# Patient Record
Sex: MALE | Race: WHITE | NOT HISPANIC OR LATINO | Employment: OTHER | URBAN - METROPOLITAN AREA
[De-identification: names, ages, dates, MRNs, and addresses within clinical notes are randomized per-mention and may not be internally consistent; named-entity substitution may affect disease eponyms.]

---

## 2017-01-08 ENCOUNTER — HOSPITAL ENCOUNTER (EMERGENCY)
Facility: MEDICAL CENTER | Age: 77
End: 2017-01-08
Payer: MEDICARE

## 2017-01-08 VITALS
HEIGHT: 71 IN | HEART RATE: 87 BPM | OXYGEN SATURATION: 95 % | RESPIRATION RATE: 18 BRPM | WEIGHT: 253.53 LBS | SYSTOLIC BLOOD PRESSURE: 199 MMHG | TEMPERATURE: 97 F | BODY MASS INDEX: 35.49 KG/M2 | DIASTOLIC BLOOD PRESSURE: 93 MMHG

## 2017-01-08 PROCEDURE — 302449 STATCHG TRIAGE ONLY (STATISTIC)

## 2017-01-09 NOTE — ED NOTES
"Pt c/o anxiety attack. Vs taken, bp is 199/93 otherwise vs wnr. Pt stated \" I am taking meds for high blood pressure but have not take it today\". Triage RN notified.  "

## 2017-01-09 NOTE — ED NOTES
"Chief Complaint   Patient presents with   • Difficulty Urinating     x 3-4 days   • Panic Attack     Patient ambulatory to triage. States that he has been having a panic attack x 2 hours. Says that he has a history of panic attacks. He feels that this is related to him having difficulty urinating for the past 3-4 days. /93 mmHg  Pulse 87  Temp(Src) 36.1 °C (97 °F)  Resp 18  Ht 1.803 m (5' 11\")  Wt 115 kg (253 lb 8.5 oz)  BMI 35.38 kg/m2  SpO2 95%    "

## 2017-01-10 ENCOUNTER — HOSPITAL ENCOUNTER (OUTPATIENT)
Facility: MEDICAL CENTER | Age: 77
End: 2017-01-10
Attending: FAMILY MEDICINE
Payer: MEDICARE

## 2017-01-10 ENCOUNTER — OFFICE VISIT (OUTPATIENT)
Dept: MEDICAL GROUP | Facility: PHYSICIAN GROUP | Age: 77
End: 2017-01-10
Payer: MEDICARE

## 2017-01-10 VITALS
HEART RATE: 66 BPM | WEIGHT: 251 LBS | DIASTOLIC BLOOD PRESSURE: 84 MMHG | RESPIRATION RATE: 16 BRPM | BODY MASS INDEX: 35.14 KG/M2 | HEIGHT: 71 IN | OXYGEN SATURATION: 93 % | SYSTOLIC BLOOD PRESSURE: 120 MMHG | TEMPERATURE: 99 F

## 2017-01-10 DIAGNOSIS — N40.1 BENIGN NON-NODULAR PROSTATIC HYPERPLASIA WITH LOWER URINARY TRACT SYMPTOMS: ICD-10-CM

## 2017-01-10 DIAGNOSIS — L72.3 SEBACEOUS CYST: ICD-10-CM

## 2017-01-10 DIAGNOSIS — R30.0 DYSURIA: ICD-10-CM

## 2017-01-10 DIAGNOSIS — R73.01 IFG (IMPAIRED FASTING GLUCOSE): ICD-10-CM

## 2017-01-10 DIAGNOSIS — M51.36 DDD (DEGENERATIVE DISC DISEASE), LUMBAR: ICD-10-CM

## 2017-01-10 DIAGNOSIS — F41.9 ANXIETY: ICD-10-CM

## 2017-01-10 LAB
APPEARANCE UR: NORMAL
BILIRUB UR STRIP-MCNC: NORMAL MG/DL
COLOR UR AUTO: NORMAL
GLUCOSE UR STRIP.AUTO-MCNC: NORMAL MG/DL
KETONES UR STRIP.AUTO-MCNC: NORMAL MG/DL
LEUKOCYTE ESTERASE UR QL STRIP.AUTO: NORMAL
NITRITE UR QL STRIP.AUTO: NORMAL
PH UR STRIP.AUTO: 5 [PH] (ref 5–8)
PROT UR QL STRIP: NORMAL MG/DL
RBC UR QL AUTO: NORMAL
SP GR UR STRIP.AUTO: 1.02
UROBILINOGEN UR STRIP-MCNC: NORMAL MG/DL

## 2017-01-10 PROCEDURE — 99214 OFFICE O/P EST MOD 30 MIN: CPT | Performed by: FAMILY MEDICINE

## 2017-01-10 PROCEDURE — 87086 URINE CULTURE/COLONY COUNT: CPT

## 2017-01-10 PROCEDURE — 81002 URINALYSIS NONAUTO W/O SCOPE: CPT | Performed by: FAMILY MEDICINE

## 2017-01-10 RX ORDER — ALPRAZOLAM 0.5 MG/1
1 TABLET ORAL PRN
Refills: 3 | COMMUNITY
Start: 2016-11-29 | End: 2017-01-10

## 2017-01-10 RX ORDER — DOXAZOSIN 8 MG/1
8 TABLET ORAL
Qty: 90 TAB | Refills: 1 | Status: SHIPPED | OUTPATIENT
Start: 2017-01-10 | End: 2017-07-05 | Stop reason: SDUPTHER

## 2017-01-10 RX ORDER — TRAZODONE HYDROCHLORIDE 100 MG/1
100 TABLET ORAL
Qty: 90 TAB | Refills: 3 | Status: SHIPPED | OUTPATIENT
Start: 2017-01-10 | End: 2017-02-28 | Stop reason: SDUPTHER

## 2017-01-10 RX ORDER — METHOCARBAMOL 500 MG/1
500 TABLET, FILM COATED ORAL 3 TIMES DAILY
Qty: 120 TAB | Refills: 0 | Status: SHIPPED | OUTPATIENT
Start: 2017-01-10 | End: 2017-03-09 | Stop reason: SDUPTHER

## 2017-01-10 RX ORDER — FINASTERIDE 5 MG/1
5 TABLET, FILM COATED ORAL DAILY
Qty: 30 TAB | Refills: 3 | Status: SHIPPED | OUTPATIENT
Start: 2017-01-10 | End: 2017-03-21

## 2017-01-10 NOTE — PROGRESS NOTES
Subjective:     Chief Complaint   Patient presents with   • Medication Management     Wants to talk about trazodone    • Cyst     L arm x 3 years       Bay Forrester is a 77 y.o. male here today for follow up on anxiety and evaluation of cyst on left arm..    Since last visit, Pt states that he had an anxiety attack on 1/8/17.  Pt went to the ER, but was too anxious to wait.  Pt went home and took xanax which resolved the panic attack.  Pt notes was thinking about death of siblings prior to onset.  Pt is using Trazodone for anxiety. He states he is not taking Xanax anymore. He reports mild improvement with sleep with trazodone. Requests increase of medication to help with sleep and anxiety.    Patient states he continues to have urinary hesitance. He is interested in supplemental medication to improve urinary hesitance of doxazosin is not helping.    He reports new symptom of dysuria for approximately 2-3 days. Patient denies hematuria, malodorous urine, suprapubic pain, flank pain, urinary frequency,  or incontinence     Patient also reports degenerative disc disease in his low back. Patient states he episodically has low back spasms. He states currently pain is 4 out of 10. At times of bending and lifting pain becomes to attend. Patient has radiation of pain down the legs, numbness, tingling, also gallbladder function, or weakness in lower extremity.    He reports history of cyst in left arm. Patient states that this is painful when he lays on it. It is not growing in size. Been present for approximately 3 years. There is no discharge. There is no warmth or redness.    Allergies   Allergen Reactions   • Nkda [No Known Drug Allergy]      Current medicines (including changes today)  Current Outpatient Prescriptions   Medication Sig Dispense Refill   • doxazosin (CARDURA) 8 MG tablet Take 1 Tab by mouth every day. 90 Tab 1   • trazodone (DESYREL) 100 MG Tab Take 1 Tab by mouth every bedtime. 90 Tab 3   •  "finasteride (PROSCAR) 5 MG Tab Take 1 Tab by mouth every day. 30 Tab 3   • methocarbamol (ROBAXIN) 500 MG Tab Take 1 Tab by mouth 3 times a day. 120 Tab 0   • lisinopril (PRINIVIL, ZESTRIL) 40 MG tablet TAKE 1 TABLET BY MOUTH EVERY DAY 90 Tab 0   • vitamin D (CHOLECALCIFEROL) 1000 UNIT Tab Take 2,000 Units by mouth every day.     • alprazolam (XANAX) 0.25 MG Tab Take 0.25 mg by mouth at bedtime as needed for Sleep.       No current facility-administered medications for this visit.     Social History   Substance Use Topics   • Smoking status: Former Smoker -- 1.00 packs/day for 40 years     Types: Cigarettes     Quit date: 1994   • Smokeless tobacco: Never Used      Comment: avoid all tobacco products   • Alcohol Use: No     Family Status   Relation Status Death Age   • Mother     • Father     • Brother Alive    • Brother       Family History   Problem Relation Age of Onset   • Heart Disease Father    • Heart Disease Brother      He    has a past medical history of Diverticulitis; Hypertension; Liver disease; Hepatitis C; Low back pain; BPH (benign prostatic hypertrophy); Depression; Anxiety; DJD (degenerative joint disease); Hepatitis C; Depression; Heart burn; Indigestion; Urinary bladder disorder; History of hepatitis C (2013); Pneumonia; Arthritis; Dental disorder; and Other specified disorder of intestines.        ROS    Resp: no shortness of breath, no cough  CV: no racing heart, no irregular beats, no chest pain  Pertinent positives and negatives as per history of present illness     Objective:     Blood pressure 120/84, pulse 66, temperature 37.2 °C (99 °F), resp. rate 16, height 1.803 m (5' 10.98\"), weight 113.853 kg (251 lb), SpO2 93 %. Body mass index is 35.02 kg/(m^2).     Physical Exam:  Constitutional: Alert, no distress, obese.  Skin: Warm, dry, good turgor, no rashes in visible areas left upper arm 2 cm x 1 cm subdermal cyst consistent with sebaceous cyst.  Eye: " Equal, round and reactive, conjunctiva clear, lids normal.  ENMT: Lips without lesions, good dentition, oropharynx non-erythematous, no exudate, moist oral mucosa, bilateral tympanic membranes: No bulging, no retraction, no fluid, nonerythematous, positive light reflex, external auditory canals: Clear, scant cerumen, nonerythematous  Neck: Trachea midline, no masses, no thyromegaly. No cervical or supraclavicular lymphadenopathy. Full ROM  Respiratory: Unlabored respiratory effort, good air movement, lungs clear to auscultation, no wheezes, no ronchi.  Cardiovascular:RRR, +S1, S2, no murmur, no peripheral edema, pedal and radial pulses equal and intact bilat  Abdomen: Obese, Soft, non-tender, no masses, no hepatosplenomegaly.  MSK:5/5 muscle strength in upper extremities as well as lower extremity bilaterally, hypertonic paraspinal muscles L2-4, hypertonic trap on right side,  Psych: Alert and oriented x3, appropriate affect and mood.  Neuro: CN2-12 intact, no gross motor or sensory deficits      Assessment and Plan:   The following treatment plan was discussed    1. Dysuria  Symptom: UA shows no signs of UTI. We'll send for culture and treat as needed. May be due to BPH.  - POCT Urinalysis  - URINE CULTURE(NEW); Future    2. Anxiety  Chronic: Patient reports improvement with trazodone nightly. Patient requests increase of medication to help improve sleep and anxiety. Patient is still taking Xanax as needed. Patient reports taking once in the past month due to panic attack as per history of present illness.  - trazodone (DESYREL) 100 MG Tab; Take 1 Tab by mouth every bedtime.  Dispense: 90 Tab; Refill: 3    3. IFG (impaired fasting glucose)  New finding: Based on lab results.  - HEMOGLOBIN A1C; Future    4. Benign non-nodular prostatic hyperplasia with lower urinary tract symptoms  Chronic: Patient reports hesitance. Will add finasteride for dual therapy. PSA within normal limits 1. 4 September 2060. If no  improvement will refer to urology.  - doxazosin (CARDURA) 8 MG tablet; Take 1 Tab by mouth every day.  Dispense: 90 Tab; Refill: 1  - finasteride (PROSCAR) 5 MG Tab; Take 1 Tab by mouth every day.  Dispense: 30 Tab; Refill: 3    5. DDD (degenerative disc disease), lumbar  Chronic: Patient reports episodic flareup of pain. Therefore recommend muscle relaxants as needed. Risk and benefits of muscle relaxant discussed with patient. Also advised simple stretching and exercise as tolerated. Patient advised to not take muscle relaxant with alcohol or benzo. Advised muscle relaxant may cause oversedation  - methocarbamol (ROBAXIN) 500 MG Tab; Take 1 Tab by mouth 3 times a day.  Dispense: 120 Tab; Refill: 0      6. Sebaceous cyst  New finding: Recommend patient schedule follow-up for removal to pain.      Followup: Return in about 4 weeks (around 2/7/2017) for sebaceous cyst removal.    Please note that this dictation was created using voice recognition software. I have made every reasonable attempt to correct obvious errors, but I expect that there are errors of grammar and possibly content that I did not discover before finalizing the note.

## 2017-01-10 NOTE — MR AVS SNAPSHOT
"Bay Forrester   1/10/2017 10:40 AM   Office Visit   MRN: [id number]    Department:  [facility]   Dept Phone:  [phone]    Description:  Male : 1940   Provider:  Emerald Cardoso D.O.           Reason for Visit     Medication Management Wants to talk about trazodone     Cyst L arm x 3 years      Allergies as of 1/10/2017     Allergen Noted Reactions    Nkda [No Known Drug Allergy] 10/14/2007         You were diagnosed with     Benign non-nodular prostatic hyperplasia with lower urinary tract symptoms   [2640892]       Anxiety   [263042]       IFG (impaired fasting glucose)   [771457]       Dysuria   [788.1.ICD-9-CM]       DDD (degenerative disc disease), lumbar   [650014]         Vital Signs     Blood Pressure Pulse Temperature Respirations Height Weight    120/84 mmHg 66 37.2 °C (99 °F) 16 1.803 m (5' 10.98\") 113.853 kg (251 lb)    Body Mass Index Oxygen Saturation Smoking Status             35.02 kg/m2 93% Former Smoker         Basic Information     Date Of Birth Sex Race Ethnicity Preferred Language    1940 Male White Non- English      Your appointments     2017  3:20 PM   Urgent/Same Day with Emerald Cardoso D.O.   [facility])    [address] [place] [address], [address]  [facility] [id number] [phone]           You will be receiving a confirmation call a few days before your appointment from our automated call confirmation system.            2017  9:20 AM   Established Patient with Emerald Cardoso D.O.   [facility])    [address] [place] [address], [address]  [facility] [id number] [phone]           You will be receiving a confirmation call a few days before your appointment from our automated call confirmation system.              Problem List              ICD-10-CM Priority Class Noted - Resolved    BPH (benign prostatic hypertrophy) N40.0 Low  Unknown - Present    Anxiety F41.9 Low  " Unknown - Present    Depression F32.9 Low  Unknown - Present    HTN (hypertension), benign I10 Medium  3/18/2010 - Present    Diverticulosis K57.90   6/24/2011 - Present    Hypotestosteronism E29.1   10/24/2011 - Present    Cirrhosis (HCC) K74.60 Low  6/12/2012 - Present    History of hepatitis C Z86.19   4/23/2013 - Present    Osteoarthritis of hip M16.9   5/1/2014 - Present    DJD (degenerative joint disease), cervical M50.30   5/1/2014 - Present    Right sided weakness M62.81   6/20/2015 - Present    Migraine without status migrainosus, not intractable G43.909   8/19/2015 - Present    TIA (transient ischemic attack) G45.9   8/19/2015 - Present    DDD (degenerative disc disease), lumbar M51.36   9/10/2015 - Present    Other osteoarthritis of spine, lumbar region M47.896   9/10/2015 - Present    DDD (degenerative disc disease), cervical M50.30   9/10/2015 - Present    Non morbid obesity due to excess calories E66.09   11/29/2016 - Present    IFG (impaired fasting glucose) R73.01   1/10/2017 - Present      Health Maintenance        Date Due Completion Dates    IMM DTaP/Tdap/Td Vaccine (1 - Tdap) 1/10/1958 ---    IMM ZOSTER VACCINE 1/10/1999 ---    IMM PNEUMOCOCCAL 65+ (ADULT) LOW/MEDIUM RISK SERIES (2 of 2 - PPSV23) 1/6/2017 1/6/2016    IMM INFLUENZA (1) 9/1/2017 (Originally 9/1/2016) ---            Current Immunizations     13-VALENT PCV PREVNAR 1/6/2016    Pneumococcal Vaccine (UF)Historical Data 1/1/2007    Tetanus Vaccine 1/1/2004      Below and/or attached are the medications your provider expects you to take. Review all of your home medications and newly ordered medications with your provider and/or pharmacist. Follow medication instructions as directed by your provider and/or pharmacist. Please keep your medication list with you and share with your provider. Update the information when medications are discontinued, doses are changed, or new medications (including over-the-counter products) are added; and  carry medication information at all times in the event of emergency situations     Allergies:  NKDA - (reactions not documented)               Medications  Valid as of: January 10, 2017 - 11:06 AM    Generic Name Brand Name Tablet Size Instructions for use    ALPRAZolam (Tab) XANAX 0.25 MG Take 0.25 mg by mouth at bedtime as needed for Sleep.        Cholecalciferol (Tab) cholecalciferol 1000 UNIT Take 2,000 Units by mouth every day.        Doxazosin Mesylate (Tab) CARDURA 8 MG Take 1 Tab by mouth every day.        Finasteride (Tab) PROSCAR 5 MG Take 1 Tab by mouth every day.        Lisinopril (Tab) PRINIVIL, ZESTRIL 40 MG TAKE 1 TABLET BY MOUTH EVERY DAY        Methocarbamol (Tab) ROBAXIN 500 MG Take 1 Tab by mouth 3 times a day.        TraZODone HCl (Tab) DESYREL 100 MG Take 1 Tab by mouth every bedtime.        .                 Medicines prescribed today were sent to:     [id number] - [place], [place] - [address] [address]    Phone: [phone] Fax: [phone]    Open 24 Hours?: No      Medication refill instructions:       If your prescription bottle indicates you have medication refills left, it is not necessary to call your provider’s office. Please contact your pharmacy and they will refill your medication.    If your prescription bottle indicates you do not have any refills left, you may request refills at any time through one of the following ways: The online [employer] system (except Urgent Care), by calling your provider’s office, or by asking your pharmacy to contact your provider’s office with a refill request. Medication refills are processed only during regular business hours and may not be available until the next business day. Your provider may request additional information or to have a follow-up visit with you prior to refilling your medication.   *Please Note: Medication refills are assigned a new Rx number when refilled electronically. Your pharmacy may  indicate that no refills were authorized even though a new prescription for the same medication is available at the pharmacy. Please request the medicine by name with the pharmacy before contacting your provider for a refill.        Your To Do List     Future Labs/Procedures Complete By Expires    HEMOGLOBIN A1C  As directed 1/10/2018    URINE CULTURE(NEW)  As directed 1/10/2018         NTQ-Data Access Code: [email or web address]  Expires: 2/9/2017 11:06 AM    NTQ-Data  A secure, online tool to manage your health information     [employer]’s NTQ-Data® is a secure, online tool that connects you to your personalized health information from the privacy of your home -- day or night - making it very easy for you to manage your healthcare. Once the activation process is completed, you can even access your medical information using the NTQ-Data jadyn, which is available for free in the Apple Jadyn store or Google Play store.     NTQ-Data provides the following levels of access (as shown below):   My Chart Features   [facility] Doctor [facility]  Specialists [facility]  Urgent  Care Non-[facility]  Primary Care  Doctor   Email your healthcare team securely and privately 24/7 X X X    Manage appointments: schedule your next appointment; view details of past/upcoming appointments X      Request prescription refills. X      View recent personal medical records, including lab and immunizations X X X X   View health record, including health history, allergies, medications X X X X   Read reports about your outpatient visits, procedures, consult and ER notes X X X X   See your discharge summary, which is a recap of your hospital and/or ER visit that includes your diagnosis, lab results, and care plan. X X       How to register for NTQ-Data:  1. Go to  [email or web address].  2. Click on the Sign Up Now box, which takes you to the New Member Sign Up page. You will need to provide the following information:  a. Enter your NTQ-Data Access Code  exactly as it appears at the top of this page. (You will not need to use this code after you’ve completed the sign-up process. If you do not sign up before the expiration date, you must request a new code.)   b. Enter your date of birth.   c. Enter your home email address.   d. Click Submit, and follow the next screen’s instructions.  3. Create a [employer] ID. This will be your [employer] login ID and cannot be changed, so think of one that is secure and easy to remember.  4. Create a [employer] password. You can change your password at any time.  5. Enter your Password Reset Question and Answer. This can be used at a later time if you forget your password.   6. Enter your e-mail address. This allows you to receive e-mail notifications when new information is available in [employer].  7. Click Sign Up. You can now view your health information.    For assistance activating your [employer] account, call [phone]

## 2017-01-12 ENCOUNTER — TELEPHONE (OUTPATIENT)
Dept: MEDICAL GROUP | Facility: PHYSICIAN GROUP | Age: 77
End: 2017-01-12

## 2017-01-12 LAB
BACTERIA UR CULT: NORMAL
SIGNIFICANT IND 70042: NORMAL
SOURCE SOURCE: NORMAL

## 2017-01-12 NOTE — TELEPHONE ENCOUNTER
----- Message from Emerald Cardoso D.O. sent at 1/12/2017 12:15 PM PST -----  Please call pt to inform him that urine culture showed no growth of bacteria.  -Dr. Cardoso

## 2017-01-17 ENCOUNTER — OFFICE VISIT (OUTPATIENT)
Dept: MEDICAL GROUP | Facility: PHYSICIAN GROUP | Age: 77
End: 2017-01-17
Payer: MEDICARE

## 2017-01-17 VITALS
WEIGHT: 251 LBS | HEIGHT: 70 IN | SYSTOLIC BLOOD PRESSURE: 160 MMHG | BODY MASS INDEX: 35.93 KG/M2 | RESPIRATION RATE: 16 BRPM | TEMPERATURE: 97.3 F | HEART RATE: 82 BPM | DIASTOLIC BLOOD PRESSURE: 82 MMHG | OXYGEN SATURATION: 91 %

## 2017-01-17 DIAGNOSIS — M79.89 CYST OF SOFT TISSUE: ICD-10-CM

## 2017-01-17 PROCEDURE — 11400 EXC TR-EXT B9+MARG 0.5 CM<: CPT | Performed by: FAMILY MEDICINE

## 2017-01-17 RX ORDER — HYDROCODONE BITARTRATE AND ACETAMINOPHEN 5; 325 MG/1; MG/1
1 TABLET ORAL EVERY 8 HOURS PRN
Qty: 42 TAB | Refills: 0 | Status: SHIPPED | OUTPATIENT
Start: 2017-01-17 | End: 2017-03-21

## 2017-01-17 ASSESSMENT — PATIENT HEALTH QUESTIONNAIRE - PHQ9: CLINICAL INTERPRETATION OF PHQ2 SCORE: 0

## 2017-01-17 NOTE — MR AVS SNAPSHOT
"Bay Forrester   2017 3:20 PM   Office Visit   MRN: [id number]    Department:  [facility]   Dept Phone:  [phone]    Description:  Male : 1940   Provider:  Emerald Cardoso D.O.           Reason for Visit     Follow-Up           Allergies as of 2017     Allergen Noted Reactions    Nkda [No Known Drug Allergy] 10/14/2007         You were diagnosed with     DDD (degenerative disc disease), lumbar   [547905]         Vital Signs     Blood Pressure Pulse Temperature Respirations Height Weight    160/82 mmHg 82 36.3 °C (97.3 °F) 16 1.778 m (5' 10\") 113.853 kg (251 lb)    Body Mass Index Oxygen Saturation Smoking Status             36.01 kg/m2 91% Former Smoker         Basic Information     Date Of Birth Sex Race Ethnicity Preferred Language    1940 Male White Non- English      Your appointments     2017  9:20 AM   Established Patient with Emerald Cardoso D.O.   [facility])    [address], [address]  [place] [address] [phone]           You will be receiving a confirmation call a few days before your appointment from our automated call confirmation system.              Problem List              ICD-10-CM Priority Class Noted - Resolved    BPH (benign prostatic hypertrophy) N40.0 Low  Unknown - Present    Anxiety F41.9 Low  Unknown - Present    Depression F32.9 Low  Unknown - Present    HTN (hypertension), benign I10 Medium  3/18/2010 - Present    Diverticulosis K57.90   2011 - Present    Hypotestosteronism E29.1   10/24/2011 - Present    Cirrhosis (CMS-HCC) K74.60 Low  2012 - Present    History of hepatitis C Z86.19   2013 - Present    Osteoarthritis of hip M16.9   2014 - Present    DJD (degenerative joint disease), cervical M50.30   2014 - Present    Right sided weakness M62.81   2015 - Present    Migraine without status migrainosus, not intractable G43.909   2015 - Present   "    TIA (transient ischemic attack) G45.9   8/19/2015 - Present    DDD (degenerative disc disease), lumbar M51.36   9/10/2015 - Present    Other osteoarthritis of spine, lumbar region M47.896   9/10/2015 - Present    DDD (degenerative disc disease), cervical M50.30   9/10/2015 - Present    Non morbid obesity due to excess calories E66.09   11/29/2016 - Present    IFG (impaired fasting glucose) R73.01   1/10/2017 - Present      Health Maintenance        Date Due Completion Dates    IMM DTaP/Tdap/Td Vaccine (1 - Tdap) 1/10/1958 ---    IMM ZOSTER VACCINE 1/10/1999 ---    IMM PNEUMOCOCCAL 65+ (ADULT) LOW/MEDIUM RISK SERIES (2 of 2 - PPSV23) 1/6/2017 1/6/2016    IMM INFLUENZA (1) 9/1/2017 (Originally 9/1/2016) ---            Current Immunizations     13-VALENT PCV PREVNAR 1/6/2016    Pneumococcal Vaccine (UF)Historical Data 1/1/2007    Tetanus Vaccine 1/1/2004      Below and/or attached are the medications your provider expects you to take. Review all of your home medications and newly ordered medications with your provider and/or pharmacist. Follow medication instructions as directed by your provider and/or pharmacist. Please keep your medication list with you and share with your provider. Update the information when medications are discontinued, doses are changed, or new medications (including over-the-counter products) are added; and carry medication information at all times in the event of emergency situations     Allergies:  NKDA - (reactions not documented)               Medications  Valid as of: January 17, 2017 -  5:51 PM    Generic Name Brand Name Tablet Size Instructions for use    ALPRAZolam (Tab) XANAX 0.25 MG Take 0.25 mg by mouth at bedtime as needed for Sleep.        Cholecalciferol (Tab) cholecalciferol 1000 UNIT Take 2,000 Units by mouth every day.        Doxazosin Mesylate (Tab) CARDURA 8 MG Take 1 Tab by mouth every day.        Finasteride (Tab) PROSCAR 5 MG Take 1 Tab by mouth every day.         Hydrocodone-Acetaminophen (Tab) NORCO 5-325 MG Take 1 Tab by mouth every 8 hours as needed.        Lisinopril (Tab) PRINIVIL, ZESTRIL 40 MG TAKE 1 TABLET BY MOUTH EVERY DAY        Methocarbamol (Tab) ROBAXIN 500 MG Take 1 Tab by mouth 3 times a day.        TraZODone HCl (Tab) DESYREL 100 MG Take 1 Tab by mouth every bedtime.        .                 Medicines prescribed today were sent to:     [facility]/PHARMACY #[id number] - [place], [place] - [address]    [address] [place] [address]    Phone: [phone] Fax: [phone]    Open 24 Hours?: No      Medication refill instructions:       If your prescription bottle indicates you have medication refills left, it is not necessary to call your provider’s office. Please contact your pharmacy and they will refill your medication.    If your prescription bottle indicates you do not have any refills left, you may request refills at any time through one of the following ways: The online Qwiki system (except Urgent Care), by calling your provider’s office, or by asking your pharmacy to contact your provider’s office with a refill request. Medication refills are processed only during regular business hours and may not be available until the next business day. Your provider may request additional information or to have a follow-up visit with you prior to refilling your medication.   *Please Note: Medication refills are assigned a new Rx number when refilled electronically. Your pharmacy may indicate that no refills were authorized even though a new prescription for the same medication is available at the pharmacy. Please request the medicine by name with the pharmacy before contacting your provider for a refill.           Qwiki Access Code: [email or web address]  Expires: 2/9/2017 11:06 AM    Qwiki  A secure, online tool to manage your health information     [employer]’s Qwiki® is a secure, online tool that connects you to your personalized health information from the  privacy of your home -- day or night - making it very easy for you to manage your healthcare. Once the activation process is completed, you can even access your medical information using the SOAMAI jadyn, which is available for free in the Apple Jadyn store or Google Play store.     SOAMAI provides the following levels of access (as shown below):   My Chart Features   [facility] Doctor [facility]  Specialists [facility]  Urgent  Care Non-[facility]  Primary Care  Doctor   Email your healthcare team securely and privately 24/7 X X X    Manage appointments: schedule your next appointment; view details of past/upcoming appointments X      Request prescription refills. X      View recent personal medical records, including lab and immunizations X X X X   View health record, including health history, allergies, medications X X X X   Read reports about your outpatient visits, procedures, consult and ER notes X X X X   See your discharge summary, which is a recap of your hospital and/or ER visit that includes your diagnosis, lab results, and care plan. X X       How to register for SOAMAI:  1. Go to  [email or web address].  2. Click on the Sign Up Now box, which takes you to the New Member Sign Up page. You will need to provide the following information:  a. Enter your SOAMAI Access Code exactly as it appears at the top of this page. (You will not need to use this code after you’ve completed the sign-up process. If you do not sign up before the expiration date, you must request a new code.)   b. Enter your date of birth.   c. Enter your home email address.   d. Click Submit, and follow the next screen’s instructions.  3. Create a SOAMAI ID. This will be your SOAMAI login ID and cannot be changed, so think of one that is secure and easy to remember.  4. Create a SOAMAI password. You can change your password at any time.  5. Enter your Password Reset Question and Answer. This can be used at a later time if you forget  your password.   6. Enter your e-mail address. This allows you to receive e-mail notifications when new information is available in Jusp.  7. Click Sign Up. You can now view your health information.    For assistance activating your Jusp account, call [phone]

## 2017-01-18 ENCOUNTER — HOSPITAL ENCOUNTER (OUTPATIENT)
Facility: MEDICAL CENTER | Age: 77
End: 2017-01-18
Attending: FAMILY MEDICINE
Payer: MEDICARE

## 2017-01-18 DIAGNOSIS — M79.89 CYST OF SOFT TISSUE: ICD-10-CM

## 2017-01-18 LAB — PATHOLOGY CONSULT NOTE: NORMAL

## 2017-01-18 PROCEDURE — 88304 TISSUE EXAM BY PATHOLOGIST: CPT

## 2017-01-18 NOTE — PROGRESS NOTES
Subjective:     Chief Complaint   Patient presents with   • Follow-Up       Bay Forrester is a 77 y.o. male here today for excision of cyst on left upper arm.  Patient reports cyst is painful. He requests removal. He states that he keeps them at night due to pain when he is laying on it.    Allergies   Allergen Reactions   • Nkda [No Known Drug Allergy]      Current medicines (including changes today)  Current Outpatient Prescriptions   Medication Sig Dispense Refill   • hydrocodone-acetaminophen (NORCO) 5-325 MG Tab per tablet Take 1 Tab by mouth every 8 hours as needed. 42 Tab 0   • doxazosin (CARDURA) 8 MG tablet Take 1 Tab by mouth every day. 90 Tab 1   • trazodone (DESYREL) 100 MG Tab Take 1 Tab by mouth every bedtime. 90 Tab 3   • finasteride (PROSCAR) 5 MG Tab Take 1 Tab by mouth every day. 30 Tab 3   • methocarbamol (ROBAXIN) 500 MG Tab Take 1 Tab by mouth 3 times a day. 120 Tab 0   • alprazolam (XANAX) 0.25 MG Tab Take 0.25 mg by mouth at bedtime as needed for Sleep.     • lisinopril (PRINIVIL, ZESTRIL) 40 MG tablet TAKE 1 TABLET BY MOUTH EVERY DAY 90 Tab 0   • vitamin D (CHOLECALCIFEROL) 1000 UNIT Tab Take 2,000 Units by mouth every day.       No current facility-administered medications for this visit.     Social History   Substance Use Topics   • Smoking status: Former Smoker -- 1.00 packs/day for 40 years     Types: Cigarettes     Quit date: 1994   • Smokeless tobacco: Never Used      Comment: avoid all tobacco products   • Alcohol Use: No     Family Status   Relation Status Death Age   • Mother     • Father     • Brother Alive    • Brother       Family History   Problem Relation Age of Onset   • Heart Disease Father    • Heart Disease Brother      He    has a past medical history of Diverticulitis; Hypertension; Liver disease; Hepatitis C; Low back pain; BPH (benign prostatic hypertrophy); Depression; Anxiety; DJD (degenerative joint disease); Hepatitis C;  "Depression; Heart burn; Indigestion; Urinary bladder disorder; History of hepatitis C (4/23/2013); Pneumonia; Arthritis; Dental disorder; and Other specified disorder of intestines.        ROS   GEN: no weight loss, fevers, or chills  Resp: no shortness of breath, no cough  CV: no racing heart, no irregular beats, no chest pain  Abd: no nausea, no vomiting, no diarrhea, no constipation, no blood in stool, no dark stools, no incontinence  Neuro: no headaches, no dizziness, no LOC, no weakness, no numbness/tingling       Objective:     Blood pressure 160/82, pulse 82, temperature 36.3 °C (97.3 °F), resp. rate 16, height 1.778 m (5' 10\"), weight 113.853 kg (251 lb), SpO2 91 %. Body mass index is 36.01 kg/(m^2).   Physical Exam:  Constitutional: Alert, no distress.  Skin: Warm, dry, good turgor, no rashes in visible areas. Skin: Warm, dry, good turgor, no rashes in visible areas left upper arm 2 cm x 1 cm subdermal cyst consistent with sebaceous cyst.  Abdomen: Soft, non-tender, no masses, no hepatosplenomegaly.  MSK:5/5 muscle strength in upper extremities as well as lower extremity bilaterally    PROCEDURE NOTE, Excision/Biopsy  Indication: Inflamed sebaceous cyst      History of allergy to iodine: no     The risks (including bleeding and infection) and benefits of the   procedure and verbal informed consent obtained.     Local anesthesia was performed with    Lidocaine 2% with epinephrine, prepped with povidone iodine, and   draped in the usual sterile fashion.  An incision was made over the lesion, which was dissected free of the surrounding tissue and removed.    The wound was closed with 3-0 ethilon using simple interrupted stitches x 4 and a sterile dressing applied.    The specimen was sent for pathologic examination.     The patient tolerated the procedure well and without apparent   complications.     The patient was instructed to keep the wound dry and covered for   24-48h and clean thereafter, and warning " signs of infection were   reviewed.  Will return for suture removal in 7   days.  Recommended that the patient use Percocet   as needed for pain.  Followup sooner for any concerns.          Assessment and Plan:   The following treatment plan was discussed    1. Cyst of soft tissue  Removed as above. Patient tolerated well. Upon dissection most likely lipoma. Instructions provided to the patient. Patient scribed medication as below.  - hydrocodone-acetaminophen (NORCO) 5-325 MG Tab per tablet; Take 1 Tab by mouth every 8 hours as needed.  Dispense: 42 Tab; Refill: 0        Followup: Return in about 7 days (around 1/24/2017), or removal of sutures.    Please note that this dictation was created using voice recognition software. I have made every reasonable attempt to correct obvious errors, but I expect that there are errors of grammar and possibly content that I did not discover before finalizing the note.

## 2017-01-24 ENCOUNTER — NON-PROVIDER VISIT (OUTPATIENT)
Dept: MEDICAL GROUP | Facility: PHYSICIAN GROUP | Age: 77
End: 2017-01-24
Payer: MEDICARE

## 2017-01-24 NOTE — PROGRESS NOTES
Bay Forrester is a 77 y.o. male here for a Non-Provider Visit for Suture Removal.    Sutures were placed by Dr Cardoso on date: 01/17/2017  Skin is healed: Yes  Provider notified if skin is not healed, or if there is redness, heat, pain, or drainage from incision: yes  Sutures removed.   Mastisol and steristips are placed: Yes    Advised to use emollient (vaseline, aquaphor, etc.) as needed, avoid peroxide and antibiotic ointment to reduce irritation.     Path report has not been reviewed by provider.  Path report has not reviewed with patient.

## 2017-01-24 NOTE — MR AVS SNAPSHOT
Bay Forrester   2017 9:40 AM   Non-Provider Visit   MRN: [id number]    Department:  [facility]   Dept Phone:  [phone]    Description:  Male : 1940   Provider:  [place] MA           Reason for Visit     Suture / Staple Removal           Allergies as of 2017     Allergen Noted Reactions    Nkda [No Known Drug Allergy] 10/14/2007         Vital Signs     Smoking Status                   Former Smoker           Basic Information     Date Of Birth Sex Race Ethnicity Preferred Language    1940 Male White Non- English      Your appointments     2017  9:20 AM   Established Patient with Emerald Cardoso D.O.   [facility])    [address], [address]  [place] [address] [phone]           You will be receiving a confirmation call a few days before your appointment from our automated call confirmation system.              Problem List              ICD-10-CM Priority Class Noted - Resolved    BPH (benign prostatic hypertrophy) N40.0 Low  Unknown - Present    Anxiety F41.9 Low  Unknown - Present    Depression F32.9 Low  Unknown - Present    HTN (hypertension), benign I10 Medium  3/18/2010 - Present    Diverticulosis K57.90   2011 - Present    Hypotestosteronism E29.1   10/24/2011 - Present    Cirrhosis (CMS-HCC) K74.60 Low  2012 - Present    History of hepatitis C Z86.19   2013 - Present    Osteoarthritis of hip M16.9   2014 - Present    DJD (degenerative joint disease), cervical M50.30   2014 - Present    Right sided weakness M62.81   2015 - Present    Migraine without status migrainosus, not intractable G43.909   2015 - Present    TIA (transient ischemic attack) G45.9   2015 - Present    DDD (degenerative disc disease), lumbar M51.36   9/10/2015 - Present    Other osteoarthritis of spine, lumbar region M47.896   9/10/2015 - Present    DDD (degenerative disc disease),  cervical M50.30   9/10/2015 - Present    Non morbid obesity due to excess calories E66.09   11/29/2016 - Present    IFG (impaired fasting glucose) R73.01   1/10/2017 - Present      Health Maintenance        Date Due Completion Dates    IMM DTaP/Tdap/Td Vaccine (1 - Tdap) 1/10/1958 ---    IMM ZOSTER VACCINE 1/10/1999 ---    IMM PNEUMOCOCCAL 65+ (ADULT) LOW/MEDIUM RISK SERIES (2 of 2 - PPSV23) 1/6/2017 1/6/2016    IMM INFLUENZA (1) 9/1/2017 (Originally 9/1/2016) ---            Current Immunizations     13-VALENT PCV PREVNAR 1/6/2016    Pneumococcal Vaccine (UF)Historical Data 1/1/2007    Tetanus Vaccine 1/1/2004      Below and/or attached are the medications your provider expects you to take. Review all of your home medications and newly ordered medications with your provider and/or pharmacist. Follow medication instructions as directed by your provider and/or pharmacist. Please keep your medication list with you and share with your provider. Update the information when medications are discontinued, doses are changed, or new medications (including over-the-counter products) are added; and carry medication information at all times in the event of emergency situations     Allergies:  NKDA - (reactions not documented)               Medications  Valid as of: January 24, 2017 - 10:04 AM    Generic Name Brand Name Tablet Size Instructions for use    ALPRAZolam (Tab) XANAX 0.25 MG Take 0.25 mg by mouth at bedtime as needed for Sleep.        Cholecalciferol (Tab) cholecalciferol 1000 UNIT Take 2,000 Units by mouth every day.        Doxazosin Mesylate (Tab) CARDURA 8 MG Take 1 Tab by mouth every day.        Finasteride (Tab) PROSCAR 5 MG Take 1 Tab by mouth every day.        Hydrocodone-Acetaminophen (Tab) NORCO 5-325 MG Take 1 Tab by mouth every 8 hours as needed.        Lisinopril (Tab) PRINIVIL, ZESTRIL 40 MG TAKE 1 TABLET BY MOUTH EVERY DAY        Methocarbamol (Tab) ROBAXIN 500 MG Take 1 Tab by mouth 3 times a day.         TraZODone HCl (Tab) DESYREL 100 MG Take 1 Tab by mouth every bedtime.        .                 Medicines prescribed today were sent to:     [facility]/PHARMACY #[id number] - [place], [place] - [address]    [address] [place] [address]    Phone: [phone] Fax: [phone]    Open 24 Hours?: No      Medication refill instructions:       If your prescription bottle indicates you have medication refills left, it is not necessary to call your provider’s office. Please contact your pharmacy and they will refill your medication.    If your prescription bottle indicates you do not have any refills left, you may request refills at any time through one of the following ways: The online Zaya system (except Urgent Care), by calling your provider’s office, or by asking your pharmacy to contact your provider’s office with a refill request. Medication refills are processed only during regular business hours and may not be available until the next business day. Your provider may request additional information or to have a follow-up visit with you prior to refilling your medication.   *Please Note: Medication refills are assigned a new Rx number when refilled electronically. Your pharmacy may indicate that no refills were authorized even though a new prescription for the same medication is available at the pharmacy. Please request the medicine by name with the pharmacy before contacting your provider for a refill.           Zaya Access Code: [email or web address]  Expires: 2/9/2017 11:06 AM    Zaya  A secure, online tool to manage your health information     [employer]’s Zaya® is a secure, online tool that connects you to your personalized health information from the privacy of your home -- day or night - making it very easy for you to manage your healthcare. Once the activation process is completed, you can even access your medical information using the Zaya jadyn, which is available for free in the Apple Jadyn store or  Google Play store.     [employer] provides the following levels of access (as shown below):   My Chart Features   [facility] Doctor [facility]  Specialists [facility]  Urgent  Care Non-[facility]  Primary Care  Doctor   Email your healthcare team securely and privately 24/7 X X X    Manage appointments: schedule your next appointment; view details of past/upcoming appointments X      Request prescription refills. X      View recent personal medical records, including lab and immunizations X X X X   View health record, including health history, allergies, medications X X X X   Read reports about your outpatient visits, procedures, consult and ER notes X X X X   See your discharge summary, which is a recap of your hospital and/or ER visit that includes your diagnosis, lab results, and care plan. X X       How to register for [employer]:  1. Go to  [email or web address].  2. Click on the Sign Up Now box, which takes you to the New Member Sign Up page. You will need to provide the following information:  a. Enter your [employer] Access Code exactly as it appears at the top of this page. (You will not need to use this code after you’ve completed the sign-up process. If you do not sign up before the expiration date, you must request a new code.)   b. Enter your date of birth.   c. Enter your home email address.   d. Click Submit, and follow the next screen’s instructions.  3. Create a [employer] ID. This will be your [employer] login ID and cannot be changed, so think of one that is secure and easy to remember.  4. Create a [employer] password. You can change your password at any time.  5. Enter your Password Reset Question and Answer. This can be used at a later time if you forget your password.   6. Enter your e-mail address. This allows you to receive e-mail notifications when new information is available in [employer].  7. Click Sign Up. You can now view your health information.    For assistance activating your [employer] account, call  [phone]

## 2017-01-25 NOTE — TELEPHONE ENCOUNTER
Was the patient seen in the last year in this department? Yes     Does patient have an active prescription for medications requested? No     Received Request Via: Pharmacy      Pt met protocol?: Yes  OV 1/17/17  BP Readings from Last 1 Encounters:   01/17/17 160/82

## 2017-01-26 RX ORDER — LISINOPRIL 40 MG/1
TABLET ORAL
Qty: 90 TAB | Refills: 0 | Status: SHIPPED | OUTPATIENT
Start: 2017-01-26 | End: 2017-04-06 | Stop reason: SDUPTHER

## 2017-01-30 ENCOUNTER — OFFICE VISIT (OUTPATIENT)
Dept: MEDICAL GROUP | Facility: PHYSICIAN GROUP | Age: 77
End: 2017-01-30
Payer: MEDICARE

## 2017-01-30 VITALS
SYSTOLIC BLOOD PRESSURE: 150 MMHG | RESPIRATION RATE: 16 BRPM | HEART RATE: 82 BPM | DIASTOLIC BLOOD PRESSURE: 90 MMHG | HEIGHT: 70 IN | TEMPERATURE: 97.6 F | BODY MASS INDEX: 35.93 KG/M2 | WEIGHT: 251 LBS | OXYGEN SATURATION: 91 %

## 2017-01-30 DIAGNOSIS — M51.36 DDD (DEGENERATIVE DISC DISEASE), LUMBAR: ICD-10-CM

## 2017-01-30 DIAGNOSIS — I10 HTN (HYPERTENSION), BENIGN: ICD-10-CM

## 2017-01-30 DIAGNOSIS — F41.9 ANXIETY: ICD-10-CM

## 2017-01-30 DIAGNOSIS — N40.1 BENIGN NON-NODULAR PROSTATIC HYPERPLASIA WITH LOWER URINARY TRACT SYMPTOMS: ICD-10-CM

## 2017-01-30 DIAGNOSIS — Z23 NEED FOR VACCINATION: ICD-10-CM

## 2017-01-30 PROCEDURE — 1101F PT FALLS ASSESS-DOCD LE1/YR: CPT | Performed by: FAMILY MEDICINE

## 2017-01-30 PROCEDURE — 1036F TOBACCO NON-USER: CPT | Performed by: FAMILY MEDICINE

## 2017-01-30 PROCEDURE — G8432 DEP SCR NOT DOC, RNG: HCPCS | Performed by: FAMILY MEDICINE

## 2017-01-30 PROCEDURE — 90471 IMMUNIZATION ADMIN: CPT | Performed by: FAMILY MEDICINE

## 2017-01-30 PROCEDURE — G8484 FLU IMMUNIZE NO ADMIN: HCPCS | Performed by: FAMILY MEDICINE

## 2017-01-30 PROCEDURE — 4040F PNEUMOC VAC/ADMIN/RCVD: CPT | Performed by: FAMILY MEDICINE

## 2017-01-30 PROCEDURE — 99214 OFFICE O/P EST MOD 30 MIN: CPT | Mod: 25 | Performed by: FAMILY MEDICINE

## 2017-01-30 PROCEDURE — 90715 TDAP VACCINE 7 YRS/> IM: CPT | Performed by: FAMILY MEDICINE

## 2017-01-30 PROCEDURE — G8419 CALC BMI OUT NRM PARAM NOF/U: HCPCS | Performed by: FAMILY MEDICINE

## 2017-01-30 NOTE — MR AVS SNAPSHOT
"Bay Forrester   2017 9:20 AM   Office Visit   MRN: [id number]    Department:  [facility]   Dept Phone:  [phone]    Description:  Male : 1940   Provider:  Emerald Cardoso D.O.           Reason for Visit     Medication Refill           Allergies as of 2017     Allergen Noted Reactions    Nkda [No Known Drug Allergy] 10/14/2007         You were diagnosed with     Anxiety   [660505]       DDD (degenerative disc disease), lumbar   [203815]       HTN (hypertension), benign   [687114]       Benign non-nodular prostatic hyperplasia with lower urinary tract symptoms   [6710859]       Need for vaccination   [701515]         Vital Signs     Blood Pressure Pulse Temperature Respirations Height Weight    150/90 mmHg 82 36.4 °C (97.6 °F) 16 1.778 m (5' 10\") 113.853 kg (251 lb)    Body Mass Index Oxygen Saturation Smoking Status             36.01 kg/m2 91% Former Smoker         Basic Information     Date Of Birth Sex Race Ethnicity Preferred Language    1940 Male White Non- English      Problem List              ICD-10-CM Priority Class Noted - Resolved    BPH (benign prostatic hypertrophy) N40.0 Low  Unknown - Present    Anxiety F41.9 Low  Unknown - Present    Depression F32.9 Low  Unknown - Present    HTN (hypertension), benign I10 Medium  3/18/2010 - Present    Hypotestosteronism E29.1   10/24/2011 - Present    Cirrhosis (CMS-HCC) K74.60 Low  2012 - Present    History of hepatitis C Z86.19   2013 - Present    Osteoarthritis of hip M16.9   2014 - Present    DJD (degenerative joint disease), cervical M50.30   2014 - Present    Right sided weakness M62.81   2015 - Present    Migraine without status migrainosus, not intractable G43.909   2015 - Present    TIA (transient ischemic attack) G45.9   2015 - Present    DDD (degenerative disc disease), lumbar M51.36   9/10/2015 - Present    DDD (degenerative disc disease), cervical M50.30   " 9/10/2015 - Present    Non morbid obesity due to excess calories E66.09   11/29/2016 - Present    IFG (impaired fasting glucose) R73.01   1/10/2017 - Present      Health Maintenance        Date Due Completion Dates    IMM DTaP/Tdap/Td Vaccine (1 - Tdap) 1/10/1958 ---    IMM ZOSTER VACCINE 1/10/1999 ---    IMM INFLUENZA (1) 9/1/2017 (Originally 9/1/2016) ---    IMM PNEUMOCOCCAL 65+ (ADULT) LOW/MEDIUM RISK SERIES (2 of 2 - PPSV23) 5/21/2027 (Originally 1/6/2017) 1/6/2016            Current Immunizations     13-VALENT PCV PREVNAR 1/6/2016    Pneumococcal Vaccine (UF)Historical Data 1/1/2007    Tdap Vaccine 1/30/2017    Tetanus Vaccine 1/1/2004      Below and/or attached are the medications your provider expects you to take. Review all of your home medications and newly ordered medications with your provider and/or pharmacist. Follow medication instructions as directed by your provider and/or pharmacist. Please keep your medication list with you and share with your provider. Update the information when medications are discontinued, doses are changed, or new medications (including over-the-counter products) are added; and carry medication information at all times in the event of emergency situations     Allergies:  NKDA - (reactions not documented)               Medications  Valid as of: January 30, 2017 - 11:24 AM    Generic Name Brand Name Tablet Size Instructions for use    ALPRAZolam (Tab) XANAX 0.25 MG Take 0.25 mg by mouth at bedtime as needed for Sleep.        Cholecalciferol (Tab) cholecalciferol 1000 UNIT Take 2,000 Units by mouth every day.        Doxazosin Mesylate (Tab) CARDURA 8 MG Take 1 Tab by mouth every day.        Finasteride (Tab) PROSCAR 5 MG Take 1 Tab by mouth every day.        Hydrocodone-Acetaminophen (Tab) NORCO 5-325 MG Take 1 Tab by mouth every 8 hours as needed.        Lisinopril (Tab) PRINIVIL, ZESTRIL 40 MG TAKE 1 TABLET BY MOUTH EVERY DAY        Methocarbamol (Tab) ROBAXIN 500 MG Take 1 Tab  by mouth 3 times a day.        TraZODone HCl (Tab) DESYREL 100 MG Take 1 Tab by mouth every bedtime.        .                 Medicines prescribed today were sent to:     [facility] #[id number] - [place], [place] - [address][1]    [address]] [place] [address]    Phone: [phone] Fax: [phone]    Open 24 Hours?: No      Medication refill instructions:       If your prescription bottle indicates you have medication refills left, it is not necessary to call your provider’s office. Please contact your pharmacy and they will refill your medication.    If your prescription bottle indicates you do not have any refills left, you may request refills at any time through one of the following ways: The online [employer] system (except Urgent Care), by calling your provider’s office, or by asking your pharmacy to contact your provider’s office with a refill request. Medication refills are processed only during regular business hours and may not be available until the next business day. Your provider may request additional information or to have a follow-up visit with you prior to refilling your medication.   *Please Note: Medication refills are assigned a new Rx number when refilled electronically. Your pharmacy may indicate that no refills were authorized even though a new prescription for the same medication is available at the pharmacy. Please request the medicine by name with the pharmacy before contacting your provider for a refill.           [employer] Access Code: [email or web address]  Expires: 2/9/2017 11:06 AM    [employer]  A secure, online tool to manage your health information     [employer]® is a secure, online tool that connects you to your personalized health information from the privacy of your home -- day or night - making it very easy for you to manage your healthcare. Once the activation process is completed, you can even access your medical information using the [employer] jackeline, which is available for  free in the Apple Jadyn store or Google Play store.     [employer] provides the following levels of access (as shown below):   My Chart Features   [facility] Doctor [facility]  Specialists [facility]  Urgent  Care Non-[facility]  Primary Care  Doctor   Email your healthcare team securely and privately 24/7 X X X    Manage appointments: schedule your next appointment; view details of past/upcoming appointments X      Request prescription refills. X      View recent personal medical records, including lab and immunizations X X X X   View health record, including health history, allergies, medications X X X X   Read reports about your outpatient visits, procedures, consult and ER notes X X X X   See your discharge summary, which is a recap of your hospital and/or ER visit that includes your diagnosis, lab results, and care plan. X X       How to register for [employer]:  1. Go to  [email or web address].  2. Click on the Sign Up Now box, which takes you to the New Member Sign Up page. You will need to provide the following information:  a. Enter your [employer] Access Code exactly as it appears at the top of this page. (You will not need to use this code after you’ve completed the sign-up process. If you do not sign up before the expiration date, you must request a new code.)   b. Enter your date of birth.   c. Enter your home email address.   d. Click Submit, and follow the next screen’s instructions.  3. Create a TEEspy ID. This will be your [employer]y login ID and cannot be changed, so think of one that is secure and easy to remember.  4. Create a [employer] password. You can change your password at any time.  5. Enter your Password Reset Question and Answer. This can be used at a later time if you forget your password.   6. Enter your e-mail address. This allows you to receive e-mail notifications when new information is available in [employer]EEspy.  7. Click Sign Up. You can now view your health information.    For assistance  activating your [employer] account, call [phone]

## 2017-01-30 NOTE — PROGRESS NOTES
Subjective:     Chief Complaint   Patient presents with   • Medication Refill       Bay Forrester is a 77 y.o. male here today for follow up on chronic medical conditions.    Degenerative disc disease of the lumbar and cervical spine: Pt is taking 1/4 of Norco PRN 2-3 times a week with improvement in lumbar and cervical pain. Patient is also using Robaxin when necessary usually 12 times per week.  Pt is no longer using Xanax.     Anxiety is currently well-controlled with trazodone nightly. Patient is no longer taking Xanax. Patient denies any thoughts of self-harm, suicide ideations, homicidal ideations, anxiety, or depression.    Patient's blood pressure is well-controlled. Denies blurry vision, change of vision, headaches, chest pain, change in urination or lower extremity swelling.      Patient reports that nocturia and urinary frequency have improved with use of Proscar. Patient denies any dysuria, hematuria, change in urination frequency or color.      Allergies   Allergen Reactions   • Nkda [No Known Drug Allergy]      Current medicines (including changes today)  Current Outpatient Prescriptions   Medication Sig Dispense Refill   • lisinopril (PRINIVIL, ZESTRIL) 40 MG tablet TAKE 1 TABLET BY MOUTH EVERY DAY 90 Tab 0   • hydrocodone-acetaminophen (NORCO) 5-325 MG Tab per tablet Take 1 Tab by mouth every 8 hours as needed. 42 Tab 0   • doxazosin (CARDURA) 8 MG tablet Take 1 Tab by mouth every day. 90 Tab 1   • trazodone (DESYREL) 100 MG Tab Take 1 Tab by mouth every bedtime. 90 Tab 3   • finasteride (PROSCAR) 5 MG Tab Take 1 Tab by mouth every day. 30 Tab 3   • methocarbamol (ROBAXIN) 500 MG Tab Take 1 Tab by mouth 3 times a day. 120 Tab 0   • alprazolam (XANAX) 0.25 MG Tab Take 0.25 mg by mouth at bedtime as needed for Sleep.     • vitamin D (CHOLECALCIFEROL) 1000 UNIT Tab Take 2,000 Units by mouth every day.       No current facility-administered medications for this visit.     Social History  "  Substance Use Topics   • Smoking status: Former Smoker -- 1.00 packs/day for 40 years     Types: Cigarettes     Quit date: 1994   • Smokeless tobacco: Never Used      Comment: avoid all tobacco products   • Alcohol Use: No     Family Status   Relation Status Death Age   • Mother     • Father     • Brother Alive    • Brother       Family History   Problem Relation Age of Onset   • Heart Disease Father    • Heart Disease Brother      He    has a past medical history of Diverticulitis; Hypertension; Liver disease; Hepatitis C; Low back pain; BPH (benign prostatic hypertrophy); Depression; Anxiety; DJD (degenerative joint disease); Hepatitis C; Depression; Heart burn; Indigestion; Urinary bladder disorder; History of hepatitis C (2013); Pneumonia; Arthritis; Dental disorder; and Other specified disorder of intestines.        ROS   GEN: no weight loss, fevers, or chills  HEENT: no blurry vision or change in vision, no ear pain, no difficulty swallowing, no throat pain, no runny nose, no nasal congestion  Resp: no shortness of breath, no cough  CV: no racing heart, no irregular beats, no chest pain  Abd: no nausea, no vomiting, no diarrhea, no constipation, no blood in stool, no dark stools, no incontinence  : no dysuria, no hematuria, no urinary incontinence, no increased frequency  MSK: Chronic low back pain controlled  Neuro: no headaches, no dizziness, no LOC, no weakness, no numbness/tingling       Objective:     Blood pressure 150/90, pulse 82, temperature 36.4 °C (97.6 °F), resp. rate 16, height 1.778 m (5' 10\"), weight 113.853 kg (251 lb), SpO2 91 %. Body mass index is 36.01 kg/(m^2).   Physical Exam:  Constitutional: Alert, no distress.  Skin: Warm, dry, good turgor, no rashes in visible areas, left upper arm surgical incision well-healed, no erythema, no drainage. 2in x0.5 inch ecchymosis.  Eye: Equal, round and reactive, conjunctiva clear, lids normal.  ENMT: Lips without " lesions, good dentition, oropharynx non-erythematous, no exudate, moist oral mucosa  Neck: Trachea midline, no masses, no thyromegaly. No cervical or supraclavicular lymphadenopathy. Full ROM  Respiratory: Unlabored respiratory effort, good air movement, lungs clear to auscultation, no wheezes, no ronchi.  Cardiovascular:RRR, +S1, S2, no murmur, no peripheral edema, pedal and radial pulses equal and intact bilat  Abdomen: Soft, non-tender, no masses, no hepatosplenomegaly.  MSK:5/5 muscle strength in upper extremities as well as lower extremity bilaterally  Psych: Alert and oriented x3, appropriate affect and mood.  Neuro: CN2-12 intact, no gross motor or sensory deficits      Assessment and Plan:   The following treatment plan was discussed    1. Anxiety  Well-controlled, continue trazodone. Xanax as per history of present illness    2. DDD (degenerative disc disease), lumbar  Well-controlled continue Norco and Robaxin when necessary    3. HTN (hypertension), benign  Well-controlled:    4. Benign non-nodular prostatic hyperplasia with lower urinary tract symptoms  Improved with Proscar.    5. Need for vaccination  I discussed benefits and side effects of each vaccine with patient, and I answered all patient's questions about vaccines.    - TDAP VACCINE =>8YO IM      Followup: Return in about 6 months (around 7/30/2017) for chronic conditions.    Please note that this dictation was created using voice recognition software. I have made every reasonable attempt to correct obvious errors, but I expect that there are errors of grammar and possibly content that I did not discover before finalizing the note.

## 2017-02-07 ENCOUNTER — APPOINTMENT (OUTPATIENT)
Dept: RADIOLOGY | Facility: IMAGING CENTER | Age: 77
End: 2017-02-07
Attending: PHYSICIAN ASSISTANT
Payer: MEDICARE

## 2017-02-07 ENCOUNTER — OFFICE VISIT (OUTPATIENT)
Dept: URGENT CARE | Facility: PHYSICIAN GROUP | Age: 77
End: 2017-02-07
Payer: MEDICARE

## 2017-02-07 VITALS
BODY MASS INDEX: 35.93 KG/M2 | TEMPERATURE: 100 F | RESPIRATION RATE: 20 BRPM | HEART RATE: 88 BPM | DIASTOLIC BLOOD PRESSURE: 90 MMHG | SYSTOLIC BLOOD PRESSURE: 130 MMHG | WEIGHT: 251 LBS | HEIGHT: 70 IN | OXYGEN SATURATION: 92 %

## 2017-02-07 DIAGNOSIS — R05.9 COUGH: ICD-10-CM

## 2017-02-07 DIAGNOSIS — J18.9 PNEUMONIA DUE TO INFECTIOUS ORGANISM, UNSPECIFIED LATERALITY, UNSPECIFIED PART OF LUNG: ICD-10-CM

## 2017-02-07 LAB
FLUAV+FLUBV AG SPEC QL IA: NEGATIVE
INT CON NEG: NEGATIVE
INT CON POS: POSITIVE

## 2017-02-07 PROCEDURE — 4040F PNEUMOC VAC/ADMIN/RCVD: CPT | Performed by: PHYSICIAN ASSISTANT

## 2017-02-07 PROCEDURE — G8419 CALC BMI OUT NRM PARAM NOF/U: HCPCS | Performed by: PHYSICIAN ASSISTANT

## 2017-02-07 PROCEDURE — 87804 INFLUENZA ASSAY W/OPTIC: CPT | Performed by: PHYSICIAN ASSISTANT

## 2017-02-07 PROCEDURE — G8432 DEP SCR NOT DOC, RNG: HCPCS | Performed by: PHYSICIAN ASSISTANT

## 2017-02-07 PROCEDURE — 71020 DX-CHEST-2 VIEWS: CPT | Mod: TC | Performed by: PHYSICIAN ASSISTANT

## 2017-02-07 PROCEDURE — 1101F PT FALLS ASSESS-DOCD LE1/YR: CPT | Performed by: PHYSICIAN ASSISTANT

## 2017-02-07 PROCEDURE — G8484 FLU IMMUNIZE NO ADMIN: HCPCS | Performed by: PHYSICIAN ASSISTANT

## 2017-02-07 PROCEDURE — 99214 OFFICE O/P EST MOD 30 MIN: CPT | Performed by: PHYSICIAN ASSISTANT

## 2017-02-07 PROCEDURE — 1036F TOBACCO NON-USER: CPT | Performed by: PHYSICIAN ASSISTANT

## 2017-02-07 RX ORDER — AMOXICILLIN AND CLAVULANATE POTASSIUM 875; 125 MG/1; MG/1
1 TABLET, FILM COATED ORAL 2 TIMES DAILY
Qty: 14 TAB | Refills: 0 | Status: SHIPPED | OUTPATIENT
Start: 2017-02-07 | End: 2017-02-14

## 2017-02-07 RX ORDER — BENZONATATE 100 MG/1
200 CAPSULE ORAL 3 TIMES DAILY PRN
Qty: 30 CAP | Refills: 0 | Status: SHIPPED | OUTPATIENT
Start: 2017-02-07 | End: 2017-03-21

## 2017-02-07 ASSESSMENT — ENCOUNTER SYMPTOMS
DIZZINESS: 0
RHINORRHEA: 1
NECK PAIN: 0
SHORTNESS OF BREATH: 0
TINGLING: 0
HEADACHES: 0
PALPITATIONS: 0
EYE DISCHARGE: 0
FEVER: 1
WHEEZING: 1
SORE THROAT: 1
COUGH: 1
EYE REDNESS: 0
ABDOMINAL PAIN: 0
DIARRHEA: 0
MYALGIAS: 1
SPUTUM PRODUCTION: 1
VOMITING: 0
CHILLS: 1

## 2017-02-07 NOTE — PROGRESS NOTES
"Subjective:      Bay Forrester is a 77 y.o. male who presents with Cough          Pt is 78 y/o male who presents with cough, fatigue, and fevers for 3 days. Pt. Reports hx of pneumonia with hospitalization and wanted to be evaluated before \"it got too bad\". He denies any hx of asthma, or COPD, or CHF. He denies any new leg swelling. He reports that his coughing kept him up all last night.   Cough  This is a new problem. Episode onset: 3-4 days ago. The problem has been gradually worsening. The problem occurs every few minutes. The cough is productive of sputum. Associated symptoms include chills, a fever, myalgias, nasal congestion, postnasal drip, rhinorrhea, a sore throat and wheezing. Pertinent negatives include no chest pain, ear pain, eye redness, headaches, rash or shortness of breath. The symptoms are aggravated by cold air. He has tried nothing for the symptoms. His past medical history is significant for pneumonia.       Review of Systems   Constitutional: Positive for fever, chills and malaise/fatigue.   HENT: Positive for congestion, postnasal drip, rhinorrhea and sore throat. Negative for ear discharge and ear pain.    Eyes: Negative for discharge and redness.   Respiratory: Positive for cough, sputum production and wheezing. Negative for shortness of breath.    Cardiovascular: Negative for chest pain, palpitations and leg swelling.   Gastrointestinal: Negative for vomiting, abdominal pain and diarrhea.   Genitourinary: Negative for dysuria and urgency.   Musculoskeletal: Positive for myalgias. Negative for neck pain.   Skin: Negative for itching and rash.   Neurological: Negative for dizziness, tingling and headaches.          Objective:     /90 mmHg  Pulse 88  Temp(Src) 37.8 °C (100 °F)  Resp 20  Ht 1.778 m (5' 10\")  Wt 113.853 kg (251 lb)  BMI 36.01 kg/m2  SpO2 92%   PMH:  has a past medical history of Diverticulitis; Hypertension; Liver disease; Hepatitis C; Low back pain; BPH " (benign prostatic hypertrophy); Depression; Anxiety; DJD (degenerative joint disease); Hepatitis C; Depression; Heart burn; Indigestion; Urinary bladder disorder; History of hepatitis C (4/23/2013); Pneumonia; Arthritis; Dental disorder; and Other specified disorder of intestines.  MEDS:   Current outpatient prescriptions:   •  benzonatate (TESSALON) 100 MG Cap, Take 2 Caps by mouth 3 times a day as needed for Cough., Disp: 30 Cap, Rfl: 0  •  amoxicillin-clavulanate (AUGMENTIN) 875-125 MG Tab, Take 1 Tab by mouth 2 times a day for 7 days., Disp: 14 Tab, Rfl: 0  •  lisinopril (PRINIVIL, ZESTRIL) 40 MG tablet, TAKE 1 TABLET BY MOUTH EVERY DAY, Disp: 90 Tab, Rfl: 0  •  hydrocodone-acetaminophen (NORCO) 5-325 MG Tab per tablet, Take 1 Tab by mouth every 8 hours as needed., Disp: 42 Tab, Rfl: 0  •  doxazosin (CARDURA) 8 MG tablet, Take 1 Tab by mouth every day., Disp: 90 Tab, Rfl: 1  •  trazodone (DESYREL) 100 MG Tab, Take 1 Tab by mouth every bedtime., Disp: 90 Tab, Rfl: 3  •  finasteride (PROSCAR) 5 MG Tab, Take 1 Tab by mouth every day., Disp: 30 Tab, Rfl: 3  •  methocarbamol (ROBAXIN) 500 MG Tab, Take 1 Tab by mouth 3 times a day., Disp: 120 Tab, Rfl: 0  •  alprazolam (XANAX) 0.25 MG Tab, Take 0.25 mg by mouth at bedtime as needed for Sleep., Disp: , Rfl:   •  vitamin D (CHOLECALCIFEROL) 1000 UNIT Tab, Take 2,000 Units by mouth every day., Disp: , Rfl:   ALLERGIES:   Allergies   Allergen Reactions   • Nkda [No Known Drug Allergy]      SURGHX:   Past Surgical History   Procedure Laterality Date   • Gastroscopy-endo  10/13/08     Performed by CECY DIAZ at ENDOSCOPY [facility] ORS   • Colonoscopy - endo  10/14/08     Performed by CECY DIAZ at ENDOSCOPY [facility] ORS   • Other orthopedic surgery  2003     right knee replacement   • Recovery  8/4/2011     Performed by SURGERY, IR-RECOVERY at SURGERY SAME DAY [facility] ORS     SOCHX:  reports that he quit smoking about 23 years ago. His smoking use included  Cigarettes. He has a 40 pack-year smoking history. He has never used smokeless tobacco. He reports that he does not drink alcohol or use illicit drugs.  FH: Family history was reviewed, no pertinent findings to report    Physical Exam   Constitutional: He is oriented to person, place, and time. He appears well-developed and well-nourished.   HENT:   Head: Normocephalic and atraumatic.   Right Ear: External ear normal.   Left Ear: External ear normal.   Mouth/Throat: Oropharynx is clear and moist. No oropharyngeal exudate.   Moderate amount of nasal discharge.      Eyes: EOM are normal. Pupils are equal, round, and reactive to light.   Neck: Normal range of motion. Neck supple.   Cardiovascular: Normal rate and regular rhythm.    No murmur heard.  Pulmonary/Chest: Effort normal and breath sounds normal. No respiratory distress. He has no wheezes.   Noted egophony to left lower lung field.    Musculoskeletal: Normal range of motion. He exhibits no tenderness.   Lymphadenopathy:     He has no cervical adenopathy.   Neurological: He is alert and oriented to person, place, and time.   Skin: Skin is warm. No rash noted.   Psychiatric: He has a normal mood and affect. His behavior is normal.   Vitals reviewed.         Influenza negative.   CXR: reviewed by myself and with the pt. - possible consolidation to the left lower lobe.   Without other acute cardiopulmonary changes.      Assessment/Plan:     1. Pneumonia due to infectious organism, unspecified laterality, unspecified part of lung  - benzonatate (TESSALON) 100 MG Cap; Take 2 Caps by mouth 3 times a day as needed for Cough.  Dispense: 30 Cap; Refill: 0  - amoxicillin-clavulanate (AUGMENTIN) 875-125 MG Tab; Take 1 Tab by mouth 2 times a day for 7 days.  Dispense: 14 Tab; Refill: 0    2. Cough  - POCT Influenza A/B  - DX-CHEST-2 VIEWS; Future  - benzonatate (TESSALON) 100 MG Cap; Take 2 Caps by mouth 3 times a day as needed for Cough.  Dispense: 30 Cap; Refill: 0  -  amoxicillin-clavulanate (AUGMENTIN) 875-125 MG Tab; Take 1 Tab by mouth 2 times a day for 7 days.  Dispense: 14 Tab; Refill: 0    Clinically- with fevers, productive cough, and fatigue- will treat for Pneumonia today. Pt is to start ABX today. Increase fluids. Avoid night time dairy. Pt. Is to have recheck with PCP early next week to ensure improvement of symptoms.     Patient given precautionary s/sx that mandate immediate follow up and evaluation in the ED. Advised of risks of not doing so.    DDX, Supportive care, and indications for immediate follow-up discussed with patient.    Instructed to return to clinic or nearest emergency department if we are not available for any change in condition, further concerns, or worsening of symptoms.    The patient demonstrated a good understanding and agreed with the treatment plan.

## 2017-02-07 NOTE — MR AVS SNAPSHOT
"Bay Forrester   2017 9:55 AM   Office Visit   MRN: [id number]    Department:  [facility]   Dept Phone:  [phone]    Description:  Male : 1940   Provider:  Reynaldo Gonsalez PA-C           Reason for Visit     Cough chest congestion, headache, fever, body aches, chest pain from coughing x3 days  Hx of pneumonia      Allergies as of 2017     Allergen Noted Reactions    Nkda [No Known Drug Allergy] 10/14/2007         You were diagnosed with     Pneumonia due to infectious organism, unspecified laterality, unspecified part of lung   [1870114]       Cough   [786.2.ICD-9-CM]         Vital Signs     Blood Pressure Pulse Temperature Respirations Height Weight    130/90 mmHg 88 37.8 °C (100 °F) 20 1.778 m (5' 10\") 113.853 kg (251 lb)    Body Mass Index Oxygen Saturation Smoking Status             36.01 kg/m2 92% Former Smoker         Basic Information     Date Of Birth Sex Race Ethnicity Preferred Language    1940 Male White Non- English      Problem List              ICD-10-CM Priority Class Noted - Resolved    BPH (benign prostatic hypertrophy) N40.0 Low  Unknown - Present    Anxiety F41.9 Low  Unknown - Present    Depression F32.9 Low  Unknown - Present    HTN (hypertension), benign I10 Medium  3/18/2010 - Present    Hypotestosteronism E29.1   10/24/2011 - Present    Cirrhosis (CMS-HCC) K74.60 Low  2012 - Present    History of hepatitis C Z86.19   2013 - Present    Osteoarthritis of hip M16.9   2014 - Present    DJD (degenerative joint disease), cervical M50.30   2014 - Present    Right sided weakness M62.81   2015 - Present    Migraine without status migrainosus, not intractable G43.909   2015 - Present    TIA (transient ischemic attack) G45.9   2015 - Present    DDD (degenerative disc disease), lumbar M51.36   9/10/2015 - Present    DDD (degenerative disc disease), cervical M50.30   9/10/2015 - Present    Non morbid obesity due to " excess calories E66.09   11/29/2016 - Present    IFG (impaired fasting glucose) R73.01   1/10/2017 - Present      Health Maintenance        Date Due Completion Dates    IMM ZOSTER VACCINE 1/10/1999 ---    IMM INFLUENZA (1) 9/1/2017 (Originally 9/1/2016) ---    IMM PNEUMOCOCCAL 65+ (ADULT) LOW/MEDIUM RISK SERIES (2 of 2 - PPSV23) 5/21/2027 (Originally 1/6/2017) 1/6/2016    IMM DTaP/Tdap/Td Vaccine (2 - Td) 1/30/2027 1/30/2017            Current Immunizations     13-VALENT PCV PREVNAR 1/6/2016    Pneumococcal Vaccine (UF)Historical Data 1/1/2007    Tdap Vaccine 1/30/2017    Tetanus Vaccine 1/1/2004      Below and/or attached are the medications your provider expects you to take. Review all of your home medications and newly ordered medications with your provider and/or pharmacist. Follow medication instructions as directed by your provider and/or pharmacist. Please keep your medication list with you and share with your provider. Update the information when medications are discontinued, doses are changed, or new medications (including over-the-counter products) are added; and carry medication information at all times in the event of emergency situations     Allergies:  NKDA - (reactions not documented)               Medications  Valid as of: February 07, 2017 - 10:59 AM    Generic Name Brand Name Tablet Size Instructions for use    ALPRAZolam (Tab) XANAX 0.25 MG Take 0.25 mg by mouth at bedtime as needed for Sleep.        Amoxicillin-Pot Clavulanate (Tab) AUGMENTIN 875-125 MG Take 1 Tab by mouth 2 times a day for 7 days.        Benzonatate (Cap) TESSALON 100 MG Take 2 Caps by mouth 3 times a day as needed for Cough.        Cholecalciferol (Tab) cholecalciferol 1000 UNIT Take 2,000 Units by mouth every day.        Doxazosin Mesylate (Tab) CARDURA 8 MG Take 1 Tab by mouth every day.        Finasteride (Tab) PROSCAR 5 MG Take 1 Tab by mouth every day.        Hydrocodone-Acetaminophen (Tab) NORCO 5-325 MG Take 1 Tab by  mouth every 8 hours as needed.        Lisinopril (Tab) PRINIVIL, ZESTRIL 40 MG TAKE 1 TABLET BY MOUTH EVERY DAY        Methocarbamol (Tab) ROBAXIN 500 MG Take 1 Tab by mouth 3 times a day.        TraZODone HCl (Tab) DESYREL 100 MG Take 1 Tab by mouth every bedtime.        .                 Medicines prescribed today were sent to:     [facility]/PHARMACY #[id number] - [place], [place] - [address]    [address] [place] [address]    Phone: [phone] Fax: [phone]    Open 24 Hours?: No      Medication refill instructions:       If your prescription bottle indicates you have medication refills left, it is not necessary to call your provider’s office. Please contact your pharmacy and they will refill your medication.    If your prescription bottle indicates you do not have any refills left, you may request refills at any time through one of the following ways: The online [employer] system (except Urgent Care), by calling your provider’s office, or by asking your pharmacy to contact your provider’s office with a refill request. Medication refills are processed only during regular business hours and may not be available until the next business day. Your provider may request additional information or to have a follow-up visit with you prior to refilling your medication.   *Please Note: Medication refills are assigned a new Rx number when refilled electronically. Your pharmacy may indicate that no refills were authorized even though a new prescription for the same medication is available at the pharmacy. Please request the medicine by name with the pharmacy before contacting your provider for a refill.        Your To Do List     Future Labs/Procedures Complete By Expires    DX-CHEST-2 VIEWS  As directed 2/7/2018         [employer] Access Code: [email or web address]  Expires: 2/9/2017 11:06 AM    [employer]  A secure, online tool to manage your health information     [employer]’s [employer]® is a secure, online tool that connects you to  your personalized health information from the privacy of your home -- day or night - making it very easy for you to manage your healthcare. Once the activation process is completed, you can even access your medical information using the [employer] jadyn, which is available for free in the Apple Jadyn store or Google Play store.     [employer] provides the following levels of access (as shown below):   My Chart Features   [facility] Doctor [facility]  Specialists [facility]  Urgent  Care Non-[facility]  Primary Care  Doctor   Email your healthcare team securely and privately 24/7 X X X    Manage appointments: schedule your next appointment; view details of past/upcoming appointments X      Request prescription refills. X      View recent personal medical records, including lab and immunizations X X X X   View health record, including health history, allergies, medications X X X X   Read reports about your outpatient visits, procedures, consult and ER notes X X X X   See your discharge summary, which is a recap of your hospital and/or ER visit that includes your diagnosis, lab results, and care plan. X X       How to register for [employer]:  1. Go to  [email or web address].  2. Click on the Sign Up Now box, which takes you to the New Member Sign Up page. You will need to provide the following information:  a. Enter your [employer] Access Code exactly as it appears at the top of this page. (You will not need to use this code after you’ve completed the sign-up process. If you do not sign up before the expiration date, you must request a new code.)   b. Enter your date of birth.   c. Enter your home email address.   d. Click Submit, and follow the next screen’s instructions.  3. Create a [employer] ID. This will be your [employer] login ID and cannot be changed, so think of one that is secure and easy to remember.  4. Create a [employer] password. You can change your password at any time.  5. Enter your Password Reset Question and Answer.  This can be used at a later time if you forget your password.   6. Enter your e-mail address. This allows you to receive e-mail notifications when new information is available in [employer].  7. Click Sign Up. You can now view your health information.    For assistance activating your [employer] account, call [phone]

## 2017-02-28 DIAGNOSIS — F41.9 ANXIETY: ICD-10-CM

## 2017-02-28 RX ORDER — TRAZODONE HYDROCHLORIDE 100 MG/1
100-200 TABLET ORAL
Qty: 180 TAB | Refills: 2 | Status: SHIPPED | OUTPATIENT
Start: 2017-02-28 | End: 2017-08-03 | Stop reason: SDUPTHER

## 2017-03-10 RX ORDER — METHOCARBAMOL 500 MG/1
TABLET, FILM COATED ORAL
Qty: 120 TAB | Refills: 0 | Status: SHIPPED | OUTPATIENT
Start: 2017-03-10 | End: 2017-03-21

## 2017-03-10 NOTE — TELEPHONE ENCOUNTER
Was the patient seen in the last year in this department? Yes     Does patient have an active prescription for medications requested? No     Received Request Via: Pharmacy      Pt met protocol?: Yes, dx discussed at OV 1/30/17.

## 2017-03-20 ENCOUNTER — HOSPITAL ENCOUNTER (OUTPATIENT)
Dept: LAB | Facility: MEDICAL CENTER | Age: 77
End: 2017-03-20
Attending: FAMILY MEDICINE
Payer: MEDICARE

## 2017-03-20 DIAGNOSIS — R73.01 IFG (IMPAIRED FASTING GLUCOSE): ICD-10-CM

## 2017-03-20 LAB
EST. AVERAGE GLUCOSE BLD GHB EST-MCNC: 126 MG/DL
HBA1C MFR BLD: 6 % (ref 0–5.6)

## 2017-03-20 PROCEDURE — 36415 COLL VENOUS BLD VENIPUNCTURE: CPT

## 2017-03-20 PROCEDURE — 83036 HEMOGLOBIN GLYCOSYLATED A1C: CPT

## 2017-03-21 ENCOUNTER — OFFICE VISIT (OUTPATIENT)
Dept: MEDICAL GROUP | Facility: PHYSICIAN GROUP | Age: 77
End: 2017-03-21
Payer: MEDICARE

## 2017-03-21 VITALS
SYSTOLIC BLOOD PRESSURE: 110 MMHG | BODY MASS INDEX: 35.93 KG/M2 | TEMPERATURE: 98.1 F | WEIGHT: 251 LBS | OXYGEN SATURATION: 92 % | HEIGHT: 70 IN | DIASTOLIC BLOOD PRESSURE: 74 MMHG | HEART RATE: 98 BPM | RESPIRATION RATE: 16 BRPM

## 2017-03-21 DIAGNOSIS — F41.9 ANXIETY: ICD-10-CM

## 2017-03-21 DIAGNOSIS — R73.01 IFG (IMPAIRED FASTING GLUCOSE): ICD-10-CM

## 2017-03-21 DIAGNOSIS — E55.9 HYPOVITAMINOSIS D: ICD-10-CM

## 2017-03-21 DIAGNOSIS — I10 HTN (HYPERTENSION), BENIGN: ICD-10-CM

## 2017-03-21 PROCEDURE — 1101F PT FALLS ASSESS-DOCD LE1/YR: CPT | Performed by: FAMILY MEDICINE

## 2017-03-21 PROCEDURE — 1036F TOBACCO NON-USER: CPT | Performed by: FAMILY MEDICINE

## 2017-03-21 PROCEDURE — G8432 DEP SCR NOT DOC, RNG: HCPCS | Performed by: FAMILY MEDICINE

## 2017-03-21 PROCEDURE — 4040F PNEUMOC VAC/ADMIN/RCVD: CPT | Performed by: FAMILY MEDICINE

## 2017-03-21 PROCEDURE — G8419 CALC BMI OUT NRM PARAM NOF/U: HCPCS | Performed by: FAMILY MEDICINE

## 2017-03-21 PROCEDURE — G8484 FLU IMMUNIZE NO ADMIN: HCPCS | Performed by: FAMILY MEDICINE

## 2017-03-21 PROCEDURE — 99214 OFFICE O/P EST MOD 30 MIN: CPT | Performed by: FAMILY MEDICINE

## 2017-03-21 RX ORDER — ERGOCALCIFEROL 1.25 MG/1
50000 CAPSULE ORAL
Qty: 6 CAP | Refills: 0 | Status: SHIPPED | OUTPATIENT
Start: 2017-03-21 | End: 2018-11-15

## 2017-03-21 NOTE — MR AVS SNAPSHOT
"Bay Forrester   3/21/2017 11:20 AM   Office Visit   MRN: [id number]    Department:  [facility]   Dept Phone:  [phone]    Description:  Male : 1940   Provider:  Emerald Cardoso D.O.           Reason for Visit     Follow-Up           Allergies as of 3/21/2017     Allergen Noted Reactions    Nkda [No Known Drug Allergy] 10/14/2007         You were diagnosed with     Hypovitaminosis D   [713037]       Anxiety   [648476]       HTN (hypertension), benign   [381876]       IFG (impaired fasting glucose)   [292394]         Vital Signs     Blood Pressure Pulse Temperature Respirations Height Weight    110/74 mmHg 98 36.7 °C (98.1 °F) 16 1.778 m (5' 10\") 113.853 kg (251 lb)    Body Mass Index Oxygen Saturation Smoking Status             36.01 kg/m2 92% Former Smoker         Basic Information     Date Of Birth Sex Race Ethnicity Preferred Language    1940 Male White Non- English      Problem List              ICD-10-CM Priority Class Noted - Resolved    BPH (benign prostatic hypertrophy) N40.0 Low  Unknown - Present    Anxiety F41.9 Low  Unknown - Present    Depression F32.9 Low  Unknown - Present    HTN (hypertension), benign I10 Medium  3/18/2010 - Present    Hypotestosteronism E29.1   10/24/2011 - Present    History of hepatitis C Z86.19   2013 - Present    Osteoarthritis of hip M16.9   2014 - Present    Right sided weakness M62.81   2015 - Present    Migraine without status migrainosus, not intractable G43.909   2015 - Present    TIA (transient ischemic attack) G45.9   2015 - Present    DDD (degenerative disc disease), lumbar M51.36   9/10/2015 - Present    DDD (degenerative disc disease), cervical M50.30   9/10/2015 - Present    Non morbid obesity due to excess calories E66.09   2016 - Present    IFG (impaired fasting glucose) R73.01   1/10/2017 - Present      Health Maintenance        Date Due Completion Dates    IMM ZOSTER VACCINE 1/10/1999 " ---    IMM INFLUENZA (1) 9/1/2017 (Originally 9/1/2016) ---    IMM PNEUMOCOCCAL 65+ (ADULT) LOW/MEDIUM RISK SERIES (2 of 2 - PPSV23) 5/21/2027 (Originally 1/6/2017) 1/6/2016    IMM DTaP/Tdap/Td Vaccine (2 - Td) 1/30/2027 1/30/2017            Current Immunizations     13-VALENT PCV PREVNAR 1/6/2016    Pneumococcal Vaccine (UF)Historical Data 1/1/2007    Tdap Vaccine 1/30/2017    Tetanus Vaccine 1/1/2004      Below and/or attached are the medications your provider expects you to take. Review all of your home medications and newly ordered medications with your provider and/or pharmacist. Follow medication instructions as directed by your provider and/or pharmacist. Please keep your medication list with you and share with your provider. Update the information when medications are discontinued, doses are changed, or new medications (including over-the-counter products) are added; and carry medication information at all times in the event of emergency situations     Allergies:  NKDA - (reactions not documented)               Medications  Valid as of: March 21, 2017 - 12:27 PM    Generic Name Brand Name Tablet Size Instructions for use    Cholecalciferol (Tab) cholecalciferol 1000 UNIT Take 2,000 Units by mouth every day.        Doxazosin Mesylate (Tab) CARDURA 8 MG Take 1 Tab by mouth every day.        Ergocalciferol (Cap) DRISDOL 91160 UNIT Take 1 Cap by mouth every 7 days.        Lisinopril (Tab) PRINIVIL, ZESTRIL 40 MG TAKE 1 TABLET BY MOUTH EVERY DAY        TraZODone HCl (Tab) DESYREL 100 MG Take 1-2 Tabs by mouth every bedtime.        .                 Medicines prescribed today were sent to:     [facility]/PHARMACY #[id number] - [place], [place] - [address]    [address] [place] [address]    Phone: [phone] Fax: [phone]    Open 24 Hours?: No      Medication refill instructions:       If your prescription bottle indicates you have medication refills left, it is not necessary to call your provider’s office.  Please contact your pharmacy and they will refill your medication.    If your prescription bottle indicates you do not have any refills left, you may request refills at any time through one of the following ways: The online [employer] system (except Urgent Care), by calling your provider’s office, or by asking your pharmacy to contact your provider’s office with a refill request. Medication refills are processed only during regular business hours and may not be available until the next business day. Your provider may request additional information or to have a follow-up visit with you prior to refilling your medication.   *Please Note: Medication refills are assigned a new Rx number when refilled electronically. Your pharmacy may indicate that no refills were authorized even though a new prescription for the same medication is available at the pharmacy. Please request the medicine by name with the pharmacy before contacting your provider for a refill.           [employer] Status: Patient Declined

## 2017-03-21 NOTE — PROGRESS NOTES
Subjective:     Chief Complaint   Patient presents with   • Follow-Up       Bay Forrester is a 77 y.o. male here today for patient was seen in urgent care on 17 and diagnosed with pneumonia. Pt states he completed course of abx.  PT denies cough, SOB, fevers, chills, generralized malaise.     The patient reports anxiety is controlled with trazodone. He is no longer taking Xanax.    Patient history of hypertension. He is taking lisinopril 40 mg daily. Denies blurry vision, change of vision, headaches, chest pain, change in urination or lower extremity swelling.       Patient's history of lumbar disc disease. He notes no improvement with hydrocodone. He is definitely not taking any longer. Patient also reported minimal improvement for Robaxin. Is currently doing home stretches to improve pain.    Allergies   Allergen Reactions   • Nkda [No Known Drug Allergy]      Current medicines (including changes today)  Current Outpatient Prescriptions   Medication Sig Dispense Refill   • ergocalciferol (DRISDOL) 23947 UNIT capsule Take 1 Cap by mouth every 7 days. 6 Cap 0   • trazodone (DESYREL) 100 MG Tab Take 1-2 Tabs by mouth every bedtime. 180 Tab 2   • lisinopril (PRINIVIL, ZESTRIL) 40 MG tablet TAKE 1 TABLET BY MOUTH EVERY DAY 90 Tab 0   • doxazosin (CARDURA) 8 MG tablet Take 1 Tab by mouth every day. 90 Tab 1   • vitamin D (CHOLECALCIFEROL) 1000 UNIT Tab Take 2,000 Units by mouth every day.       No current facility-administered medications for this visit.     Social History   Substance Use Topics   • Smoking status: Former Smoker -- 1.00 packs/day for 40 years     Types: Cigarettes     Quit date: 1994   • Smokeless tobacco: Never Used      Comment: avoid all tobacco products   • Alcohol Use: No     Family Status   Relation Status Death Age   • Mother     • Father     • Brother Alive    • Brother       Family History   Problem Relation Age of Onset   • Heart Disease Father    •  "Heart Disease Brother      He    has a past medical history of Diverticulitis; Hypertension; Liver disease; Hepatitis C; Low back pain; BPH (benign prostatic hypertrophy); Depression; Anxiety; DJD (degenerative joint disease); Hepatitis C; Depression; Heart burn; Indigestion; Urinary bladder disorder; History of hepatitis C (4/23/2013); Pneumonia; Arthritis; Dental disorder; and Other specified disorder of intestines.        ROS   GEN: no weight loss, fevers, or chills  HEENT: no blurry vision or change in vision, no ear pain, no difficulty swallowing, no throat pain, no runny nose, no nasal congestion  Resp: no shortness of breath, no cough  CV: no racing heart, no irregular beats, no chest pain  Abd: no nausea, no vomiting, no diarrhea, no constipation, no blood in stool, no dark stools, no incontinence  : no dysuria, no hematuria, no urinary incontinence, no increased frequency  Neuro: no headaches, no dizziness, no LOC, no weakness, no numbness/tingling       Objective:     Blood pressure 110/74, pulse 98, temperature 36.7 °C (98.1 °F), resp. rate 16, height 1.778 m (5' 10\"), weight 113.853 kg (251 lb), SpO2 92 %. Body mass index is 36.01 kg/(m^2).   Physical Exam:  Constitutional: Alert, no distress.  Skin: Warm, dry, good turgor, no rashes in visible areas.  Eye: Equal, round and reactive, conjunctiva clear, lids normal.  ENMT: Lips without lesions, good dentition, oropharynx non-erythematous, no exudate, moist oral mucosa  Neck: Trachea midline, no masses, no thyromegaly. No cervical or supraclavicular lymphadenopathy. Full ROM  Respiratory: Unlabored respiratory effort, good air movement, lungs clear to auscultation, no wheezes, no ronchi.  Cardiovascular:RRR, +S1, S2, no murmur, no peripheral edema, pedal and radial pulses equal and intact bilat  Abdomen: Soft, non-tender, no masses, no hepatosplenomegaly.  MSK:5/5 muscle strength in upper extremities as well as lower extremity bilaterally  Psych: Alert " and oriented x3, appropriate affect and mood.  Neuro: CN2-12 intact, no gross motor or sensory deficits      Assessment and Plan:   The following treatment plan was discussed    1. Hypovitaminosis D  Chronic: Patient reports he continues to have fatigue. Recommended vitamin D 50,000 units once per week for 6 weeks.  - ergocalciferol (DRISDOL) 25578 UNIT capsule; Take 1 Cap by mouth every 7 days.  Dispense: 6 Cap; Refill: 0    2. Anxiety  Chronic: Well controlled with trazodone. Patient has stopped Xanax.    3. HTN (hypertension), benign  Chronic: Well controlled with lisinopril    4. IFG (impaired fasting glucose)  We advise to reduce sugar/carbohydrate/alcohol, loose weight, eat more vegetables and lean meats such as fish/chicken/turkey. We also recommend 30 minutes of cardiovascular exercise 5 days of the week. Most recent A1c 6.0.      Followup: Return in about 6 months (around 9/21/2017) for chronic conditions.    Please note that this dictation was created using voice recognition software. I have made every reasonable attempt to correct obvious errors, but I expect that there are errors of grammar and possibly content that I did not discover before finalizing the note.

## 2017-04-07 RX ORDER — LISINOPRIL 40 MG/1
TABLET ORAL
Qty: 90 TAB | Refills: 1 | Status: SHIPPED | OUTPATIENT
Start: 2017-04-07 | End: 2017-10-20 | Stop reason: SDUPTHER

## 2017-04-07 NOTE — TELEPHONE ENCOUNTER
Refill X 6 months, sent to pharmacy.Pt. Seen in the last 6 months per protocol.   Lab Results   Component Value Date/Time    SODIUM 140 09/27/2016 09:02 AM    POTASSIUM 4.2 09/27/2016 09:02 AM    CHLORIDE 107 09/27/2016 09:02 AM    CO2 28 09/27/2016 09:02 AM    GLUCOSE 107* 09/27/2016 09:02 AM    BUN 19 09/27/2016 09:02 AM    CREATININE 0.85 09/27/2016 09:02 AM    BUN-CREATININE RATIO 17 12/23/2010 02:45 PM    GLOM FILT RATE, EST >59 12/23/2010 02:45 PM

## 2017-04-07 NOTE — TELEPHONE ENCOUNTER
Was the patient seen in the last year in this department? Yes     Does patient have an active prescription for medications requested? No     Received Request Via: Pharmacy      Pt met protocol?: Yes, OV last month   BP Readings from Last 1 Encounters:   03/21/17 110/74

## 2017-06-01 NOTE — TELEPHONE ENCOUNTER
Was the patient seen in the last year in this department? Yes     Does patient have an active prescription for medications requested? No     Received Request Via: Pharmacy      Pt met protocol?: Yes    LAST OV 03/21/2017

## 2017-06-01 NOTE — TELEPHONE ENCOUNTER
Was the patient seen in the last year in this department? Yes     Does patient have an active prescription for medications requested? NO    Received Request Via: Pharmacy

## 2017-06-02 RX ORDER — FINASTERIDE 5 MG/1
TABLET, FILM COATED ORAL
Refills: 3 | OUTPATIENT
Start: 2017-06-02

## 2017-06-02 NOTE — TELEPHONE ENCOUNTER
"Dr. Cardoso- You d/c's med 3/17 due to \"prescription completion\" but I don't see any specific note from OV stating why. Pt requesting, please refill as you see fit.   "

## 2017-07-05 DIAGNOSIS — N40.1 BENIGN NON-NODULAR PROSTATIC HYPERPLASIA WITH LOWER URINARY TRACT SYMPTOMS: ICD-10-CM

## 2017-07-05 RX ORDER — DOXAZOSIN 8 MG/1
8 TABLET ORAL
Qty: 90 TAB | Refills: 1 | Status: SHIPPED | OUTPATIENT
Start: 2017-07-05 | End: 2017-12-14 | Stop reason: SDUPTHER

## 2017-07-14 ENCOUNTER — OFFICE VISIT (OUTPATIENT)
Dept: URGENT CARE | Facility: PHYSICIAN GROUP | Age: 77
End: 2017-07-14
Payer: MEDICARE

## 2017-07-14 VITALS
HEART RATE: 98 BPM | OXYGEN SATURATION: 95 % | RESPIRATION RATE: 16 BRPM | DIASTOLIC BLOOD PRESSURE: 80 MMHG | HEIGHT: 70 IN | TEMPERATURE: 99.1 F | SYSTOLIC BLOOD PRESSURE: 138 MMHG | BODY MASS INDEX: 34.79 KG/M2 | WEIGHT: 243 LBS

## 2017-07-14 DIAGNOSIS — R60.0 BILATERAL EDEMA OF LOWER EXTREMITY: ICD-10-CM

## 2017-07-14 LAB
GLUCOSE BLD-MCNC: 192 MG/DL (ref 70–100)
HBA1C MFR BLD: 6.1 % (ref ?–5.8)
INT CON NEG: NEGATIVE
INT CON POS: POSITIVE

## 2017-07-14 PROCEDURE — 99213 OFFICE O/P EST LOW 20 MIN: CPT | Performed by: FAMILY MEDICINE

## 2017-07-14 PROCEDURE — 82962 GLUCOSE BLOOD TEST: CPT | Performed by: FAMILY MEDICINE

## 2017-07-14 PROCEDURE — 83036 HEMOGLOBIN GLYCOSYLATED A1C: CPT | Performed by: FAMILY MEDICINE

## 2017-07-14 ASSESSMENT — ENCOUNTER SYMPTOMS
GASTROINTESTINAL NEGATIVE: 1
MUSCULOSKELETAL NEGATIVE: 1
FEVER: 0
PSYCHIATRIC NEGATIVE: 1
DIZZINESS: 0
COUGH: 0
HEADACHES: 0
JOINT SWELLING: 0
FATIGUE: 0
FALLS: 0
EYES NEGATIVE: 1

## 2017-07-14 NOTE — PROGRESS NOTES
Subjective:      Bay Forrester is a 77 y.o. male who presents with Foot Swelling      Chief Complaint   Patient presents with   • Foot Swelling     Bilateral foot swelling, burning X 2 months Pt. has a history of diabetus in the family.            Foot Swelling  This is a new problem. Episode onset: 2 months ago. The problem has been gradually worsening. Pertinent negatives include no chest pain, congestion, coughing, fatigue, fever, headaches or joint swelling. Nothing aggravates the symptoms. He has tried nothing for the symptoms. The treatment provided no relief.           He has not started any medications prior to bilateral foot swelling. He states sometimes it feels a burning sensation to both feet,  he denies any pain, redness or warmth.  He denies any trauma or falls to area. He denies any difficulty walking or exercising.  Pain is intermittent, but worse with walking.     Past Medical History   Diagnosis Date   • Diverticulitis    • Hypertension    • Liver disease    • Hepatitis C    • Low back pain    • BPH (benign prostatic hypertrophy)    • Depression    • Anxiety    • DJD (degenerative joint disease)    • Hepatitis C    • Depression    • Heart burn    • Indigestion    • Urinary bladder disorder    • History of hepatitis C 4/23/2013   • Pneumonia    • Arthritis      back/neck/hands   • Dental disorder      dentures upper   • Other specified disorder of intestines      constipation/diarrhea     Past Surgical History   Procedure Laterality Date   • Gastroscopy-endo  10/13/08     Performed by CECY DIAZ at ENDOSCOPY [facility]   • Colonoscopy - endo  10/14/08     Performed by CECY DIAZ at ENDOSCOPY [facility]   • Other orthopedic surgery  2003     right knee replacement   • Recovery  8/4/2011     Performed by SURGERY, IR-RECOVERY at SURGERY SAME DAY [facility]     Social History   Substance Use Topics   • Smoking status: Former Smoker -- 1.00 packs/day for 40 years     Types:  "Cigarettes     Quit date: 01/01/1994   • Smokeless tobacco: Never Used      Comment: avoid all tobacco products   • Alcohol Use: No         Review of Systems   Constitutional: Negative for fever and fatigue.   HENT: Negative for congestion.    Eyes: Negative.    Respiratory: Negative for cough and shortness of breath.    Cardiovascular: Negative for chest pain.   Gastrointestinal: Negative.    Genitourinary: Negative.    Musculoskeletal: Negative.  Negative for falls and joint swelling.   Skin: Negative.    Neurological: Negative for dizziness and headaches.   Psychiatric/Behavioral: Negative.    All other systems reviewed and are negative.         Objective:     /80 mmHg  Pulse 98  Temp(Src) 37.3 °C (99.1 °F)  Resp 16  Ht 1.778 m (5' 10\")  Wt 110.224 kg (243 lb)  BMI 34.87 kg/m2  SpO2 95%     Physical Exam   Constitutional: He is oriented to person, place, and time. Vital signs are normal. He appears well-developed and well-nourished.   Cardiovascular: Normal rate, regular rhythm, S1 normal, S2 normal, normal heart sounds, intact distal pulses and normal pulses.    No murmur heard.  Pulmonary/Chest: Effort normal and breath sounds normal. No respiratory distress. He has no decreased breath sounds. He has no wheezes. He has no rales.   Musculoskeletal: He exhibits edema (trace pedal edema bilat).        Right foot: Normal.        Left foot: Normal.   Neurological: He is alert and oriented to person, place, and time. No sensory deficit.   Skin: Skin is warm, dry and intact. He is not diaphoretic. No pallor.   HE HAS VARICOSE VEINS AND SPIDER VEINS ON BOTH LEGS     Nursing note and vitals reviewed.              Assessment/Plan:     1. Bilateral edema of lower extremity     Likely secondary to venous insufficiency    Rx compression stockings with 20-25 mmHg    Elevate legs daily     A1c 6.1 - advised f/u with PCP  "

## 2017-07-14 NOTE — MR AVS SNAPSHOT
"        Bay Forrester   2017 12:10 PM   Office Visit   MRN: [id number]    Department:  [facility]   Dept Phone:  [phone]    Description:  Male : 1940   Provider:  Abelardo Vidal M.D.           Reason for Visit     Foot Swelling Bilateral foot swelling, burning X 2 months Pt. has a history of diabetus in the family.       Allergies as of 2017     Allergen Noted Reactions    Nkda [No Known Drug Allergy] 10/14/2007         You were diagnosed with     Bilateral edema of lower extremity   [942465]         Vital Signs     Blood Pressure Pulse Temperature Respirations Height Weight    138/80 mmHg 98 37.3 °C (99.1 °F) 16 1.778 m (5' 10\") 110.224 kg (243 lb)    Body Mass Index Oxygen Saturation Smoking Status             34.87 kg/m2 95% Former Smoker         Basic Information     Date Of Birth Sex Race Ethnicity Preferred Language    1940 Male White Non- English      Problem List              ICD-10-CM Priority Class Noted - Resolved    BPH (benign prostatic hypertrophy) N40.0 Low  Unknown - Present    Anxiety F41.9 Low  Unknown - Present    Depression F32.9 Low  Unknown - Present    HTN (hypertension), benign I10 Medium  3/18/2010 - Present    Hypotestosteronism E29.1   10/24/2011 - Present    History of hepatitis C Z86.19   2013 - Present    Osteoarthritis of hip M16.9   2014 - Present    Right sided weakness R53.1   2015 - Present    Migraine without status migrainosus, not intractable G43.909   2015 - Present    TIA (transient ischemic attack) G45.9   2015 - Present    DDD (degenerative disc disease), lumbar M51.36   9/10/2015 - Present    DDD (degenerative disc disease), cervical M50.30   9/10/2015 - Present    Non morbid obesity due to excess calories E66.09   2016 - Present    IFG (impaired fasting glucose) R73.01   1/10/2017 - Present      Health Maintenance        Date Due Completion Dates    IMM ZOSTER VACCINE 1/10/1999 ---    IMM " PNEUMOCOCCAL 65+ (ADULT) LOW/MEDIUM RISK SERIES (2 of 2 - PPSV23) 5/21/2027 (Originally 1/6/2017) 1/6/2016    IMM INFLUENZA (1) 9/1/2017 ---    IMM DTaP/Tdap/Td Vaccine (2 - Td) 1/30/2027 1/30/2017            Results     POCT Glucose      Component Value Standard Range & Units    Glucose - Accu-Ck 192 70 - 100 mg/dL                POCT  A1C      Component    Glycohemoglobin    6.1    Internal Control Negative    Negative    Internal Control Positive    Positive                        Current Immunizations     13-VALENT PCV PREVNAR 1/6/2016    Pneumococcal Vaccine (UF)Historical Data 1/1/2007    Tdap Vaccine 1/30/2017    Tetanus Vaccine 1/1/2004      Below and/or attached are the medications your provider expects you to take. Review all of your home medications and newly ordered medications with your provider and/or pharmacist. Follow medication instructions as directed by your provider and/or pharmacist. Please keep your medication list with you and share with your provider. Update the information when medications are discontinued, doses are changed, or new medications (including over-the-counter products) are added; and carry medication information at all times in the event of emergency situations     Allergies:  NKDA - (reactions not documented)               Medications  Valid as of: July 14, 2017 -  3:14 PM    Generic Name Brand Name Tablet Size Instructions for use    Cholecalciferol (Tab) cholecalciferol 1000 UNIT Take 2,000 Units by mouth every day.        Doxazosin Mesylate (Tab) CARDURA 8 MG Take 1 Tab by mouth every day.        Ergocalciferol (Cap) DRISDOL 00795 UNIT Take 1 Cap by mouth every 7 days.        Lisinopril (Tab) PRINIVIL, ZESTRIL 40 MG TAKE 1 TABLET BY MOUTH EVERY DAY        TraZODone HCl (Tab) DESYREL 100 MG Take 1-2 Tabs by mouth every bedtime.        .                 Medicines prescribed today were sent to:     [facility]/PHARMACY #[id number] - [place], [place] - [address]    [address]  [place] [address]    Phone: [phone] Fax: [phone]    Open 24 Hours?: No      Medication refill instructions:       If your prescription bottle indicates you have medication refills left, it is not necessary to call your provider’s office. Please contact your pharmacy and they will refill your medication.    If your prescription bottle indicates you do not have any refills left, you may request refills at any time through one of the following ways: The online [employer] system (except Urgent Care), by calling your provider’s office, or by asking your pharmacy to contact your provider’s office with a refill request. Medication refills are processed only during regular business hours and may not be available until the next business day. Your provider may request additional information or to have a follow-up visit with you prior to refilling your medication.   *Please Note: Medication refills are assigned a new Rx number when refilled electronically. Your pharmacy may indicate that no refills were authorized even though a new prescription for the same medication is available at the pharmacy. Please request the medicine by name with the pharmacy before contacting your provider for a refill.           [employer] Status: Patient Declined

## 2017-07-17 ASSESSMENT — ENCOUNTER SYMPTOMS: SHORTNESS OF BREATH: 0

## 2017-07-27 RX ORDER — ALPRAZOLAM 0.25 MG/1
0.25 TABLET ORAL NIGHTLY PRN
Qty: 30 TAB | Refills: 0 | Status: SHIPPED
Start: 2017-07-27 | End: 2017-08-03 | Stop reason: SDUPTHER

## 2017-08-03 ENCOUNTER — OFFICE VISIT (OUTPATIENT)
Dept: MEDICAL GROUP | Facility: PHYSICIAN GROUP | Age: 77
End: 2017-08-03
Payer: MEDICARE

## 2017-08-03 VITALS
WEIGHT: 238 LBS | SYSTOLIC BLOOD PRESSURE: 120 MMHG | TEMPERATURE: 98.6 F | HEART RATE: 86 BPM | RESPIRATION RATE: 16 BRPM | OXYGEN SATURATION: 93 % | DIASTOLIC BLOOD PRESSURE: 78 MMHG | HEIGHT: 70 IN | BODY MASS INDEX: 34.07 KG/M2

## 2017-08-03 DIAGNOSIS — F33.41 RECURRENT MAJOR DEPRESSIVE DISORDER, IN PARTIAL REMISSION (HCC): ICD-10-CM

## 2017-08-03 DIAGNOSIS — F41.9 ANXIETY: ICD-10-CM

## 2017-08-03 DIAGNOSIS — E66.9 OBESITY (BMI 30-39.9): ICD-10-CM

## 2017-08-03 PROCEDURE — 99214 OFFICE O/P EST MOD 30 MIN: CPT | Performed by: FAMILY MEDICINE

## 2017-08-03 RX ORDER — ALPRAZOLAM 0.25 MG/1
0.25 TABLET ORAL NIGHTLY PRN
Qty: 30 TAB | Refills: 0 | Status: SHIPPED | OUTPATIENT
Start: 2017-08-03 | End: 2017-10-03 | Stop reason: SDUPTHER

## 2017-08-03 RX ORDER — TRAZODONE HYDROCHLORIDE 100 MG/1
200 TABLET ORAL
Qty: 180 TAB | Refills: 2 | Status: SHIPPED | OUTPATIENT
Start: 2017-08-03 | End: 2018-01-16 | Stop reason: SDUPTHER
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
## 2017-08-03 NOTE — MR AVS SNAPSHOT
"        Bay Forrester   8/3/2017 2:00 PM   Office Visit   MRN: [id number]    Department:  [facility]   Dept Phone:  [phone]    Description:  Male : 1940   Provider:  Emerald Cardoso D.O.           Reason for Visit     Anxiety [place]      Allergies as of 8/3/2017     Allergen Noted Reactions    Nkda [No Known Drug Allergy] 10/14/2007         You were diagnosed with     Anxiety   [653115]       Recurrent major depressive disorder, in partial remission (CMS-HCC)   [5637913]       Obesity (BMI 30-39.9)   [876084]         Vital Signs     Blood Pressure Pulse Temperature Respirations Height Weight    120/78 mmHg 86 37 °C (98.6 °F) 16 1.778 m (5' 10\") 107.956 kg (238 lb)    Body Mass Index Oxygen Saturation Smoking Status             34.15 kg/m2 93% Former Smoker         Basic Information     Date Of Birth Sex Race Ethnicity Preferred Language    1940 Male White Non- English      Your appointments     Aug 31, 2017  1:00 PM   Established Patient with Emerald Cardoso D.O.   [facility])    [address], [address]  [place] [address] [phone]           You will be receiving a confirmation call a few days before your appointment from our automated call confirmation system.              Problem List              ICD-10-CM Priority Class Noted - Resolved    BPH (benign prostatic hypertrophy) N40.0 Low  Unknown - Present    Anxiety F41.9 Low  Unknown - Present    Depression F32.9 Low  Unknown - Present    HTN (hypertension), benign I10 Medium  3/18/2010 - Present    Hypotestosteronism E29.1   10/24/2011 - Present    History of hepatitis C Z86.19   2013 - Present    Osteoarthritis of hip M16.9   2014 - Present    Migraine without status migrainosus, not intractable G43.909   2015 - Present    TIA (transient ischemic attack) G45.9   2015 - Present    DDD (degenerative disc disease), lumbar M51.36   9/10/2015 - Present    DDD " (degenerative disc disease), cervical M50.30   9/10/2015 - Present    Non morbid obesity due to excess calories E66.09   11/29/2016 - Present    IFG (impaired fasting glucose) R73.01   1/10/2017 - Present    Obesity (BMI 30-39.9) E66.9   8/3/2017 - Present      Health Maintenance        Date Due Completion Dates    IMM ZOSTER VACCINE 1/10/1999 ---    IMM PNEUMOCOCCAL 65+ (ADULT) LOW/MEDIUM RISK SERIES (2 of 2 - PPSV23) 5/21/2027 (Originally 1/6/2017) 1/6/2016    IMM INFLUENZA (1) 9/1/2017 ---    IMM DTaP/Tdap/Td Vaccine (2 - Td) 1/30/2027 1/30/2017            Current Immunizations     13-VALENT PCV PREVNAR 1/6/2016    Pneumococcal Vaccine (UF)Historical Data 1/1/2007    Tdap Vaccine 1/30/2017    Tetanus Vaccine 1/1/2004      Below and/or attached are the medications your provider expects you to take. Review all of your home medications and newly ordered medications with your provider and/or pharmacist. Follow medication instructions as directed by your provider and/or pharmacist. Please keep your medication list with you and share with your provider. Update the information when medications are discontinued, doses are changed, or new medications (including over-the-counter products) are added; and carry medication information at all times in the event of emergency situations     Allergies:  NKDA - (reactions not documented)               Medications  Valid as of: August 03, 2017 -  2:26 PM    Generic Name Brand Name Tablet Size Instructions for use    ALPRAZolam (Tab) XANAX 0.25 MG Take 1 Tab by mouth at bedtime as needed for Sleep.        Cholecalciferol (Tab) cholecalciferol 1000 UNIT Take 2,000 Units by mouth every day.        Doxazosin Mesylate (Tab) CARDURA 8 MG Take 1 Tab by mouth every day.        Ergocalciferol (Cap) DRISDOL 40616 UNIT Take 1 Cap by mouth every 7 days.        Lisinopril (Tab) PRINIVIL, ZESTRIL 40 MG TAKE 1 TABLET BY MOUTH EVERY DAY        TraZODone HCl (Tab) DESYREL 100 MG Take 2 Tabs by  mouth every bedtime.        .                 Medicines prescribed today were sent to:     [facility]/PHARMACY #[id number] - [place], [place] - [address]    [address] [place] [address]    Phone: [phone] Fax: [phone]    Open 24 Hours?: No      Medication refill instructions:       If your prescription bottle indicates you have medication refills left, it is not necessary to call your provider’s office. Please contact your pharmacy and they will refill your medication.    If your prescription bottle indicates you do not have any refills left, you may request refills at any time through one of the following ways: The online [employer] system (except Urgent Care), by calling your provider’s office, or by asking your pharmacy to contact your provider’s office with a refill request. Medication refills are processed only during regular business hours and may not be available until the next business day. Your provider may request additional information or to have a follow-up visit with you prior to refilling your medication.   *Please Note: Medication refills are assigned a new Rx number when refilled electronically. Your pharmacy may indicate that no refills were authorized even though a new prescription for the same medication is available at the pharmacy. Please request the medicine by name with the pharmacy before contacting your provider for a refill.        Referral     A referral request has been sent to our patient care coordination department. Please allow 3-5 business days for us to process this request and contact you either by phone or mail. If you do not hear from us by the 5th business day, please call us at [phone].           [employer] Status: Patient Declined

## 2017-10-02 ENCOUNTER — OFFICE VISIT (OUTPATIENT)
Dept: BEHAVIORAL HEALTH | Facility: PHYSICIAN GROUP | Age: 77
End: 2017-10-02
Payer: MEDICARE

## 2017-10-02 DIAGNOSIS — F41.8 SITUATIONAL ANXIETY: ICD-10-CM

## 2017-10-02 PROCEDURE — 90791 PSYCH DIAGNOSTIC EVALUATION: CPT | Performed by: SOCIAL WORKER
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
## 2017-10-20 ENCOUNTER — TELEPHONE (OUTPATIENT)
Dept: MEDICAL GROUP | Facility: PHYSICIAN GROUP | Age: 77
End: 2017-10-20

## 2017-10-20 DIAGNOSIS — R73.01 IFG (IMPAIRED FASTING GLUCOSE): ICD-10-CM

## 2017-10-20 DIAGNOSIS — I10 HTN (HYPERTENSION), BENIGN: ICD-10-CM

## 2017-10-20 RX ORDER — LISINOPRIL 40 MG/1
TABLET ORAL
Qty: 90 TAB | Refills: 0 | Status: SHIPPED | OUTPATIENT
Start: 2017-10-20 | End: 2018-01-16 | Stop reason: SDUPTHER

## 2017-12-05 ENCOUNTER — PATIENT OUTREACH (OUTPATIENT)
Dept: HEALTH INFORMATION MANAGEMENT | Facility: OTHER | Age: 77
End: 2017-12-05

## 2017-12-05 NOTE — PROGRESS NOTES
Attempt #:1    WebIZ Checked & Epic Updated: Yes  · WebIZ Recommendations: FLU and ZOSTAVAX (Shingles)  · Is patient due for Tdap? NO  · Is patient due for Shingles? YES. Patient was not notified of copay/out of pocket cost.  [facility] Verified: yes  Verify PCP: yes    Communication Preference Obtained: yes     Review Care Team: yes    Annual Wellness Visit Scheduling  1. Scheduling Status:Scheduled        Care Gap Scheduling (Attempt to Schedule EACH Overdue Care Gap!)     Health Maintenance Due   Topic Date Due   • IMM ZOSTER VACCINE  01/10/1999   • Annual Wellness Visit  01/06/2017   • IMM INFLUENZA (1) 09/01/2017        Scheduled patient for Annual Wellness Visit/ DISCUSS IMMUNIZATIONS WITH PCP       [employer]: declined  [employer]: no  Virtual Visits: no  Opt In to Text Messages: no

## 2017-12-14 DIAGNOSIS — N40.1 BENIGN NON-NODULAR PROSTATIC HYPERPLASIA WITH LOWER URINARY TRACT SYMPTOMS: ICD-10-CM

## 2017-12-15 RX ORDER — DOXAZOSIN 8 MG/1
8 TABLET ORAL
Qty: 90 TAB | Refills: 0 | Status: SHIPPED | OUTPATIENT
Start: 2017-12-15 | End: 2018-01-16 | Stop reason: SDUPTHER

## 2017-12-15 NOTE — TELEPHONE ENCOUNTER
Was the patient seen in the last year in this department? Yes     Does patient have an active prescription for medications requested? No     Received Request Via: Pharmacy      Pt met protocol?: Yes    OV 8/17

## 2018-01-09 ENCOUNTER — TELEPHONE (OUTPATIENT)
Dept: MEDICAL GROUP | Facility: PHYSICIAN GROUP | Age: 78
End: 2018-01-09

## 2018-01-09 NOTE — TELEPHONE ENCOUNTER
ANNUAL WELLNESS VISIT PRE-VISIT PLANNING     1.  Reviewed note from last office visit with PCP: YES 08/03/2017    2.  If any orders were placed at last visit, do we have Results/Consult Notes?        •  Labs - Labs ordered, NOT completed. Patient advised to complete prior to next appointment.       •  Imaging - Imaging was not ordered at last office visit.       •  Referrals - Referral ordered, patient was seen and consult notes are in chart. Care Teams updated  YES.    3.  Immunizations were updated in [facility] using WebIZ?: Yes       •  WebIZ Recommendations: FLU and ZOSTAVAX (Shingles) declined        •  Is patient due for Tdap? NO       •  Is patient due for Shingles? YES. Patient was notified of copay/out of pocket cost.     4.  Patient is due for the following Health Maintenance Topics:   Health Maintenance Due   Topic Date Due   • IMM ZOSTER VACCINE  01/10/1999   • Annual Wellness Visit  01/06/2017   • IMM INFLUENZA (1) 09/01/2017         5.  Reviewed/Updated the following with patient:       •   Preferred Pharmacy? YES       •   Preferred Lab? YES       •   Preferred Communication? YES       •   Allergies? YES       •   Medications? YES. Was Abstract Encounter opened and chart updated? YES       •   Social History? YES. Was Abstract Encounter opened and chart updated? YES       •   Family History (document living status of immediate family members and if + hx of cancer, diabetes, hypertension, hyperlipidemia, heart attack, stroke) YES. Was Abstract Encounter opened and chart updated? YES    6.  Care Team Updated:       •   DME Company (gait device, O2, CPAP, etc.): YES       •   Other Specialists (eye doctor, derm, GYN, cardiology, endo, etc): YES    7.  Patient has the following Care Path diagnoses on Problem List:  DEPRESSION     8.  Specialty Comments was updated with diagnosis information provided by [facility]: YES There is a note     9.  Patient was advised: “This is a free wellness visit. The provider will  screen for medical conditions to help you stay healthy. If you have other concerns to address you may be asked to discuss these at a separate visit or there may be an additional fee.”     6.  Patient was informed to arrive 15 min prior to their scheduled appointment and bring in their medication bottles.

## 2018-01-12 ENCOUNTER — HOSPITAL ENCOUNTER (OUTPATIENT)
Dept: LAB | Facility: MEDICAL CENTER | Age: 78
End: 2018-01-12
Attending: FAMILY MEDICINE
Payer: MEDICARE

## 2018-01-12 DIAGNOSIS — R73.01 IFG (IMPAIRED FASTING GLUCOSE): ICD-10-CM

## 2018-01-12 DIAGNOSIS — I10 HTN (HYPERTENSION), BENIGN: ICD-10-CM

## 2018-01-12 LAB
25(OH)D3 SERPL-MCNC: 25 NG/ML (ref 30–100)
ALBUMIN SERPL BCP-MCNC: 4.1 G/DL (ref 3.2–4.9)
ALBUMIN/GLOB SERPL: 1.5 G/DL
ALP SERPL-CCNC: 47 U/L (ref 30–99)
ALT SERPL-CCNC: 9 U/L (ref 2–50)
ANION GAP SERPL CALC-SCNC: 6 MMOL/L (ref 0–11.9)
AST SERPL-CCNC: 16 U/L (ref 12–45)
BILIRUB SERPL-MCNC: 0.5 MG/DL (ref 0.1–1.5)
BUN SERPL-MCNC: 23 MG/DL (ref 8–22)
CALCIUM SERPL-MCNC: 9.3 MG/DL (ref 8.5–10.5)
CHLORIDE SERPL-SCNC: 106 MMOL/L (ref 96–112)
CHOLEST SERPL-MCNC: 141 MG/DL (ref 100–199)
CO2 SERPL-SCNC: 27 MMOL/L (ref 20–33)
CREAT SERPL-MCNC: 0.87 MG/DL (ref 0.5–1.4)
ERYTHROCYTE [DISTWIDTH] IN BLOOD BY AUTOMATED COUNT: 48.2 FL (ref 35.9–50)
EST. AVERAGE GLUCOSE BLD GHB EST-MCNC: 128 MG/DL
GLOBULIN SER CALC-MCNC: 2.7 G/DL (ref 1.9–3.5)
GLUCOSE SERPL-MCNC: 97 MG/DL (ref 65–99)
HBA1C MFR BLD: 6.1 % (ref 0–5.6)
HCT VFR BLD AUTO: 46.1 % (ref 42–52)
HDLC SERPL-MCNC: 41 MG/DL
HGB BLD-MCNC: 14.8 G/DL (ref 14–18)
LDLC SERPL CALC-MCNC: 81 MG/DL
MCH RBC QN AUTO: 30 PG (ref 27–33)
MCHC RBC AUTO-ENTMCNC: 32.1 G/DL (ref 33.7–35.3)
MCV RBC AUTO: 93.3 FL (ref 81.4–97.8)
PLATELET # BLD AUTO: 221 K/UL (ref 164–446)
PMV BLD AUTO: 10.8 FL (ref 9–12.9)
POTASSIUM SERPL-SCNC: 4.4 MMOL/L (ref 3.6–5.5)
PROT SERPL-MCNC: 6.8 G/DL (ref 6–8.2)
RBC # BLD AUTO: 4.94 M/UL (ref 4.7–6.1)
SODIUM SERPL-SCNC: 139 MMOL/L (ref 135–145)
TRIGL SERPL-MCNC: 94 MG/DL (ref 0–149)
WBC # BLD AUTO: 9 K/UL (ref 4.8–10.8)

## 2018-01-12 PROCEDURE — 80061 LIPID PANEL: CPT

## 2018-01-12 PROCEDURE — 85027 COMPLETE CBC AUTOMATED: CPT

## 2018-01-12 PROCEDURE — 82306 VITAMIN D 25 HYDROXY: CPT

## 2018-01-12 PROCEDURE — 80053 COMPREHEN METABOLIC PANEL: CPT

## 2018-01-12 PROCEDURE — 36415 COLL VENOUS BLD VENIPUNCTURE: CPT

## 2018-01-12 PROCEDURE — 83036 HEMOGLOBIN GLYCOSYLATED A1C: CPT

## 2018-01-16 ENCOUNTER — OFFICE VISIT (OUTPATIENT)
Dept: MEDICAL GROUP | Facility: PHYSICIAN GROUP | Age: 78
End: 2018-01-16
Payer: MEDICARE

## 2018-01-16 VITALS
HEIGHT: 70 IN | HEART RATE: 65 BPM | WEIGHT: 245 LBS | TEMPERATURE: 98 F | DIASTOLIC BLOOD PRESSURE: 90 MMHG | BODY MASS INDEX: 35.07 KG/M2 | SYSTOLIC BLOOD PRESSURE: 144 MMHG | OXYGEN SATURATION: 95 %

## 2018-01-16 DIAGNOSIS — Z00.00 MEDICARE ANNUAL WELLNESS VISIT, SUBSEQUENT: ICD-10-CM

## 2018-01-16 DIAGNOSIS — E55.9 HYPOVITAMINOSIS D: ICD-10-CM

## 2018-01-16 DIAGNOSIS — Z86.19 HISTORY OF HEPATITIS C: ICD-10-CM

## 2018-01-16 DIAGNOSIS — R73.01 IFG (IMPAIRED FASTING GLUCOSE): ICD-10-CM

## 2018-01-16 DIAGNOSIS — M16.0 PRIMARY OSTEOARTHRITIS OF BOTH HIPS: ICD-10-CM

## 2018-01-16 DIAGNOSIS — G43.009 MIGRAINE WITHOUT AURA AND WITHOUT STATUS MIGRAINOSUS, NOT INTRACTABLE: ICD-10-CM

## 2018-01-16 DIAGNOSIS — F41.9 ANXIETY: ICD-10-CM

## 2018-01-16 DIAGNOSIS — E66.9 OBESITY (BMI 30-39.9): ICD-10-CM

## 2018-01-16 DIAGNOSIS — M50.30 DDD (DEGENERATIVE DISC DISEASE), CERVICAL: ICD-10-CM

## 2018-01-16 DIAGNOSIS — F33.41 RECURRENT MAJOR DEPRESSIVE DISORDER, IN PARTIAL REMISSION (HCC): ICD-10-CM

## 2018-01-16 DIAGNOSIS — N40.1 BENIGN NON-NODULAR PROSTATIC HYPERPLASIA WITH LOWER URINARY TRACT SYMPTOMS: ICD-10-CM

## 2018-01-16 DIAGNOSIS — M51.36 DDD (DEGENERATIVE DISC DISEASE), LUMBAR: ICD-10-CM

## 2018-01-16 DIAGNOSIS — E34.9 HYPOTESTOSTERONISM: ICD-10-CM

## 2018-01-16 DIAGNOSIS — I10 HTN (HYPERTENSION), BENIGN: ICD-10-CM

## 2018-01-16 PROCEDURE — G0439 PPPS, SUBSEQ VISIT: HCPCS | Performed by: FAMILY MEDICINE

## 2018-01-16 RX ORDER — LISINOPRIL 40 MG/1
40 TABLET ORAL
Qty: 90 TAB | Refills: 2 | Status: SHIPPED | OUTPATIENT
Start: 2018-01-16 | End: 2018-05-01 | Stop reason: SDUPTHER

## 2018-01-16 RX ORDER — DOXAZOSIN 8 MG/1
8 TABLET ORAL
Qty: 90 TAB | Refills: 2 | Status: SHIPPED | OUTPATIENT
Start: 2018-01-16 | End: 2018-05-01 | Stop reason: SDUPTHER

## 2018-01-16 RX ORDER — TRAZODONE HYDROCHLORIDE 100 MG/1
200 TABLET ORAL
Qty: 180 TAB | Refills: 2 | Status: SHIPPED | OUTPATIENT
Start: 2018-01-16 | End: 2019-02-20 | Stop reason: SDUPTHER

## 2018-01-16 RX ORDER — ALPRAZOLAM 0.25 MG/1
TABLET ORAL
Qty: 30 TAB | Refills: 1 | Status: SHIPPED
Start: 2018-01-16 | End: 2018-02-15

## 2018-01-16 ASSESSMENT — PATIENT HEALTH QUESTIONNAIRE - PHQ9: CLINICAL INTERPRETATION OF PHQ2 SCORE: 0

## 2018-01-16 NOTE — PROGRESS NOTES
Chief Complaint   Patient presents with   • Annual Wellness Visit         HPI:  Bay is a 78 y.o. here for Medicare Annual Wellness Visit        Patient Active Problem List    Diagnosis Date Noted   • HTN (hypertension), benign 03/18/2010     Priority: Medium   • BPH (benign prostatic hypertrophy)      Priority: Low   • Anxiety      Priority: Low   • Depression      Priority: Low   • Hypovitaminosis D 01/16/2018   • Obesity (BMI 30-39.9) 08/03/2017   • IFG (impaired fasting glucose) 01/10/2017   • DDD (degenerative disc disease), lumbar 09/10/2015   • DDD (degenerative disc disease), cervical 09/10/2015   • Migraine without status migrainosus, not intractable 08/19/2015   • Osteoarthritis of hip 05/01/2014   • History of hepatitis C 04/23/2013   • Hypotestosteronism 10/24/2011       Current Outpatient Prescriptions   Medication Sig Dispense Refill   • doxazosin (CARDURA) 8 MG tablet Take 1 Tab by mouth every day. 90 Tab 2   • lisinopril (PRINIVIL, ZESTRIL) 40 MG tablet Take 1 Tab by mouth every day. 90 Tab 2   • trazodone (DESYREL) 100 MG Tab Take 2 Tabs by mouth every bedtime. 180 Tab 2   • alprazolam (XANAX) 0.25 MG Tab TAKE 1 TABLET BY MOUTH AT BEDTIME AS NEEDED FOR SLEEP 30 Tab 1   • ergocalciferol (DRISDOL) 44649 UNIT capsule Take 1 Cap by mouth every 7 days. 6 Cap 0   • vitamin D (CHOLECALCIFEROL) 1000 UNIT Tab Take 2,000 Units by mouth every day.       No current facility-administered medications for this visit.         Patient is taking medications as noted in medication list.  Current supplements as per medication list.     Allergies: Nkda [no known drug allergy]    Current social contact/activities: Visit with family and friends      Is patient current with immunizations? No, due for FLU and ZOSTAVAX (Shingles). Patient is interested in receiving NONE today.    He  reports that he quit smoking about 24 years ago. His smoking use included Cigarettes. He has a 40.00 pack-year smoking history. He has never  used smokeless tobacco. He reports that he does not drink alcohol or use drugs.  Counseling given: Not Answered        DPA/Advanced directive: Patient does not have an Advanced Directive.  A packet and workshop information was given on Advanced Directives.    ROS:    Gait: Uses no assistive device   Ostomy: no   Other tubes: no   Amputations: no   Chronic oxygen use no   Last eye exam couple years    Wears hearing aids: no   : Denies any urinary leakage during the last 6 months      Screening:    DEPRESSION     Is patient seeing a counselor, psychiatrist or other healthcare provider regarding their mental health? No, and not interested.     Depression Screen (PHQ-2/PHQ-9) 1/6/2016 1/17/2017 1/16/2018   PHQ-2 Total Score 0 - -   PHQ-2 Total Score - 0 0       Interpretation of PHQ-9 Total Score   Score Severity   1-4 No Depression   5-9 Mild Depression   10-14 Moderate Depression   15-19 Moderately Severe Depression   20-27 Severe Depression    Depression Screening    Little interest or pleasure in doing things?  0 - not at all  Feeling down, depressed, or hopeless? 0 - not at all  Patient Health Questionnaire Score: 0    If depressive symptoms identified deferred to follow up visit unless specifically addressed in assessment and plan.    Interpretation of PHQ-9 Total Score   Score Severity   1-4 No Depression   5-9 Mild Depression   10-14 Moderate Depression   15-19 Moderately Severe Depression   20-27 Severe Depression    Screening for Cognitive Impairment    Three Minute Recall (apple, watch, anthony)  2/3 [facility] kitchen baby   Draw clock face with all 12 numbers set to the hand to show 10 minutes past 11 o'clock  1 3:45 4/5  If cognitive concerns identified, deferred for follow up unless specifically addressed in assessment and plan.    Fall Risk Assessment    Has the patient had two or more falls in the last year or any fall with injury in the last year?  No  If fall risk identified, deferred for follow up  unless specifically addressed in assessment and plan.    Safety Assessment    Throw rugs on floor.  Yes  Handrails on all stairs.  Yes  Good lighting in all hallways.  Yes  Difficulty hearing.  No  Patient counseled about all safety risks that were identified.    Functional Assessment ADLs    Are there any barriers preventing you from cooking for yourself or meeting nutritional needs?  No.    Are there any barriers preventing you from driving safely or obtaining transportation?  No.    Are there any barriers preventing you from using a telephone or calling for help?  No.    Are there any barriers preventing you from shopping?  No.    Are there any barriers preventing you from taking care of your own finances?  No.    Are there any barriers preventing you from managing your medications?  No.    Are you currently engaging any exercise or physical activity?  Yes.  Walking     Health Maintenance Summary                IMM ZOSTER VACCINE Overdue 1/10/1999     Annual Wellness Visit Overdue 1/6/2017      Done 1/6/2016 Visit Dx: Medicare annual wellness visit, subsequent    IMM INFLUENZA Overdue 9/1/2017     IMM PNEUMOCOCCAL 65+ (ADULT) LOW/MEDIUM RISK SERIES Postponed 5/21/2027 Originally 1/6/2017. Provider advises postponing for medical reasons     Done 1/6/2016 Imm Admin: Pneumococcal Conjugate Vaccine (Prevnar/PCV-13)    IMM DTaP/Tdap/Td Vaccine Next Due 1/30/2027      Done 1/30/2017 Imm Admin: Tdap Vaccine          Patient Care Team:  Emerald Clemente D.O. as PCP - General (Family Medicine)  Alida Wong L.C.S.W. as Consulting Physician (Psychology)    Social History   Substance Use Topics   • Smoking status: Former Smoker     Packs/day: 1.00     Years: 40.00     Types: Cigarettes     Quit date: 1/1/1994   • Smokeless tobacco: Never Used      Comment: avoid all tobacco products   • Alcohol use No      Comment: occ     Family History   Problem Relation Age of Onset   • Heart Disease Mother    • Other Father      " MS    • Heart Disease Brother    • Diabetes Brother    • Diabetes Brother    • Heart Disease Brother    • Suicide Attempts Maternal Grandmother    • No Known Problems Maternal Grandfather    • No Known Problems Paternal Grandmother    • No Known Problems Paternal Grandfather    • Alzheimer's Disease Brother    • Depression Brother      He  has a past medical history of Anxiety; Arthritis; BPH (benign prostatic hypertrophy); Dental disorder; Depression; Diverticulitis; DJD (degenerative joint disease); Heart burn; Hepatitis C; Hypertension; Indigestion; Liver disease; Low back pain; Other specified disorder of intestines; Pneumonia; and Urinary bladder disorder.   Past Surgical History:   Procedure Laterality Date   • RECOVERY  8/4/2011    Performed by SURGERY, IR-RECOVERY at SURGERY SAME DAY [facility] ORS   • COLONOSCOPY - ENDO  10/14/08    Performed by CECY DIAZ at ENDOSCOPY [facility] ORS   • GASTROSCOPY-ENDO  10/13/08    Performed by CECY DIAZ at ENDOSCOPY [facility] ORS   • OTHER ORTHOPEDIC SURGERY  2003    right knee replacement           Exam:     Blood pressure 144/90, pulse 65, temperature 36.7 °C (98 °F), height 1.778 m (5' 10\"), weight 111.1 kg (245 lb), SpO2 95 %. Body mass index is 35.15 kg/m².    Hearing excellent.    Alert, oriented in no acute distress.  Eye contact is good, speech goal directed, affect calm      Assessment and Plan. The following treatment and monitoring plan is recommended:    1. Medicare annual wellness visit, subsequent      2. Obesity (BMI 30-39.9)  Pt educated on the increase of morbidity and mortality associated with excess weight including DM, Heart Disease, HTN, stroke, and sleep apnea.  Pt advised weight loss of 5% through reduced calorie, low fat diet and 150 mins of exercise a week      3. Benign non-nodular prostatic hyperplasia with lower urinary tract symptoms  Chronic: well controlled  - doxazosin (CARDURA) 8 MG tablet; Take 1 Tab by mouth every day.  " Dispense: 90 Tab; Refill: 2    4. Recurrent major depressive disorder, in partial remission (CMS-HCC)  Pt understands continuing to smoke will increase morbidity and mortality from cancer, stroke, and vascular disease.       5. Anxiety  Chronic: well controlled  Patient understands they  cannot take any other medications including prescription, over-the-counter, or illicit substances including alcohol while being treated with a benzodiazepine from our office. Patient understands the risk of benzodiazepine including respiratory depression and sedation. Patient does not take medication prior to driving. Patient only takes medications when they are at home.  - trazodone (DESYREL) 100 MG Tab; Take 2 Tabs by mouth every bedtime.  Dispense: 180 Tab; Refill: 2  - alprazolam (XANAX) 0.25 MG Tab; TAKE 1 TABLET BY MOUTH AT BEDTIME AS NEEDED FOR SLEEP  Dispense: 30 Tab; Refill: 1    6. HTN (hypertension), benign  Chronic: well controlled  - lisinopril (PRINIVIL, ZESTRIL) 40 MG tablet; Take 1 Tab by mouth every day.  Dispense: 90 Tab; Refill: 2    7. IFG (impaired fasting glucose)  Chronic: stable    8. Migraine without aura and without status migrainosus, not intractable  Chronic: well controlled      9. Hypotestosteronism  Chronic: Recommend against testosterone.    10. History of hepatitis C  Chronic: stable  Treated with interferon.    11. DDD (degenerative disc disease), lumbar  Chronic: stable    12. DDD (degenerative disc disease), cervical  Chronic: stable    13. Primary osteoarthritis of both hips  Chronic: stable    14. Hypovitaminosis D  Chronic: Continues to be low. Recommend continuing vitamin D 2000 units daily      Services suggested: No services needed at this time  Health Care Screening recommendations as per orders if indicated.  Referrals offered: PT/OT/Nutrition counseling/Behavioral Health/Smoking cessation as per orders if indicated.    Discussion today about general wellness and lifestyle habits:     · Prevent falls and reduce trip hazards; Cautioned about securing or removing rugs.  · Have a working fire alarm and carbon monoxide detector;   · Engage in regular physical activity and social activities       Follow-up: Return for chronic conditions with Francine eBnton.

## 2018-03-22 ENCOUNTER — TELEPHONE (OUTPATIENT)
Dept: MEDICAL GROUP | Facility: PHYSICIAN GROUP | Age: 78
End: 2018-03-22

## 2018-03-22 NOTE — TELEPHONE ENCOUNTER
Future Appointments       Provider Department Center    3/23/2018 2:00 PM Christiana Harrington M.D. [facility]        ESTABLISHED PATIENT PRE-VISIT PLANNING     Note: Patient will not be contacted if there is no indication to call.     1.  Reviewed notes from the last few office visits within the medical group: Yes 01/16/2018    2.  If any orders were placed at last visit or intended to be done for this visit (i.e. 6 mos follow-up), do we have Results/Consult Notes?        •  Labs - Labs were not ordered at last office visit.       •  Imaging - Imaging ordered, completed and results are in chart.       •  Referrals - No referrals were ordered at last office visit.    3. Is this appointment scheduled as a Hospital Follow-Up? No    4.  Immunizations were updated in Matter and Form using WebIZ?: Yes       •  Web Iz Recommendations: FLU, PNEUMOVAX (PPSV23), TD and ZOSTAVAX (Shingles)    5.  Patient is due for the following Health Maintenance Topics:   Health Maintenance Due   Topic Date Due   • IMM ZOSTER VACCINE  01/10/1999   • IMM INFLUENZA (1) 09/01/2017     6.  MDX printed and highlighted for Provider? YES    7.  Patient was informed to arrive 15 min prior to their scheduled appointment and bring in their medication bottles.

## 2018-04-19 ENCOUNTER — TELEPHONE (OUTPATIENT)
Dept: MEDICAL GROUP | Facility: PHYSICIAN GROUP | Age: 78
End: 2018-04-19

## 2018-04-19 NOTE — TELEPHONE ENCOUNTER
Future Appointments       Provider Department Center    4/20/2018 1:00 PM Christiana Harrington M.D. [facility]        ESTABLISHED PATIENT PRE-VISIT PLANNING     Note: Patient will not be contacted if there is no indication to call.     1.  Reviewed notes from the last few office visits within the medical group: Yes 01/16/2018    2.  If any orders were placed at last visit or intended to be done for this visit (i.e. 6 mos follow-up), do we have Results/Consult Notes?        •  Labs - Labs were not ordered at last office visit.       •  Imaging - Imaging ordered, completed and results are in chart.       •  Referrals - No referrals were ordered at last office visit.    3. Is this appointment scheduled as a Hospital Follow-Up? No    4.  Immunizations were updated in Epic using WebIZ?: Yes       •  Web Iz Recommendations: FLU, PNEUMOVAX (PPSV23), TD and ZOSTAVAX (Shingles)    5.  Patient is due for the following Health Maintenance Topics:   There are no preventive care reminders to display for this patient.    6.  MDX printed for Provider? YES    7.  Patient was informed to arrive 15 min prior to their scheduled appointment and bring in their medication bottles.

## 2018-05-01 ENCOUNTER — OFFICE VISIT (OUTPATIENT)
Dept: MEDICAL GROUP | Facility: CLINIC | Age: 78
End: 2018-05-01
Payer: MEDICARE

## 2018-05-01 VITALS
RESPIRATION RATE: 14 BRPM | HEIGHT: 70 IN | TEMPERATURE: 97.6 F | WEIGHT: 247 LBS | DIASTOLIC BLOOD PRESSURE: 84 MMHG | OXYGEN SATURATION: 95 % | BODY MASS INDEX: 35.36 KG/M2 | HEART RATE: 87 BPM | SYSTOLIC BLOOD PRESSURE: 156 MMHG

## 2018-05-01 DIAGNOSIS — M79.89 LOCALIZED SWELLING OF BOTH LOWER EXTREMITIES: ICD-10-CM

## 2018-05-01 DIAGNOSIS — I10 HTN (HYPERTENSION), BENIGN: ICD-10-CM

## 2018-05-01 DIAGNOSIS — N40.1 BENIGN NON-NODULAR PROSTATIC HYPERPLASIA WITH LOWER URINARY TRACT SYMPTOMS: ICD-10-CM

## 2018-05-01 DIAGNOSIS — R20.9 COLD RIGHT FOOT: ICD-10-CM

## 2018-05-01 PROCEDURE — 99213 OFFICE O/P EST LOW 20 MIN: CPT | Performed by: NURSE PRACTITIONER

## 2018-05-01 RX ORDER — DOXAZOSIN 8 MG/1
8 TABLET ORAL
Qty: 90 TAB | Refills: 2 | Status: SHIPPED | OUTPATIENT
Start: 2018-05-01 | End: 2019-01-23 | Stop reason: SDUPTHER

## 2018-05-01 RX ORDER — LISINOPRIL 40 MG/1
40 TABLET ORAL
Qty: 90 TAB | Refills: 2 | Status: SHIPPED | OUTPATIENT
Start: 2018-05-01 | End: 2018-06-12 | Stop reason: SDUPTHER

## 2018-05-01 NOTE — PROGRESS NOTES
CC: Edema (bilateral feet x on and off for a year; Since Sept-2017; )        HPI:     Bay is a namballa all problems are due to me today, presents today for the followin. HTN (hypertension), benign  States he has been out of all his blood pressure medications for the last week due to insurance issues. They have straightness Alateen occur plus him like a new prescription sent to the pharmacy.    2. Benign non-nodular prostatic hyperplasia with lower urinary tract symptoms  Also out of his Cardura, once a prescription    4. Localized swelling of both lower extremities  Here primarily today stating over the last year he's had intermittent swelling on his lower legs and feet, worse on the right side. Stating however in the last 6 months or so it's been pretty common throughout the day. Doesn't fluctuate to different times. He doesn't elevate his legs. He was a previous smoker however quit several decades ago  He wanted to make sure it wasn't related diabetes-patient has a prediabetic with a recent A1c of 6.1  Echocardiogram 3 years ago within normal limits with no cardiac history or change.    Current Outpatient Prescriptions   Medication Sig Dispense Refill   • doxazosin (CARDURA) 8 MG tablet Take 1 Tab by mouth every day. 90 Tab 2   • lisinopril (PRINIVIL, ZESTRIL) 40 MG tablet Take 1 Tab by mouth every day. 90 Tab 2   • trazodone (DESYREL) 100 MG Tab Take 2 Tabs by mouth every bedtime. 180 Tab 2   • ergocalciferol (DRISDOL) 16124 UNIT capsule Take 1 Cap by mouth every 7 days. 6 Cap 0   • vitamin D (CHOLECALCIFEROL) 1000 UNIT Tab Take 2,000 Units by mouth every day.       No current facility-administered medications for this visit.      Social History   Substance Use Topics   • Smoking status: Former Smoker     Packs/day: 1.00     Years: 40.00     Types: Cigarettes     Quit date: 1994   • Smokeless tobacco: Never Used      Comment: avoid all tobacco products   • Alcohol use No      Comment: occ  "    I reviewed patients allergies, problem list and medications today in [facility].    ROS: Any/all pertinent positives listed in the HPI, otherwise all others reviewed are negative today.      /84   Pulse 87   Temp 36.4 °C (97.6 °F)   Resp 14   Ht 1.778 m (5' 10\")   Wt 112 kg (247 lb)   SpO2 95%   BMI 35.44 kg/m²     Exam:    Gen: Alert and oriented, No apparent distress. WDWN  Psych: A+Ox3, normal affect and mood  Skin: Warm, dry and intact. Good turgor   No rashes in visible areas.  Eye: Conjunctiva clear, lids normal  ENMT: Lips without lesions, good dentition  Lungs: Clear to auscultation bilaterally, no rales or rhonchi   Unlabored respiratory effort.   CV: Regular rate and rhythm, S1, S2. No murmurs.   No Edema  Ext: No clubbing, cyanosis,   Right foot slightly cool with decreased pedal pulse, the pulse appears fairly symmetrical. Left foot warm. Onychomycosis all toenails left and right. Small amount hair distribution remaining on the right foot which is fairly symmetrical to the left  Skin is dry and intact    Numerous varicosities are small and located on the right lower leg mostly around the ankle. He has trace to 1+ nonpitting edema that is mostly just on the right ankle. Both feet do appear slightly reddened or maybe even to a mild degree dusky and when they're in the dependent position      Assessment and Plan.   78 y.o. male with the following issues.    1. HTN (hypertension), benign  Stable restart current medication, follow-up with blood pressure at his next primary care.  - lisinopril (PRINIVIL, ZESTRIL) 40 MG tablet; Take 1 Tab by mouth every day.  Dispense: 90 Tab; Refill: 2    2. Benign non-nodular prostatic hyperplasia with lower urinary tract symptoms  Stable continue current medication  - doxazosin (CARDURA) 8 MG tablet; Take 1 Tab by mouth every day.  Dispense: 90 Tab; Refill: 2    3. Cold right foot  Stable. This is chronic. He'll make sure he takes baby aspirin daily and we did " order an ultrasound to evaluate blood flow he no longer smokes  - US-EXTREMITY LOWER ARTERY BILATERAL; Future    4. Localized swelling of both lower extremities  Stable. I do suspect that this is more related to varicosities which are more present on the right lower leg around the ankle where there is the most swelling. Currently this is mild. We will try compression stockings and elevating the legs. Continue walking. Follow-up as scheduled in about 5 weeks with his new primary care. Discussed we may consider checking thyroid, we may consider adding a diuretic to his blood pressure regimen

## 2018-05-16 ENCOUNTER — HOSPITAL ENCOUNTER (OUTPATIENT)
Dept: RADIOLOGY | Facility: MEDICAL CENTER | Age: 78
End: 2018-05-16
Attending: NURSE PRACTITIONER
Payer: MEDICARE

## 2018-05-16 DIAGNOSIS — R20.9 COLD RIGHT FOOT: ICD-10-CM

## 2018-05-16 PROCEDURE — 93922 UPR/L XTREMITY ART 2 LEVELS: CPT

## 2018-05-17 ENCOUNTER — TELEPHONE (OUTPATIENT)
Dept: MEDICAL GROUP | Facility: CLINIC | Age: 78
End: 2018-05-17

## 2018-05-17 DIAGNOSIS — I73.9 PVD (PERIPHERAL VASCULAR DISEASE) (HCC): ICD-10-CM

## 2018-05-17 NOTE — TELEPHONE ENCOUNTER
Please let Bay know his leg ultrasound did show poor circulation and some plaque build up.  I will place a referral to see a vascular consult to decide on treatment options

## 2018-06-11 ENCOUNTER — TELEPHONE (OUTPATIENT)
Dept: MEDICAL GROUP | Facility: PHYSICIAN GROUP | Age: 78
End: 2018-06-11

## 2018-06-11 NOTE — TELEPHONE ENCOUNTER
Future Appointments       Provider Department Center    6/12/2018 2:20 PM Christiana Harrington M.D. [facility]        ESTABLISHED PATIENT PRE-VISIT PLANNING     Note: Patient will not be contacted if there is no indication to call.     1.  Reviewed notes from the last few office visits within the medical group: Yes    2.  If any orders were placed at last visit or intended to be done for this visit (i.e. 6 mos follow-up), do we have Results/Consult Notes?        •  Labs - Labs were not ordered at last office visit.       •  Imaging - Imaging ordered, completed and results are in chart.       •  Referrals - Referral ordered, patient has NOT been seen.    3. Is this appointment scheduled as a Hospital Follow-Up? No    4.  Immunizations were updated in Epic using WebIZ?: Yes       •  Web Iz Recommendations: FLU, PNEUMOVAX (PPSV23), TD and ZOSTAVAX (Shingles)    5.  Patient is due for the following Health Maintenance Topics:   There are no preventive care reminders to display for this patient.    6.  MDX printed for Provider? NO complete and compliant 01/16/2018    7.  Patient was informed to arrive 15 min prior to their scheduled appointment and bring in their medication bottles.

## 2018-06-12 ENCOUNTER — OFFICE VISIT (OUTPATIENT)
Dept: MEDICAL GROUP | Facility: PHYSICIAN GROUP | Age: 78
End: 2018-06-12
Payer: MEDICARE

## 2018-06-12 VITALS
WEIGHT: 252 LBS | BODY MASS INDEX: 36.08 KG/M2 | OXYGEN SATURATION: 95 % | TEMPERATURE: 98.9 F | HEART RATE: 66 BPM | HEIGHT: 70 IN | DIASTOLIC BLOOD PRESSURE: 82 MMHG | SYSTOLIC BLOOD PRESSURE: 146 MMHG

## 2018-06-12 DIAGNOSIS — F41.9 ANXIETY: ICD-10-CM

## 2018-06-12 DIAGNOSIS — E66.9 CLASS 2 OBESITY WITH BODY MASS INDEX (BMI) OF 36.0 TO 36.9 IN ADULT, UNSPECIFIED OBESITY TYPE, UNSPECIFIED WHETHER SERIOUS COMORBIDITY PRESENT: ICD-10-CM

## 2018-06-12 DIAGNOSIS — I73.9 PVD (PERIPHERAL VASCULAR DISEASE) (HCC): ICD-10-CM

## 2018-06-12 DIAGNOSIS — N40.0 BENIGN PROSTATIC HYPERPLASIA WITHOUT LOWER URINARY TRACT SYMPTOMS: ICD-10-CM

## 2018-06-12 DIAGNOSIS — E66.9 OBESITY (BMI 30-39.9): ICD-10-CM

## 2018-06-12 DIAGNOSIS — Z86.69 HX OF SEIZURE DISORDER: ICD-10-CM

## 2018-06-12 DIAGNOSIS — I10 HTN (HYPERTENSION), BENIGN: ICD-10-CM

## 2018-06-12 DIAGNOSIS — Z00.00 HEALTHCARE MAINTENANCE: ICD-10-CM

## 2018-06-12 PROCEDURE — 99204 OFFICE O/P NEW MOD 45 MIN: CPT | Performed by: INTERNAL MEDICINE

## 2018-06-12 RX ORDER — ALPRAZOLAM 0.25 MG/1
0.25 TABLET ORAL
Refills: 1 | COMMUNITY
Start: 2018-05-01 | End: 2018-09-07 | Stop reason: SDUPTHER

## 2018-06-12 RX ORDER — LISINOPRIL 40 MG/1
60 TABLET ORAL DAILY
Qty: 90 TAB | Refills: 1 | Status: SHIPPED | OUTPATIENT
Start: 2018-06-12 | End: 2018-11-12 | Stop reason: SDUPTHER

## 2018-06-12 NOTE — ASSESSMENT & PLAN NOTE
This is a new problem diagnosed in May 2018 on the arterial duplex, positive for Area of elevated velocities at the Right, mid anterior tibial artery. Velocities are consistent with >50% stenosis (438 cm/sec). Patient was given referral to vascular surgery, yet to get the appointment. We'll repeat the order today and give him. Not on any aspirin and will start him 81 mg aspirin, last lipid panel within normal limits.

## 2018-06-12 NOTE — ASSESSMENT & PLAN NOTE
This is a chronic health problem that is well controlled with current medications and lifestyle measures. Given symptoms of anxiety after the loss of his family members in the period of 2 years, taking Xanax half tablet of 0.25 mg once in every 10 days.

## 2018-06-12 NOTE — ASSESSMENT & PLAN NOTE
This is a chronic health problem that is well controlled with current medications and lifestyle measures. This was seen around 3 years back only one episode, EEG reviewed with 's note which was showing possible seizure activity at that time. Not on any medications as per Dr. Dixon's note because it's well-controlled.

## 2018-06-12 NOTE — ASSESSMENT & PLAN NOTE
This is a chronic health problem that is uncontrolled with current medications and lifestyle measures. Present office today is 146/82, currently on lisinopril 40 mg daily.

## 2018-06-12 NOTE — ASSESSMENT & PLAN NOTE
Last colonoscopy around 2016 okay for 10 years we'll need to get the records. Patient opting to wait for Blood work for PSA check

## 2018-06-12 NOTE — ASSESSMENT & PLAN NOTE
This is a chronic health problem that is uncontrolled with current medications and lifestyle measures. Has symptoms of nocturia, currently on doxazosin 8 mg daily.

## 2018-06-12 NOTE — PROGRESS NOTES
CC:  Establish care with new PCP, hypertension.    HISTORY OF THE PRESENT ILLNESS: Patient is a 78 y.o. male. This pleasant patient is here today to establish care with new PCP and discuss some medical conditions as mentioned in history of present illness below.    Health Maintenance: Completed      PVD (peripheral vascular disease) (HCC)  This is a new problem diagnosed in May 2018 on the arterial duplex, positive for Area of elevated velocities at the Right, mid anterior tibial artery. Velocities are consistent with >50% stenosis (438 cm/sec). Patient was given referral to vascular surgery, yet to get the appointment. We'll repeat the order today and give him. Not on any aspirin and will start him 81 mg aspirin, last lipid panel within normal limits.    HTN (hypertension), benign  This is a chronic health problem that is uncontrolled with current medications and lifestyle measures. Present office today is 146/82, currently on lisinopril 40 mg daily.    BPH (benign prostatic hypertrophy)  This is a chronic health problem that is uncontrolled with current medications and lifestyle measures. Has symptoms of nocturia, currently on doxazosin 8 mg daily.    Anxiety  This is a chronic health problem that is well controlled with current medications and lifestyle measures. Given symptoms of anxiety after the loss of his family members in the period of 2 years, taking Xanax half tablet of 0.25 mg once in every 10 days.    Hx of seizure disorder  This is a chronic health problem that is well controlled with current medications and lifestyle measures. This was seen around 3 years back only one episode, EEG reviewed with 's note which was showing possible seizure activity at that time. Not on any medications as per Dr. Dixon's note because it's well-controlled.    Healthcare maintenance  Last colonoscopy around 2016 okay for 10 years we'll need to get the records. Patient opting to wait for Blood work for PSA  check    PHQ score 0, BMI > 36 not opting for health improvement program, for tobacco, no fall injuries    Allergies: Nkda [no known drug allergy]    Current Outpatient Prescriptions Ordered in [facility]   Medication Sig Dispense Refill   • aspirin EC (ECOTRIN) 81 MG Tablet Delayed Response Take 1 Tab by mouth every day. 90 Tab 2   • lisinopril (PRINIVIL, ZESTRIL) 40 MG tablet Take 1.5 Tabs by mouth every day. 90 Tab 1   • doxazosin (CARDURA) 8 MG tablet Take 1 Tab by mouth every day. 90 Tab 2   • trazodone (DESYREL) 100 MG Tab Take 2 Tabs by mouth every bedtime. 180 Tab 2   • vitamin D (CHOLECALCIFEROL) 1000 UNIT Tab Take 2,000 Units by mouth every day.     • ALPRAZolam (XANAX) 0.25 MG Tab Take 0.25 mg by mouth. TAKE 1 TABLET BY MOUTH AT BEDTIME AS NEEDED FOR SLEEP FOR 30 DAYS  1   • ergocalciferol (DRISDOL) 66450 UNIT capsule Take 1 Cap by mouth every 7 days. 6 Cap 0     No current Epic-ordered facility-administered medications on file.        Past Medical History:   Diagnosis Date   • Anxiety    • Arthritis     back/neck/hands   • BPH (benign prostatic hypertrophy)    • Dental disorder     dentures upper   • Depression    • Diverticulitis    • DJD (degenerative joint disease)    • Heart burn    • Hepatitis C    • Hypertension    • Indigestion    • Liver disease    • Low back pain    • Other specified disorder of intestines     constipation/diarrhea   • Pneumonia    • Urinary bladder disorder        Past Surgical History:   Procedure Laterality Date   • RECOVERY  8/4/2011    Performed by SURGERY, IR-RECOVERY at SURGERY SAME DAY [facility] ORS   • COLONOSCOPY - ENDO  10/14/08    Performed by CECY DIAZ at ENDOSCOPY [facility] ORS   • GASTROSCOPY-ENDO  10/13/08    Performed by CECY DIAZ at ENDOSCOPY [facility] ORS   • OTHER ORTHOPEDIC SURGERY  2003    right knee replacement       Social History   Substance Use Topics   • Smoking status: Former Smoker     Packs/day: 1.00     Years: 40.00     Types: Cigarettes      Quit date: 1/1/1994   • Smokeless tobacco: Never Used      Comment: avoid all tobacco products   • Alcohol use No      Comment: occ       Social History     Social History Narrative   • No narrative on file       Family History   Problem Relation Age of Onset   • Heart Disease Mother    • Other Father      MS    • Heart Disease Brother    • Diabetes Brother    • Diabetes Brother    • Heart Disease Brother    • Suicide Attempts Maternal Grandmother    • No Known Problems Maternal Grandfather    • No Known Problems Paternal Grandmother    • No Known Problems Paternal Grandfather    • Alzheimer's Disease Brother    • Depression Brother        ROS:     - Constitutional: Negative for fever, chills, unexpected weight change, and fatigue/generalized weakness.     - HEENT: Negative for headaches, vision changes, hearing changes, ear pain, ear discharge, rhinorrhea, sinus congestion, sore throat, and neck pain.      - Respiratory: Negative for cough, sputum production, chest congestion, dyspnea, wheezing, and crackles.      - Cardiovascular: Positive for right lower extremity edema Negative for chest pain, palpitations, orthopnea.    - Gastrointestinal: Negative for heartburn, nausea, vomiting, abdominal pain, hematochezia, melena, diarrhea, constipation, and greasy/foul-smelling stools.     - Genitourinary: Positive for nocturia Negative for dysuria, polyuria, hematuria, pyuria, urinary urgency, and urinary incontinence.     - Musculoskeletal: Positive for claudication pain on the right lower extremity Negative for myalgias, back pain, and joint pain.     - Skin: Negative for rash, itching, cyanotic skin color change.     - Neurological: Negative for dizziness, tingling, tremors, focal sensory deficit, focal weakness and headaches.     - Endo/Heme/Allergies: Does not bruise/bleed easily.     - Psychiatric/Behavioral: Positive for intermittent anxiety .Negative for depression, suicidal/homicidal ideation and memory loss.  "       Last lab work done in January 2018 reviewed and discussed with the patient.    Exam: Blood pressure 146/82, pulse 66, temperature 37.2 °C (98.9 °F), height 1.778 m (5' 10\"), weight 114.3 kg (252 lb), SpO2 95 %. Body mass index is 36.16 kg/m².    General: Normal appearing. No distress.  HEENT: Normocephalic. Eyes conjunctiva clear lids without ptosis, pupils equal and reactive to light accommodation, ears normal shape and contour, canals are clear bilaterally, tympanic membranes are benign, nasal mucosa benign, oropharynx is without erythema, edema or exudates.   Neck: Supple without JVD or bruit. Thyroid is not enlarged.  Pulmonary: Clear to ausculation.  Normal effort. No rales, ronchi, or wheezing.  Cardiovascular: Regular rate and rhythm without murmur. Carotid and radial pulses are intact and equal bilaterally. Positive for right lower extremity 1+ pitting edema  Abdomen: Soft, nontender, nondistended. Normal bowel sounds. Liver and spleen are not palpable  Neurologic: Grossly nonfocal  Lymph: No cervical, supraclavicular or axillary lymph nodes are palpable  Skin: Warm and dry.  No obvious lesions.  Musculoskeletal: Normal gait. No extremity cyanosis, clubbing.  Psych: Normal mood and affect. Alert and oriented x3. Judgment and insight is normal.      Please note that this dictation was created using voice recognition software. I have made every reasonable attempt to correct obvious errors, but I expect that there are errors of grammar and possibly content that I did not discover before finalizing the note.      Assessment/Plan  1. Anxiety  Most likely due to bereavement of all his family members in the span of 2 years. Well controlled on when necessary Xanax half tablet of 0.25 mg once in 10 days. Denies any suicidal or homicidal intentions.    2. Benign prostatic hyperplasia without lower urinary tract symptoms  Occasional nocturia, currently well controlled on current dose of Zosyn 8 mg daily 2 " continue same.    3. HTN (hypertension), benign  Uncontrolled with the last 3 office visits blood pressure measuring greater than 140s/90s. Will increase his lisinopril from 40-60 mg daily given his peripheral arterial disease diagnosis recently, given instructions to check his blood pressure every day  - lisinopril (PRINIVIL, ZESTRIL) 40 MG tablet; Take 1.5 Tabs by mouth every day.  Dispense: 90 Tab; Refill: 1    4. PVD (peripheral vascular disease) (HCC)  Uncontrolled, reviewed his previous tibial Doppler positive for anterior tibial artery greater than 50% stenosis on the right side. Patient is supposed to go to vascular surgery with a refill given in the past. I have reprinted the order and emphasized to keep up this appointment. Last lipid panel done in January 2008 and within normal limits. We will start him on aspirin 81 mg and look forward for the recommendations by vascular surgery.  - aspirin EC (ECOTRIN) 81 MG Tablet Delayed Response; Take 1 Tab by mouth every day.  Dispense: 90 Tab; Refill: 2    5. Obesity (BMI 30-39.9)  6. Class 2 obesity with body mass index (BMI) of 36.0 to 36.9 in adult, unspecified obesity type, unspecified whether serious comorbidity present  Pt educated on the increase of morbidity and mortality associated with excess weight including DM, Heart Disease, HTN, stroke, and sleep apnea.  Pt advised weight loss of 5% through reduced calorie, low fat diet and 150 mins of exercise a week  Recommend obesity clinic if patient is unsuccessful with weight loss    7. Hx of seizure disorder  This was only one episode in his lifetime, reviewed Dr. Dixon's note. Not on any antiepileptic at this time not needed as per the neurology.    8. Healthcare maintenance  Offered him PSA level check which was last done in 2016, which patient prefers to have it in the next blood work but not now.

## 2018-08-31 ENCOUNTER — OFFICE VISIT (OUTPATIENT)
Dept: URGENT CARE | Facility: PHYSICIAN GROUP | Age: 78
End: 2018-08-31
Payer: MEDICARE

## 2018-08-31 VITALS
WEIGHT: 253 LBS | HEIGHT: 70 IN | TEMPERATURE: 98.9 F | SYSTOLIC BLOOD PRESSURE: 126 MMHG | RESPIRATION RATE: 14 BRPM | OXYGEN SATURATION: 94 % | DIASTOLIC BLOOD PRESSURE: 74 MMHG | BODY MASS INDEX: 36.22 KG/M2 | HEART RATE: 87 BPM

## 2018-08-31 DIAGNOSIS — S05.91XA RIGHT EYE INJURY, INITIAL ENCOUNTER: ICD-10-CM

## 2018-08-31 PROCEDURE — 99214 OFFICE O/P EST MOD 30 MIN: CPT | Performed by: FAMILY MEDICINE

## 2018-08-31 RX ORDER — POLYMYXIN B SULFATE AND TRIMETHOPRIM 1; 10000 MG/ML; [USP'U]/ML
1 SOLUTION OPHTHALMIC EVERY 4 HOURS
Qty: 10 ML | Refills: 0 | Status: SHIPPED | OUTPATIENT
Start: 2018-08-31 | End: 2018-11-15

## 2018-08-31 ASSESSMENT — ENCOUNTER SYMPTOMS
NAUSEA: 0
FEVER: 0
DOUBLE VISION: 0
EYE REDNESS: 1
EYE ITCHING: 0
VOMITING: 0
EYE DISCHARGE: 1
BLURRED VISION: 1

## 2018-08-31 NOTE — PROGRESS NOTES
"Subjective:   Bay Forrester is a 78 y.o. male who presents for Eye Problem (right eye infection, and pain x 6 months )        Eye Problem    The right eye is affected. This is a new problem. The current episode started more than 1 month ago. The problem occurs constantly. The problem has been rapidly worsening. The injury mechanism was a direct trauma. The pain is moderate. Associated symptoms include blurred vision, an eye discharge and eye redness. Pertinent negatives include no double vision, fever, itching, nausea or vomiting.     Review of Systems   Constitutional: Negative for fever.   Eyes: Positive for blurred vision, discharge and redness. Negative for double vision and itching.   Gastrointestinal: Negative for nausea and vomiting.     Allergies   Allergen Reactions   • Nkda [No Known Drug Allergy]       Objective:   /74   Pulse 87   Temp 37.2 °C (98.9 °F)   Resp 14   Ht 1.778 m (5' 10\")   Wt 114.8 kg (253 lb)   SpO2 94%   BMI 36.30 kg/m²   Physical Exam   Constitutional: He is oriented to person, place, and time. He appears well-developed and well-nourished. No distress.   HENT:   Head: Normocephalic and atraumatic.   Eyes: Pupils are equal, round, and reactive to light. EOM are normal. Right conjunctiva is injected. Right conjunctiva has no hemorrhage. Left conjunctiva is not injected. Left conjunctiva has no hemorrhage.   Cardiovascular: Normal rate and regular rhythm.    No murmur heard.  Pulmonary/Chest: Effort normal and breath sounds normal. No respiratory distress.   Abdominal: Soft. He exhibits no distension. There is no tenderness.   Neurological: He is alert and oriented to person, place, and time. He has normal reflexes. No sensory deficit.   Skin: Skin is warm and dry.   Psychiatric: He has a normal mood and affect.         Assessment/Plan:   Assessment    1. Right eye injury, initial encounter  - polymixin-trimethoprim (POLYTRIM) 93325-8.1 UNIT/ML-% Solution; Place 1 Drop " in both eyes every 4 hours.  Dispense: 10 mL; Refill: 0  - REFERRAL TO OPHTHALMOLOGY    Differential diagnosis, natural history, supportive care, and indications for immediate follow-up discussed.

## 2018-09-04 RX ORDER — ALPRAZOLAM 0.25 MG/1
0.25 TABLET ORAL
Qty: 30 TAB | Refills: 1 | OUTPATIENT
Start: 2018-09-04

## 2018-09-06 ENCOUNTER — TELEPHONE (OUTPATIENT)
Dept: MEDICAL GROUP | Facility: PHYSICIAN GROUP | Age: 78
End: 2018-09-06

## 2018-09-06 NOTE — TELEPHONE ENCOUNTER
Future Appointments       Provider Department Center    9/7/2018 1:00 PM Christiana Harrington M.D. [facility]        ESTABLISHED PATIENT PRE-VISIT PLANNING     Note: Patient will not be contacted if there is no indication to call.     1.  Reviewed notes from the last few office visits within the medical group: Yes    2.  If any orders were placed at last visit or intended to be done for this visit (i.e. 6 mos follow-up), do we have Results/Consult Notes?        •  Labs - Labs were not ordered at last office visit.       •  Imaging - Imaging was not ordered at last office visit.       •  Referrals - Referral ordered, patient has NOT been seen.    3. Is this appointment scheduled as a Hospital Follow-Up? No    4.  Immunizations were updated in [facility] using WebIZ?: Yes       •  Web Iz Recommendations: FLU, PNEUMOVAX (PPSV23), TD and ZOSTAVAX (Shingles)    5.  Patient is due for the following Health Maintenance Topics:   Health Maintenance Due   Topic Date Due   • IMM ZOSTER VACCINES (1 of 2) 01/10/1989   • IMM INFLUENZA (1) 09/01/2018       6.  MDX printed for Provider? NO  Complete and compliant on 01/16/18    7.  Patient was informed to arrive 15 min prior to their scheduled appointment and bring in their medication bottles.

## 2018-09-07 ENCOUNTER — OFFICE VISIT (OUTPATIENT)
Dept: MEDICAL GROUP | Facility: PHYSICIAN GROUP | Age: 78
End: 2018-09-07
Payer: MEDICARE

## 2018-09-07 VITALS
OXYGEN SATURATION: 95 % | BODY MASS INDEX: 36.51 KG/M2 | TEMPERATURE: 99.8 F | DIASTOLIC BLOOD PRESSURE: 76 MMHG | WEIGHT: 255 LBS | SYSTOLIC BLOOD PRESSURE: 132 MMHG | HEART RATE: 74 BPM | HEIGHT: 70 IN

## 2018-09-07 DIAGNOSIS — I73.9 PVD (PERIPHERAL VASCULAR DISEASE) (HCC): ICD-10-CM

## 2018-09-07 DIAGNOSIS — I35.0 AORTIC VALVE STENOSIS, ETIOLOGY OF CARDIAC VALVE DISEASE UNSPECIFIED: ICD-10-CM

## 2018-09-07 DIAGNOSIS — E66.9 OBESITY (BMI 35.0-39.9 WITHOUT COMORBIDITY): ICD-10-CM

## 2018-09-07 DIAGNOSIS — E66.9 OBESITY (BMI 30-39.9): ICD-10-CM

## 2018-09-07 DIAGNOSIS — F41.9 ANXIETY: ICD-10-CM

## 2018-09-07 DIAGNOSIS — I10 HTN (HYPERTENSION), BENIGN: ICD-10-CM

## 2018-09-07 DIAGNOSIS — E66.9 CLASS 2 OBESITY WITH BODY MASS INDEX (BMI) OF 36.0 TO 36.9 IN ADULT, UNSPECIFIED OBESITY TYPE, UNSPECIFIED WHETHER SERIOUS COMORBIDITY PRESENT: ICD-10-CM

## 2018-09-07 DIAGNOSIS — Z23 NEED FOR VACCINATION: ICD-10-CM

## 2018-09-07 PROCEDURE — 99214 OFFICE O/P EST MOD 30 MIN: CPT | Performed by: INTERNAL MEDICINE

## 2018-09-07 RX ORDER — ALPRAZOLAM 0.25 MG/1
0.25 TABLET ORAL NIGHTLY PRN
Qty: 30 TAB | Refills: 1 | Status: SHIPPED | OUTPATIENT
Start: 2018-09-07 | End: 2018-10-07

## 2018-09-07 NOTE — PROGRESS NOTES
CC: Follow-up visit, peripheral vascular disease.    HISTORY OF PRESENT ILLNESS: Patient is a 78 y.o. male established patient who presents today to discuss her medical conditions as mentioned in history of present illness below.    Health Maintenance: Completed    PVD (peripheral vascular disease) (HCC)  This is a chronic health problem that is uncontrolled with current medications and lifestyle measures. Diagnosed in May 2018 on the arterial duplex, positive for Area of elevated velocities at the Right, mid anterior tibial artery. Velocities are consistent with >50% stenosis (438 cm/sec). On ASA 81 mg daily, last lipid panel within normal limits. Following Blanca MUSA at Vascular Surgery at [facility].Plans of Bypass graft in 3 stages in October 2018.     HTN (hypertension), benign  This is a chronic health problem that is well controlled with current medications and lifestyle measures. B.P in office is 132/76. On Lisinopril 40 mg daily.     Aortic stenosis  This is a chronic health problem that is well controlled with current medications and lifestyle measures. Last Eco on 2015 was positive for Mild Aortic stenosis. Denies Chest pain, light headedness or Syncope.      PHQ score 0, BMI > 36 , Former tobacco, no fall injuries.    Patient Active Problem List    Diagnosis Date Noted   • IFG (impaired fasting glucose) 01/10/2017     Priority: Medium   • HTN (hypertension), benign 03/18/2010     Priority: Medium   • Hypovitaminosis D 01/16/2018     Priority: Low   • Obesity (BMI 30-39.9) 08/03/2017     Priority: Low   • DDD (degenerative disc disease), lumbar 09/10/2015     Priority: Low   • DDD (degenerative disc disease), cervical 09/10/2015     Priority: Low   • Migraine without status migrainosus, not intractable 08/19/2015     Priority: Low   • Osteoarthritis of hip 05/01/2014     Priority: Low   • History of hepatitis C 04/23/2013     Priority: Low   • Hypotestosteronism 10/24/2011     Priority: Low   • BPH  (benign prostatic hypertrophy)      Priority: Low   • Anxiety      Priority: Low   • Depression      Priority: Low   • Obesity (BMI 35.0-39.9 without comorbidity) ([facility]) 09/07/2018   • Aortic stenosis 09/07/2018   • Hx of seizure disorder 06/12/2018   • Healthcare maintenance 06/12/2018   • PVD (peripheral vascular disease) ([facility]) 05/17/2018   • Localized swelling of both lower extremities 05/01/2018      Allergies:Nkda [no known drug allergy]    Current Outpatient Prescriptions   Medication Sig Dispense Refill   • ALPRAZolam (XANAX) 0.25 MG Tab Take 1 Tab by mouth at bedtime as needed for Sleep for up to 30 days. TAKE 1 TABLET BY MOUTH AT BEDTIME AS NEEDED FOR SLEEP FOR 30 DAYS 30 Tab 1   • NON SPECIFIED Please give Shingrix vaccine 1 Each 0   • aspirin EC (ECOTRIN) 81 MG Tablet Delayed Response Take 1 Tab by mouth every day. 90 Tab 2   • lisinopril (PRINIVIL, ZESTRIL) 40 MG tablet Take 1.5 Tabs by mouth every day. 90 Tab 1   • doxazosin (CARDURA) 8 MG tablet Take 1 Tab by mouth every day. 90 Tab 2   • vitamin D (CHOLECALCIFEROL) 1000 UNIT Tab Take 2,000 Units by mouth every day.     • polymixin-trimethoprim (POLYTRIM) 14113-9.1 UNIT/ML-% Solution Place 1 Drop in both eyes every 4 hours. 10 mL 0   • trazodone (DESYREL) 100 MG Tab Take 2 Tabs by mouth every bedtime. 180 Tab 2   • ergocalciferol (DRISDOL) 04362 UNIT capsule Take 1 Cap by mouth every 7 days. 6 Cap 0     No current facility-administered medications for this visit.        Social History   Substance Use Topics   • Smoking status: Former Smoker     Packs/day: 1.00     Years: 40.00     Types: Cigarettes     Quit date: 1/1/1994   • Smokeless tobacco: Never Used      Comment: avoid all tobacco products   • Alcohol use No      Comment: occ     Social History     Social History Narrative   • No narrative on file       Family History   Problem Relation Age of Onset   • Heart Disease Mother    • Other Father         MS    • Heart Disease Brother    • Diabetes  "Brother    • Diabetes Brother    • Heart Disease Brother    • Suicide Attempts Maternal Grandmother    • No Known Problems Maternal Grandfather    • No Known Problems Paternal Grandmother    • No Known Problems Paternal Grandfather    • Alzheimer's Disease Brother    • Depression Brother         ROS:     - Constitutional:  Negative for fever, chills, unexpected weight change, and fatigue/generalized weakness.    - HEENT:  Negative for headaches, vision changes, hearing changes, ear pain, ear discharge, rhinorrhea, sinus congestion, sore throat, and neck pain.      - Respiratory: Negative for cough, sputum production, chest congestion, dyspnea, wheezing, and crackles.      - Cardiovascular: Positive for bilateral lower extremity edema Negative for chest pain, palpitations, orthopnea.    - Gastrointestinal: Negative for heartburn, nausea, vomiting, abdominal pain, hematochezia, melena, diarrhea, constipation, and greasy/foul-smelling stools.     - Genitourinary: Negative for dysuria, polyuria, hematuria, pyuria, urinary urgency, and urinary incontinence.     - Musculoskeletal: Negative for myalgias, back pain, and joint pain.     - Skin: Negative for rash, itching, cyanotic skin color change.     - Neurological: Negative for dizziness, tingling, tremors, focal sensory deficit, focal weakness and headaches.     - Endo/Heme/Allergies: Does not bruise/bleed easily.     - Psychiatric/Behavioral: Negative for depression, suicidal/homicidal ideation and memory loss.          Last lab work done in January 2018 reviewed and discussed with the patient.    Exam:    Blood pressure 132/76, pulse 74, temperature 37.7 °C (99.8 °F), height 1.778 m (5' 10\"), weight 115.7 kg (255 lb), SpO2 95 %. Body mass index is 36.59 kg/m².    General:  Well nourished, well developed male in NAD  Head is grossly normal.  Neck: Supple without JVD or bruit. Thyroid is not enlarged.  Pulmonary: Clear to ausculation and percussion.  Normal effort. No " rales, ronchi, or wheezing.  Cardiovascular: Regular rate and rhythm positive for 3/6 systolic murmur in both right and left second intercostal spaces radiating to bilateral carotids. Radial pulses are intact and equal bilaterally. Positive for bilateral lower extremity edema right greater than left.  Extremities: no clubbing, cyanosis.      Please note that this dictation was created using voice recognition software. I have made every reasonable attempt to correct obvious errors, but I expect that there are errors of grammar and possibly content that I did not discover before finalizing the note.    Assessment/Plan:  1. Anxiety  Uncontrolled, patient says that he his having a lot of stress at home with the visit of her daughter's family along with a pet dog for which he is facing a lot of anxiety. He has lost 3 family members in the last one year and has been using Xanax 0.25 mg when necessary 30 tablets which is sufficient for more than 5 months as he takes only as needed. Requesting for refills as he is out of it.  not working have checked with pharmacy by my MA which says last refilled by Dr. Cardoso 30 tablets 0.25 mg on May 1, 2018. We will do a controlled substance contract and urine drug screen.  - ALPRAZolam (XANAX) 0.25 MG Tab; Take 1 Tab by mouth at bedtime as needed for Sleep for up to 30 days. TAKE 1 TABLET BY MOUTH AT BEDTIME AS NEEDED FOR SLEEP FOR 30 DAYS  Dispense: 30 Tab; Refill: 1  - CONTROLLED SUBSTANCE TREATMENT AGREEMENT  - PAIN MANAGEMENT SCRN, UR; Future    2. Obesity (BMI 30-39.9)  3. Class 2 obesity with body mass index (BMI) of 36.0 to 36.9 in adult, unspecified obesity type, unspecified whether serious comorbidity present  4. Obesity (BMI 35.0-39.9 without comorbidity)  Pt educated on the increase of morbidity and mortality associated with excess weight including DM, Heart Disease, HTN, stroke, and sleep apnea.  Pt advised weight loss of 5% through reduced calorie, low fat diet and 150  mins of exercise a week  Recommend obesity clinic if patient is unsuccessful with weight loss  - Patient identified as having weight management issue.  Appropriate orders and counseling given.    5. PVD (peripheral vascular disease) (HCC)  Uncontrolled, as mentioned in history of present illness above about the arterial Doppler studies. When you are not taking care of plans of surgery in October 2018.     6. HTN (hypertension), benign  Well controlled, continue current lisinopril 40 mg daily.    7. Aortic valve stenosis, etiology of cardiac valve disease unspecified  Asymptomatic at this time except the peripheral edema, last check in 2015 positive for mild aortic stenosis, would like to do a follow-up on this to check for the severity of aortic stenosis. Clinical exam positive for 3/6 systolic murmur radiating to carotids.  - ECHOCARDIOGRAM COMP W/O CONT; Future    8. Need for vaccination   Patient prefers to take flu shot on an other day by taking an MA visit. Okay to get the shingrix vaccine today at pharmacy.  - NON SPECIFIED; Please give Shingrix vaccine  Dispense: 1 Each; Refill: 0

## 2018-09-07 NOTE — ASSESSMENT & PLAN NOTE
This is a chronic health problem that is well controlled with current medications and lifestyle measures. Last Eco on 2015 was positive for Mild Aortic stenosis. Denies Chest pain, light headedness or Syncope.

## 2018-09-07 NOTE — ASSESSMENT & PLAN NOTE
This is a chronic health problem that is uncontrolled with current medications and lifestyle measures. Diagnosed in May 2018 on the arterial duplex, positive for Area of elevated velocities at the Right, mid anterior tibial artery. Velocities are consistent with >50% stenosis (438 cm/sec). On ASA 81 mg daily, last lipid panel within normal limits. Following Blanca MUSA at Vascular Surgery at [facility].Plans of Bypass graft in 3 stages in October 2018.

## 2018-09-07 NOTE — LETTER
[facility]  Christiana Harrington M.D.  [address]  [place] [address]  Fax: [phone]   Authorization for Release/Disclosure of   Protected Health Information   Name: BAY ANDERSON : 1940 SSN: [id number]   Address: [address] [place] [address]  [address]  [facility] [address] Phone:    [phone] (home)    I authorize the entity listed below to release/disclose the PHI below to:   [facility]/Christiana Harrington M.D. and Christiana Harrington M.D.   Provider or Entity Name:  [place]    Address   City, State, Zip   Phone:      Fax: [phone]     Reason for request: continuity of care   Information to be released:    [  ] LAST COLONOSCOPY,  including any PATH REPORT and follow-up  [  ] LAST FIT/COLOGUARD RESULT [  ] LAST DEXA  [  ] LAST MAMMOGRAM  [  ] LAST PAP  [  ] LAST LABS [  ] RETINA EXAM REPORT  [  ] IMMUNIZATION RECORDS  [XXX ] Release all info      [  ] Check here and initial the line next to each item to release ALL health information INCLUDING  _____ Care and treatment for drug and / or alcohol abuse  _____ HIV testing, infection status, or AIDS  _____ Genetic Testing    DATES OF SERVICE OR TIME PERIOD TO BE DISCLOSED: _____________  I understand and acknowledge that:  * This Authorization may be revoked at any time by you in writing, except if your health information has already been used or disclosed.  * Your health information that will be used or disclosed as a result of you signing this authorization could be re-disclosed by the recipient. If this occurs, your re-disclosed health information may no longer be protected by State or Federal laws.  * You may refuse to sign this Authorization. Your refusal will not affect your ability to obtain treatment.  * This Authorization becomes effective upon signing and will  on (date) __________.      If no date is indicated, this Authorization will  one (1) year from the signature date.    Name: Bay Leal  Mitzy    Signature:   Date:     9/7/2018       PLEASE FAX REQUESTED RECORDS BACK TO: [phone]

## 2018-09-07 NOTE — ASSESSMENT & PLAN NOTE
This is a chronic health problem that is well controlled with current medications and lifestyle measures. B.P in office is 132/76. On Lisinopril 40 mg daily.

## 2018-09-19 ENCOUNTER — HOSPITAL ENCOUNTER (OUTPATIENT)
Dept: CARDIOLOGY | Facility: MEDICAL CENTER | Age: 78
End: 2018-09-19
Attending: INTERNAL MEDICINE
Payer: MEDICARE

## 2018-09-19 DIAGNOSIS — I35.0 AORTIC VALVE STENOSIS, ETIOLOGY OF CARDIAC VALVE DISEASE UNSPECIFIED: ICD-10-CM

## 2018-09-19 LAB
LV EJECT FRACT  99904: 55
LV EJECT FRACT MOD 2C 99903: 54.01
LV EJECT FRACT MOD 4C 99902: 59.83
LV EJECT FRACT MOD BP 99901: 56.83

## 2018-09-19 PROCEDURE — 93306 TTE W/DOPPLER COMPLETE: CPT

## 2018-09-20 ENCOUNTER — TELEPHONE (OUTPATIENT)
Dept: MEDICAL GROUP | Facility: PHYSICIAN GROUP | Age: 78
End: 2018-09-20

## 2018-09-20 DIAGNOSIS — R42 DIZZINESS: ICD-10-CM

## 2018-09-20 DIAGNOSIS — I35.0 AORTIC VALVE STENOSIS, ETIOLOGY OF CARDIAC VALVE DISEASE UNSPECIFIED: ICD-10-CM

## 2018-09-20 NOTE — TELEPHONE ENCOUNTER
Pt informed of result, pt mentioned that he has been having lightheadedness over the last couple of months. Most recent one was last week. Please advise.

## 2018-09-20 NOTE — TELEPHONE ENCOUNTER
----- Message from Christiana Harrington M.D. sent at 9/19/2018 11:56 PM PDT -----  Your Echocardiogram positive for slight worsening of the aortic stenosis compared to 2015. Please inform for any symptoms of chest pain, Dizziness or passing out.  Christiana Harrington M.D.

## 2018-09-21 NOTE — TELEPHONE ENCOUNTER
I have placed referral to Cardiology for further recommendations and symptoms of dizziness. Please let the patient know to come for walkin to our Office for an MA visit to get an EKG ASAP. ( orders already placed by me ) and also keep up her appointment with Cardiology. Thank you.  Christiana Harrington M.D.

## 2018-10-14 ENCOUNTER — HOSPITAL ENCOUNTER (EMERGENCY)
Facility: MEDICAL CENTER | Age: 78
End: 2018-10-14
Attending: EMERGENCY MEDICINE
Payer: MEDICARE

## 2018-10-14 ENCOUNTER — APPOINTMENT (OUTPATIENT)
Dept: RADIOLOGY | Facility: MEDICAL CENTER | Age: 78
End: 2018-10-14
Attending: EMERGENCY MEDICINE
Payer: MEDICARE

## 2018-10-14 VITALS
BODY MASS INDEX: 36.27 KG/M2 | RESPIRATION RATE: 18 BRPM | HEIGHT: 71 IN | DIASTOLIC BLOOD PRESSURE: 71 MMHG | TEMPERATURE: 98.1 F | SYSTOLIC BLOOD PRESSURE: 174 MMHG | OXYGEN SATURATION: 95 % | HEART RATE: 81 BPM | WEIGHT: 259.04 LBS

## 2018-10-14 DIAGNOSIS — N39.0 URINARY TRACT INFECTION WITH HEMATURIA, SITE UNSPECIFIED: ICD-10-CM

## 2018-10-14 DIAGNOSIS — K40.90 UNILATERAL INGUINAL HERNIA WITHOUT OBSTRUCTION OR GANGRENE, RECURRENCE NOT SPECIFIED: ICD-10-CM

## 2018-10-14 DIAGNOSIS — R31.9 URINARY TRACT INFECTION WITH HEMATURIA, SITE UNSPECIFIED: ICD-10-CM

## 2018-10-14 LAB
ALBUMIN SERPL BCP-MCNC: 3.9 G/DL (ref 3.2–4.9)
ALBUMIN/GLOB SERPL: 1.6 G/DL
ALP SERPL-CCNC: 55 U/L (ref 30–99)
ALT SERPL-CCNC: 18 U/L (ref 2–50)
ANION GAP SERPL CALC-SCNC: 8 MMOL/L (ref 0–11.9)
APPEARANCE UR: ABNORMAL
AST SERPL-CCNC: 23 U/L (ref 12–45)
BACTERIA #/AREA URNS HPF: ABNORMAL /HPF
BASOPHILS # BLD AUTO: 0.5 % (ref 0–1.8)
BASOPHILS # BLD: 0.05 K/UL (ref 0–0.12)
BILIRUB SERPL-MCNC: 0.3 MG/DL (ref 0.1–1.5)
BILIRUB UR QL STRIP.AUTO: ABNORMAL
BUN SERPL-MCNC: 24 MG/DL (ref 8–22)
CALCIUM SERPL-MCNC: 8.6 MG/DL (ref 8.4–10.2)
CHLORIDE SERPL-SCNC: 107 MMOL/L (ref 96–112)
CO2 SERPL-SCNC: 23 MMOL/L (ref 20–33)
COLOR UR: YELLOW
CREAT SERPL-MCNC: 1.18 MG/DL (ref 0.5–1.4)
EOSINOPHIL # BLD AUTO: 0.13 K/UL (ref 0–0.51)
EOSINOPHIL NFR BLD: 1.3 % (ref 0–6.9)
ERYTHROCYTE [DISTWIDTH] IN BLOOD BY AUTOMATED COUNT: 47.3 FL (ref 35.9–50)
GLOBULIN SER CALC-MCNC: 2.4 G/DL (ref 1.9–3.5)
GLUCOSE SERPL-MCNC: 133 MG/DL (ref 65–99)
GLUCOSE UR STRIP.AUTO-MCNC: NEGATIVE MG/DL
HCT VFR BLD AUTO: 42.6 % (ref 42–52)
HGB BLD-MCNC: 14 G/DL (ref 14–18)
IMM GRANULOCYTES # BLD AUTO: 0.08 K/UL (ref 0–0.11)
IMM GRANULOCYTES NFR BLD AUTO: 0.8 % (ref 0–0.9)
KETONES UR STRIP.AUTO-MCNC: ABNORMAL MG/DL
LACTATE BLD-SCNC: 2 MMOL/L (ref 0.5–2)
LEUKOCYTE ESTERASE UR QL STRIP.AUTO: ABNORMAL
LIPASE SERPL-CCNC: 28 U/L (ref 7–58)
LYMPHOCYTES # BLD AUTO: 1.25 K/UL (ref 1–4.8)
LYMPHOCYTES NFR BLD: 12.1 % (ref 22–41)
MCH RBC QN AUTO: 30.4 PG (ref 27–33)
MCHC RBC AUTO-ENTMCNC: 32.9 G/DL (ref 33.7–35.3)
MCV RBC AUTO: 92.6 FL (ref 81.4–97.8)
MICRO URNS: ABNORMAL
MONOCYTES # BLD AUTO: 0.85 K/UL (ref 0–0.85)
MONOCYTES NFR BLD AUTO: 8.2 % (ref 0–13.4)
MUCOUS THREADS #/AREA URNS HPF: ABNORMAL /HPF
NEUTROPHILS # BLD AUTO: 8 K/UL (ref 1.82–7.42)
NEUTROPHILS NFR BLD: 77.1 % (ref 44–72)
NITRITE UR QL STRIP.AUTO: NEGATIVE
NRBC # BLD AUTO: 0 K/UL
NRBC BLD-RTO: 0 /100 WBC
PH UR STRIP.AUTO: 5.5 [PH]
PLATELET # BLD AUTO: 200 K/UL (ref 164–446)
PMV BLD AUTO: 10.3 FL (ref 9–12.9)
POTASSIUM SERPL-SCNC: 4.3 MMOL/L (ref 3.6–5.5)
PROT SERPL-MCNC: 6.3 G/DL (ref 6–8.2)
PROT UR QL STRIP: 100 MG/DL
RBC # BLD AUTO: 4.6 M/UL (ref 4.7–6.1)
RBC # URNS HPF: ABNORMAL /HPF
RBC UR QL AUTO: ABNORMAL
SODIUM SERPL-SCNC: 138 MMOL/L (ref 135–145)
SP GR UR REFRACTOMETRY: 1.03
WBC # BLD AUTO: 10.4 K/UL (ref 4.8–10.8)
WBC #/AREA URNS HPF: ABNORMAL /HPF

## 2018-10-14 PROCEDURE — 99284 EMERGENCY DEPT VISIT MOD MDM: CPT | Mod: 25

## 2018-10-14 PROCEDURE — 74176 CT ABD & PELVIS W/O CONTRAST: CPT

## 2018-10-14 PROCEDURE — 36415 COLL VENOUS BLD VENIPUNCTURE: CPT

## 2018-10-14 PROCEDURE — 80053 COMPREHEN METABOLIC PANEL: CPT

## 2018-10-14 PROCEDURE — 81001 URINALYSIS AUTO W/SCOPE: CPT

## 2018-10-14 PROCEDURE — 83690 ASSAY OF LIPASE: CPT

## 2018-10-14 PROCEDURE — 87086 URINE CULTURE/COLONY COUNT: CPT

## 2018-10-14 PROCEDURE — 83605 ASSAY OF LACTIC ACID: CPT

## 2018-10-14 PROCEDURE — 85025 COMPLETE CBC W/AUTO DIFF WBC: CPT

## 2018-10-14 RX ORDER — CIPROFLOXACIN 500 MG/1
500 TABLET, FILM COATED ORAL 2 TIMES DAILY
Qty: 14 TAB | Refills: 0 | Status: SHIPPED | OUTPATIENT
Start: 2018-10-14 | End: 2018-11-15

## 2018-10-14 RX ORDER — HYDROCODONE BITARTRATE AND ACETAMINOPHEN 7.5; 325 MG/1; MG/1
1 TABLET ORAL EVERY 6 HOURS PRN
Qty: 4 TAB | Refills: 0 | Status: SHIPPED | OUTPATIENT
Start: 2018-10-14 | End: 2018-10-16

## 2018-10-14 ASSESSMENT — PAIN SCALES - GENERAL
PAINLEVEL_OUTOF10: 6
PAINLEVEL_OUTOF10: 2

## 2018-10-14 NOTE — ED PROVIDER NOTES
ED Provider Note    CHIEF COMPLAINT  Chief Complaint   Patient presents with   • Pelvic Pain     Started 2 days ago, associated dysuria. Denies fever. Pt reports this feels like kidney stone he had 5 years ago   • Leg Pain     Bilateral legs, pt had vein stripping surgery 3 days ago        HPI  Bay Forrester is a 78 y.o. male who presents for evaluation of pelvic pain.  Patient states that over the last 2 days he has been having some difficulty urinating along with some dysuria.  He also has some bilateral pelvic pain with increased pain on the left lower abdominal/inguinal area.  Patient states he passed a kidney stone several years ago and states the symptoms feel somewhat similar to this time.  3 days ago the patient had varicose vein ablation treatment performed as an outpatient.  He indicates he is not on any oral narcotic analgesics.  He denies any associated: Fever, chills, URI symptoms, cardiorespiratory symptoms, vomiting, diarrhea, hematemesis, melena hematochezia.  No other acute symptomatology or complaints.    REVIEW OF SYSTEMS  See HPI for further details. All other systems negative.    PAST MEDICAL HISTORY  Past Medical History:   Diagnosis Date   • Anxiety    • Arthritis     back/neck/hands   • BPH (benign prostatic hypertrophy)    • Dental disorder     dentures upper   • Depression    • Diverticulitis    • DJD (degenerative joint disease)    • Heart burn    • Hepatitis C    • Hypertension    • Indigestion    • Liver disease    • Low back pain    • Other specified disorder of intestines     constipation/diarrhea   • Pneumonia    • Urinary bladder disorder        FAMILY HISTORY  Family History   Problem Relation Age of Onset   • Heart Disease Mother    • Other Father         MS    • Heart Disease Brother    • Diabetes Brother    • Diabetes Brother    • Heart Disease Brother    • Suicide Attempts Maternal Grandmother    • No Known Problems Maternal Grandfather    • No Known Problems Paternal  "Grandmother    • No Known Problems Paternal Grandfather    • Alzheimer's Disease Brother    • Depression Brother        SOCIAL HISTORY  Previous smoker; occasional alcohol use;    SURGICAL HISTORY  Past Surgical History:   Procedure Laterality Date   • RECOVERY  8/4/2011    Performed by SURGERY, IR-RECOVERY at SURGERY SAME DAY [facility] ORS   • COLONOSCOPY - ENDO  10/14/08    Performed by CECY DIAZ at ENDOSCOPY [facility] ORS   • GASTROSCOPY-ENDO  10/13/08    Performed by CECY DIAZ at ENDOSCOPY [facility] ORS   • OTHER ORTHOPEDIC SURGERY  2003    right knee replacement       CURRENT MEDICATIONS  See nurse's notes    ALLERGIES  Allergies   Allergen Reactions   • Nkda [No Known Drug Allergy]        PHYSICAL EXAM  VITAL SIGNS: BP (!) 184/84   Pulse 87   Temp 36.7 °C (98.1 °F)   Resp 18   Ht 1.803 m (5' 11\")   Wt 117.5 kg (259 lb 0.7 oz)   SpO2 93%   BMI 36.13 kg/m²    Constitutional: 78-year-old male, chronically ill-appearing, awake, oriented x3  HENT: Atraumatic, Bilateral external ears normal, tympanic membranes clear, Oropharynx moist, No oral exudates, Nose normal.   Eyes: PERRL, EOMI, Conjunctiva normal, No discharge.   Neck: Normal range of motion, No tenderness, Supple, No stridor.   Lymphatic: No lymphadenopathy noted.   Cardiovascular: Normal heart rate, Normal rhythm, No murmurs, No rubs, No gallops.   Thorax & Lungs: Normal Equal breath sounds, No respiratory distress, No wheezing, no stridor, no rales. No chest tenderness.   Abdomen: Soft, nontender, nondistended, no organomegaly, positive bowel sounds normal in quality. No guarding or rebound.  No inguinal adenopathy hernias or masses; genital exam reveals an uncircumcised penis with no lesions or discharge, no scrotal swelling or masses or tenderness;  Skin: Good skin turgor, pink, warm, dry. No rashes, petechiae, purpura. Normal capillary refill.   Back: No tenderness, No CVA tenderness.   Extremities: Intact distal pulses, No " tenderness, No cyanosis, No clubbing. Vascular: Pulses are 2+, symmetric in the upper and lower extremities; patient's had recent surgery on the lower extremities with some postprocedural ecchymosis; negative Homans sign; no asymmetry comparing the extremities;  Musculoskeletal: Good range of motion in all major joints. No tenderness to palpation or major deformities noted.   Neurologic: Alert & oriented x 3, Normal motor function, Normal sensory function, No gross focal deficits noted.   Psychiatric: Affect normal, Judgment normal, Mood normal.     RADIOLOGY/PROCEDURES  CT-RENAL COLIC EVALUATION(A/P W/O)   Final Result      1.  No acute intra-abdominal findings.      2.  Left inguinal hernia containing a segment of sigmoid colon. No secondary obstruction is appreciated.      3.  Diverticulosis.      4.  Bilateral renal cysts.      5.  Atherosclerosis with aortic ectasia.      6.  Cholelithiasis.            COURSE & MEDICAL DECISION MAKING  Pertinent Labs & Imaging studies reviewed. (See chart for details)  1.  Saline lock    Laboratory studies: CBC shows white count of 10.4, 77% neutrophils, 12% lymphocytes, 8% monocytes, hemoglobin 14.0, hematocrit 42.6; CMP shows a glucose of 133, BUN 24, otherwise within normal; lipase 28; lactic acid 2.0; urinalysis is positive for trace leukocyte esterase, WBCs , RBCs 100-1 50, bacteria rare;    Discussion: At this time, the patient presents complaining of lower abdominal pain.  Patient has findings suggesting urinary tract infection.  However, the patient does have a left inguinal hernia identified on CT scanning.  On physical examination the patient has a small hernia but no evidence of incarceration or strangulation.  The hernia is easily reducible.  At this time, there is no evidence of sepsis.  Patient looks well clinically and I feel we can treat as an outpatient.  Patient is requesting a pain pill.  I explained to him that narcotic analgesics can cause  constipation as well as urinary retention and may worsen his symptoms.  The patient is adamant that he receive something for pain he been following for a short period of time.  I have agreed to give him a minimal amount of analgesic.  I discussed the findings and treatment plan with the patient and his son.  They indicate that they are comfortable with this explanation and disposition.    FINAL IMPRESSION  1. Urinary tract infection with hematuria, site unspecified    2. Unilateral inguinal hernia without obstruction or gangrene, recurrence not specified           PLAN  1.  Appropriate discharge instructions given  2.  Cipro 500 mg twice daily #14  3.  Lortab 5 mg #4  4.  Follow-up with primary care tomorrow; check if any fever change or worsening of symptoms  5.  Follow-up with general surgery regarding inguinal hernia    Electronically signed by: Guy G Gansert, 10/14/2018 12:35 PM

## 2018-10-14 NOTE — ED NOTES
D/c pt home, rx given . Pt aware of f/u instructions with pmd  , aware to return for any changes or concerns. No further questions upon d/c home from ed  Pt given and understands controlled substance use consent form signed  Pt aware to no drive or operate vehicle after receiving or taking medication

## 2018-10-16 LAB
BACTERIA UR CULT: NORMAL
SIGNIFICANT IND 70042: NORMAL
SITE SITE: NORMAL
SOURCE SOURCE: NORMAL

## 2018-11-12 DIAGNOSIS — I10 HTN (HYPERTENSION), BENIGN: ICD-10-CM

## 2018-11-13 RX ORDER — LISINOPRIL 40 MG/1
60 TABLET ORAL DAILY
Qty: 135 TAB | Refills: 1 | Status: ON HOLD | OUTPATIENT
Start: 2018-11-13 | End: 2019-05-13

## 2018-11-13 NOTE — TELEPHONE ENCOUNTER
Was the patient seen in the last year in this department? Yes    Does patient have an active prescription for medications requested? No     Received Request Via: Pharmacy      Pt met protocol?: Yes  pt last ov 9/2018 rx due at the end of November   BP Readings from Last 1 Encounters:   10/14/18 (!) 174/71

## 2018-11-13 NOTE — TELEPHONE ENCOUNTER
Refill X 6 months, sent to pharmacy.Pt. Seen in the last 6 months per protocol.   Lab Results   Component Value Date/Time    SODIUM 138 10/14/2018 01:11 PM    POTASSIUM 4.3 10/14/2018 01:11 PM    CHLORIDE 107 10/14/2018 01:11 PM    CO2 23 10/14/2018 01:11 PM    GLUCOSE 133 (H) 10/14/2018 01:11 PM    BUN 24 (H) 10/14/2018 01:11 PM    CREATININE 1.18 10/14/2018 01:11 PM    CREATININE 1.00 12/23/2010 02:45 PM    BUNCREATRAT 17 12/23/2010 02:45 PM    GLOMRATE >59 12/23/2010 02:45 PM

## 2018-11-15 ENCOUNTER — OFFICE VISIT (OUTPATIENT)
Dept: CARDIOLOGY | Facility: MEDICAL CENTER | Age: 78
End: 2018-11-15
Payer: MEDICARE

## 2018-11-15 VITALS
OXYGEN SATURATION: 92 % | WEIGHT: 261.8 LBS | DIASTOLIC BLOOD PRESSURE: 98 MMHG | HEIGHT: 71 IN | BODY MASS INDEX: 36.65 KG/M2 | SYSTOLIC BLOOD PRESSURE: 138 MMHG | HEART RATE: 78 BPM

## 2018-11-15 DIAGNOSIS — I35.0 AORTIC VALVE STENOSIS, ETIOLOGY OF CARDIAC VALVE DISEASE UNSPECIFIED: ICD-10-CM

## 2018-11-15 DIAGNOSIS — R94.31 NONSPECIFIC ABNORMAL ELECTROCARDIOGRAM (ECG) (EKG): ICD-10-CM

## 2018-11-15 DIAGNOSIS — Z86.19 HISTORY OF HEPATITIS C: ICD-10-CM

## 2018-11-15 DIAGNOSIS — I73.9 PVD (PERIPHERAL VASCULAR DISEASE) (HCC): ICD-10-CM

## 2018-11-15 DIAGNOSIS — I10 HTN (HYPERTENSION), BENIGN: ICD-10-CM

## 2018-11-15 LAB — EKG IMPRESSION: NORMAL

## 2018-11-15 PROCEDURE — 99204 OFFICE O/P NEW MOD 45 MIN: CPT | Mod: 25 | Performed by: INTERNAL MEDICINE

## 2018-11-15 PROCEDURE — 93000 ELECTROCARDIOGRAM COMPLETE: CPT | Performed by: INTERNAL MEDICINE

## 2018-11-15 ASSESSMENT — ENCOUNTER SYMPTOMS
BACK PAIN: 1
FOCAL WEAKNESS: 0
SPUTUM PRODUCTION: 0
STRIDOR: 0
FEVER: 0
SINUS PAIN: 1
MYALGIAS: 0
COUGH: 0
CHILLS: 0
DIZZINESS: 1
INSOMNIA: 1
NECK PAIN: 1
VOMITING: 0
SHORTNESS OF BREATH: 0
DOUBLE VISION: 0
WEIGHT LOSS: 0
BRUISES/BLEEDS EASILY: 0
NERVOUS/ANXIOUS: 0
MEMORY LOSS: 1
HEMOPTYSIS: 0
BLURRED VISION: 0
ABDOMINAL PAIN: 0
EYES NEGATIVE: 1
DEPRESSION: 0
NAUSEA: 0
CONSTIPATION: 1
HEARTBURN: 1
HEADACHES: 0
PALPITATIONS: 0
WEAKNESS: 0
CLAUDICATION: 0
CARDIOVASCULAR NEGATIVE: 1

## 2018-11-15 NOTE — PROGRESS NOTES
Chief Complaint   Patient presents with   • Aortic Stenosis       Subjective:   Bay Forrester is a 78 y.o. male who presents today for interventional consult/aortic stenosis surveillance as requested by Christiana Blackwell for moderate aortic stenosis.    Thank you for allowing me to evaluate Mr. Forrester, who as you know is a 78 year old male with moderate aortic stenosis peripheral vascular disease. He admits to chronic fatigue, shortness of breath and dyspnea on exertion. He denies chest pain, dizziness or syncope.    Past Medical History:   Diagnosis Date   • Anxiety    • Aortic stenosis    • Arthritis     back/neck/hands   • BPH (benign prostatic hypertrophy)    • Dental disorder     dentures upper   • Depression    • Diverticulitis    • DJD (degenerative joint disease)    • Heart burn    • Hepatitis C    • Hypertension    • Indigestion    • Liver disease    • Low back pain    • Other specified disorder of intestines     constipation/diarrhea   • Pneumonia    • Urinary bladder disorder      Past Surgical History:   Procedure Laterality Date   • RECOVERY  8/4/2011    Performed by SURGERY, IR-RECOVERY at SURGERY SAME DAY [facility] ORS   • COLONOSCOPY - ENDO  10/14/08    Performed by CECY DIAZ at ENDOSCOPY [facility] ORS   • GASTROSCOPY-ENDO  10/13/08    Performed by CECY DIAZ at ENDOSCOPY [facility] ORS   • OTHER ORTHOPEDIC SURGERY  2003    right knee replacement     Family History   Problem Relation Age of Onset   • Heart Disease Mother    • Other Father         MS    • Heart Disease Brother    • Diabetes Brother    • Diabetes Brother    • Heart Disease Brother    • Suicide Attempts Maternal Grandmother    • No Known Problems Maternal Grandfather    • No Known Problems Paternal Grandmother    • No Known Problems Paternal Grandfather    • Alzheimer's Disease Brother    • Depression Brother      Social History     Social History   • Marital status:      Spouse name: N/A   • Number of  children: N/A   • Years of education: N/A     Occupational History   • Not on file.     Social History Main Topics   • Smoking status: Former Smoker     Packs/day: 1.00     Years: 40.00     Types: Cigarettes     Quit date: 1/1/1994   • Smokeless tobacco: Never Used      Comment: avoid all tobacco products   • Alcohol use No      Comment: occ   • Drug use: No   • Sexual activity: Not on file     Other Topics Concern   • Not on file     Social History Narrative   • No narrative on file     Allergies   Allergen Reactions   • Nkda [No Known Drug Allergy]      Medications reviewed.    Outpatient Encounter Prescriptions as of 11/15/2018   Medication Sig Dispense Refill   • Zolpidem Tartrate (AMBIEN PO) Take  by mouth.     • lisinopril (PRINIVIL, ZESTRIL) 40 MG tablet TAKE 1.5 TABS BY MOUTH EVERY DAY. 135 Tab 1   • aspirin EC (ECOTRIN) 81 MG Tablet Delayed Response Take 1 Tab by mouth every day. 90 Tab 2   • doxazosin (CARDURA) 8 MG tablet Take 1 Tab by mouth every day. 90 Tab 2   • trazodone (DESYREL) 100 MG Tab Take 2 Tabs by mouth every bedtime. 180 Tab 2   • vitamin D (CHOLECALCIFEROL) 1000 UNIT Tab Take 2,000 Units by mouth every day.     • [DISCONTINUED] ciprofloxacin (CIPRO) 500 MG Tab Take 1 Tab by mouth 2 times a day. 14 Tab 0   • [DISCONTINUED] polymixin-trimethoprim (POLYTRIM) 95561-0.1 UNIT/ML-% Solution Place 1 Drop in both eyes every 4 hours. 10 mL 0   • [DISCONTINUED] ergocalciferol (DRISDOL) 53773 UNIT capsule Take 1 Cap by mouth every 7 days. 6 Cap 0     No facility-administered encounter medications on file as of 11/15/2018.      Review of Systems   Constitutional: Positive for malaise/fatigue. Negative for chills, fever and weight loss.   HENT: Positive for sinus pain. Negative for congestion, ear discharge, ear pain, hearing loss, nosebleeds and tinnitus.    Eyes: Negative.  Negative for blurred vision and double vision.   Respiratory: Negative for cough, hemoptysis, sputum production, shortness of  "breath and stridor.    Cardiovascular: Negative.  Negative for chest pain, palpitations, claudication and leg swelling.   Gastrointestinal: Positive for constipation and heartburn. Negative for abdominal pain, nausea and vomiting.   Genitourinary: Positive for dysuria, frequency and urgency.   Musculoskeletal: Positive for back pain and neck pain. Negative for joint pain and myalgias.   Skin: Negative.  Negative for itching and rash.   Neurological: Positive for dizziness. Negative for focal weakness, weakness and headaches.   Endo/Heme/Allergies: Negative.  Does not bruise/bleed easily.   Psychiatric/Behavioral: Positive for memory loss. Negative for depression. The patient has insomnia. The patient is not nervous/anxious.         Objective:   /98 (BP Location: Left arm, Patient Position: Sitting, BP Cuff Size: Adult)   Pulse 78   Ht 1.803 m (5' 11\")   Wt 118.8 kg (261 lb 12.8 oz)   SpO2 92%   BMI 36.51 kg/m²     Physical Exam   Constitutional: He is oriented to person, place, and time. He appears well-developed and well-nourished.   HENT:   Head: Normocephalic and atraumatic.   Eyes: Pupils are equal, round, and reactive to light. Conjunctivae are normal.   Neck: Normal range of motion. Neck supple.   Cardiovascular: Normal rate and regular rhythm.    Pulmonary/Chest: Effort normal and breath sounds normal.   Abdominal: Soft. Bowel sounds are normal.   Musculoskeletal: Normal range of motion.   Neurological: He is alert and oriented to person, place, and time.   Skin: Skin is warm and dry.   Psychiatric: He has a normal mood and affect.     CARDIAC STUDIES/PROCEDURES:    CAROTID ULTRASOUND (08/24/15)  1.  Mild plaque in the bilateral carotid bifurcation with less than 50% stenosis in the bifurcation and internal carotid arteries.   2.  Normal bilateral subclavian and vertebral arterial exam.  (study result reviewed)    ECHOCARDIOGRAM CONCLUSIONS (09/19/18)  Normal left ventricular size, wall thickness, " and systolic function.  Mildly dilated left atrium.  Mild (nearly moderate) aortic stenosis.  Vmax is 2.56 m/s. Average transvalvular gradients are - Peak: 37 mmHg, Mean: 19 mmHg.   Mild aortic insufficiency.  Compared to the images of the prior study done 6/21/2015, there has   been slight progression in aortic stenosis and normalization in left ventricular ejection fraction.  (study result reviewed)    EKG was ordered for aortic stenosis, performed on (11/15/18) and reviewed: EKG shows sinus rhythm with non-specific intra-ventricular conduction delay.    Laboratory results of (10/14/18) were reviewed. Bun of 24 mg/dl, creatinine levels of 1.1.8 mg/dl noted.  Laboratory results of (01/12/18) were reviewed. Cholesterol profile of 141/94/41/81 noted.    LOWER EXTREMITY ARTERIAL ULTRASOUND (05/16/18)  Area of elevated velocities at the Right, mid anterior tibial artery.   Velocities are consistent with >50% stenosis (438 cm/sec).  (study result reviewed)    Assessment:     1. Aortic valve stenosis, etiology of cardiac valve disease unspecified  EKG    EC-ECHOCARDIOGRAM COMPLETE W/O CONT   2. HTN (hypertension), benign     3. PVD (peripheral vascular disease) (HCC)     4. History of hepatitis C     5. Nonspecific abnormal electrocardiogram (ECG) (EKG)  NM-CARDIAC STRESS TEST     Medical Decision Making:  Today's Assessment / Status / Plan:     1. Aortic stenosis: He is clinically doing well with mild aortic stenosis. We will follow him clinically and repeat an echocardiogram in one year.  2. Hypertension: Blood pressure is well controlled.  3. Peripheral vascular disease: Stable on medical therapy.  4. Health maintenance: He has been treated and cured of Hep C.  5. Health maintenance: Abnormal EKG: The EKG is abnormal as described above. We will perform a myocardial perfusion imaging study.    We will follow up the patient in one year with echocardiogram.    Thank you for this consult.

## 2018-11-19 ENCOUNTER — HOSPITAL ENCOUNTER (OUTPATIENT)
Dept: RADIOLOGY | Facility: MEDICAL CENTER | Age: 78
End: 2018-11-19
Attending: INTERNAL MEDICINE
Payer: MEDICARE

## 2018-11-19 DIAGNOSIS — R94.31 NONSPECIFIC ABNORMAL ELECTROCARDIOGRAM (ECG) (EKG): ICD-10-CM

## 2018-11-19 PROCEDURE — 78452 HT MUSCLE IMAGE SPECT MULT: CPT | Mod: 26 | Performed by: INTERNAL MEDICINE

## 2018-11-19 PROCEDURE — 700111 HCHG RX REV CODE 636 W/ 250 OVERRIDE (IP)

## 2018-11-19 PROCEDURE — 93018 CV STRESS TEST I&R ONLY: CPT | Performed by: INTERNAL MEDICINE

## 2018-11-19 PROCEDURE — A9502 TC99M TETROFOSMIN: HCPCS

## 2018-11-19 RX ORDER — REGADENOSON 0.08 MG/ML
INJECTION, SOLUTION INTRAVENOUS
Status: COMPLETED
Start: 2018-11-19 | End: 2018-11-19

## 2018-11-19 RX ADMIN — REGADENOSON 0.4 MG: 0.08 INJECTION, SOLUTION INTRAVENOUS at 10:18

## 2018-11-21 ENCOUNTER — TELEPHONE (OUTPATIENT)
Dept: CARDIOLOGY | Facility: MEDICAL CENTER | Age: 78
End: 2018-11-21

## 2018-11-22 NOTE — TELEPHONE ENCOUNTER
Per Dr. Reyez, recent cardiac stress test shows just scar from previous event but otherwise no new ischemia requiring intervention. Continue medical therapy.     Called pt and discussed the above. Pt was happy to hear that he did not need a stent. Advised pt to call office if any concerns or symptoms change or if serious symptoms to call EMS. Otherwise pt will continue medical therapy and follow up yearly with echo prior.

## 2019-01-23 DIAGNOSIS — N40.1 BENIGN NON-NODULAR PROSTATIC HYPERPLASIA WITH LOWER URINARY TRACT SYMPTOMS: ICD-10-CM

## 2019-01-23 RX ORDER — DOXAZOSIN 8 MG/1
8 TABLET ORAL
Qty: 90 TAB | Refills: 1 | Status: ON HOLD | OUTPATIENT
Start: 2019-01-23 | End: 2019-05-13

## 2019-02-19 ENCOUNTER — TELEPHONE (OUTPATIENT)
Dept: MEDICAL GROUP | Facility: PHYSICIAN GROUP | Age: 79
End: 2019-02-19

## 2019-02-19 NOTE — TELEPHONE ENCOUNTER
Future Appointments       Provider Department Center    2/20/2019 1:20 PM Christiana Harrington M.D. [facility]        ESTABLISHED PATIENT PRE-VISIT PLANNING     Patient was contacted to complete PVP.     Note: Patient will not be contacted if there is no indication to call.     1.  Reviewed notes from the last few office visits within the medical group: Yes    2.  If any orders were placed at last visit or intended to be done for this visit (i.e. 6 mos follow-up), do we have Results/Consult Notes?        •  Labs - Labs were not ordered at last office visit.       •  Imaging - Imaging was not ordered at last office visit.       •  Referrals - Referral ordered, patient was seen and consult notes are in chart. Care Teams updated  YES.    3. Is this appointment scheduled as a Hospital Follow-Up? No    4.  Immunizations were updated in [employer] using Web[employer]?: Yes       •  Web [employer] Recommendations: FLU, PNEUMOVAX (PPSV23), TD and SHINGRIX (Shingles)    5.  Patient is due for the following Health Maintenance Topics:   Health Maintenance Due   Topic Date Due   • IMM ZOSTER VACCINES (1 of 2) 01/10/1989   • IMM INFLUENZA (1) 09/01/2018   • Annual Wellness Visit  01/17/2019       6. Orders for overdue Health Maintenance topics pended in Pre-Charting? YES    7.  AHA (MDX) form printed for Provider? YES    8.  Patient was informed to arrive 15 min prior to their scheduled appointment and bring in their medication bottles.

## 2019-02-20 ENCOUNTER — HOSPITAL ENCOUNTER (OUTPATIENT)
Dept: LAB | Facility: MEDICAL CENTER | Age: 79
End: 2019-02-20
Attending: INTERNAL MEDICINE
Payer: MEDICARE

## 2019-02-20 ENCOUNTER — OFFICE VISIT (OUTPATIENT)
Dept: MEDICAL GROUP | Facility: PHYSICIAN GROUP | Age: 79
End: 2019-02-20
Payer: MEDICARE

## 2019-02-20 VITALS
OXYGEN SATURATION: 93 % | TEMPERATURE: 98.6 F | HEIGHT: 71 IN | BODY MASS INDEX: 38.5 KG/M2 | SYSTOLIC BLOOD PRESSURE: 152 MMHG | WEIGHT: 275 LBS | HEART RATE: 79 BPM | DIASTOLIC BLOOD PRESSURE: 90 MMHG

## 2019-02-20 DIAGNOSIS — R31.0 GROSS HEMATURIA: ICD-10-CM

## 2019-02-20 DIAGNOSIS — I73.9 PVD (PERIPHERAL VASCULAR DISEASE) (HCC): ICD-10-CM

## 2019-02-20 DIAGNOSIS — F41.9 ANXIETY: ICD-10-CM

## 2019-02-20 DIAGNOSIS — Z12.5 ENCOUNTER FOR SCREENING FOR MALIGNANT NEOPLASM OF PROSTATE: ICD-10-CM

## 2019-02-20 DIAGNOSIS — R30.0 DYSURIA: ICD-10-CM

## 2019-02-20 DIAGNOSIS — G47.00 INSOMNIA, UNSPECIFIED TYPE: ICD-10-CM

## 2019-02-20 DIAGNOSIS — Z23 NEED FOR VACCINATION: ICD-10-CM

## 2019-02-20 DIAGNOSIS — F33.40 RECURRENT MAJOR DEPRESSIVE DISORDER, IN REMISSION (HCC): ICD-10-CM

## 2019-02-20 DIAGNOSIS — N40.0 BENIGN PROSTATIC HYPERPLASIA WITHOUT LOWER URINARY TRACT SYMPTOMS: ICD-10-CM

## 2019-02-20 DIAGNOSIS — Z87.442 HISTORY OF NEPHROLITHIASIS: ICD-10-CM

## 2019-02-20 LAB
ALBUMIN SERPL BCP-MCNC: 4 G/DL (ref 3.2–4.9)
ALBUMIN/GLOB SERPL: 1.2 G/DL
ALP SERPL-CCNC: 57 U/L (ref 30–99)
ALT SERPL-CCNC: 13 U/L (ref 2–50)
ANION GAP SERPL CALC-SCNC: 6 MMOL/L (ref 0–11.9)
AST SERPL-CCNC: 16 U/L (ref 12–45)
BASOPHILS # BLD AUTO: 0.6 % (ref 0–1.8)
BASOPHILS # BLD: 0.06 K/UL (ref 0–0.12)
BILIRUB SERPL-MCNC: 0.4 MG/DL (ref 0.1–1.5)
BUN SERPL-MCNC: 19 MG/DL (ref 8–22)
CALCIUM SERPL-MCNC: 9.1 MG/DL (ref 8.5–10.5)
CHLORIDE SERPL-SCNC: 108 MMOL/L (ref 96–112)
CO2 SERPL-SCNC: 27 MMOL/L (ref 20–33)
CREAT SERPL-MCNC: 0.86 MG/DL (ref 0.5–1.4)
EOSINOPHIL # BLD AUTO: 0.15 K/UL (ref 0–0.51)
EOSINOPHIL NFR BLD: 1.6 % (ref 0–6.9)
ERYTHROCYTE [DISTWIDTH] IN BLOOD BY AUTOMATED COUNT: 49.2 FL (ref 35.9–50)
GLOBULIN SER CALC-MCNC: 3.3 G/DL (ref 1.9–3.5)
GLUCOSE SERPL-MCNC: 89 MG/DL (ref 65–99)
HCT VFR BLD AUTO: 42.7 % (ref 42–52)
HGB BLD-MCNC: 13.6 G/DL (ref 14–18)
IMM GRANULOCYTES # BLD AUTO: 0.06 K/UL (ref 0–0.11)
IMM GRANULOCYTES NFR BLD AUTO: 0.6 % (ref 0–0.9)
LYMPHOCYTES # BLD AUTO: 1.43 K/UL (ref 1–4.8)
LYMPHOCYTES NFR BLD: 15.1 % (ref 22–41)
MCH RBC QN AUTO: 30.5 PG (ref 27–33)
MCHC RBC AUTO-ENTMCNC: 31.9 G/DL (ref 33.7–35.3)
MCV RBC AUTO: 95.7 FL (ref 81.4–97.8)
MONOCYTES # BLD AUTO: 0.88 K/UL (ref 0–0.85)
MONOCYTES NFR BLD AUTO: 9.3 % (ref 0–13.4)
NEUTROPHILS # BLD AUTO: 6.92 K/UL (ref 1.82–7.42)
NEUTROPHILS NFR BLD: 72.8 % (ref 44–72)
NRBC # BLD AUTO: 0 K/UL
NRBC BLD-RTO: 0 /100 WBC
PLATELET # BLD AUTO: 219 K/UL (ref 164–446)
PMV BLD AUTO: 10.5 FL (ref 9–12.9)
POTASSIUM SERPL-SCNC: 4.1 MMOL/L (ref 3.6–5.5)
PROT SERPL-MCNC: 7.3 G/DL (ref 6–8.2)
PSA SERPL-MCNC: 2.81 NG/ML (ref 0–4)
RBC # BLD AUTO: 4.46 M/UL (ref 4.7–6.1)
SODIUM SERPL-SCNC: 141 MMOL/L (ref 135–145)
WBC # BLD AUTO: 9.5 K/UL (ref 4.8–10.8)

## 2019-02-20 PROCEDURE — 85025 COMPLETE CBC W/AUTO DIFF WBC: CPT

## 2019-02-20 PROCEDURE — 84153 ASSAY OF PSA TOTAL: CPT

## 2019-02-20 PROCEDURE — 99214 OFFICE O/P EST MOD 30 MIN: CPT | Performed by: INTERNAL MEDICINE

## 2019-02-20 PROCEDURE — 8041 PR SCP AHA: Performed by: INTERNAL MEDICINE

## 2019-02-20 PROCEDURE — 36415 COLL VENOUS BLD VENIPUNCTURE: CPT

## 2019-02-20 PROCEDURE — 80053 COMPREHEN METABOLIC PANEL: CPT

## 2019-02-20 RX ORDER — BUSPIRONE HYDROCHLORIDE 10 MG/1
10 TABLET ORAL 2 TIMES DAILY
Qty: 90 TAB | Refills: 1 | Status: ON HOLD | OUTPATIENT
Start: 2019-02-20 | End: 2019-05-13

## 2019-02-20 RX ORDER — TRAZODONE HYDROCHLORIDE 100 MG/1
100 TABLET ORAL
Qty: 90 TAB | Refills: 1 | Status: SHIPPED | OUTPATIENT
Start: 2019-02-20 | End: 2019-08-16 | Stop reason: SDUPTHER

## 2019-02-20 NOTE — ASSESSMENT & PLAN NOTE
This is a chronic health problem that is uncontrolled with current medications and lifestyle measures. Pt says he was taking Xanax in the past and he is out of it.

## 2019-02-20 NOTE — ASSESSMENT & PLAN NOTE
This is s anew problem which started 3 days back. Associated hematuria and polyuria with accidents of incontinence. Denies fever , Chills, flank pain, abdominal pain.

## 2019-02-20 NOTE — ASSESSMENT & PLAN NOTE
This is a new problem which patient is facing for the past few days, previous urinalysis in October 2018+ for microscopic hematuria.ENdorses worsening peripheral edema in both legs.  Patient does not have any previous history of prostate cancer in the past, last PSA checked in 2016 was normal, after that patient was not opting for that.

## 2019-02-20 NOTE — PROGRESS NOTES
Subjective:     Bay Forrester is a 79 y.o. male here today for follow-up visit dysuria and hematuria and Annual Health Assessment.    BPH (benign prostatic hypertrophy)  This is a chronic health problem that is uncontrolled with current medications and lifestyle measures.  Patient does have symptoms of nocturia and currently having hematuria.  On doxazosin 8 mg daily.    Gross hematuria  This is a new problem which patient is facing for the past few days, previous urinalysis in October 2018+ for microscopic hematuria.ENdorses worsening peripheral edema in both legs.  Patient does not have any previous history of prostate cancer in the past, last PSA checked in 2016 was normal, after that patient was not opting for that.     PVD (peripheral vascular disease) (HCC)  This is a chronic health problem that is well controlled with current medications and lifestyle measures. S/P procedures on the both the lower extremities in 10/2018 as per the patient, We will need to get the records from [facility].    History of nephrolithiasis  This is a chronic health problem that is uncontrolled with current medications and lifestyle measures. Pt had previous Hx of Renal stones x2 in the last 5 yrs. Last one was 2 yrs back. Denies any procedures at that time , but passed off spontaneously.    Dysuria  This is s anew problem which started 3 days back. Associated hematuria and polyuria with accidents of incontinence. Denies fever , Chills, flank pain, abdominal pain.    Anxiety  This is a chronic health problem that is uncontrolled with current medications and lifestyle measures. Pt says he was taking Xanax in the past and he is out of it.     PHQ score 0, BMI > 38 , former tobacco, no fall injuries    Health Maintenance Summary                IMM ZOSTER VACCINES Overdue 1/10/1989     IMM INFLUENZA Overdue 9/1/2018     Annual Wellness Visit Overdue 1/17/2019      Done 1/16/2018 Visit Dx: Medicare annual wellness visit, subsequent      Patient has more history with this topic...    IMM PNEUMOCOCCAL 65+ (ADULT) LOW/MEDIUM RISK SERIES Postponed 5/21/2027 Originally 1/6/2017. Provider advises postponing for medical reasons     Done 1/6/2016 Imm Admin: Pneumococcal Conjugate Vaccine (Prevnar/PCV-13)    IMM DTaP/Tdap/Td Vaccine Next Due 1/30/2027      Done 1/30/2017 Imm Admin: Tdap Vaccine           Annual Health Assessment Questions:     1.  Are you currently engaging in any exercise or physical activity? No    2.  How would you describe your mood or emotional well-being today? fair    3.  Have you had any falls in the last year? No    4.  Have you noticed any problems with your balance or had difficulty walking? No    5.  In the last six months have you experienced any leakage of urine? Yes    6. DPA/Advanced Directive: Patient does not have an Advanced Directive.  A packet and workshop information was given on Advanced Directives.    Current medicines (including changes today)  Current Outpatient Prescriptions   Medication Sig Dispense Refill   • traZODone (DESYREL) 100 MG Tab Take 1 Tab by mouth at bedtime as needed for Sleep. 90 Tab 1   • busPIRone (BUSPAR) 10 MG Tab tablet Take 1 Tab by mouth 2 times a day. 90 Tab 1   • NON SPECIFIED Please provide Extra large pull ups for the patient to use 3x/day 100 Each 2   • doxazosin (CARDURA) 8 MG tablet Take 1 Tab by mouth every day. 90 Tab 1   • lisinopril (PRINIVIL, ZESTRIL) 40 MG tablet TAKE 1.5 TABS BY MOUTH EVERY DAY. 135 Tab 1   • aspirin EC (ECOTRIN) 81 MG Tablet Delayed Response Take 1 Tab by mouth every day. 90 Tab 2   • vitamin D (CHOLECALCIFEROL) 1000 UNIT Tab Take 2,000 Units by mouth every day.     • Zolpidem Tartrate (AMBIEN PO) Take  by mouth.       No current facility-administered medications for this visit.        He  has a past medical history of Anxiety; Aortic stenosis; Arthritis; BPH (benign prostatic hypertrophy); Dental disorder; Depression; Diverticulitis; DJD (degenerative  "joint disease); Heart burn; Hepatitis C; Hypertension; Indigestion; Liver disease; Low back pain; Other specified disorder of intestines; Pneumonia; and Urinary bladder disorder.    Nkda [no known drug allergy]    He  reports that he quit smoking about 25 years ago. His smoking use included Cigarettes. He has a 40.00 pack-year smoking history. He has never used smokeless tobacco. He reports that he does not drink alcohol or use drugs.  Counseling given: Not Answered      Constitutional: Denies fevers or chills  Eyes: Denies changes in vision  Ears/Nose/Throat/Mouth: Denies nasal congestion or sore throat   Cardiovascular: Denies chest pain or palpitations   Respiratory: Denies shortness of breath , Denies cough  Gastrointestinal/Hepatic: Denies abd pain, nausea, vomiting   Genitourinary: Positive for dysuria or frequency  Musculoskeletal/Rheum: Denies joint pain and swelling   Neurological: Denies headache  Psychiatric: Denies mood disorder   Endocrine: Denies hx of diabetes or thyroid dysfunction  Heme/Oncology/Lymph Nodes: Denies weight changes or enlarged LNs.   Allergic/Immunologic: Denies hx of allergies      Objective:     Physical Exam:  Blood pressure 152/90, pulse 79, temperature 37 °C (98.6 °F), temperature source Temporal, height 1.803 m (5' 11\"), weight 124.7 kg (275 lb), SpO2 93 %. Body mass index is 38.35 kg/m².   Constitutional: Alert, no distress.  Skin: Warm, dry, good turgor, no rashes in visible areas.  Eye: Equal, round and reactive, conjunctiva clear, lids normal.  ENMT: Lips without lesions, good dentition, oropharynx clear.  Neck: Trachea midline, no masses, no thyromegaly. No cervical or supraclavicular lymphadenopathy.  Respiratory: Unlabored respiratory effort, lungs clear to auscultation, no wheezes, no rhonchi.  Cardiovascular: Normal S1, S2, no murmur, Positive for bilateral lower extremity edema 1+  Abdomen: Soft, non-tender, no masses, no hepatosplenomegaly.  Psych: Alert and oriented " x3, normal affect and mood.    Assessment and Plan:     1. Dysuria  2. Gross hematuria  3. History of nephrolithiasis  New problem, given the patient's age and risk factors and p.o. CT urinalysis negative for infection except for blood.  I am suspecting that patient might be having possible malignancy either in the kidneys or urethra including urinary bladder which need to be considered.  Will check for a CT scan with contrast to make sure for any pathology.  Once I get the report I will do further evaluation for possible need of cystoscopy at that time if nothing is seen on the CT scan.  All the plan discussed with the patient and the wife which they understood and agreed.  We will do a renal function given the contrast studies, a CBC due to ongoing gross hematuria for more than 3 days, do a urine cytology for possible malignant cells.  Patient requesting for diapers as he is having severe urgency, frequency and incontinence.  - URINE CYTOLOGY  - NON SPECIFIED; Please provide Extra large pull ups for the patient to use 3x/day  Dispense: 100 Each; Refill: 2  - CT-ABDOMEN WITH & W/O; Future  - Comp Metabolic Panel; Future  - CBC WITH DIFFERENTIAL; Future    4. Recurrent major depressive disorder, in remission (HCC)  Stable, patient not on any medications.  Currently on lifestyle modification, previously using trazodone for sleep which he stopped using at this time.  Requesting to restart it for sleep.    5. Benign prostatic hyperplasia without lower urinary tract symptoms  6. Encounter for screening for malignant neoplasm of prostate  Uncontrolled, ongoing nocturia with frequency.  Continue current doxazosin 8 mg daily, will check the reports of CT scan before further changes.  We will do a PSA level which was last done in 2016.  - PROSTATE SPECIFIC AG SCREENING; Future      7. PVD (peripheral vascular disease) (HCC)  Improving, status post procedures at [facility] for the referral given in the past.  Continue the  current elastic support stockings.  Will get the records for further discussions with the patient.    8. Anxiety  Uncontrolled, patient has been on Xanax in the past which is not on it anymore.  Requesting refill of Xanax again.  I have offered him alternative buspirone 10 mg twice daily at this time before we consider controlled substances, plan and agreed by the patient and wife.  - busPIRone (BUSPAR) 10 MG Tab tablet; Take 1 Tab by mouth 2 times a day.  Dispense: 90 Tab; Refill: 1    10. Insomnia, unspecified type  Uncontrolled, have restarted his trazodone at 100 mg nightly as needed instead of the previous dose of 200 in the past.    Discussion today about general wellness and lifestyle habits:    · Engage in regular physical activity and social activities.  · Prevent falls and reduce trip hazards; using ambulatory aides, hearing and vision testing if appropriate.  · Steps to improve urinary incontinence.  · Advanced care planning.    Follow-Up: Return in about 1 month (around 3/20/2019), or if symptoms worsen or fail to improve.         PLEASE NOTE: This dictation was created using voice recognition software. I have made every reasonable attempt to correct obvious errors, but I expect that there are errors of grammar and possibly content that I did not discover before finalizing the note.

## 2019-02-20 NOTE — ASSESSMENT & PLAN NOTE
This is a chronic health problem that is well controlled with current medications and lifestyle measures. S/P procedures on the both the lower extremities in 10/2018 as per the patient, We will need to get the records from [facility].

## 2019-02-20 NOTE — ASSESSMENT & PLAN NOTE
This is a chronic health problem that is uncontrolled with current medications and lifestyle measures.  Patient does have symptoms of nocturia and currently having hematuria.  On doxazosin 8 mg daily.

## 2019-02-20 NOTE — ASSESSMENT & PLAN NOTE
This is a chronic health problem that is uncontrolled with current medications and lifestyle measures. Pt had previous Hx of Renal stones x2 in the last 5 yrs. Last one was 2 yrs back. Denies any procedures at that time , but passed off spontaneously.

## 2019-02-21 ENCOUNTER — HOSPITAL ENCOUNTER (OUTPATIENT)
Dept: RADIOLOGY | Facility: MEDICAL CENTER | Age: 79
End: 2019-02-21
Attending: INTERNAL MEDICINE
Payer: MEDICARE

## 2019-02-21 DIAGNOSIS — N40.0 BENIGN PROSTATIC HYPERPLASIA WITHOUT LOWER URINARY TRACT SYMPTOMS: ICD-10-CM

## 2019-02-21 DIAGNOSIS — R30.0 DYSURIA: ICD-10-CM

## 2019-02-21 DIAGNOSIS — R31.0 GROSS HEMATURIA: ICD-10-CM

## 2019-02-21 DIAGNOSIS — Z87.442 HISTORY OF NEPHROLITHIASIS: ICD-10-CM

## 2019-02-21 PROCEDURE — 74178 CT ABD&PLV WO CNTR FLWD CNTR: CPT

## 2019-02-21 PROCEDURE — 700117 HCHG RX CONTRAST REV CODE 255: Performed by: INTERNAL MEDICINE

## 2019-02-21 RX ADMIN — IOHEXOL 100 ML: 350 INJECTION, SOLUTION INTRAVENOUS at 13:09

## 2019-02-22 ENCOUNTER — TELEPHONE (OUTPATIENT)
Dept: MEDICAL GROUP | Facility: PHYSICIAN GROUP | Age: 79
End: 2019-02-22

## 2019-02-22 NOTE — LETTER
February 22, 2019        Bay Elvis Forrester  [address]  [facility] [address]        Dear Bay:    Your CT scan is not showing any serious problem except mild increase in the size of left kidney cyst. No stones or masses which we were suspicious. I have given referral to Urology for possible need of Cystoscopy for further evaluation. I will await for the results of other work up and update you. Please hold Aspirin medication to prevent further bleeding , keep the appointment with Urology. We can restart the Aspirin at a later date.      Christiana Harrington M.D.     If you have any questions or concerns, please don't hesitate to call.        Sincerely,        Christiana Harrington M.D.    Electronically Signed

## 2019-02-22 NOTE — TELEPHONE ENCOUNTER
----- Message from Christiana Harrington M.D. sent at 2/21/2019  9:12 PM PST -----  Your CT scan is not showing any serious problem except mild increase in the size of left kidney cyst. No stones or masses which we were suspicious. I have given referral to Urology for possible need of Cystoscopy for further evaluation. I will await for the results of other work up and update you.  Christiana Harrington M.D.

## 2019-02-26 ENCOUNTER — TELEPHONE (OUTPATIENT)
Dept: MEDICAL GROUP | Facility: PHYSICIAN GROUP | Age: 79
End: 2019-02-26

## 2019-02-26 DIAGNOSIS — R31.0 GROSS HEMATURIA: ICD-10-CM

## 2019-02-27 ENCOUNTER — HOSPITAL ENCOUNTER (OUTPATIENT)
Dept: LAB | Facility: MEDICAL CENTER | Age: 79
End: 2019-02-27
Attending: INTERNAL MEDICINE
Payer: MEDICARE

## 2019-02-27 LAB — CYTOLOGY REG CYTOL: NORMAL

## 2019-02-27 PROCEDURE — 88112 CYTOPATH CELL ENHANCE TECH: CPT

## 2019-02-28 ENCOUNTER — TELEPHONE (OUTPATIENT)
Dept: MEDICAL GROUP | Facility: PHYSICIAN GROUP | Age: 79
End: 2019-02-28

## 2019-03-01 ENCOUNTER — TELEPHONE (OUTPATIENT)
Dept: MEDICAL GROUP | Facility: PHYSICIAN GROUP | Age: 79
End: 2019-03-01

## 2019-03-01 NOTE — TELEPHONE ENCOUNTER
"Called and spoke to the patient that his Urine Cytology Pathology report is positive for \" Positive for urothelial cells demonstrating high-grade nuclear features most consistent with high-grade urothelial carcinoma.\"     He doesn't know the details of his Urologist he is supposed to follow on the referral placed by me on 2/21/2019.     I have dictated the contact of [facility] along with the phone number for which he will go to them ASAP. Plan understood by the patient and wife.  Christiana Harrington M.D.  "

## 2019-03-01 NOTE — LETTER
"March 1, 2019        Bay Sabaabril Pegueroarchana  [address] [place] [id number]        Dear Bay:         Your Urine Cytology is positive for \"  Positive for high-grade urothelial carcinoma \" per pathology report which is concerning. You will need to follow up with your Urologist ASAP.   I have already placed Urology referral on 02/21/2019. The referral department have tried to reach you to follow up with -   [facility]   [address] [facility]  [address]   Phone: [phone]     Please call them and set up an appointment ASAP.   Christiana Harrington M.D.         If you have any questions or concerns, please don't hesitate to call.        Sincerely,        Christiana Harrington M.D.    Electronically Signed     "

## 2019-03-18 ENCOUNTER — HOSPITAL ENCOUNTER (OUTPATIENT)
Dept: LAB | Facility: MEDICAL CENTER | Age: 79
End: 2019-03-18
Attending: UROLOGY
Payer: MEDICARE

## 2019-03-18 LAB
ALBUMIN SERPL BCP-MCNC: 4.1 G/DL (ref 3.2–4.9)
ALBUMIN/GLOB SERPL: 1.6 G/DL
ALP SERPL-CCNC: 62 U/L (ref 30–99)
ALT SERPL-CCNC: 10 U/L (ref 2–50)
ANION GAP SERPL CALC-SCNC: 8 MMOL/L (ref 0–11.9)
AST SERPL-CCNC: 10 U/L (ref 12–45)
BASOPHILS # BLD AUTO: 0.7 % (ref 0–1.8)
BASOPHILS # BLD: 0.05 K/UL (ref 0–0.12)
BILIRUB SERPL-MCNC: 0.5 MG/DL (ref 0.1–1.5)
BUN SERPL-MCNC: 22 MG/DL (ref 8–22)
CALCIUM SERPL-MCNC: 8.8 MG/DL (ref 8.5–10.5)
CHLORIDE SERPL-SCNC: 107 MMOL/L (ref 96–112)
CO2 SERPL-SCNC: 28 MMOL/L (ref 20–33)
CREAT SERPL-MCNC: 0.97 MG/DL (ref 0.5–1.4)
EOSINOPHIL # BLD AUTO: 0.15 K/UL (ref 0–0.51)
EOSINOPHIL NFR BLD: 2 % (ref 0–6.9)
ERYTHROCYTE [DISTWIDTH] IN BLOOD BY AUTOMATED COUNT: 49.5 FL (ref 35.9–50)
GLOBULIN SER CALC-MCNC: 2.5 G/DL (ref 1.9–3.5)
GLUCOSE SERPL-MCNC: 115 MG/DL (ref 65–99)
HCT VFR BLD AUTO: 45.1 % (ref 42–52)
HGB BLD-MCNC: 14.1 G/DL (ref 14–18)
IMM GRANULOCYTES # BLD AUTO: 0.05 K/UL (ref 0–0.11)
IMM GRANULOCYTES NFR BLD AUTO: 0.7 % (ref 0–0.9)
LYMPHOCYTES # BLD AUTO: 1.23 K/UL (ref 1–4.8)
LYMPHOCYTES NFR BLD: 16.6 % (ref 22–41)
MCH RBC QN AUTO: 30.3 PG (ref 27–33)
MCHC RBC AUTO-ENTMCNC: 31.3 G/DL (ref 33.7–35.3)
MCV RBC AUTO: 97 FL (ref 81.4–97.8)
MONOCYTES # BLD AUTO: 0.71 K/UL (ref 0–0.85)
MONOCYTES NFR BLD AUTO: 9.6 % (ref 0–13.4)
NEUTROPHILS # BLD AUTO: 5.23 K/UL (ref 1.82–7.42)
NEUTROPHILS NFR BLD: 70.4 % (ref 44–72)
NRBC # BLD AUTO: 0 K/UL
NRBC BLD-RTO: 0 /100 WBC
PLATELET # BLD AUTO: 218 K/UL (ref 164–446)
PMV BLD AUTO: 10.9 FL (ref 9–12.9)
POTASSIUM SERPL-SCNC: 4.3 MMOL/L (ref 3.6–5.5)
PROT SERPL-MCNC: 6.6 G/DL (ref 6–8.2)
RBC # BLD AUTO: 4.65 M/UL (ref 4.7–6.1)
SODIUM SERPL-SCNC: 143 MMOL/L (ref 135–145)
WBC # BLD AUTO: 7.4 K/UL (ref 4.8–10.8)

## 2019-03-18 PROCEDURE — 85025 COMPLETE CBC W/AUTO DIFF WBC: CPT

## 2019-03-18 PROCEDURE — 87086 URINE CULTURE/COLONY COUNT: CPT

## 2019-03-18 PROCEDURE — 80053 COMPREHEN METABOLIC PANEL: CPT

## 2019-03-20 LAB
BACTERIA UR CULT: NORMAL
SIGNIFICANT IND 70042: NORMAL
SITE SITE: NORMAL
SOURCE SOURCE: NORMAL

## 2019-03-21 ENCOUNTER — TELEPHONE (OUTPATIENT)
Dept: MEDICAL GROUP | Facility: PHYSICIAN GROUP | Age: 79
End: 2019-03-21

## 2019-03-21 NOTE — TELEPHONE ENCOUNTER
Future Appointments       Provider Department Center    3/22/2019 2:20 PM Christiana Harrington M.D. [facility]        ESTABLISHED PATIENT PRE-VISIT PLANNING     Patient was NOT contacted to complete PVP.     Note: Patient will not be contacted if there is no indication to call.     1.  Reviewed notes from the last few office visits within the medical group: Yes    2.  If any orders were placed at last visit or intended to be done for this visit (i.e. 6 mos follow-up), do we have Results/Consult Notes?        •  Labs - Labs ordered, completed on 03/18/2019 and results are in chart.   Note: If patient appointment is for lab review and patient did not complete labs, check with provider if OK to reschedule patient until labs completed.       •  Imaging - Imaging ordered, completed and results are in chart.       •  Referrals - Referral ordered, patient was seen and consult notes were requested from [facility] . Care Teams updated  YES.    3. Is this appointment scheduled as a Hospital Follow-Up? No    4.  Immunizations were updated in [employer] using WebIZ?: Yes       •  Web Iz Recommendations: FLU, PNEUMOVAX (PPSV23), TD, VARICELLA (Chicken Pox)  and SHINGRIX (Shingles)    5.  Patient is due for the following Health Maintenance Topics:   Health Maintenance Due   Topic Date Due   • IMM ZOSTER VACCINES (1 of 2) 01/10/1989   • IMM INFLUENZA (1) 09/01/2018   • Annual Wellness Visit  01/17/2019         6. Orders for overdue Health Maintenance topics pended in Pre-Charting? YES    7.  AHA (MDX) form printed for Provider? No, already completed    8.  Patient was informed to arrive 15 min prior to their scheduled appointment and bring in their medication bottles. Confirmed through

## 2019-03-22 ENCOUNTER — APPOINTMENT (OUTPATIENT)
Dept: MEDICAL GROUP | Facility: PHYSICIAN GROUP | Age: 79
End: 2019-03-22
Payer: MEDICARE

## 2019-04-16 ENCOUNTER — OFFICE VISIT (OUTPATIENT)
Dept: MEDICAL GROUP | Facility: PHYSICIAN GROUP | Age: 79
End: 2019-04-16
Payer: MEDICARE

## 2019-04-16 VITALS
HEIGHT: 71 IN | TEMPERATURE: 99 F | WEIGHT: 253 LBS | OXYGEN SATURATION: 92 % | HEART RATE: 70 BPM | SYSTOLIC BLOOD PRESSURE: 130 MMHG | BODY MASS INDEX: 35.42 KG/M2 | DIASTOLIC BLOOD PRESSURE: 72 MMHG

## 2019-04-16 DIAGNOSIS — C68.9 TRANSITIONAL CELL CARCINOMA (HCC): ICD-10-CM

## 2019-04-16 DIAGNOSIS — E66.9 CLASS 2 OBESITY WITH BODY MASS INDEX (BMI) OF 35.0 TO 35.9 IN ADULT, UNSPECIFIED OBESITY TYPE, UNSPECIFIED WHETHER SERIOUS COMORBIDITY PRESENT: ICD-10-CM

## 2019-04-16 DIAGNOSIS — N39.0 URINARY TRACT INFECTION WITH HEMATURIA, SITE UNSPECIFIED: ICD-10-CM

## 2019-04-16 DIAGNOSIS — E66.9 OBESITY (BMI 35.0-39.9 WITHOUT COMORBIDITY): ICD-10-CM

## 2019-04-16 DIAGNOSIS — E66.9 OBESITY (BMI 30-39.9): ICD-10-CM

## 2019-04-16 DIAGNOSIS — R31.9 URINARY TRACT INFECTION WITH HEMATURIA, SITE UNSPECIFIED: ICD-10-CM

## 2019-04-16 DIAGNOSIS — R30.0 DYSURIA: ICD-10-CM

## 2019-04-16 LAB
APPEARANCE UR: CLEAR
BILIRUB UR STRIP-MCNC: NORMAL MG/DL
COLOR UR AUTO: NORMAL
GLUCOSE UR STRIP.AUTO-MCNC: NEGATIVE MG/DL
KETONES UR STRIP.AUTO-MCNC: 15 MG/DL
LEUKOCYTE ESTERASE UR QL STRIP.AUTO: NORMAL
NITRITE UR QL STRIP.AUTO: NEGATIVE
PH UR STRIP.AUTO: 5 [PH] (ref 5–8)
PROT UR QL STRIP: 100 MG/DL
RBC UR QL AUTO: NORMAL
SP GR UR STRIP.AUTO: 1.03
UROBILINOGEN UR STRIP-MCNC: 0.2 MG/DL

## 2019-04-16 PROCEDURE — 99214 OFFICE O/P EST MOD 30 MIN: CPT | Mod: 25 | Performed by: INTERNAL MEDICINE

## 2019-04-16 PROCEDURE — 81002 URINALYSIS NONAUTO W/O SCOPE: CPT | Performed by: INTERNAL MEDICINE

## 2019-04-16 RX ORDER — NITROFURANTOIN 25; 75 MG/1; MG/1
100 CAPSULE ORAL 2 TIMES DAILY
Qty: 10 CAP | Refills: 0 | Status: SHIPPED | OUTPATIENT
Start: 2019-04-16 | End: 2019-04-21

## 2019-04-16 RX ORDER — HYDROCODONE BITARTRATE AND ACETAMINOPHEN 5; 325 MG/1; MG/1
TABLET ORAL
Qty: 20 TAB | Refills: 0 | Status: CANCELLED | OUTPATIENT
Start: 2019-04-16

## 2019-04-16 RX ORDER — HYDROCODONE BITARTRATE AND ACETAMINOPHEN 5; 325 MG/1; MG/1
TABLET ORAL
Refills: 0 | COMMUNITY
Start: 2019-04-01 | End: 2019-04-16 | Stop reason: SDUPTHER

## 2019-04-16 RX ORDER — HYDROCODONE BITARTRATE AND ACETAMINOPHEN 5; 325 MG/1; MG/1
TABLET ORAL
Qty: 60 TAB | Refills: 0 | Status: SHIPPED | OUTPATIENT
Start: 2019-04-16 | End: 2019-05-06

## 2019-04-16 NOTE — PROGRESS NOTES
CC: Follow-up visit, dysuria.    HISTORY OF PRESENT ILLNESS: Patient is a 79 y.o. male established patient who presents today to discuss her medical conditions as mentioned in HPI below.    Health Maintenance: Completed    Transitional cell carcinoma (HCC)  This is a new diagnosis done during his recent visit with us with gross hematuria for which the urine cytology was positive for high-grade cells, referred to urologist when he was planned for a rigid cystoscopy with a bladder mass and he was planned for intravesical mitomycin-C along with stent placement as per the notes available here.  Reviewed all the urology records of Dr. Murphy at [facility] going to see him on 04/26/2019.    04/01/2019 : Date of Surgery , [facility] [place], [place]. The records we see do not show the whole story which patient says that there was removal of bladder masses, Intravesical antibiotics and ?Mitomycin. No stents put in. Will need to get the latest records here. All the labs recently done at this procedure.    Dysuria  This is a new problem which patient says that he has been getting after the procedure and had a short refill of [place] at  has given which patient had good relief and requesting for a refill of this for his procedure pain.      PHQ score 0, BMI > 35, former tobacco, no fall injuries.    Patient Active Problem List    Diagnosis Date Noted   • IFG (impaired fasting glucose) 01/10/2017     Priority: Medium   • HTN (hypertension), benign 03/18/2010     Priority: Medium   • Hypovitaminosis D 01/16/2018     Priority: Low   • Obesity (BMI 30-39.9) 08/03/2017     Priority: Low   • DDD (degenerative disc disease), lumbar 09/10/2015     Priority: Low   • DDD (degenerative disc disease), cervical 09/10/2015     Priority: Low   • Migraine without status migrainosus, not intractable 08/19/2015     Priority: Low   • Osteoarthritis of hip 05/01/2014     Priority: Low   • History of hepatitis C 04/23/2013      Priority: Low   • Hypotestosteronism 10/24/2011     Priority: Low   • BPH (benign prostatic hypertrophy)      Priority: Low   • Anxiety      Priority: Low   • Recurrent major depressive disorder, in remission ([facility])      Priority: Low   • Transitional cell carcinoma ([facility]) 04/16/2019   • Gross hematuria 02/20/2019   • History of nephrolithiasis 02/20/2019   • Dysuria 02/20/2019   • Nonspecific abnormal electrocardiogram (ECG) (EKG) 11/15/2018   • Dizziness 09/20/2018   • Obesity (BMI 35.0-39.9 without comorbidity) ([facility]) 09/07/2018   • Aortic stenosis 09/07/2018   • Hx of seizure disorder 06/12/2018   • Healthcare maintenance 06/12/2018   • PVD (peripheral vascular disease) ([facility]) 05/17/2018   • Localized swelling of both lower extremities 05/01/2018      Allergies:Nkda [no known drug allergy]    Current Outpatient Prescriptions   Medication Sig Dispense Refill   • HYDROcodone-acetaminophen (NORCO) 5-325 MG Tab per tablet Take 1 tab twice a day as needed for pain. 60 Tab 0   • traZODone (DESYREL) 100 MG Tab Take 1 Tab by mouth at bedtime as needed for Sleep. 90 Tab 1   • busPIRone (BUSPAR) 10 MG Tab tablet Take 1 Tab by mouth 2 times a day. 90 Tab 1   • doxazosin (CARDURA) 8 MG tablet Take 1 Tab by mouth every day. 90 Tab 1   • lisinopril (PRINIVIL, ZESTRIL) 40 MG tablet TAKE 1.5 TABS BY MOUTH EVERY DAY. 135 Tab 1   • vitamin D (CHOLECALCIFEROL) 1000 UNIT Tab Take 2,000 Units by mouth every day.     • Zolpidem Tartrate (AMBIEN PO) Take  by mouth.       No current facility-administered medications for this visit.        Social History   Substance Use Topics   • Smoking status: Former Smoker     Packs/day: 1.00     Years: 40.00     Types: Cigarettes     Quit date: 1/1/1994   • Smokeless tobacco: Never Used      Comment: avoid all tobacco products   • Alcohol use No      Comment: occ     Social History     Social History Narrative   • No narrative on file       Family History   Problem Relation Age of Onset   • Heart  "Disease Mother    • Other Father         MS    • Heart Disease Brother    • Diabetes Brother    • Diabetes Brother    • Heart Disease Brother    • Suicide Attempts Maternal Grandmother    • No Known Problems Maternal Grandfather    • No Known Problems Paternal Grandmother    • No Known Problems Paternal Grandfather    • Alzheimer's Disease Brother    • Depression Brother         ROS:     - Constitutional:  Negative for fever, chills, unexpected weight change, and fatigue/generalized weakness.    - HEENT:  Negative for headaches, vision changes, hearing changes, ear pain, ear discharge, rhinorrhea, sinus congestion, sore throat, and neck pain.      - Respiratory: Negative for cough, sputum production, chest congestion, dyspnea, wheezing, and crackles.      - Cardiovascular: Negative for chest pain, palpitations, orthopnea, and bilateral lower extremity edema.     - Gastrointestinal: Negative for heartburn, nausea, vomiting, abdominal pain, hematochezia, melena, diarrhea, constipation, and greasy/foul-smelling stools.     - Genitourinary: As mentioned in HPI above negative for dysuria, polyuria, hematuria, pyuria, urinary urgency, and urinary incontinence.     - Musculoskeletal: Negative for myalgias, back pain, and joint pain.     - Skin: Negative for rash, itching, cyanotic skin color change.     - Neurological: Negative for dizziness, tingling, tremors, focal sensory deficit, focal weakness and headaches.     - Endo/Heme/Allergies: Does not bruise/bleed easily.     - Psychiatric/Behavioral: Negative for depression, suicidal/homicidal ideation and memory loss.            Exam:    /72 (BP Location: Right arm, Patient Position: Sitting, BP Cuff Size: Large adult)   Pulse 70   Temp 37.2 °C (99 °F) (Temporal)   Ht 1.803 m (5' 11\")   Wt 114.8 kg (253 lb)   SpO2 92%  Body mass index is 35.29 kg/m².    General:  Well nourished, well developed male in NAD  Head is grossly normal.  Neck: Supple without JVD or " bruit. Thyroid is not enlarged.  Pulmonary: Clear to ausculation and percussion.  Normal effort. No rales, ronchi, or wheezing.  Cardiovascular: Regular rate and rhythm without murmur. Carotid and radial pulses are intact and equal bilaterally.  Extremities: no clubbing, cyanosis, or edema.  Abdominal exam : Soft, nontender, suprapubic tenderness with mild discomfort on palpation.  No rigidity or guarding.    Please note that this dictation was created using voice recognition software. I have made every reasonable attempt to correct obvious errors, but I expect that there are errors of grammar and possibly content that I did not discover before finalizing the note.    Assessment/Plan:  1. Transitional cell carcinoma (HCC)  2. Dysuria  New problem, recent diagnosis of transitional cell carcinoma.  Status post resection of bladder tumor will need to get the records.  There are some records of Dr. Murphy mentioned in the patient's chart but does not give me the complete details of the procedure done this was prior to the procedure.  For now will do the required treatment at this time including a p.o. CT urinalysis, his suprapubic pain is most likely related to possibly the bladder spasms status post surgery and he felt that Norco was giving him good relief but the specialist has deferred the treatment to PCP to get the [place] refills.  Will do the required controlled substance contract and urine drug screen before refilling this medication.  Check for urinalysis which is positive for small leukocyte esterase, large blood which is inevitable.  - POCT Urinalysis  - HYDROcodone-acetaminophen (NORCO) 5-325 MG Tab per tablet; Take 1 tab twice a day as needed for pain.  Dispense: 60 Tab; Refill: 0  - Consent for Opiate Prescription  - PAIN MANAGEMENT SCRN, UR; Future  - nitrofurantoin monohyd macro (MACROBID) 100 MG Cap; Take 1 Cap by mouth 2 times a day for 5 days.  Dispense: 10 Cap; Refill: 0  - URINALYSIS,CULTURE IF  INDICATED; Future      3. Obesity (BMI 30-39.9)  6. Class 2 obesity with body mass index (BMI) of 35.0 to 35.9 in adult, unspecified obesity type, unspecified whether serious comorbidity present  Pt educated on the increase of morbidity and mortality associated with excess weight including DM, Heart Disease, HTN, stroke, and sleep apnea.  Pt advised weight loss of 5% through reduced calorie, low fat diet and 150 mins of exercise a week  Recommend obesity clinic if patient is unsuccessful with weight loss

## 2019-04-16 NOTE — ASSESSMENT & PLAN NOTE
This is a new diagnosis done during his recent visit with us with gross hematuria for which the urine cytology was positive for high-grade cells, referred to urologist when he was planned for a rigid cystoscopy with a bladder mass and he was planned for intravesical mitomycin-C along with stent placement as per the notes available here.  Reviewed all the urology records of Dr. Murphy at [facility] going to see him on 04/26/2019.    04/01/2019 : Date of Surgery , [facility]. The records we see do not show the whole story which patient says that there was removal of bladder masses, Intravesical antibiotics and ?Mitomycin. No stents put in. Will need to get the latest records here. All the labs recently done at this procedure.

## 2019-04-16 NOTE — ASSESSMENT & PLAN NOTE
This is a new problem which patient says that he has been getting after the procedure and had a short refill of [place] at .[facility] has given which patient had good relief and requesting for a refill of this for his procedure pain.

## 2019-04-18 ENCOUNTER — TELEPHONE (OUTPATIENT)
Dept: MEDICAL GROUP | Facility: PHYSICIAN GROUP | Age: 79
End: 2019-04-18

## 2019-04-18 DIAGNOSIS — R30.1 PAINFUL BLADDER SPASM: ICD-10-CM

## 2019-04-18 RX ORDER — OXYBUTYNIN CHLORIDE 5 MG/1
5 TABLET, EXTENDED RELEASE ORAL DAILY
Qty: 30 TAB | Refills: 1 | Status: SHIPPED | OUTPATIENT
Start: 2019-04-18 | End: 2019-05-06

## 2019-04-18 NOTE — TELEPHONE ENCOUNTER
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  He has an upcoming appointment with urologist on 26 April 2019.  Given instructions to continue his current Norco which was started for his severe lower abdominal pain, have also sent oxybutynin to his pharmacy to reduce his bladder spasms.  Also emphasized to go to ER immediately for any symptoms of dizziness, fatigue, severe loss of blood as he might need blood transfusions with CBC follow-up which patient agreed.  Answered all his questions, patient understood the plan and agreed.  Christiana Harrington M.D.

## 2019-04-18 NOTE — TELEPHONE ENCOUNTER
Patient called and states that there is a lot of blood in his urine, he would like some advise on what to do. Please advise.    Home Phone [phone]

## 2019-04-22 DIAGNOSIS — F41.9 ANXIETY: ICD-10-CM

## 2019-04-30 RX ORDER — ALPRAZOLAM 0.25 MG/1
TABLET ORAL
OUTPATIENT
Start: 2019-04-30

## 2019-05-01 NOTE — TELEPHONE ENCOUNTER
Please call this patient and let him know that we are already giving him opiates for his uncontrolled cancer pain, I cannot refill his Xanax which I already discussed with him in the past due to benzodiazepine and opiate combinations causing severe fall injuries.  He is already given alternate medication buspirone for his anxiety.  Thank you.

## 2019-05-06 DIAGNOSIS — Z01.810 PRE-OPERATIVE CARDIOVASCULAR EXAMINATION: ICD-10-CM

## 2019-05-06 DIAGNOSIS — Z01.812 PRE-OPERATIVE LABORATORY EXAMINATION: ICD-10-CM

## 2019-05-06 LAB
ANION GAP SERPL CALC-SCNC: 8 MMOL/L (ref 0–11.9)
APPEARANCE UR: ABNORMAL
BACTERIA #/AREA URNS HPF: ABNORMAL /HPF
BILIRUB UR QL STRIP.AUTO: ABNORMAL
BUN SERPL-MCNC: 23 MG/DL (ref 8–22)
CALCIUM SERPL-MCNC: 9 MG/DL (ref 8.5–10.5)
CHLORIDE SERPL-SCNC: 106 MMOL/L (ref 96–112)
CO2 SERPL-SCNC: 26 MMOL/L (ref 20–33)
COLOR UR: ABNORMAL
CREAT SERPL-MCNC: 1.01 MG/DL (ref 0.5–1.4)
EKG IMPRESSION: NORMAL
EPI CELLS #/AREA URNS HPF: ABNORMAL /HPF
GLUCOSE SERPL-MCNC: 132 MG/DL (ref 65–99)
GLUCOSE UR STRIP.AUTO-MCNC: NEGATIVE MG/DL
HYALINE CASTS #/AREA URNS LPF: ABNORMAL /LPF
KETONES UR STRIP.AUTO-MCNC: ABNORMAL MG/DL
LEUKOCYTE ESTERASE UR QL STRIP.AUTO: ABNORMAL
MICRO URNS: ABNORMAL
MUCOUS THREADS #/AREA URNS HPF: ABNORMAL /HPF
NITRITE UR QL STRIP.AUTO: NEGATIVE
PH UR STRIP.AUTO: 5.5 [PH]
POTASSIUM SERPL-SCNC: 4.3 MMOL/L (ref 3.6–5.5)
PROT UR QL STRIP: 300 MG/DL
RBC # URNS HPF: ABNORMAL /HPF
RBC UR QL AUTO: ABNORMAL
SODIUM SERPL-SCNC: 140 MMOL/L (ref 135–145)
SP GR UR STRIP.AUTO: 1.03
UROBILINOGEN UR STRIP.AUTO-MCNC: 1 MG/DL
WBC #/AREA URNS HPF: ABNORMAL /HPF

## 2019-05-06 PROCEDURE — 80048 BASIC METABOLIC PNL TOTAL CA: CPT

## 2019-05-06 PROCEDURE — 81001 URINALYSIS AUTO W/SCOPE: CPT

## 2019-05-06 PROCEDURE — 87086 URINE CULTURE/COLONY COUNT: CPT

## 2019-05-06 PROCEDURE — 93010 ELECTROCARDIOGRAM REPORT: CPT | Performed by: INTERNAL MEDICINE

## 2019-05-06 PROCEDURE — 93005 ELECTROCARDIOGRAM TRACING: CPT

## 2019-05-08 LAB
BACTERIA UR CULT: NORMAL
SIGNIFICANT IND 70042: NORMAL
SITE SITE: NORMAL
SOURCE SOURCE: NORMAL

## 2019-05-11 DIAGNOSIS — R30.1 PAINFUL BLADDER SPASM: ICD-10-CM

## 2019-05-13 ENCOUNTER — ANESTHESIA (OUTPATIENT)
Dept: SURGERY | Facility: MEDICAL CENTER | Age: 79
End: 2019-05-13
Payer: MEDICARE

## 2019-05-13 ENCOUNTER — HOSPITAL ENCOUNTER (OUTPATIENT)
Facility: MEDICAL CENTER | Age: 79
End: 2019-05-13
Attending: UROLOGY | Admitting: UROLOGY
Payer: MEDICARE

## 2019-05-13 ENCOUNTER — ANESTHESIA EVENT (OUTPATIENT)
Dept: SURGERY | Facility: MEDICAL CENTER | Age: 79
End: 2019-05-13
Payer: MEDICARE

## 2019-05-13 VITALS
RESPIRATION RATE: 16 BRPM | WEIGHT: 244.49 LBS | TEMPERATURE: 98.4 F | BODY MASS INDEX: 34.23 KG/M2 | OXYGEN SATURATION: 91 % | HEART RATE: 56 BPM | HEIGHT: 71 IN

## 2019-05-13 DIAGNOSIS — I10 HTN (HYPERTENSION), BENIGN: ICD-10-CM

## 2019-05-13 LAB — PATHOLOGY CONSULT NOTE: NORMAL

## 2019-05-13 PROCEDURE — 700105 HCHG RX REV CODE 258: Performed by: ANESTHESIOLOGY

## 2019-05-13 PROCEDURE — 160002 HCHG RECOVERY MINUTES (STAT): Performed by: UROLOGY

## 2019-05-13 PROCEDURE — 500879 HCHG PACK, CYSTO: Performed by: UROLOGY

## 2019-05-13 PROCEDURE — 700111 HCHG RX REV CODE 636 W/ 250 OVERRIDE (IP): Performed by: ANESTHESIOLOGY

## 2019-05-13 PROCEDURE — 160025 RECOVERY II MINUTES (STATS): Performed by: UROLOGY

## 2019-05-13 PROCEDURE — 160009 HCHG ANES TIME/MIN: Performed by: UROLOGY

## 2019-05-13 PROCEDURE — 700105 HCHG RX REV CODE 258: Performed by: UROLOGY

## 2019-05-13 PROCEDURE — 700101 HCHG RX REV CODE 250: Performed by: UROLOGY

## 2019-05-13 PROCEDURE — 160046 HCHG PACU - 1ST 60 MINS PHASE II: Performed by: UROLOGY

## 2019-05-13 PROCEDURE — A9270 NON-COVERED ITEM OR SERVICE: HCPCS | Performed by: ANESTHESIOLOGY

## 2019-05-13 PROCEDURE — 160028 HCHG SURGERY MINUTES - 1ST 30 MINS LEVEL 3: Performed by: UROLOGY

## 2019-05-13 PROCEDURE — 160036 HCHG PACU - EA ADDL 30 MINS PHASE I: Performed by: UROLOGY

## 2019-05-13 PROCEDURE — 160035 HCHG PACU - 1ST 60 MINS PHASE I: Performed by: UROLOGY

## 2019-05-13 PROCEDURE — 160048 HCHG OR STATISTICAL LEVEL 1-5: Performed by: UROLOGY

## 2019-05-13 PROCEDURE — 501329 HCHG SET, CYSTO IRRIG Y TUR: Performed by: UROLOGY

## 2019-05-13 PROCEDURE — 700101 HCHG RX REV CODE 250: Performed by: ANESTHESIOLOGY

## 2019-05-13 PROCEDURE — 700102 HCHG RX REV CODE 250 W/ 637 OVERRIDE(OP): Performed by: ANESTHESIOLOGY

## 2019-05-13 PROCEDURE — 88307 TISSUE EXAM BY PATHOLOGIST: CPT

## 2019-05-13 PROCEDURE — C1769 GUIDE WIRE: HCPCS | Performed by: UROLOGY

## 2019-05-13 PROCEDURE — 160039 HCHG SURGERY MINUTES - EA ADDL 1 MIN LEVEL 3: Performed by: UROLOGY

## 2019-05-13 RX ORDER — HALOPERIDOL 5 MG/ML
0.5 INJECTION INTRAMUSCULAR
Status: DISCONTINUED | OUTPATIENT
Start: 2019-05-13 | End: 2019-05-13 | Stop reason: HOSPADM

## 2019-05-13 RX ORDER — IPRATROPIUM BROMIDE AND ALBUTEROL SULFATE 2.5; .5 MG/3ML; MG/3ML
3 SOLUTION RESPIRATORY (INHALATION)
Status: DISCONTINUED | OUTPATIENT
Start: 2019-05-13 | End: 2019-05-13 | Stop reason: HOSPADM

## 2019-05-13 RX ORDER — DIPHENHYDRAMINE HYDROCHLORIDE 50 MG/ML
12.5 INJECTION INTRAMUSCULAR; INTRAVENOUS
Status: DISCONTINUED | OUTPATIENT
Start: 2019-05-13 | End: 2019-05-13 | Stop reason: HOSPADM

## 2019-05-13 RX ORDER — SODIUM CHLORIDE, SODIUM LACTATE, POTASSIUM CHLORIDE, CALCIUM CHLORIDE 600; 310; 30; 20 MG/100ML; MG/100ML; MG/100ML; MG/100ML
INJECTION, SOLUTION INTRAVENOUS CONTINUOUS
Status: DISCONTINUED | OUTPATIENT
Start: 2019-05-13 | End: 2019-05-13 | Stop reason: HOSPADM

## 2019-05-13 RX ORDER — METOPROLOL TARTRATE 1 MG/ML
1 INJECTION, SOLUTION INTRAVENOUS
Status: DISCONTINUED | OUTPATIENT
Start: 2019-05-13 | End: 2019-05-13 | Stop reason: HOSPADM

## 2019-05-13 RX ORDER — DOXAZOSIN 8 MG/1
8 TABLET ORAL EVERY EVENING
COMMUNITY
End: 2019-07-15

## 2019-05-13 RX ORDER — CEFAZOLIN SODIUM 1 G/3ML
INJECTION, POWDER, FOR SOLUTION INTRAMUSCULAR; INTRAVENOUS PRN
Status: DISCONTINUED | OUTPATIENT
Start: 2019-05-13 | End: 2019-05-13 | Stop reason: SURG

## 2019-05-13 RX ORDER — OXYBUTYNIN CHLORIDE 5 MG/1
TABLET, EXTENDED RELEASE ORAL
Qty: 90 TAB | Refills: 0 | Status: SHIPPED | OUTPATIENT
Start: 2019-05-13 | End: 2019-08-09 | Stop reason: SDUPTHER

## 2019-05-13 RX ORDER — LABETALOL HYDROCHLORIDE 5 MG/ML
5 INJECTION, SOLUTION INTRAVENOUS
Status: DISCONTINUED | OUTPATIENT
Start: 2019-05-13 | End: 2019-05-13 | Stop reason: HOSPADM

## 2019-05-13 RX ORDER — SODIUM CHLORIDE, SODIUM GLUCONATE, SODIUM ACETATE, POTASSIUM CHLORIDE AND MAGNESIUM CHLORIDE 526; 502; 368; 37; 30 MG/100ML; MG/100ML; MG/100ML; MG/100ML; MG/100ML
INJECTION, SOLUTION INTRAVENOUS
Status: DISCONTINUED | OUTPATIENT
Start: 2019-05-13 | End: 2019-05-13 | Stop reason: SURG

## 2019-05-13 RX ORDER — LABETALOL HYDROCHLORIDE 5 MG/ML
INJECTION, SOLUTION INTRAVENOUS PRN
Status: DISCONTINUED | OUTPATIENT
Start: 2019-05-13 | End: 2019-05-13 | Stop reason: SURG

## 2019-05-13 RX ORDER — BUSPIRONE HYDROCHLORIDE 10 MG/1
10 TABLET ORAL 2 TIMES DAILY PRN
COMMUNITY
End: 2019-07-31

## 2019-05-13 RX ORDER — ATROPA BELLADONNA AND OPIUM 16.2; 6 MG/1; MG/1
60 SUPPOSITORY RECTAL
Status: COMPLETED | OUTPATIENT
Start: 2019-05-13 | End: 2019-05-13

## 2019-05-13 RX ORDER — HYDRALAZINE HYDROCHLORIDE 20 MG/ML
5 INJECTION INTRAMUSCULAR; INTRAVENOUS
Status: DISCONTINUED | OUTPATIENT
Start: 2019-05-13 | End: 2019-05-13 | Stop reason: HOSPADM

## 2019-05-13 RX ORDER — LISINOPRIL 40 MG/1
60 TABLET ORAL EVERY EVENING
COMMUNITY
End: 2019-05-14

## 2019-05-13 RX ORDER — METOCLOPRAMIDE HYDROCHLORIDE 5 MG/ML
INJECTION INTRAMUSCULAR; INTRAVENOUS PRN
Status: DISCONTINUED | OUTPATIENT
Start: 2019-05-13 | End: 2019-05-13 | Stop reason: SURG

## 2019-05-13 RX ORDER — SODIUM CHLORIDE, SODIUM GLUCONATE, SODIUM ACETATE, POTASSIUM CHLORIDE AND MAGNESIUM CHLORIDE 526; 502; 368; 37; 30 MG/100ML; MG/100ML; MG/100ML; MG/100ML; MG/100ML
500 INJECTION, SOLUTION INTRAVENOUS CONTINUOUS
Status: DISCONTINUED | OUTPATIENT
Start: 2019-05-13 | End: 2019-05-13 | Stop reason: HOSPADM

## 2019-05-13 RX ORDER — ONDANSETRON 2 MG/ML
4 INJECTION INTRAMUSCULAR; INTRAVENOUS
Status: DISCONTINUED | OUTPATIENT
Start: 2019-05-13 | End: 2019-05-13 | Stop reason: HOSPADM

## 2019-05-13 RX ADMIN — HYDROCODONE BITARTRATE AND ACETAMINOPHEN 15 ML: 7.5; 325 SOLUTION ORAL at 14:06

## 2019-05-13 RX ADMIN — FENTANYL CITRATE 50 MCG: 50 INJECTION, SOLUTION INTRAMUSCULAR; INTRAVENOUS at 14:17

## 2019-05-13 RX ADMIN — FENTANYL CITRATE 50 MCG: 50 INJECTION, SOLUTION INTRAMUSCULAR; INTRAVENOUS at 13:02

## 2019-05-13 RX ADMIN — LIDOCAINE HYDROCHLORIDE 100 MG: 20 INJECTION, SOLUTION INTRAVENOUS at 12:21

## 2019-05-13 RX ADMIN — FENTANYL CITRATE 50 MCG: 50 INJECTION, SOLUTION INTRAMUSCULAR; INTRAVENOUS at 13:04

## 2019-05-13 RX ADMIN — ATROPA BELLADONNA AND OPIUM 60 MG: 16.2; 6 SUPPOSITORY RECTAL at 13:56

## 2019-05-13 RX ADMIN — LABETALOL HYDROCHLORIDE 5 MG: 5 INJECTION, SOLUTION INTRAVENOUS at 13:04

## 2019-05-13 RX ADMIN — FENTANYL CITRATE 100 MCG: 50 INJECTION, SOLUTION INTRAMUSCULAR; INTRAVENOUS at 12:21

## 2019-05-13 RX ADMIN — METOCLOPRAMIDE 10 MG: 5 INJECTION, SOLUTION INTRAMUSCULAR; INTRAVENOUS at 12:24

## 2019-05-13 RX ADMIN — HYDRALAZINE HYDROCHLORIDE 5 MG: 20 INJECTION INTRAMUSCULAR; INTRAVENOUS at 15:23

## 2019-05-13 RX ADMIN — HYDROCODONE BITARTRATE AND ACETAMINOPHEN 15 ML: 7.5; 325 SOLUTION ORAL at 14:36

## 2019-05-13 RX ADMIN — SODIUM CHLORIDE, SODIUM GLUCONATE, SODIUM ACETATE, POTASSIUM CHLORIDE AND MAGNESIUM CHLORIDE: 526; 502; 368; 37; 30 INJECTION, SOLUTION INTRAVENOUS at 12:50

## 2019-05-13 RX ADMIN — SODIUM CHLORIDE, POTASSIUM CHLORIDE, SODIUM LACTATE AND CALCIUM CHLORIDE: 600; 310; 30; 20 INJECTION, SOLUTION INTRAVENOUS at 11:16

## 2019-05-13 RX ADMIN — EPHEDRINE SULFATE 20 MG: 50 INJECTION INTRAMUSCULAR; INTRAVENOUS; SUBCUTANEOUS at 12:40

## 2019-05-13 RX ADMIN — HYDRALAZINE HYDROCHLORIDE 5 MG: 20 INJECTION INTRAMUSCULAR; INTRAVENOUS at 14:21

## 2019-05-13 RX ADMIN — SUCCINYLCHOLINE CHLORIDE 170 MG: 20 INJECTION, SOLUTION INTRAMUSCULAR; INTRAVENOUS at 12:22

## 2019-05-13 RX ADMIN — LABETALOL HYDROCHLORIDE 5 MG: 5 INJECTION, SOLUTION INTRAVENOUS at 13:26

## 2019-05-13 RX ADMIN — ROCURONIUM BROMIDE 10 MG: 10 INJECTION, SOLUTION INTRAVENOUS at 12:21

## 2019-05-13 RX ADMIN — CEFAZOLIN 2 G: 330 INJECTION, POWDER, FOR SOLUTION INTRAMUSCULAR; INTRAVENOUS at 12:27

## 2019-05-13 RX ADMIN — FENTANYL CITRATE 50 MCG: 50 INJECTION, SOLUTION INTRAMUSCULAR; INTRAVENOUS at 14:04

## 2019-05-13 RX ADMIN — FENTANYL CITRATE 50 MCG: 50 INJECTION, SOLUTION INTRAMUSCULAR; INTRAVENOUS at 13:25

## 2019-05-13 RX ADMIN — LIDOCAINE HYDROCHLORIDE 100 MG: 20 INJECTION, SOLUTION INTRAVENOUS at 12:38

## 2019-05-13 RX ADMIN — LIDOCAINE HYDROCHLORIDE 0.5 ML: 10 INJECTION, SOLUTION EPIDURAL; INFILTRATION; INTRACAUDAL at 11:16

## 2019-05-13 RX ADMIN — PROPOFOL 150 MG: 10 INJECTION, EMULSION INTRAVENOUS at 12:22

## 2019-05-13 RX ADMIN — FENTANYL CITRATE 50 MCG: 50 INJECTION, SOLUTION INTRAMUSCULAR; INTRAVENOUS at 14:35

## 2019-05-13 ASSESSMENT — PAIN SCALES - GENERAL: PAIN_LEVEL: 4

## 2019-05-13 NOTE — TELEPHONE ENCOUNTER
Was the patient seen in the last year in this department? Yes    Does patient have an active prescription for medications requested? No     Received Request Via: Pharmacy      Pt met protocol?: Yes, last ov 4/19  Medication was dc'd on 5/6/19

## 2019-05-13 NOTE — ANESTHESIA PROCEDURE NOTES
Airway  Date/Time: 5/13/2019 12:22 PM  Performed by: JOSE JEFFERY  Authorized by: JOSE JEFFERY     Location:  OR  Urgency:  Elective  Difficult Airway: No    Indications for Airway Management:  Anesthesia  Spontaneous Ventilation: absent    Sedation Level:  Deep  Preoxygenated: Yes    Patient Position:  Sniffing  Mask Difficulty Assessment:  0 - not attempted  Final Airway Type:  Endotracheal airway  Final Endotracheal Airway:  ETT  Cuffed: Yes    Technique Used for Successful ETT Placement:  Direct laryngoscopy  Insertion Site:  Oral  Blade Type:  Francine  Laryngoscope Blade/Videolaryngoscope Blade Size:  3  ETT Size (mm):  8.0  Measured from:  Lips  ETT to Lips (cm):  24  Placement Verified by: capnometry    Cormack-Lehane Classification:  Grade I - full view of glottis  Number of Attempts at Approach:  1  Number of Other Approaches Attempted:  0

## 2019-05-13 NOTE — DISCHARGE INSTRUCTIONS
ACTIVITY: Rest and take it easy for the first 24 hours.  A responsible adult is recommended to remain with you during that time.  It is normal to feel sleepy.  We encourage you to not do anything that requires balance, judgment or coordination.    MILD FLU-LIKE SYMPTOMS ARE NORMAL. YOU MAY EXPERIENCE GENERALIZED MUSCLE ACHES, THROAT IRRITATION, HEADACHE AND/OR SOME NAUSEA.    FOR 24 HOURS DO NOT:  Drive, operate machinery or run household appliances.  Drink beer or alcoholic beverages.   Make important decisions or sign legal documents.    SPECIAL INSTRUCTIONS:     Teach leg bag care prior to discharge.  Home with toro and leg bag instruction.  Follow-up 5/17 AM for voiding trial at [facility] office at 0900AM    Transurethral Resection of the Prostate  Transurethral resection of the prostate (TURP) is the removal (resection) of part of the gland that produces semen (prostate gland). This procedure is done to treat benign prostatic hyperplasia (BPH). BPH is an abnormal, noncancerous (benign) increase in the number of cells that make up the prostate tissue. BPH causes the prostate to get bigger. The enlarged prostate can push against or block the tube that drains urine from the bladder out of the body (urethra). BPH can affect normal urine flow by causing bladder infections, difficulty controlling bladder function, and difficulty emptying the bladder. The goal of TURP is to remove enough prostate tissue to allow for a normal flow of urine.  In a transurethral resection, a thin telescope with a light, a tiny camera, and an electric cutting edge (resectoscope) is passed through the urethra and into the prostate. The opening of the urethra is at the end of the penis.  Tell a health care provider about:  · Any allergies you have.  · All medicines you are taking, including vitamins, herbs, eye drops, creams, and over-the-counter medicines.  · Any problems you or family members have had with anesthetic medicines.  · Any blood  disorders you have.  · Any surgeries you have had.  · Any medical conditions you have.  · Any prostate infections you have had.  What are the risks?  Generally, this is a safe procedure. However, problems may occur, including:  · Infection.  · Bleeding.  · Allergic reactions to medicines.  · Damage to other structures or organs, such as:  ¨ The urethra.  ¨ The bladder.  ¨ Muscles that surround the prostate.  · Difficulty getting an erection.  · Difficulty controlling urination (incontinence).  · Scarring, which may cause problems with urine flow.  What happens before the procedure?  · Follow instructions from your health care provider about eating or drinking restrictions.  · Ask your health care provider about:  ¨ Changing or stopping your regular medicines. This is especially important if you are taking diabetes medicines or blood thinners.  ¨ Taking medicines such as aspirin and ibuprofen. These medicines can thin your blood. Do not take these medicines before your procedure if your health care provider instructs you not to.  · You may have a physical exam.  · You may have a blood or urine sample taken.  · You may be given antibiotic medicine to help prevent infection.  · Ask your health care provider how your surgical site will be marked or identified.  · Plan to have someone take you home after the procedure. You may not be able to drive for up to 10 days after your procedure.  What happens during the procedure?  · To reduce your risk of infection:  ¨ Your health care team will wash or sanitize their hands.  ¨ Your skin will be washed with soap.  · An IV tube will be inserted into one of your veins.  · You will be given one or more of the following:  ¨ A medicine to help you relax (sedative).  ¨ A medicine to make you fall asleep (general anesthetic).  ¨ A medicine that is injected into your spine to numb the area below and slightly above the injection site (spinal anesthetic).  · Your legs will be placed in  foot rests (stirrups) so that your legs are apart and your knees are bent.  · The resectoscope will be passed through your urethra to your prostate.  · Parts of your prostate will be resected using the cutting edge of the resectoscope.  · The resectoscope will be removed.  · A thin, flexible tube (catheter) will be passed through your urethra and into your bladder. The catheter will drain urine into a bag outside of your body.  ¨ Fluid may be passed through the catheter to keep the catheter open.  The procedure may vary among health care providers and hospitals.  What happens after the procedure?  · Your blood pressure, heart rate, breathing rate, and blood oxygen level will be monitored often until the medicines you were given have worn off.  · You may continue to receive fluids and medicines through an IV tube.  · You may have some pain. Pain medicine will be available to help you.  · You will have a catheter draining your urine.  ¨ You may have blood in your urine. Your catheter may be kept in until your urine is clear.  ¨ Your urinary drainage will be monitored. If necessary, your bladder may be rinsed out (irrigated) through your catheter.  · You will be encouraged to walk around as soon as possible.  · You may have to wear compression stockings. These stockings help prevent blood clots and reduce swelling in your legs.  · Do not drive for 24 hours if you received a sedative.  This information is not intended to replace advice given to you by your health care provider. Make sure you discuss any questions you have with your health care provider.        Cystoscopy  Cystoscopy is a procedure that is used to help diagnose and sometimes treat conditions that affect that lower urinary tract. The lower urinary tract includes the bladder and the tube that drains urine from the bladder out of the body (urethra). Cystoscopy is performed with a thin, tube-shaped instrument with a light and camera at the end (cystoscope).  The cystoscope may be hard (rigid) or flexible, depending on the goal of the procedure. The cystoscope is inserted through the urethra, into the bladder.  Cystoscopy may be recommended if you have:  · Urinary tractinfections that keep coming back (recurring).  · Blood in the urine (hematuria).  · Loss of bladder control (urinary incontinence) or an overactive bladder.  · Unusual cells found in a urine sample.  · A blockage in the urethra.  · Painful urination.  · An abnormality in the bladder found during an intravenous pyelogram (IVP) or CT scan.  Cystoscopy may also be done to remove a sample of tissue to be examined under a microscope (biopsy).  Tell a health care provider about:  · Any allergies you have.  · All medicines you are taking, including vitamins, herbs, eye drops, creams, and over-the-counter medicines.  · Any problems you or family members have had with anesthetic medicines.  · Any blood disorders you have.  · Any surgeries you have had.  · Any medical conditions you have.  · Whether you are pregnant or may be pregnant.  What are the risks?  Generally, this is a safe procedure. However, problems may occur, including:  · Infection.  · Bleeding.  · Allergic reactions to medicines.  · Damage to other structures or organs.  What happens before the procedure?  · Ask your health care provider about:  ¨ Changing or stopping your regular medicines. This is especially important if you are taking diabetes medicines or blood thinners.  ¨ Taking medicines such as aspirin and ibuprofen. These medicines can thin your blood. Do not take these medicines before your procedure if your health care provider instructs you not to.  · Follow instructions from your health care provider about eating or drinking restrictions.  · You may be given antibiotic medicine to help prevent infection.  · You may have an exam or testing, such as X-rays of the bladder, urethra, or kidneys.  · You may have urine tests to check for signs  of infection.  · Plan to have someone take you home after the procedure.  What happens during the procedure?  · To reduce your risk of infection, your health care team will wash or sanitize their hands.  · You will be given one or more of the following:  ¨ A medicine to help you relax (sedative).  ¨ A medicine to numb the area (local anesthetic).  · The area around the opening of your urethra will be cleaned.  · The cystoscope will be passed through your urethra into your bladder.  · Germ-free (sterile) fluid will flow through the cystoscope to fill your bladder. The fluid will stretch your bladder so that your surgeon can clearly examine your bladder walls.  · The cystoscope will be removed and your bladder will be emptied.  The procedure may vary among health care providers and hospitals.  What happens after the procedure?  · You may have some soreness or pain in your abdomen and urethra. Medicines will be available to help you.  · You may have some blood in your urine.  · Do not drive for 24 hours if you received a sedative.  This information is not intended to replace advice given to you by your health care provider. Make sure you discuss any questions you have with your health care provider.    DIET: To avoid nausea, slowly advance diet as tolerated, avoiding spicy or greasy foods for the first day.  Add more substantial food to your diet according to your physician's instructions.  Babies can be fed formula or breast milk as soon as they are hungry.  INCREASE FLUIDS AND FIBER TO AVOID CONSTIPATION.    SURGICAL DRESSING/BATHING:     FOLLOW-UP APPOINTMENT:  A follow-up appointment should be arranged with your doctor in , you have appointment may 17 at 9:00am for voiding trial, at [facility] Office    You should CALL YOUR PHYSICIAN if you develop:  Fever greater than 101 degrees F.  Pain not relieved by medication, or persistent nausea or vomiting.  Excessive bleeding (blood soaking through dressing) or unexpected  drainage from the wound.  Extreme redness or swelling around the incision site, drainage of pus or foul smelling drainage.  Inability to urinate or empty your bladder within 8 hours.  Problems with breathing or chest pain.    You should call 911 if you develop problems with breathing or chest pain.  If you are unable to contact your doctor or surgical center, you should go to the nearest emergency room or urgent care center.    Physician's telephone #: [phone]    If any questions arise, call your doctor.  If your doctor is not available, please feel free to call the Surgical Center at [phone].  The Center is open Monday through Friday from 7AM to 7PM.  You can also call the [facility] HOTLINE open 24 hours/day, 7 days/week and speak to a nurse at [phone], or toll free at [phone].    A registered nurse may call you a few days after your surgery to see how you are doing after your procedure.    MEDICATIONS: Resume taking daily medication.  Take prescribed pain medication with food.  If no medication is prescribed, you may take non-aspirin pain medication if needed.  PAIN MEDICATION CAN BE VERY CONSTIPATING.  Take a stool softener or laxative such as senokot, pericolace, or milk of magnesia if needed.    Prescription given for Ditropan for your bladder and Hydrocodone with Tylenol  Last pain m,edication given at 2:36pm.    If your physician has prescribed pain medication that includes Acetaminophen (Tylenol), do not take additional Acetaminophen (Tylenol) while taking the prescribed medication.    Depression / Suicide Risk    As you are discharged from this [facility] facility, it is important to learn how to keep safe from harming yourself.    Recognize the warning signs:  · Abrupt changes in personality, positive or negative- including increase in energy   · Giving away possessions  · Change in eating patterns- significant weight changes-  positive or negative  · Change in sleeping patterns-  unable to sleep or sleeping all the time   · Unwillingness or inability to communicate  · Depression  · Unusual sadness, discouragement and loneliness  · Talk of wanting to die  · Neglect of personal appearance   · Rebelliousness- reckless behavior  · Withdrawal from people/activities they love  · Confusion- inability to concentrate     If you or a loved one observes any of these behaviors or has concerns about self-harm, here's what you can do:  · Talk about it- your feelings and reasons for harming yourself  · Remove any means that you might use to hurt yourself (examples: pills, rope, extension cords, firearm)  · Get professional help from the community (Mental Health, Substance Abuse, psychological counseling)  · Do not be alone:Call your Safe Contact- someone whom you trust who will be there for you.  · Call your local CRISIS HOTLINE 259-0753 or 375-543-6883  · Call your local Children's Mobile Crisis Response Team [place] (499) 846-0568 or www.[employer]  · Call the toll free National Suicide Prevention Hotlines   · National Suicide Prevention Lifeline 550-330-JVXC (5404)  · National Hope Line Network 800-SUICIDE (527-8391)

## 2019-05-13 NOTE — ANESTHESIA TIME REPORT
Anesthesia Start and Stop Event Times     Date Time Event    5/13/2019 1216 Anesthesia Start     1341 Anesthesia Stop        Responsible Staff  05/13/19    Name Role Begin End    Lester Vickers M.D. Anesth 1216 1341        Preop Diagnosis (Free Text):  Pre-op Diagnosis     BLADDER CANCER        Preop Diagnosis (Codes):  Diagnosis Information     Diagnosis Code(s):         Post op Diagnosis  Bladder cancer (HCC)      Premium Reason  Non-Premium    Comments:

## 2019-05-13 NOTE — OP REPORT
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    PROCEDURES PERFORMED:  1.  Rigid cystourethroscopy.  2.  Transurethral resection of bladder tumor scar site 6x5 cm in area and   fulguration of base.  3.  Transurethral resection of left proximal and distal trigonal area 2x3 cm.    SURGEON:  Benji Murphy MD    ANESTHESIA:  General laryngeal mask.    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    COMPLICATIONS:  None.    DRAINS:  A 20-Greenlandic Bishop to gravity.    SPECIMENS:  A.  Bladder tumor scar site tissue to pathology for permanent section.  B.  Left hemitrigone all tissue to pathology for permanent section.    INDICATIONS:  The patient is an 79-year-old gentleman who developed gross   hematuria.  He was taken to endoscopy in the office, found to have a tumor   involving the trigone and right bladder neck.  He underwent a rigid   cystoscopy, transurethral resection of a high grade transitional cell   carcinoma of the bladder with lamina propria invasion and I have recommended   repeat resection of the surgical scars.  There is at least a 35% chance of   having residual disease.  Prior to surgery, we discussed the risks of the   procedure including, but not limited to risk of perioperative urinary tract   infection, urosepsis, risk of urethral stricture as a delayed complication of   instrumentation, a risk of bladder perforation requiring prolonged   catheterization and the fact that additional therapy may be needed due to   persistent disease.  He is also aware of the perioperative risk of urgency,   frequency, dysuria as well as deep vein thrombosis, pulmonary embolism,   aspiration pneumonia, heart attack, stroke and death.  Informed consent was   given to me by  the patient to proceed.    DESCRIPTION IN DETAIL:  After informed consent was obtained, the patient was   brought to operating room and placed supine.  Bilateral sequential compression   devices are in place and a general laryngeal mask anesthetic was administered   by Dr. Lester Vickers in a balanced fashion.  The patient was positioned in   modified lithotomy and the operative area was Betadine prepped and draped in   the usual sterile fashion.  A surgical time-out was called and all members of   the operative team agree as to the patient's name and procedure to be   performed.  Without objections, attention was directed towards the procedure.    I calibrated the anterior urethra with Etelvina sounds from 22-American to 28   American and then passed a 26-American resectoscope with the visualizing obturator   into the bladder.  When I withdrew the obturator and passed the scope,   general inspection of the bladder showed there to be erythematous and abnormal   area in the left trigone.  There was scar, which was healing on the bladder   neck.  The area involved is large 5x6 cm and I began the resection by   resecting the deep tissue involving the entire surgical scar.  This extended   up almost to 10 or 11 o'clock on the right side of the bladder neck and   involved resection some of the proximal prostate.  Deep biopsies were taken   and after this was resected, the rollerball was used for fulguration.  General   inspection failed to reveal at the beginning of the case either ureteral   orifice.  This was not identifiable and clearly the left hemitrigone needed to   be resected.  This was resected to deep muscle.  I did not identify either   orifice, but the patient had normal creatinine preoperatively.  After   resecting the trigone and taking deep muscle biopsies, the area was fulgurated   and the surrounding area of approximately 2.5x3.5 cm was treated.  At the end   of the case, general inspection of the bladder  shows all the bladder tumor   chips to be removed.  Hemostasis was excellent.  With the bladder full, I   withdrew the scope and passed a 20-Tamazight Bishop without difficulty, inflated   the balloon to 10 mL of sterile water and left this to gravity drainage.  At   the end the case, the patient tolerated the procedure well without   complication, was awake in the operative and transferred to recovery room   where he arrived in stable condition.       ____________________________________     MD MABLE Love / NTS    DD:  05/13/2019 13:41:26  DT:  05/13/2019 13:59:32    D#:  [id number]  Job#:  [id number]

## 2019-05-13 NOTE — ANESTHESIA QCDR
2019 [facility] Clinical Data Registry (for Quality Improvement)     Postoperative nausea/vomiting risk protocol (Adult = 18 yrs and Pediatric 3-17 yrs)- (430 and 463)  General inhalation anesthetic (NOT TIVA) with PONV risk factors: Yes  Provision of anti-emetic therapy with at least 2 different classes of agents: Yes   Patient DID NOT receive anti-emetic therapy and reason is documented in Medical Record:  N/A    Multimodal Pain Management- (AQI59)  Patient undergoing Elective Surgery (i.e. Outpatient, or ASC, or Prescheduled Surgery prior to Hospital Admission): Yes  Use of Multimodal Pain Management, two or more drugs and/or interventions, NOT including systemic opioids: Yes   Exception: Documented allergy to multiple classes of analgesics:  N/A    PACU assessment of acute postoperative pain prior to Anesthesia Care End- Applies to Patients Age = 18- (ABG7)  Initial PACU pain score is which of the following: Pain not assessed  Patient unable to report pain score: Yes (Patient Unable to Report)    Post-anesthetic transfer of care checklist/protocol to PACU/ICU- (426 and 427)  Upon conclusion of case, patient transferred to which of the following locations: PACU/Non-ICU  Use of transfer checklist/protocol: Yes  Exclusion: Service Performed in Patient Hospital Room (and thus did not require transfer): N/A    PACU Reintubation- (AQI31)  General anesthesia requiring endotracheal intubation (ETT) along with subsequent extubation in OR or PACU: Yes  Required reintubation in the PACU: No   Extubation was a planned trial documented in the medical record prior to removal of the original airway device:  N/A    Unplanned admission to ICU related to anesthesia service up through end of PACU care- (MD51)  Unplanned admission to ICU (not initially anticipated at anesthesia start time): No

## 2019-05-13 NOTE — OR NURSING
"1338 received from the OR, report from Dr Crotez, sp bladder/scar tissue removal, asleep with OA, breathing unlabored & clear, toro to DD, bloody urine  1348 OA d/c'd, pt agitated, \"I have to pee\", meds given  3222 reported off to Carolyn ZUNIGA    "

## 2019-05-13 NOTE — TELEPHONE ENCOUNTER
Was the patient seen in the last year in this department? Yes    Does patient have an active prescription for medications requested? No     Received Request Via: Pharmacy      Pt met protocol?: Yes, last ov 4/19  Last labs  5/19    BP Readings from Last 1 Encounters:   04/16/19 130/72

## 2019-05-13 NOTE — OR NURSING
Pt arrived in Phase 2, awake, alert, complains of 4-5 burring pain in groin/penis area, indwelling toro catheter in place draining pink tinged urine with few scattered clots present, VS stable, personal belongings at bedside, waiting for wife to arrive.

## 2019-05-13 NOTE — ANESTHESIA PREPROCEDURE EVALUATION
Relevant Problems   (+) Aortic stenosis   (+) BPH (benign prostatic hypertrophy)   (+) HTN (hypertension), benign   (+) Hx of seizure disorder   (+) IFG (impaired fasting glucose)   (+) Obesity (BMI 30-39.9)   (+) PVD (peripheral vascular disease) (HCC)   (+) Recurrent major depressive disorder, in remission (HCC)   (+) Transitional cell carcinoma (HCC)       Physical Exam    Airway   Mallampati: II  TM distance: >3 FB  Neck ROM: full       Cardiovascular - normal exam  Rhythm: regular  Rate: normal  (-) murmur  Comments: No murmurs appreciated   Dental - normal exam         Pulmonary - normal exam  Breath sounds clear to auscultation     Abdominal   (+) obese     Neurological - normal exam    Comments: A&Ox3. Grossly intact.           Anesthesia Plan    ASA 3 (Aortic stenosis. HTN. Obesity. Impared fasting glucose. Smoker.)   ASA physical status 3 criteria: other (comment)    Plan - general       Airway plan will be ETT      Plan Factors:   Patient was previously instructed to abstain from smoking on day of procedure.      Induction: intravenous    Postoperative Plan: Postoperative administration of opioids is intended.    Pertinent diagnostic labs and testing reviewed    Informed Consent:    Anesthetic plan and risks discussed with patient.    Use of blood products discussed with: patient whom consented to blood products.

## 2019-05-13 NOTE — ANESTHESIA POSTPROCEDURE EVALUATION
Patient: Bay Forrester    Procedure Summary     Date:  05/13/19 Room / Location:  [place] [address] / SURGERY [facility]    Anesthesia Start:  1216 Anesthesia Stop:  1341    Procedures:       CYSTOSCOPY - RIGID (N/A Bladder)      TRANS URETHRAL RESECTION BLADDER - SCAR, TRANS URETHRAL RESECTION LEFT TRIGONE (Left Bladder) Diagnosis:  (BLADDER CANCER)    Surgeon:  Benji Murphy M.D. Responsible Provider:  Lester Vickers M.D.    Anesthesia Type:  general ASA Status:  3          Final Anesthesia Type: general  Last vitals  BP   NIBP: 123/81    Temp   36.9 °C (98.4 °F)    Pulse   Pulse: (!) 56, Heart Rate (Monitored): 86   Resp   16    SpO2   91 %      Anesthesia Post Evaluation    Patient location during evaluation: PACU  Patient participation: complete - patient participated  Level of consciousness: awake and alert  Pain score: 4    Airway patency: patent  Anesthetic complications: no  Cardiovascular status: hemodynamically stable  Respiratory status: acceptable  Hydration status: euvolemic    PONV: none           Nurse Pain Score: 4 (NPRS)

## 2019-05-14 RX ORDER — LISINOPRIL 40 MG/1
TABLET ORAL
Qty: 150 TAB | Refills: 1 | Status: SHIPPED | OUTPATIENT
Start: 2019-05-14 | End: 2019-05-22

## 2019-05-14 NOTE — TELEPHONE ENCOUNTER
Refill X 6 months, sent to pharmacy.Pt. Seen in the last 6 months per protocol.   Lab Results   Component Value Date/Time    SODIUM 140 05/06/2019 01:39 PM    POTASSIUM 4.3 05/06/2019 01:39 PM    CHLORIDE 106 05/06/2019 01:39 PM    CO2 26 05/06/2019 01:39 PM    GLUCOSE 132 (H) 05/06/2019 01:39 PM    BUN 23 (H) 05/06/2019 01:39 PM    CREATININE 1.01 05/06/2019 01:39 PM    CREATININE 1.00 12/23/2010 02:45 PM    BUNCREATRAT 17 12/23/2010 02:45 PM    GLOMRATE >59 12/23/2010 02:45 PM

## 2019-05-21 ENCOUNTER — TELEPHONE (OUTPATIENT)
Dept: MEDICAL GROUP | Facility: PHYSICIAN GROUP | Age: 79
End: 2019-05-21

## 2019-05-21 NOTE — TELEPHONE ENCOUNTER
Future Appointments       Provider Department Center    5/22/2019 11:20 AM Christiana Harrington M.D. [facility]        ESTABLISHED PATIENT PRE-VISIT PLANNING     Patient was NOT contacted to complete PVP.     Note: Patient will not be contacted if there is no indication to call.     1.  Reviewed notes from the last few office visits within the medical group: Yes    2.  If any orders were placed at last visit or intended to be done for this visit (i.e. 6 mos follow-up), do we have Results/Consult Notes?        •  Labs - Labs ordered, completed on 05/06-13/2019 and results are in chart.       •  Imaging - Imaging ordered, completed and results are in chart.       •  Referrals - No referrals were ordered at last office visit.    3. Is this appointment scheduled as a Hospital Follow-Up? No    4.  Immunizations were updated in [facility] using WebIZ?: Yes       •  Web Iz Recommendations: FLU, PNEUMOVAX (PPSV23), TD, VARICELLA (Chicken Pox)  and SHINGRIX (Shingles)    5.  Patient is due for the following Health Maintenance Topics:   Health Maintenance Due   Topic Date Due   • IMM ZOSTER VACCINES (1 of 2) 01/10/1989   • Annual Wellness Visit  01/17/2019           6. Orders for overdue Health Maintenance topics pended in Pre-Charting? NO    7.  AHA (MDX) form printed for Provider? No, already completed    8.  Patient was informed to arrive 15 min prior to their scheduled appointment and bring in their medication bottles. Confirmed through automated call

## 2019-05-22 ENCOUNTER — OFFICE VISIT (OUTPATIENT)
Dept: MEDICAL GROUP | Facility: PHYSICIAN GROUP | Age: 79
End: 2019-05-22
Payer: MEDICARE

## 2019-05-22 VITALS
HEIGHT: 71 IN | WEIGHT: 242 LBS | OXYGEN SATURATION: 91 % | DIASTOLIC BLOOD PRESSURE: 68 MMHG | SYSTOLIC BLOOD PRESSURE: 92 MMHG | BODY MASS INDEX: 33.88 KG/M2 | HEART RATE: 68 BPM | TEMPERATURE: 98.2 F

## 2019-05-22 DIAGNOSIS — I10 HTN (HYPERTENSION), BENIGN: ICD-10-CM

## 2019-05-22 DIAGNOSIS — E66.9 CLASS 1 OBESITY WITH BODY MASS INDEX (BMI) OF 33.0 TO 33.9 IN ADULT, UNSPECIFIED OBESITY TYPE, UNSPECIFIED WHETHER SERIOUS COMORBIDITY PRESENT: ICD-10-CM

## 2019-05-22 DIAGNOSIS — Z98.890 STATUS POST CYSTOSCOPY: ICD-10-CM

## 2019-05-22 DIAGNOSIS — E66.9 OBESITY (BMI 30-39.9): ICD-10-CM

## 2019-05-22 DIAGNOSIS — C68.9 TRANSITIONAL CELL CARCINOMA (HCC): ICD-10-CM

## 2019-05-22 PROCEDURE — 99214 OFFICE O/P EST MOD 30 MIN: CPT | Performed by: INTERNAL MEDICINE

## 2019-05-22 RX ORDER — HYDROCODONE BITARTRATE AND ACETAMINOPHEN 5; 325 MG/1; MG/1
TABLET ORAL
Qty: 30 TAB | Refills: 0 | Status: SHIPPED | OUTPATIENT
Start: 2019-07-24 | End: 2019-07-31 | Stop reason: SDUPTHER

## 2019-05-22 RX ORDER — HYDROCODONE BITARTRATE AND ACETAMINOPHEN 5; 325 MG/1; MG/1
TABLET ORAL
Qty: 30 TAB | Refills: 0 | Status: SHIPPED | OUTPATIENT
Start: 2019-05-22 | End: 2019-05-22 | Stop reason: SDUPTHER

## 2019-05-22 RX ORDER — HYDROCODONE BITARTRATE AND ACETAMINOPHEN 5; 325 MG/1; MG/1
TABLET ORAL
Refills: 0 | COMMUNITY
Start: 2019-05-13 | End: 2019-05-22 | Stop reason: SDUPTHER

## 2019-05-22 RX ORDER — LISINOPRIL 20 MG/1
20 TABLET ORAL DAILY
Qty: 100 TAB | Refills: 0 | Status: SHIPPED | OUTPATIENT
Start: 2019-05-22 | End: 2019-08-27 | Stop reason: SDUPTHER

## 2019-05-22 RX ORDER — HYDROCODONE BITARTRATE AND ACETAMINOPHEN 5; 325 MG/1; MG/1
TABLET ORAL
Qty: 30 TAB | Refills: 0 | Status: SHIPPED | OUTPATIENT
Start: 2019-06-24 | End: 2019-05-22 | Stop reason: SDUPTHER

## 2019-05-22 NOTE — PROGRESS NOTES
Chief Complaint   Patient presents with   • Chronic Opiate Therapy     Pt requesing refill of norco given by surgeon        CC : Bladder cancer, pain medication refill.  HISTORY OF PRESENT ILLNESS: Patient is a 79 y.o. male established patient who presents today with Chronic Pain.    Nature/Quality of the Pain was aching, intermittent.  Approximate date of onset of pain was since diagnosed this year..  The source of the pain is urinary bladder.  The current severity of pain is 6/ 10.    Health Maintenance: Completed    Current Problems:    Chronic Opiate Therapy: V58.69:      Chronic Pain Syndrome:  338.4:       [Diagnosis code of pain origin/type]:  C68.9      Consequences of Chronic Opiate therapy:  (5 A's)  Analgesia:  improved  Activity:  improved  Adverse Events:  None  Aberrant Behaviors:  None  Affect/Mood: good grooming, full facial expressions, normal speech pattern and content, normal thought patterns, normal perception, good insight, normal reasoning  Last CMP:   5/2019  Appropriate Imaging done:   Yes.    Patient Active Problem List    Diagnosis Date Noted   • IFG (impaired fasting glucose) 01/10/2017     Priority: Medium   • HTN (hypertension), benign 03/18/2010     Priority: Medium   • Hypovitaminosis D 01/16/2018     Priority: Low   • Obesity (BMI 30-39.9) 08/03/2017     Priority: Low   • DDD (degenerative disc disease), lumbar 09/10/2015     Priority: Low   • DDD (degenerative disc disease), cervical 09/10/2015     Priority: Low   • Migraine without status migrainosus, not intractable 08/19/2015     Priority: Low   • Osteoarthritis of hip 05/01/2014     Priority: Low   • History of hepatitis C 04/23/2013     Priority: Low   • Hypotestosteronism 10/24/2011     Priority: Low   • BPH (benign prostatic hypertrophy)      Priority: Low   • Anxiety      Priority: Low   • Recurrent major depressive disorder, in remission (HCC)      Priority: Low   • Status post cystoscopy 05/22/2019   • Transitional cell  carcinoma ([facility]) 04/16/2019   • Gross hematuria 02/20/2019   • History of nephrolithiasis 02/20/2019   • Dysuria 02/20/2019   • Nonspecific abnormal electrocardiogram (ECG) (EKG) 11/15/2018   • Dizziness 09/20/2018   • Aortic stenosis 09/07/2018   • Hx of seizure disorder 06/12/2018   • Healthcare maintenance 06/12/2018   • PVD (peripheral vascular disease) ([facility]) 05/17/2018   • Localized swelling of both lower extremities 05/01/2018       Allergies:Nkda [no known drug allergy]    Current Outpatient Prescriptions   Medication Sig Dispense Refill   • lisinopril (PRINIVIL) 20 MG Tab Take 1 Tab by mouth every day. 100 Tab 0   • [START ON 7/24/2019] HYDROcodone-acetaminophen (NORCO) 5-325 MG Tab per tablet Take 1 tab a day as needed for pain 30 Tab 0   • oxybutynin SR (DITROPAN-XL) 5 MG TABLET SR 24 HR TAKE 1 TABLET BY MOUTH EVERY DAY 90 Tab 0   • busPIRone (BUSPAR) 10 MG Tab tablet Take 10 mg by mouth 2 times a day as needed.     • doxazosin (CARDURA) 8 MG tablet Take 8 mg by mouth every evening.     • B Complex Vitamins (VITAMIN B COMPLEX PO) Take 1 Tab by mouth every day.     • traZODone (DESYREL) 100 MG Tab Take 1 Tab by mouth at bedtime as needed for Sleep. 90 Tab 1   • vitamin D (CHOLECALCIFEROL) 1000 UNIT Tab Take 2,000 Units by mouth every day.       No current facility-administered medications for this visit.        I reviewed Tylenol/ Acetaminophen dosing: Yes     I have reviewed and updated the past medical/surgical, family history and social history as of today.    ROS:  Review of Systems   Constitutional: Negative for fever, chills, weight loss and malaise/fatigue.   Respiratory: Negative for cough, sputum production, shortness of breath and wheezing.    Cardiovascular: Negative for chest pain, palpitations, orthopnea and leg swelling.   Gastrointestinal: Negative for heartburn, nausea, vomiting, constipation and abdominal pain.  Musculoskeletal:  Positive for nocturnal cramping:   Skin: Negative for rash and  "itching.   Neurological:  Negative   Psychiatric/Behavioral: Negative for signs or symptoms of depression, anxiety, suicidal or homicidal ideation.  Patient able to contract for their safety.    All other systems reviewed and are negative except as in HPI.      PHYSICAL EXAM  VITAL SIGNS:   BP (!) 92/68 (BP Location: Right arm, Patient Position: Sitting, BP Cuff Size: Large adult)   Pulse 68   Temp 36.8 °C (98.2 °F)   Ht 1.803 m (5' 11\")   Wt 109.8 kg (242 lb)   SpO2 91%   BMI 33.75 kg/m²    Constitutional: Well developed, Well nourished, No acute distress, Non-toxic appearance.    HENT: Normocephalic, Atraumatic, Bilateral external ears normal, Oropharynx moist, No oral exudates, Nose normal.    Eyes: PERRLA, EOMI, Conjunctiva normal, No discharge.    Neck: Normal range of motion, No tenderness, Supple, No stridor.    Lymphatic: No lymphadenopathy noted.    Cardiovascular: Regular rate and rhythm,  No murmurs, No rubs, No gallops.    Thorax & Lungs: Normal breath sounds, No respiratory distress, No wheezing, No chest tenderness.    Abdomen: Bowel sounds normal, Soft, No tenderness, No masses, No pulsatile masses.    Skin: Warm, Dry, No erythema, No rash.    Back: No tenderness, No CVA tenderness.   Extremities: Intact distal pulses, No edema, No tenderness, No cyanosis, No clubbing.    Musculoskeletal: Good range of motion in all major joints. No tenderness to palpation or major deformities noted.    Neurologic: Alert & oriented x 3, Normal motor function, Normal sensory function, No focal deficits noted.    Psychiatric: Affect normal, Judgment normal, Mood normal.       DISCUSSION OF CARE PLAN:    - I discussed management options. I reviewed symptomatic care   - I stressed the importance of  non-narcotic treatments to control pain including  NSAIDs, PT, surgery, gabapentin, Cymbalta, topical analgesics,home exercise plan, and pain management for injections.   -  I reviewed medications. The medications have  " been  helpful for controlling the pain, maintaining mood, and allowing the patient to function, including ADLs.    -Current MED:. 10  - Side effects are controlled. No constipation   - No aberrant behavior noted.  -  I reviewed diagnostic studies. I reviewed radiographs. I reviewed lab studies.  I reviewed medical issues. I reviewed social issues.  - I have obtained and reviewed patient utilization report [facility]. The pt has a meaningful improvement in function and pain without significant risk of harm. Based on my assessment through pt's  Report of pain, physical exam, diagnostic imaging, and review of records including , the controlled medicine prescriptions written today are medically necessary.  -The patient was advised to avoid alcohol use with prescribed medication. I counseled the patient regarding risks, side effects, and interactions of medications. Pt is advised to take no drugs and  take only medications reported to this office and prescribed.  I counseled the patient on the controlled substance prescribing program. I reviewed further symptomatic medications. I advised the pt of risk of use of NSAIDs for PUD and renal concerns.  I reviewed Acetaminophen dose and cautioned use due to liver involvement.      Date of Last therapy agreement/information sheet: today  Opioid Risk Score: No Value exists for the : [id number]    Interpretation of Opioid Risk Score   Score 0-3 = Low risk of abuse. Do UDS at least once per year.  Score 4-7 = Moderate risk of abuse. Do UDS 1-4 times per year.  Score 8+ = High risk of abuse. Refer to specialist.     verified: today  Discrepancies: none    - I will obtain/review additional records:  Yes    - I discussed efforts with home exercise program and activity modification         I recommend taper/minimize use of benzodiazepines due to risk of interaction with pain medications.   Yes    The patient was advised to avoid alcohol use with prescribed  medication. I counseled the patient regarding risks, side effects, and interactions of medications. I counseled the patient on the controlled substance prescribing program. I reviewed further symptomatic medications.    - I reviewed additional diagnostic options: Yes.    - I reviewed additional therapeutic options: Yes    - I reviewed psychosocial interventions:  Yes    Please note that this dictation was created using voice recognition software. I have made every reasonable attempt to correct obvious errors, but I expect that there are errors of grammar and possibly content that I did not discover before finalizing the note.    Assessment/Plan:    1. Transitional cell carcinoma (HCC)  2. Status post cystoscopy  Improving, patient has been taking Norco 5 mg twice daily as needed in the past, status post surgery he was supplied by extra pills of [place] by Dr. Murphy urologist.  I have checked the .  Discussed at length with the patient that given the surgery and the need of extra Norco refills with another provider it is like a violation of the contract as per the [facility] rules.  But it is reasonable that given the surgery aspect patients do get this from other providers during the surgery, as per my discussion previously with the pain management specialist Dr. uCeva we are okay to repeat the pain contract and give education to the patient that he will not violate the contract again and he will notify us at the time which both the patient and wife understood.  Currently patient is feeling better given his bladder spasms are being controlled by oxybutynin managed by Dr. Murphy the urologist.  I have refilled the Norco as once a day as needed for his pain.  This is a decrease of the dose compared to the previous twice a day as needed.  - Consent for Opiate Prescription  - Controlled Substance Treatment Agreement  - HYDROcodone-acetaminophen (NORCO) 5-325 MG Tab per tablet; Take 1 tab a day as needed for pain   Dispense: 30 Tab; Refill: 0    3. Obesity (BMI 30-39.9)  4. Class 1 obesity with body mass index (BMI) of 33.0 to 33.9 in adult, unspecified obesity type, unspecified whether serious comorbidity present  Pt educated on the increase of morbidity and mortality associated with excess weight including DM, Heart Disease, HTN, stroke, and sleep apnea.  Pt advised weight loss of 5% through reduced calorie, low fat diet and 150 mins of exercise a week  Recommend obesity clinic if patient is unsuccessful with weight loss    5. HTN (hypertension), benign  Well-controlled, patient blood pressure sometimes going to low to 90/60s.  Given instructions that I will have the half the dose at this time and he will need to avoid taking the medication if his blood pressure is so low to 90/60s which they understood and agreed.  - lisinopril (PRINIVIL) 20 MG Tab; Take 1 Tab by mouth every day.  Dispense: 100 Tab; Refill: 0    A Controlled Substance Agreement has been signed within the last year.

## 2019-05-22 NOTE — LETTER
[facility]  Christiana Harrington M.D.  [address]  [facility] [address]  Fax: [phone]   Authorization for Release/Disclosure of   Protected Health Information   Name: BAY ANDERSON : 1940 SSN: [id number]   Address: [address] [place] [address] [address] [place] [id number] Phone:    [phone] (home)    I authorize the entity listed below to release/disclose the PHI below to:   [facility]/Christiana Harrington M.D. and Christiana Harrington M.D.   Provider or Entity Name:  Dr. Benji Murphy   Address   [facility], [place], [place] Phone:      Fax:     Reason for request: continuity of care   Information to be released:    [  ] LAST COLONOSCOPY,  including any PATH REPORT and follow-up  [  ] LAST FIT/COLOGUARD RESULT [  ] LAST DEXA  [  ] LAST MAMMOGRAM  [  ] LAST PAP  [  ] LAST LABS [  ] RETINA EXAM REPORT  [  ] IMMUNIZATION RECORDS  [XX] Release all info      [  ] Check here and initial the line next to each item to release ALL health information INCLUDING  _____ Care and treatment for drug and / or alcohol abuse  _____ HIV testing, infection status, or AIDS  _____ Genetic Testing    DATES OF SERVICE OR TIME PERIOD TO BE DISCLOSED: _____________  I understand and acknowledge that:  * This Authorization may be revoked at any time by you in writing, except if your health information has already been used or disclosed.  * Your health information that will be used or disclosed as a result of you signing this authorization could be re-disclosed by the recipient. If this occurs, your re-disclosed health information may no longer be protected by State or Federal laws.  * You may refuse to sign this Authorization. Your refusal will not affect your ability to obtain treatment.  * This Authorization becomes effective upon signing and will  on (date) __________.      If no date is indicated, this Authorization will  one (1) year from the signature date.    Name: Bay Leal  Mitzy    Signature:   Date:     5/22/2019       PLEASE FAX REQUESTED RECORDS BACK TO: [phone]

## 2019-05-22 NOTE — ASSESSMENT & PLAN NOTE
Status post recent cystoscopy with transurethral resection of the left trigone scar on May 13, 2019.  Following urology Dr. Murphy.  I have reviewed the records which are scanned here but I do not find the actual procedure note and all records are previous visits sometime in April 2019.  Will need to get the recent records.

## 2019-05-22 NOTE — ASSESSMENT & PLAN NOTE
This is a chronic health problem that is uncontrolled with current medications and lifestyle measures.  Status post recent surgeries x2, one in April 2019 at surgery [facility] and the second 1 in May 2019 with transurethral resection of the scar on the left trigone.  He was given intravesical antibiotics in the past, currently the pain management patient was given [place] 5 mg by Dr. Murphy urology for pain control after the surgery up to 3 days supply of 20 pills.    Pt not aware of any Chemotherapy plans from . Supposed to get a F/U in 06/06/2019 for further discussion.

## 2019-06-06 ENCOUNTER — HOSPITAL ENCOUNTER (OUTPATIENT)
Dept: RADIOLOGY | Facility: MEDICAL CENTER | Age: 79
End: 2019-06-06
Attending: UROLOGY
Payer: MEDICARE

## 2019-06-06 DIAGNOSIS — N28.1 ACQUIRED CYST OF KIDNEY: ICD-10-CM

## 2019-06-06 PROCEDURE — 76775 US EXAM ABDO BACK WALL LIM: CPT

## 2019-07-14 DIAGNOSIS — N40.1 BENIGN NON-NODULAR PROSTATIC HYPERPLASIA WITH LOWER URINARY TRACT SYMPTOMS: ICD-10-CM

## 2019-07-15 RX ORDER — DOXAZOSIN 8 MG/1
TABLET ORAL
Qty: 90 TAB | Refills: 0 | Status: SHIPPED | OUTPATIENT
Start: 2019-07-15 | End: 2019-09-05

## 2019-07-15 NOTE — TELEPHONE ENCOUNTER
Was the patient seen in the last year in this department? Yes    Does patient have an active prescription for medications requested? No     Received Request Via: Pharmacy      Pt met protocol?: Yes, OV 5/19, entered as historical

## 2019-07-25 ENCOUNTER — TELEPHONE (OUTPATIENT)
Dept: MEDICAL GROUP | Facility: PHYSICIAN GROUP | Age: 79
End: 2019-07-25

## 2019-07-25 NOTE — TELEPHONE ENCOUNTER
Future Appointments       Provider Department Center    7/31/2019 4:00 PM Christiana Harrington M.D. [facility]        ESTABLISHED PATIENT PRE-VISIT PLANNING     Patient was NOT contacted to complete PVP.     Note: Patient will not be contacted if there is no indication to call.     1.  Reviewed notes from the last few office visits within the medical group: Yes    2.  If any orders were placed at last visit or intended to be done for this visit (i.e. 6 mos follow-up), do we have Results/Consult Notes?        •  Labs - Labs were not ordered at last office visit.   Note: If patient appointment is for lab review and patient did not complete labs, check with provider if OK to reschedule patient until labs completed.       •  Imaging - Imaging ordered, completed and results are in chart.       •  Referrals - No referrals were ordered at last office visit.    3. Is this appointment scheduled as a Hospital Follow-Up? No    4.  Immunizations were updated in [facility] using WebIZ?: Yes       •  Web Iz Recommendations: FLU, PNEUMOVAX (PPSV23), TD, VARICELLA (Chicken Pox)  and SHINGRIX (Shingles)    5.  Patient is due for the following Health Maintenance Topics:   Health Maintenance Due   Topic Date Due   • IMM ZOSTER VACCINES (1 of 2) 01/10/1989   • Annual Wellness Visit  01/17/2019       6. Orders for overdue Health Maintenance topics pended in Pre-Charting? NO    7.  AHA (MDX) form printed for Provider? No, already completed    8.  Patient was informed to arrive 15 min prior to their scheduled appointment and bring in their medication bottles.

## 2019-07-31 ENCOUNTER — OFFICE VISIT (OUTPATIENT)
Dept: MEDICAL GROUP | Facility: PHYSICIAN GROUP | Age: 79
End: 2019-07-31
Payer: MEDICARE

## 2019-07-31 VITALS
OXYGEN SATURATION: 94 % | DIASTOLIC BLOOD PRESSURE: 82 MMHG | WEIGHT: 235 LBS | HEART RATE: 64 BPM | TEMPERATURE: 98.5 F | BODY MASS INDEX: 32.78 KG/M2 | SYSTOLIC BLOOD PRESSURE: 136 MMHG

## 2019-07-31 DIAGNOSIS — E66.9 CLASS 1 OBESITY WITH BODY MASS INDEX (BMI) OF 33.0 TO 33.9 IN ADULT, UNSPECIFIED OBESITY TYPE, UNSPECIFIED WHETHER SERIOUS COMORBIDITY PRESENT: ICD-10-CM

## 2019-07-31 DIAGNOSIS — F41.9 ANXIETY: ICD-10-CM

## 2019-07-31 DIAGNOSIS — C68.9 TRANSITIONAL CELL CARCINOMA (HCC): ICD-10-CM

## 2019-07-31 DIAGNOSIS — I10 HTN (HYPERTENSION), BENIGN: ICD-10-CM

## 2019-07-31 DIAGNOSIS — E66.9 OBESITY (BMI 30-39.9): ICD-10-CM

## 2019-07-31 PROCEDURE — 99214 OFFICE O/P EST MOD 30 MIN: CPT | Performed by: INTERNAL MEDICINE

## 2019-07-31 RX ORDER — BUSPIRONE HYDROCHLORIDE 30 MG/1
30 TABLET ORAL 2 TIMES DAILY
Qty: 200 TAB | Refills: 1 | Status: SHIPPED | OUTPATIENT
Start: 2019-07-31 | End: 2019-09-05

## 2019-07-31 RX ORDER — HYDROCODONE BITARTRATE AND ACETAMINOPHEN 5; 325 MG/1; MG/1
TABLET ORAL
Qty: 30 TAB | Refills: 0 | Status: SHIPPED | OUTPATIENT
Start: 2019-08-25 | End: 2019-07-31 | Stop reason: SDUPTHER

## 2019-07-31 RX ORDER — HYDROCODONE BITARTRATE AND ACETAMINOPHEN 5; 325 MG/1; MG/1
TABLET ORAL
Qty: 30 TAB | Refills: 0 | Status: SHIPPED | OUTPATIENT
Start: 2019-09-26 | End: 2019-07-31 | Stop reason: SDUPTHER

## 2019-07-31 RX ORDER — HYDROCODONE BITARTRATE AND ACETAMINOPHEN 5; 325 MG/1; MG/1
TABLET ORAL
Qty: 30 TAB | Refills: 0 | Status: ON HOLD | OUTPATIENT
Start: 2019-09-26 | End: 2019-09-09

## 2019-07-31 SDOH — HEALTH STABILITY: MENTAL HEALTH: HOW OFTEN DO YOU HAVE A DRINK CONTAINING ALCOHOL?: NEVER

## 2019-07-31 ASSESSMENT — PATIENT HEALTH QUESTIONNAIRE - PHQ9
5. POOR APPETITE OR OVEREATING: NOT AT ALL
8. MOVING OR SPEAKING SO SLOWLY THAT OTHER PEOPLE COULD HAVE NOTICED. OR THE OPPOSITE, BEING SO FIGETY OR RESTLESS THAT YOU HAVE BEEN MOVING AROUND A LOT MORE THAN USUAL: SEVERAL DAYS
6. FEELING BAD ABOUT YOURSELF - OR THAT YOU ARE A FAILURE OR HAVE LET YOURSELF OR YOUR FAMILY DOWN: SEVERAL DAYS
3. TROUBLE FALLING OR STAYING ASLEEP OR SLEEPING TOO MUCH: NOT AT ALL
SUM OF ALL RESPONSES TO PHQ9 QUESTIONS 1 AND 2: 1
9. THOUGHTS THAT YOU WOULD BE BETTER OFF DEAD, OR OF HURTING YOURSELF: NOT AT ALL
SUM OF ALL RESPONSES TO PHQ QUESTIONS 1-9: 3
1. LITTLE INTEREST OR PLEASURE IN DOING THINGS: NOT AT ALL
4. FEELING TIRED OR HAVING LITTLE ENERGY: NOT AT ALL
2. FEELING DOWN, DEPRESSED, IRRITABLE, OR HOPELESS: SEVERAL DAYS
7. TROUBLE CONCENTRATING ON THINGS, SUCH AS READING THE NEWSPAPER OR WATCHING TELEVISION: NOT AT ALL

## 2019-07-31 NOTE — PROGRESS NOTES
CC: Follow-up visit, anxiety.    HISTORY OF PRESENT ILLNESS: Patient is a 79 y.o. male established patient who presents today to discuss her medical conditions as mentioned in HPI below.    Health Maintenance: Due for pneumonia vaccine which clinic it does not cover, will offer them again.    Transitional cell carcinoma (HCC)  This is a chronic health problem that is uncontrolled with current medications and lifestyle measures.Pt following  Urology. No plans of chemotherapy until 08/01/2019 when it will be discussed.    Anxiety  This is a chronic health problem that is uncontrolled with current medications and lifestyle measures. Currently on Buspirone 10 mg BID. Pt says that his home is full of grand children and is very anxious.    HTN (hypertension), benign  This is a chronic health problem that is uncontrolled with current medications and lifestyle measures.  Blood pressure in office today is 136/82, currently on lisinopril 20 mg daily and doxazosin 8 mg which he also takes for his BPH.      PHQ score 0, BMI > 33 , former tobacco, no fall injuries.    Patient Active Problem List    Diagnosis Date Noted   • Status post cystoscopy 05/22/2019   • Transitional cell carcinoma (HCC) 04/16/2019   • Gross hematuria 02/20/2019   • History of nephrolithiasis 02/20/2019   • Dysuria 02/20/2019   • Nonspecific abnormal electrocardiogram (ECG) (EKG) 11/15/2018   • Dizziness 09/20/2018   • Aortic stenosis 09/07/2018   • Hx of seizure disorder 06/12/2018   • Healthcare maintenance 06/12/2018   • PVD (peripheral vascular disease) (HCC) 05/17/2018   • Localized swelling of both lower extremities 05/01/2018   • Hypovitaminosis D 01/16/2018   • Obesity (BMI 30-39.9) 08/03/2017   • IFG (impaired fasting glucose) 01/10/2017   • DDD (degenerative disc disease), lumbar 09/10/2015   • DDD (degenerative disc disease), cervical 09/10/2015   • Migraine without status migrainosus, not intractable 08/19/2015   • Osteoarthritis of  hip 2014   • History of hepatitis C 2013   • Hypotestosteronism 10/24/2011   • HTN (hypertension), benign 2010   • BPH (benign prostatic hypertrophy)    • Anxiety    • Recurrent major depressive disorder, in remission (HCC)       Allergies:Nkda [no known drug allergy]    Current Outpatient Medications   Medication Sig Dispense Refill   • busPIRone (BUSPAR) 30 MG tablet Take 1 Tab by mouth 2 times a day. 200 Tab 1   • [START ON 2019] HYDROcodone-acetaminophen (NORCO) 5-325 MG Tab per tablet Take 1 tab a day as needed for pain 30 Tab 0   • doxazosin (CARDURA) 8 MG tablet TAKE 1 TABLET BY MOUTH EVERY DAY 90 Tab 0   • lisinopril (PRINIVIL) 20 MG Tab Take 1 Tab by mouth every day. 100 Tab 0   • oxybutynin SR (DITROPAN-XL) 5 MG TABLET SR 24 HR TAKE 1 TABLET BY MOUTH EVERY DAY 90 Tab 0   • B Complex Vitamins (VITAMIN B COMPLEX PO) Take 1 Tab by mouth every day.     • traZODone (DESYREL) 100 MG Tab Take 1 Tab by mouth at bedtime as needed for Sleep. 90 Tab 1   • vitamin D (CHOLECALCIFEROL) 1000 UNIT Tab Take 2,000 Units by mouth every day.       No current facility-administered medications for this visit.        Social History     Tobacco Use   • Smoking status: Former Smoker     Packs/day: 1.00     Years: 40.00     Pack years: 40.00     Types: Cigarettes, Cigars     Last attempt to quit: 1994     Years since quittin.5   • Smokeless tobacco: Never Used   • Tobacco comment: avoid all tobacco products   Substance Use Topics   • Alcohol use: Never     Alcohol/week: 0.0 oz     Frequency: Never     Comment: 1 drinks per month    • Drug use: No     Social History     Social History Narrative   • Not on file       Family History   Problem Relation Age of Onset   • Heart Disease Mother    • Other Father         MS    • Heart Disease Brother    • Diabetes Brother    • Diabetes Brother    • Heart Disease Brother    • Suicide Attempts Maternal Grandmother    • No Known Problems Maternal Grandfather    •  No Known Problems Paternal Grandmother    • No Known Problems Paternal Grandfather    • Alzheimer's Disease Brother    • Depression Brother         ROS:     - Constitutional:  Negative for fever, chills, unexpected weight change, and fatigue/generalized weakness.    - HEENT:  Negative for headaches, vision changes, hearing changes, ear pain, ear discharge, rhinorrhea, sinus congestion, sore throat, and neck pain.      - Respiratory: Negative for cough, sputum production, chest congestion, dyspnea, wheezing, and crackles.      - Cardiovascular: Negative for chest pain, palpitations, orthopnea, and bilateral lower extremity edema.     - Gastrointestinal: Ongoing intermittent lower abdominal pain on the urinary bladder region for which he takes Norco as needed currently rarely needing it once in 2 days versus twice daily as needed negative for heartburn, nausea, vomiting, abdominal pain, hematochezia, melena, diarrhea, constipation, and greasy/foul-smelling stools.     - Genitourinary: Negative for dysuria, polyuria, hematuria, pyuria, urinary urgency, and urinary incontinence.     - Musculoskeletal: Negative for myalgias, back pain, and joint pain.     - Skin: Negative for rash, itching, cyanotic skin color change.     - Neurological: Negative for dizziness, tingling, tremors, focal sensory deficit, focal weakness and headaches.     - Endo/Heme/Allergies: Does not bruise/bleed easily.     - Psychiatric/Behavioral: Negative for depression, suicidal/homicidal ideation and memory loss.        Recent lab work in May 2019 reviewed.    Exam:    /82 (BP Location: Right arm, Patient Position: Sitting, BP Cuff Size: Large adult)   Pulse 64   Temp 36.9 °C (98.5 °F) (Temporal)   Wt 106.6 kg (235 lb)   SpO2 94%  Body mass index is 32.78 kg/m².    General:  Well nourished, well developed male in NAD  Head is grossly normal.  Neck: Supple without JVD or bruit. Thyroid is not enlarged.  Pulmonary: Clear to ausculation and  percussion.  Normal effort. No rales, ronchi, or wheezing.  Cardiovascular: Regular rate and rhythm without murmur. Carotid and radial pulses are intact and equal bilaterally.  Extremities: no clubbing, cyanosis, or edema.    Please note that this dictation was created using voice recognition software. I have made every reasonable attempt to correct obvious errors, but I expect that there are errors of grammar and possibly content that I did not discover before finalizing the note.    Assessment/Plan:  1. Transitional cell carcinoma (HCC)  Stable, follows with urology Dr. beck.  Please see HPI above for further details.  Will refill his Alto as per the  check not working pharmacy was contacted and his last refill is on June 24, 2019.  He still has 1 more prescription to be filled in which he did not do it.  Will give her 2 more months of refills starting from August 25, 2019 enough until October 25, 2019.  - HYDROcodone-acetaminophen (NORCO) 5-325 MG Tab per tablet; Take 1 tab a day as needed for pain  Dispense: 30 Tab; Refill: 0    2. Anxiety  Uncontrolled, as mentioned in HPI below will increase the dose of his buspirone 30 mg twice daily.  Emphasized that patient cannot be given benzodiazepines given his opiate use which patient understood and agreed.  Denies any depression symptoms.  - busPIRone (BUSPAR) 30 MG tablet; Take 1 Tab by mouth 2 times a day.  Dispense: 200 Tab; Refill: 1    3. Obesity (BMI 30-39.9)  4. Class 1 obesity with body mass index (BMI) of 33.0 to 33.9 in adult, unspecified obesity type, unspecified whether serious comorbidity present  Patient identified as elevated BMI, appropriate counseling given.    5. HTN (hypertension), benign  Borderline elevated which patient attributes to his uncontrolled anxiety at this time.  To continue current lisinopril as mentioned above in HPI.

## 2019-07-31 NOTE — ASSESSMENT & PLAN NOTE
This is a chronic health problem that is uncontrolled with current medications and lifestyle measures. Currently on Buspirone 10 mg BID. Pt says that his home is full of grand children and is very anxious.

## 2019-07-31 NOTE — ASSESSMENT & PLAN NOTE
This is a chronic health problem that is uncontrolled with current medications and lifestyle measures.  Blood pressure in office today is 136/82, currently on lisinopril 20 mg daily and doxazosin 8 mg which he also takes for his BPH.

## 2019-08-09 DIAGNOSIS — R30.1 PAINFUL BLADDER SPASM: ICD-10-CM

## 2019-08-09 NOTE — TELEPHONE ENCOUNTER
Was the patient seen in the last year in this department? Yes    Does patient have an active prescription for medications requested? No     Received Request Via: Pharmacy    Pt met protocol?: Yes     Last OV 07/31/19

## 2019-08-12 RX ORDER — OXYBUTYNIN CHLORIDE 5 MG/1
TABLET, EXTENDED RELEASE ORAL
Qty: 90 TAB | Refills: 0 | Status: SHIPPED | OUTPATIENT
Start: 2019-08-12 | End: 2019-09-05

## 2019-08-15 DIAGNOSIS — F41.9 ANXIETY: ICD-10-CM

## 2019-08-15 RX ORDER — BUSPIRONE HYDROCHLORIDE 10 MG/1
TABLET ORAL
Refills: 1 | OUTPATIENT
Start: 2019-08-15

## 2019-08-16 RX ORDER — TRAZODONE HYDROCHLORIDE 100 MG/1
100 TABLET ORAL
Qty: 90 TAB | Refills: 1 | Status: SHIPPED | OUTPATIENT
Start: 2019-08-16 | End: 2019-09-05

## 2019-08-27 DIAGNOSIS — I10 HTN (HYPERTENSION), BENIGN: ICD-10-CM

## 2019-08-28 RX ORDER — LISINOPRIL 20 MG/1
TABLET ORAL
Qty: 100 TAB | Refills: 0 | Status: SHIPPED | OUTPATIENT
Start: 2019-08-28 | End: 2019-09-05

## 2019-08-28 NOTE — TELEPHONE ENCOUNTER
Was the patient seen in the last year in this department? Yes    Does patient have an active prescription for medications requested? No     Received Request Via: Pharmacy      Pt met protocol?: Yes    OV 7/19    BP Readings from Last 1 Encounters:   07/31/19 136/82

## 2019-08-29 ENCOUNTER — HOSPITAL ENCOUNTER (OUTPATIENT)
Dept: LAB | Facility: MEDICAL CENTER | Age: 79
End: 2019-08-29
Attending: UROLOGY
Payer: MEDICARE

## 2019-08-29 LAB
ANION GAP SERPL CALC-SCNC: 8 MMOL/L (ref 0–11.9)
BASOPHILS # BLD AUTO: 0.5 % (ref 0–1.8)
BASOPHILS # BLD: 0.05 K/UL (ref 0–0.12)
BUN SERPL-MCNC: 23 MG/DL (ref 8–22)
CALCIUM SERPL-MCNC: 9.1 MG/DL (ref 8.5–10.5)
CHLORIDE SERPL-SCNC: 107 MMOL/L (ref 96–112)
CO2 SERPL-SCNC: 26 MMOL/L (ref 20–33)
CREAT SERPL-MCNC: 1.31 MG/DL (ref 0.5–1.4)
EOSINOPHIL # BLD AUTO: 0.17 K/UL (ref 0–0.51)
EOSINOPHIL NFR BLD: 1.7 % (ref 0–6.9)
ERYTHROCYTE [DISTWIDTH] IN BLOOD BY AUTOMATED COUNT: 49.9 FL (ref 35.9–50)
FASTING STATUS PATIENT QL REPORTED: NORMAL
GLUCOSE SERPL-MCNC: 107 MG/DL (ref 65–99)
HCT VFR BLD AUTO: 43.9 % (ref 42–52)
HGB BLD-MCNC: 14.1 G/DL (ref 14–18)
IMM GRANULOCYTES # BLD AUTO: 0.1 K/UL (ref 0–0.11)
IMM GRANULOCYTES NFR BLD AUTO: 1 % (ref 0–0.9)
LYMPHOCYTES # BLD AUTO: 1.09 K/UL (ref 1–4.8)
LYMPHOCYTES NFR BLD: 10.6 % (ref 22–41)
MCH RBC QN AUTO: 30.5 PG (ref 27–33)
MCHC RBC AUTO-ENTMCNC: 32.1 G/DL (ref 33.7–35.3)
MCV RBC AUTO: 94.8 FL (ref 81.4–97.8)
MONOCYTES # BLD AUTO: 0.92 K/UL (ref 0–0.85)
MONOCYTES NFR BLD AUTO: 9 % (ref 0–13.4)
NEUTROPHILS # BLD AUTO: 7.91 K/UL (ref 1.82–7.42)
NEUTROPHILS NFR BLD: 77.2 % (ref 44–72)
NRBC # BLD AUTO: 0 K/UL
NRBC BLD-RTO: 0 /100 WBC
PLATELET # BLD AUTO: 221 K/UL (ref 164–446)
PMV BLD AUTO: 10.1 FL (ref 9–12.9)
POTASSIUM SERPL-SCNC: 4.3 MMOL/L (ref 3.6–5.5)
RBC # BLD AUTO: 4.63 M/UL (ref 4.7–6.1)
SODIUM SERPL-SCNC: 141 MMOL/L (ref 135–145)
WBC # BLD AUTO: 10.2 K/UL (ref 4.8–10.8)

## 2019-08-29 PROCEDURE — 87086 URINE CULTURE/COLONY COUNT: CPT

## 2019-08-29 PROCEDURE — 85025 COMPLETE CBC W/AUTO DIFF WBC: CPT

## 2019-08-29 PROCEDURE — 80048 BASIC METABOLIC PNL TOTAL CA: CPT

## 2019-08-29 PROCEDURE — 36415 COLL VENOUS BLD VENIPUNCTURE: CPT

## 2019-08-30 NOTE — ED NOTES
Iv placed , labs drawn and taken to lab. poc update given to pt, results pending at this time   Likely secondary to missed hemodialysis session in a patient with ESRD.  Status post urgent dialysis and resolved.  Trend labs.

## 2019-08-31 LAB
BACTERIA UR CULT: NORMAL
SIGNIFICANT IND 70042: NORMAL
SITE SITE: NORMAL
SOURCE SOURCE: NORMAL

## 2019-09-05 RX ORDER — LISINOPRIL 20 MG/1
20 TABLET ORAL
COMMUNITY
End: 2020-02-06

## 2019-09-05 RX ORDER — OXYBUTYNIN CHLORIDE 5 MG/1
5 TABLET, EXTENDED RELEASE ORAL EVERY MORNING
COMMUNITY
End: 2020-02-06

## 2019-09-05 RX ORDER — DOXAZOSIN 8 MG/1
8 TABLET ORAL
COMMUNITY
End: 2019-10-15

## 2019-09-05 RX ORDER — BUSPIRONE HYDROCHLORIDE 30 MG/1
30 TABLET ORAL 2 TIMES DAILY PRN
COMMUNITY
End: 2020-01-27

## 2019-09-09 ENCOUNTER — HOSPITAL ENCOUNTER (OUTPATIENT)
Facility: MEDICAL CENTER | Age: 79
End: 2019-09-09
Attending: UROLOGY | Admitting: UROLOGY
Payer: MEDICARE

## 2019-09-09 ENCOUNTER — ANESTHESIA EVENT (OUTPATIENT)
Dept: SURGERY | Facility: MEDICAL CENTER | Age: 79
End: 2019-09-09
Payer: MEDICARE

## 2019-09-09 ENCOUNTER — ANESTHESIA (OUTPATIENT)
Dept: SURGERY | Facility: MEDICAL CENTER | Age: 79
End: 2019-09-09
Payer: MEDICARE

## 2019-09-09 VITALS
HEIGHT: 72 IN | RESPIRATION RATE: 16 BRPM | TEMPERATURE: 97 F | SYSTOLIC BLOOD PRESSURE: 127 MMHG | WEIGHT: 231.7 LBS | BODY MASS INDEX: 31.38 KG/M2 | DIASTOLIC BLOOD PRESSURE: 88 MMHG | HEART RATE: 75 BPM | OXYGEN SATURATION: 98 %

## 2019-09-09 LAB — PATHOLOGY CONSULT NOTE: NORMAL

## 2019-09-09 PROCEDURE — 700102 HCHG RX REV CODE 250 W/ 637 OVERRIDE(OP): Performed by: ANESTHESIOLOGY

## 2019-09-09 PROCEDURE — A9270 NON-COVERED ITEM OR SERVICE: HCPCS | Performed by: ANESTHESIOLOGY

## 2019-09-09 PROCEDURE — 500879 HCHG PACK, CYSTO: Performed by: UROLOGY

## 2019-09-09 PROCEDURE — C1769 GUIDE WIRE: HCPCS | Performed by: UROLOGY

## 2019-09-09 PROCEDURE — 160025 RECOVERY II MINUTES (STATS): Performed by: UROLOGY

## 2019-09-09 PROCEDURE — 160046 HCHG PACU - 1ST 60 MINS PHASE II: Performed by: UROLOGY

## 2019-09-09 PROCEDURE — 700102 HCHG RX REV CODE 250 W/ 637 OVERRIDE(OP): Performed by: UROLOGY

## 2019-09-09 PROCEDURE — 700105 HCHG RX REV CODE 258: Performed by: UROLOGY

## 2019-09-09 PROCEDURE — 160048 HCHG OR STATISTICAL LEVEL 1-5: Performed by: UROLOGY

## 2019-09-09 PROCEDURE — 88305 TISSUE EXAM BY PATHOLOGIST: CPT | Mod: 59

## 2019-09-09 PROCEDURE — 160002 HCHG RECOVERY MINUTES (STAT): Performed by: UROLOGY

## 2019-09-09 PROCEDURE — 700111 HCHG RX REV CODE 636 W/ 250 OVERRIDE (IP): Performed by: ANESTHESIOLOGY

## 2019-09-09 PROCEDURE — 160028 HCHG SURGERY MINUTES - 1ST 30 MINS LEVEL 3: Performed by: UROLOGY

## 2019-09-09 PROCEDURE — 160009 HCHG ANES TIME/MIN: Performed by: UROLOGY

## 2019-09-09 PROCEDURE — 501329 HCHG SET, CYSTO IRRIG Y TUR: Performed by: UROLOGY

## 2019-09-09 PROCEDURE — 160036 HCHG PACU - EA ADDL 30 MINS PHASE I: Performed by: UROLOGY

## 2019-09-09 PROCEDURE — 160035 HCHG PACU - 1ST 60 MINS PHASE I: Performed by: UROLOGY

## 2019-09-09 PROCEDURE — 700111 HCHG RX REV CODE 636 W/ 250 OVERRIDE (IP)

## 2019-09-09 PROCEDURE — 700101 HCHG RX REV CODE 250: Performed by: ANESTHESIOLOGY

## 2019-09-09 PROCEDURE — A9270 NON-COVERED ITEM OR SERVICE: HCPCS | Performed by: UROLOGY

## 2019-09-09 PROCEDURE — 160039 HCHG SURGERY MINUTES - EA ADDL 1 MIN LEVEL 3: Performed by: UROLOGY

## 2019-09-09 RX ORDER — MAGNESIUM SULFATE HEPTAHYDRATE 40 MG/ML
INJECTION, SOLUTION INTRAVENOUS PRN
Status: DISCONTINUED | OUTPATIENT
Start: 2019-09-09 | End: 2019-09-09 | Stop reason: SURG

## 2019-09-09 RX ORDER — HALOPERIDOL 5 MG/ML
1 INJECTION INTRAMUSCULAR
Status: DISCONTINUED | OUTPATIENT
Start: 2019-09-09 | End: 2019-09-09 | Stop reason: HOSPADM

## 2019-09-09 RX ORDER — LIDOCAINE HYDROCHLORIDE 10 MG/ML
INJECTION, SOLUTION EPIDURAL; INFILTRATION; INTRACAUDAL; PERINEURAL
Status: COMPLETED
Start: 2019-09-09 | End: 2019-09-09

## 2019-09-09 RX ORDER — SODIUM CHLORIDE, SODIUM LACTATE, POTASSIUM CHLORIDE, CALCIUM CHLORIDE 600; 310; 30; 20 MG/100ML; MG/100ML; MG/100ML; MG/100ML
INJECTION, SOLUTION INTRAVENOUS CONTINUOUS
Status: DISCONTINUED | OUTPATIENT
Start: 2019-09-09 | End: 2019-09-09 | Stop reason: HOSPADM

## 2019-09-09 RX ORDER — ENALAPRILAT 1.25 MG/ML
1.25 INJECTION INTRAVENOUS
Status: DISCONTINUED | OUTPATIENT
Start: 2019-09-09 | End: 2019-09-09 | Stop reason: HOSPADM

## 2019-09-09 RX ORDER — ONDANSETRON 2 MG/ML
INJECTION INTRAMUSCULAR; INTRAVENOUS PRN
Status: DISCONTINUED | OUTPATIENT
Start: 2019-09-09 | End: 2019-09-09 | Stop reason: HOSPADM

## 2019-09-09 RX ORDER — GABAPENTIN 300 MG/1
300 CAPSULE ORAL ONCE
Status: COMPLETED | OUTPATIENT
Start: 2019-09-09 | End: 2019-09-09

## 2019-09-09 RX ORDER — CELECOXIB 200 MG/1
200 CAPSULE ORAL ONCE
Status: COMPLETED | OUTPATIENT
Start: 2019-09-09 | End: 2019-09-09

## 2019-09-09 RX ORDER — TRAZODONE HYDROCHLORIDE 50 MG/1
25-50 TABLET ORAL
COMMUNITY
End: 2020-02-06

## 2019-09-09 RX ORDER — ACETAMINOPHEN 500 MG
1000 TABLET ORAL ONCE
Status: COMPLETED | OUTPATIENT
Start: 2019-09-09 | End: 2019-09-09

## 2019-09-09 RX ORDER — ROCURONIUM BROMIDE 10 MG/ML
INJECTION, SOLUTION INTRAVENOUS PRN
Status: DISCONTINUED | OUTPATIENT
Start: 2019-09-09 | End: 2019-09-09 | Stop reason: SURG

## 2019-09-09 RX ORDER — OXYCODONE HCL 5 MG/5 ML
10 SOLUTION, ORAL ORAL
Status: COMPLETED | OUTPATIENT
Start: 2019-09-09 | End: 2019-09-09

## 2019-09-09 RX ORDER — MIDAZOLAM HYDROCHLORIDE 1 MG/ML
INJECTION INTRAMUSCULAR; INTRAVENOUS PRN
Status: DISCONTINUED | OUTPATIENT
Start: 2019-09-09 | End: 2019-09-09 | Stop reason: SURG

## 2019-09-09 RX ORDER — DEXAMETHASONE SODIUM PHOSPHATE 4 MG/ML
INJECTION, SOLUTION INTRA-ARTICULAR; INTRALESIONAL; INTRAMUSCULAR; INTRAVENOUS; SOFT TISSUE PRN
Status: DISCONTINUED | OUTPATIENT
Start: 2019-09-09 | End: 2019-09-09 | Stop reason: SURG

## 2019-09-09 RX ORDER — OXYCODONE HCL 5 MG/5 ML
5 SOLUTION, ORAL ORAL
Status: COMPLETED | OUTPATIENT
Start: 2019-09-09 | End: 2019-09-09

## 2019-09-09 RX ORDER — MIDAZOLAM HYDROCHLORIDE 1 MG/ML
1 INJECTION INTRAMUSCULAR; INTRAVENOUS
Status: DISCONTINUED | OUTPATIENT
Start: 2019-09-09 | End: 2019-09-09 | Stop reason: HOSPADM

## 2019-09-09 RX ORDER — CEFAZOLIN SODIUM 1 G/3ML
INJECTION, POWDER, FOR SOLUTION INTRAMUSCULAR; INTRAVENOUS PRN
Status: DISCONTINUED | OUTPATIENT
Start: 2019-09-09 | End: 2019-09-09 | Stop reason: HOSPADM

## 2019-09-09 RX ORDER — ATROPA BELLADONNA AND OPIUM 16.2; 6 MG/1; MG/1
30 SUPPOSITORY RECTAL
Status: COMPLETED | OUTPATIENT
Start: 2019-09-09 | End: 2019-09-09

## 2019-09-09 RX ORDER — HYDROCODONE BITARTRATE AND ACETAMINOPHEN 5; 325 MG/1; MG/1
1 TABLET ORAL EVERY 8 HOURS PRN
COMMUNITY
End: 2020-02-19 | Stop reason: SDUPTHER

## 2019-09-09 RX ADMIN — LIDOCAINE HYDROCHLORIDE 0.5 ML: 10 INJECTION, SOLUTION EPIDURAL; INFILTRATION; INTRACAUDAL at 10:05

## 2019-09-09 RX ADMIN — GABAPENTIN 300 MG: 300 CAPSULE ORAL at 10:05

## 2019-09-09 RX ADMIN — FENTANYL CITRATE 50 MCG: 50 INJECTION, SOLUTION INTRAMUSCULAR; INTRAVENOUS at 11:18

## 2019-09-09 RX ADMIN — MIDAZOLAM 1 MG: 1 INJECTION INTRAMUSCULAR; INTRAVENOUS at 11:40

## 2019-09-09 RX ADMIN — ROCURONIUM BROMIDE 50 MG: 10 INJECTION, SOLUTION INTRAVENOUS at 10:25

## 2019-09-09 RX ADMIN — SUGAMMADEX 200 MG: 100 INJECTION, SOLUTION INTRAVENOUS at 10:54

## 2019-09-09 RX ADMIN — ONDANSETRON 4 MG: 2 INJECTION INTRAMUSCULAR; INTRAVENOUS at 10:25

## 2019-09-09 RX ADMIN — MIDAZOLAM 1 MG: 1 INJECTION INTRAMUSCULAR; INTRAVENOUS at 11:33

## 2019-09-09 RX ADMIN — CELECOXIB 200 MG: 200 CAPSULE ORAL at 10:05

## 2019-09-09 RX ADMIN — ACETAMINOPHEN 1000 MG: 500 TABLET ORAL at 10:05

## 2019-09-09 RX ADMIN — MIDAZOLAM 2 MG: 1 INJECTION INTRAMUSCULAR; INTRAVENOUS at 10:25

## 2019-09-09 RX ADMIN — CEFAZOLIN 2 G: 330 INJECTION, POWDER, FOR SOLUTION INTRAMUSCULAR; INTRAVENOUS at 10:25

## 2019-09-09 RX ADMIN — ATROPA BELLADONNA AND OPIUM 30 MG: 16.2; 6 SUPPOSITORY RECTAL at 13:48

## 2019-09-09 RX ADMIN — PROPOFOL 150 MG: 10 INJECTION, EMULSION INTRAVENOUS at 10:25

## 2019-09-09 RX ADMIN — OXYCODONE HYDROCHLORIDE 10 MG: 5 SOLUTION ORAL at 11:18

## 2019-09-09 RX ADMIN — SODIUM CHLORIDE, POTASSIUM CHLORIDE, SODIUM LACTATE AND CALCIUM CHLORIDE: 600; 310; 30; 20 INJECTION, SOLUTION INTRAVENOUS at 10:06

## 2019-09-09 RX ADMIN — FENTANYL CITRATE 50 MCG: 50 INJECTION, SOLUTION INTRAMUSCULAR; INTRAVENOUS at 11:43

## 2019-09-09 RX ADMIN — FENTANYL CITRATE 50 MCG: 50 INJECTION, SOLUTION INTRAMUSCULAR; INTRAVENOUS at 12:10

## 2019-09-09 RX ADMIN — ENALAPRILAT 1.25 MG: 1.25 INJECTION INTRAVENOUS at 13:03

## 2019-09-09 RX ADMIN — DEXAMETHASONE SODIUM PHOSPHATE 4 MG: 4 INJECTION, SOLUTION INTRA-ARTICULAR; INTRALESIONAL; INTRAMUSCULAR; INTRAVENOUS; SOFT TISSUE at 10:25

## 2019-09-09 RX ADMIN — MAGNESIUM SULFATE IN WATER 4 G: 40 INJECTION, SOLUTION INTRAVENOUS at 10:29

## 2019-09-09 RX ADMIN — FENTANYL CITRATE 50 MCG: 50 INJECTION, SOLUTION INTRAMUSCULAR; INTRAVENOUS at 11:31

## 2019-09-09 RX ADMIN — FENTANYL CITRATE 100 MCG: 50 INJECTION, SOLUTION INTRAMUSCULAR; INTRAVENOUS at 10:25

## 2019-09-09 ASSESSMENT — PAIN SCALES - GENERAL: PAIN_LEVEL: 6

## 2019-09-09 NOTE — PROGRESS NOTES
"Patient still c/o lots of pain, doesn't think he can go home with this much pain. When asked about available pain medication at home that he has to take, patient states he \"has nothing.\" Dr Murphy paged to notify.   "

## 2019-09-09 NOTE — OR NURSING
Patient allergies and NPO status verified, home medication reconciliation completed and belongings secured. Patient verbalizes understanding of pain scale, expected course of stay and plan of care. Surgical site verified with patient. IV access established; call light within reach. No further needs at this time; hourly rounding.

## 2019-09-09 NOTE — DISCHARGE INSTRUCTIONS
ACTIVITY: Rest and take it easy for the first 24 hours.  A responsible adult is recommended to remain with you during that time.  It is normal to feel sleepy.  We encourage you to not do anything that requires balance, judgment or coordination.    MILD FLU-LIKE SYMPTOMS ARE NORMAL. YOU MAY EXPERIENCE GENERALIZED MUSCLE ACHES, THROAT IRRITATION, HEADACHE AND/OR SOME NAUSEA.    FOR 24 HOURS DO NOT:  Drive, operate machinery or run household appliances.  Drink beer or alcoholic beverages.   Make important decisions or sign legal documents.    SPECIAL INSTRUCTIONS:   Diet: Clears Advance Diet As Tolerated to regular.  Follow-up in office in 2-3 weeks with Dr. Murphy  Activity: No restrictions    DIET: To avoid nausea, slowly advance diet as tolerated, avoiding spicy or greasy foods for the first day.  Add more substantial food to your diet according to your physician's instructions.  Babies can be fed formula or breast milk as soon as they are hungry.  INCREASE FLUIDS AND FIBER TO AVOID CONSTIPATION.    SURGICAL DRESSING/BATHING: N/A    FOLLOW-UP APPOINTMENT:  A follow-up appointment should be arranged with your doctor in 2-3 weeks; call to schedule.    You should CALL YOUR PHYSICIAN if you develop:  Fever greater than 101 degrees F.  Pain not relieved by medication, or persistent nausea or vomiting.  Excessive bleeding (blood soaking through dressing) or unexpected drainage from the wound.  Extreme redness or swelling around the incision site, drainage of pus or foul smelling drainage.  Inability to urinate or empty your bladder within 8 hours.  Problems with breathing or chest pain.    You should call 911 if you develop problems with breathing or chest pain.  If you are unable to contact your doctor or surgical center, you should go to the nearest emergency room or urgent care center.  Physician's telephone #: Dr. Murphy [phone]    If any questions arise, call your doctor.  If your doctor is not available, please  feel free to call the Surgical Center at [phone].  The Center is open Monday through Friday from 7AM to 7PM.  You can also call the HEALTH HOTLINE open 24 hours/day, 7 days/week and speak to a nurse at [phone], or toll free at [phone].    A registered nurse may call you a few days after your surgery to see how you are doing after your procedure.    MEDICATIONS: Resume taking daily medication.  Take prescribed pain medication with food.  If no medication is prescribed, you may take non-aspirin pain medication if needed.  PAIN MEDICATION CAN BE VERY CONSTIPATING.  Take a stool softener or laxative such as senokot, pericolace, or milk of magnesia if needed.    Prescription given for (call primary for pain medication refill).  Last pain medication given at 11:18 am    If your physician has prescribed pain medication that includes Acetaminophen (Tylenol), do not take additional Acetaminophen (Tylenol) while taking the prescribed medication.    Depression / Suicide Risk    As you are discharged from this [facility] facility, it is important to learn how to keep safe from harming yourself.    Recognize the warning signs:  · Abrupt changes in personality, positive or negative- including increase in energy   · Giving away possessions  · Change in eating patterns- significant weight changes-  positive or negative  · Change in sleeping patterns- unable to sleep or sleeping all the time   · Unwillingness or inability to communicate  · Depression  · Unusual sadness, discouragement and loneliness  · Talk of wanting to die  · Neglect of personal appearance   · Rebelliousness- reckless behavior  · Withdrawal from people/activities they love  · Confusion- inability to concentrate     If you or a loved one observes any of these behaviors or has concerns about self-harm, here's what you can do:  · Talk about it- your feelings and reasons for harming yourself  · Remove any means that you might use to hurt  yourself (examples: pills, rope, extension cords, firearm)  · Get professional help from the community (Mental Health, Substance Abuse, psychological counseling)  · Do not be alone:Call your Safe Contact- someone whom you trust who will be there for you.  · Call your local CRISIS HOTLINE 373-5327 or 065-419-9626  · Call your local Children's Mobile Crisis Response Team [place] (451) 398-3308 or www.[employer]  · Call the toll free National Suicide Prevention Hotlines   · National Suicide Prevention Lifeline 647-376-IUCB (5142)  · National Hope Line Network 800-SUICIDE (082-5856)

## 2019-09-09 NOTE — ANESTHESIA PREPROCEDURE EVALUATION
Relevant Problems   PULMONARY   (+) History of hepatitis C      NEURO   (+) History of hepatitis C   (+) History of nephrolithiasis   (+) Hx of seizure disorder      CARDIAC   (+) Aortic stenosis   (+) HTN (hypertension), benign   (+) Migraine without status migrainosus, not intractable      Other   (+) Nonspecific abnormal electrocardiogram (ECG) (EKG)   (+) Obesity (BMI 30-39.9)   (+) PVD (peripheral vascular disease) (HCC)   (+) Recurrent major depressive disorder, in remission (HCC)       Physical Exam    Airway   Mallampati: II  TM distance: >3 FB  Neck ROM: full       Cardiovascular - normal exam  Rhythm: regular  Rate: normal  (-) murmur     Dental - normal exam  (+) upper dentures         Pulmonary - normal exam  Breath sounds clear to auscultation     Abdominal    Neurological - normal exam                 Anesthesia Plan    ASA 3 (aortic stenosis, obesity, smoker)       Plan - general       Airway plan will be ETT        Induction: intravenous          Informed Consent:    Anesthetic plan and risks discussed with patient.

## 2019-09-09 NOTE — PROGRESS NOTES
Patient has remained hypertensive on and off since he has been in PACU, came in hypertensive this am, but he did not take his lisinopril this morning as scheduled. Called Dr Otto to give an update with new orders received.

## 2019-09-09 NOTE — OR SURGEON
Immediate Post OP Note    PreOp Diagnosis: History of high grade TCC of the bladder    PostOp Diagnosis: As above    Procedure(s):  CYSTOSCOPY - Wound Class: Clean Contaminated  TRANSURETHRAL RESECTION BLADDER SCAR - Wound Class: Clean Contaminated    Surgeon(s):  Benji Murphy M.D.    Anesthesiologist/Type of Anesthesia:  Anesthesiologist: Fam Pate M.D./General LMA    Surgical Staff:  Circulator: Latisha Tobias R.N.  Relief Circulator: Raffi Castellanos R.N.  [place]: Jose Morgan R.N.    Specimens removed if any:  A: Bladder trigone  B: Deep muscle bladder biopsy    Estimated Blood Loss: N/A  Findings: Unable to visualize the right ureteral orifice.                 Bladder trigone erythema                   Left ureteral orifice with clear efflux    Complications: None        9/9/2019 10:58 AM Benji Murphy M.D.

## 2019-09-09 NOTE — OR NURSING
Pt to phase 2 by chioma. Aaox4, vss. Pt denies nausea. Pt voided in phase 1 pacu. Discharge instructions given. To car by wheelchair.

## 2019-09-09 NOTE — ANESTHESIA QCDR
2019 [facility] Clinical Data Registry (for Quality Improvement)     Postoperative nausea/vomiting risk protocol (Adult = 18 yrs and Pediatric 3-17 yrs)- (430 and 463)  General inhalation anesthetic (NOT TIVA) with PONV risk factors: No  Provision of anti-emetic therapy with at least 2 different classes of agents: N/A  Patient DID NOT receive anti-emetic therapy and reason is documented in Medical Record: N/A    Multimodal Pain Management- (AQI59)  Patient undergoing Elective Surgery (i.e. Outpatient, or ASC, or Prescheduled Surgery prior to Hospital Admission): Yes  Use of Multimodal Pain Management, two or more drugs and/or interventions, NOT including systemic opioids: Yes   Exception: Documented allergy to multiple classes of analgesics:  N/A    PACU assessment of acute postoperative pain prior to Anesthesia Care End- Applies to Patients Age = 18- (ABG7)  Initial PACU pain score is which of the following: < 7/10  Patient unable to report pain score: N/A    Post-anesthetic transfer of care checklist/protocol to PACU/ICU- (426 and 427)  Upon conclusion of case, patient transferred to which of the following locations: PACU/Non-ICU  Use of transfer checklist/protocol: Yes  Exclusion: Service Performed in Patient Hospital Room (and thus did not require transfer): N/A    PACU Reintubation- (AQI31)  General anesthesia requiring endotracheal intubation (ETT) along with subsequent extubation in OR or PACU: Yes  Required reintubation in the PACU: No   Extubation was a planned trial documented in the medical record prior to removal of the original airway device:  N/A    Unplanned admission to ICU related to anesthesia service up through end of PACU care- (MD51)  Unplanned admission to ICU (not initially anticipated at anesthesia start time): No

## 2019-09-09 NOTE — ANESTHESIA PROCEDURE NOTES
Airway  Date/Time: 9/9/2019 10:29 AM  Performed by: Fam Pate M.D.  Authorized by: Fam Pate M.D.     Location:  OR  Urgency:  Elective  Indications for Airway Management:  Anesthesia  Spontaneous Ventilation: absent    Sedation Level:  Deep  Preoxygenated: Yes    Patient Position:  Sniffing  Mask Difficulty Assessment:  0 - not attempted  Final Airway Type:  Endotracheal airway  Final Endotracheal Airway:  ETT  Cuffed: Yes    Technique Used for Successful ETT Placement:  Direct laryngoscopy  Insertion Site:  Oral  Blade Type:  Chávez  Laryngoscope Blade/Videolaryngoscope Blade Size:  2  ETT Size (mm):  8.0  Measured from:  Gums  Placement Verified by: auscultation and capnometry    Cormack-Lehane Classification:  Grade I - full view of glottis  Number of Attempts at Approach:  1

## 2019-09-09 NOTE — ANESTHESIA TIME REPORT
Anesthesia Start and Stop Event Times     Date Time Event    9/9/2019 0959 Ready for Procedure     1023 Anesthesia Start     1109 Anesthesia Stop        Responsible Staff  09/09/19    Name Role Begin End    Fam Pate M.D. Anesth 1023 1109        Preop Diagnosis (Free Text):  Pre-op Diagnosis     PERSONAL HISTORY OF MALIGNANT NEOPLASM OF BLADDER        Preop Diagnosis (Codes):    Post op Diagnosis  Bladder cancer (HCC)      Premium Reason  Non-Premium    Comments:

## 2019-09-09 NOTE — PROGRESS NOTES
Dr Murphy updated on patient, new orders received for Sabrina Kimberley suppository, see MAR for administration.     Dr Murphy also was updated on patient saying he doesn't have any more Norco to take at home. He is no longer in the facility and said that the patient then needs to contact his primary who prescribed them to get a refill since he did not originally prescribe them. Patient does have

## 2019-09-09 NOTE — ANESTHESIA POSTPROCEDURE EVALUATION
Patient: Bay Forrester    Procedure Summary     Date:  09/09/19 Room / Location:  [place] [id number] / SURGERY [facility]    Anesthesia Start:  1023 Anesthesia Stop:  1109    Procedures:       CYSTOSCOPY (Bladder)      TRANS URETHRAL RESECTION BLADDER SCAR (Bladder) Diagnosis:  (PERSONAL HISTORY OF MALIGNANT NEOPLASM OF BLADDER)    Surgeon:  Benji Murphy M.D. Responsible Provider:  Fam Pate M.D.    Anesthesia Type:  general ASA Status:  3          Final Anesthesia Type: general  Last vitals  BP   NIBP: 157/130    Temp   36.6 °C (97.9 °F)    Pulse   Pulse: 79   Resp   16    SpO2   93 %      Anesthesia Post Evaluation    Patient location during evaluation: PACU  Patient participation: complete - patient participated  Level of consciousness: awake and alert  Pain score: 6    Airway patency: patent  Anesthetic complications: no  Cardiovascular status: hemodynamically stable  Respiratory status: acceptable  Hydration status: euvolemic    PONV: controlled           Nurse Pain Score: 0 (NPRS)

## 2019-09-11 NOTE — OP REPORT
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    OPERATION AND PROCEDURE PERFORMED:  1.  Rigid cystourethroscopy.  2.  Transurethral resection of bladder scar site.    POSTOPERATIVE DIAGNOSIS:  As above.    SURGEON:  Benji Murphy MD    ANESTHESIA:  General laryngeal mask.    ANESTHESIOLOGIST:  Fam Pate MD    COMPLICATIONS:  None.    DRAINS:  None.    SPECIMENS:  Bladder trigonal biopsy to pathology for permanent section, deep   muscle bladder biopsy for permanent section.    ESTIMATED BLOOD LOSS:  Not applicable.    INTRAOPERATIVE FINDINGS:  At the time of rigid cystourethroscopy, anterior   urethra was normal; the prostatic fossa measured 4.5 cm in length with bilobar   occlusive hypertrophy.  I am unable to visualize the right ureteral orifice.    Left ureteral orifice is status post a prior resection near the trigone, but   is patent.  There was significant erythema of the bladder trigone suggesting   potential carcinoma in situ.    INDICATIONS:  The patient is an 79-year-old gentleman who presented with gross   hematuria, was evaluated and found to have bladder tumor.  He underwent   original resection in 04/2019.  At that time, he had high-grade tumor with   lamina propria invasion.  He received intravesical chemotherapy, went back and   re-resected the scar.  Recently, he had endoscopies, found to have erythema   of the trigone.  I recommended a repeat biopsy as he is high risk for muscle   invasive bladder cancer.  We discussed the treatment options for high-grade   lamina propria invasion including neoadjuvant chemotherapy with subsequent   cystectomy versus primary cystectomy versus attempts of bladder conservation.    At this point in time due to his advanced age, we would like to proceed with   the bladder conservation attempt and explained to him the risk of the   resection including but not limited to risk of  perioperative urinary tract   infection, urosepsis, risk of urethral strictures, a delayed complication of   instrumentation as well as the risk of ureteral obstruction from repeat   resections and the fact that there is a risk of bladder perforation.  In   addition, he is aware that this study may indicate additional therapy will be   indicated and we discussed the perioperative risk of postoperative urgency,   frequency, urge incontinence, and hematuria.  We also discussed the potential   need for catheterization if there is bladder thinning.  In addition, I   discussed the perioperative risk of deep vein thrombosis, pulmonary embolism,   aspiration pneumonia, heart attack, stroke and death.  Informed consent was   given to me by the patient to proceed in the presence of wife.    DESCRIPTION IN DETAIL:  After informed consent was obtained, the patient was   brought to the operating room and placed supine.  Bilateral sequential   compression devices are in place and operational.  General laryngeal mask   anesthetic administered by Dr. Fam Pate in a balanced fashion.  The   patient received weight-based antibiotics.  He was positioned in modified   lithotomy and the operative area was Betadine prepped and draped in usual   sterile fashion.  A surgical time-out was called.  All members of the   operative team agree as the patient's name, procedure to be performed without   objections, attention was directed to the procedure.    I passed a generously lubricated 26-Croatian resectoscope with 30-degree lens   per urethra.  The anterior urethra was normal.  Prostatic fossa measured 4.5   cm in length with bilobar occlusive hypertrophy.  Upon entering the bladder,   serial evaluation shows 2+ trabeculation.  Left ureteral orifice was patent   and there is evidence medially of some scar tissue.  There is significant   erythema of the trigone region, which has a divot in it posteriorly.  Also of   note, the patient's  right ureteral orifice was not identified and at this   point in time, I used the resectoscope with monopolar technology, resected the   bladder floor at the trigone.  Multiple tissue specimens were obtained.  I   then did a deep muscle biopsy, fulgurated the base and serial evaluation of   rest of bladder showed no other abnormalities.  At the end of the case, the   patient tolerated the procedure well without complication.  A 16-English Bishop   was placed.  He was awakened in the operating room and transferred to recovery   room where he arrived in stable condition.       ____________________________________     MD RUBINA Love / BENJY    DD:  09/11/2019 10:11:51  DT:  09/11/2019 11:50:04    D#:  [id number]  Job#:  [id number]

## 2019-10-13 DIAGNOSIS — N40.1 BENIGN NON-NODULAR PROSTATIC HYPERPLASIA WITH LOWER URINARY TRACT SYMPTOMS: ICD-10-CM

## 2019-10-15 RX ORDER — DOXAZOSIN 8 MG/1
TABLET ORAL
Qty: 90 TAB | Refills: 0 | Status: SHIPPED | OUTPATIENT
Start: 2019-10-15 | End: 2020-02-05

## 2019-10-18 ENCOUNTER — HOSPITAL ENCOUNTER (OUTPATIENT)
Dept: CARDIOLOGY | Facility: MEDICAL CENTER | Age: 79
End: 2019-10-18
Attending: INTERNAL MEDICINE
Payer: MEDICARE

## 2019-10-18 DIAGNOSIS — I35.0 AORTIC VALVE STENOSIS, ETIOLOGY OF CARDIAC VALVE DISEASE UNSPECIFIED: ICD-10-CM

## 2019-10-18 LAB
LV EJECT FRACT  99904: 60
LV EJECT FRACT MOD 2C 99903: 56.04
LV EJECT FRACT MOD 4C 99902: 63.66
LV EJECT FRACT MOD BP 99901: 60.53

## 2019-10-18 PROCEDURE — 93306 TTE W/DOPPLER COMPLETE: CPT

## 2019-10-18 PROCEDURE — 93306 TTE W/DOPPLER COMPLETE: CPT | Mod: 26 | Performed by: INTERNAL MEDICINE

## 2019-10-21 ENCOUNTER — TELEPHONE (OUTPATIENT)
Dept: CARDIOLOGY | Facility: MEDICAL CENTER | Age: 79
End: 2019-10-21

## 2019-10-21 NOTE — TELEPHONE ENCOUNTER
Message   Received: 3 days ago   Message Contents   KIM Dixon R.N.             Please call patient with echocardiogram results showing still mild aortic stenosis. Please repeat an echocardiogram in one year.     Thanks.  PETR.      _______________________________________________________________________    Pt notified of results of echo per Dr. Reyez. Offered him a one year follow up appt and he declined at this time saying he has a follow up with his PCP soon and would rather wait until later. Importance of follow up discussed. He agrees to call in a few weeks and get scheduled in early 2020 and appreciated call.     Echo report mailed to pt per his request

## 2019-11-25 ENCOUNTER — OFFICE VISIT (OUTPATIENT)
Dept: URGENT CARE | Facility: PHYSICIAN GROUP | Age: 79
End: 2019-11-25
Payer: MEDICARE

## 2019-11-25 VITALS
HEART RATE: 58 BPM | BODY MASS INDEX: 32.9 KG/M2 | DIASTOLIC BLOOD PRESSURE: 72 MMHG | HEIGHT: 71 IN | TEMPERATURE: 98.6 F | SYSTOLIC BLOOD PRESSURE: 136 MMHG | RESPIRATION RATE: 16 BRPM | OXYGEN SATURATION: 94 % | WEIGHT: 235 LBS

## 2019-11-25 DIAGNOSIS — J22 LRTI (LOWER RESPIRATORY TRACT INFECTION): ICD-10-CM

## 2019-11-25 DIAGNOSIS — I10 HTN (HYPERTENSION), BENIGN: ICD-10-CM

## 2019-11-25 PROCEDURE — 99214 OFFICE O/P EST MOD 30 MIN: CPT | Performed by: PHYSICIAN ASSISTANT

## 2019-11-25 RX ORDER — DOXYCYCLINE HYCLATE 100 MG
100 TABLET ORAL 2 TIMES DAILY
Qty: 14 TAB | Refills: 0 | Status: SHIPPED | OUTPATIENT
Start: 2019-11-25 | End: 2019-12-02

## 2019-11-25 RX ORDER — CODEINE PHOSPHATE AND GUAIFENESIN 10; 100 MG/5ML; MG/5ML
5 SOLUTION ORAL EVERY 6 HOURS PRN
Qty: 100 ML | Refills: 0 | Status: SHIPPED | OUTPATIENT
Start: 2019-11-25 | End: 2019-11-30

## 2019-11-25 RX ORDER — BENZONATATE 100 MG/1
200 CAPSULE ORAL 3 TIMES DAILY PRN
Qty: 60 CAP | Refills: 0 | Status: SHIPPED | OUTPATIENT
Start: 2019-11-25 | End: 2020-02-06

## 2019-11-25 ASSESSMENT — ENCOUNTER SYMPTOMS
CHILLS: 1
WHEEZING: 0
SPUTUM PRODUCTION: 1
HEMOPTYSIS: 0
COUGH: 1
SHORTNESS OF BREATH: 0
FEVER: 0

## 2019-11-25 NOTE — PROGRESS NOTES
"Subjective:   Bay Forrester  is a 79 y.o. male who presents for Cough (Green sputum with cough x 3 days)    This is a new problem.  Patient presents to urgent care with 10-day history of nasal congestion and cold symptoms followed by onset of worsening cough for the past 3 days.  Cough is productive of yellow-green sputum.  Patient denies fever or chills.  Patient does admit to decreased energy and some fatigue.  Patient is currently being treated for what sounds like bladder cancer/urologic cancer.    Patient is a non-smoker however he is exposed to a fair amount of secondhand smoke.      Cough   Associated symptoms include chills. Pertinent negatives include no fever, hemoptysis, shortness of breath or wheezing.     Review of Systems   Constitutional: Positive for chills and malaise/fatigue. Negative for fever.   HENT: Positive for congestion.    Respiratory: Positive for cough and sputum production. Negative for hemoptysis, shortness of breath and wheezing.    All other systems reviewed and are negative.    Allergies   Allergen Reactions   • Penicillins      Hx of, difficulty breathing  RXN=25 years ago     Reviewed past medical, surgical and family history.  Reviewed prescription and over-the-counter medications with patient and electronic health record today.     Objective:   /70   Pulse (!) 58   Temp 37 °C (98.6 °F) (Temporal)   Resp 16   Ht 1.803 m (5' 11\")   Wt 106.6 kg (235 lb)   SpO2 94%   BMI 32.78 kg/m²   Physical Exam  Vitals signs reviewed.   Constitutional:       Appearance: He is well-developed. He is not ill-appearing or toxic-appearing.   HENT:      Head: Normocephalic and atraumatic.      Right Ear: Tympanic membrane, ear canal and external ear normal.      Left Ear: Tympanic membrane, ear canal and external ear normal.      Nose: Nose normal.      Mouth/Throat:      Lips: Pink.      Mouth: Mucous membranes are moist.      Pharynx: Uvula midline. No oropharyngeal exudate. "   Eyes:      Conjunctiva/sclera: Conjunctivae normal.      Pupils: Pupils are equal, round, and reactive to light.   Neck:      Musculoskeletal: Normal range of motion and neck supple.   Cardiovascular:      Rate and Rhythm: Normal rate and regular rhythm.      Heart sounds: Normal heart sounds. No murmur. No friction rub.   Pulmonary:      Effort: Pulmonary effort is normal. No respiratory distress.      Breath sounds: Examination of the right-upper field reveals rhonchi. Examination of the left-upper field reveals rhonchi. Examination of the right-middle field reveals rhonchi. Examination of the left-middle field reveals rhonchi. Examination of the right-lower field reveals rhonchi. Examination of the left-lower field reveals rhonchi. Rhonchi present. No decreased breath sounds, wheezing or rales.   Abdominal:      General: Bowel sounds are normal.      Palpations: Abdomen is soft.      Tenderness: There is no tenderness.   Musculoskeletal: Normal range of motion.   Lymphadenopathy:      Head:      Right side of head: No submental, submandibular or tonsillar adenopathy.      Left side of head: No submental, submandibular or tonsillar adenopathy.      Cervical: No cervical adenopathy.      Upper Body:      Right upper body: No supraclavicular adenopathy.      Left upper body: No supraclavicular adenopathy.   Skin:     General: Skin is warm and dry.      Findings: No rash.   Neurological:      Mental Status: He is alert and oriented to person, place, and time.      Cranial Nerves: No cranial nerve deficit.      Sensory: No sensory deficit.      Motor: Motor function is intact.      Coordination: Coordination normal.   Psychiatric:         Attention and Perception: Attention normal.         Mood and Affect: Mood normal.         Speech: Speech normal.         Behavior: Behavior normal.         Thought Content: Thought content normal.         Judgment: Judgment normal.           Assessment/Plan:   1. LRTI (lower  respiratory tract infection)  - doxycycline (VIBRAMYCIN) 100 MG Tab; Take 1 Tab by mouth 2 times a day for 7 days.  Dispense: 14 Tab; Refill: 0  - benzonatate (TESSALON) 100 MG Cap; Take 2 Caps by mouth 3 times a day as needed.  Dispense: 60 Cap; Refill: 0  - guaifenesin-codeine (ROBITUSSIN AC) Solution oral solution; Take 5 mL by mouth every 6 hours as needed for Cough for up to 5 days.  Dispense: 100 mL; Refill: 0    2. HTN (hypertension), benign    Patient will be treated with doxycycline, Tessalon Perles and a small supply of codeine cough medicine for nighttime cough.  Patient is counseled on not driving while taking this medication as it does cause drowsiness.  Recommend addition of Mucinex to regimen, increase fluids, rest.    Initial blood pressure is noted to be elevated however repeat manual blood pressure is within normal limits at 136/72.  Continue blood pressure medication, follow-up with primary care.    Differential diagnosis, natural history, supportive care, and indications for immediate follow-up discussed.     Red flag warning symptoms and strict ER/follow-up precautions given.  The patient demonstrated a good understanding and agreed with the treatment plan.  Please note that this note was created using voice recognition speech to text software. Every effort has been made to correct obvious errors.  However, I expect there are errors of grammar and possibly context that were not discovered prior to finalizing the note  ELISSA Ernandez PA-C

## 2019-12-07 DIAGNOSIS — I10 HTN (HYPERTENSION), BENIGN: ICD-10-CM

## 2019-12-10 RX ORDER — LISINOPRIL 20 MG/1
TABLET ORAL
Qty: 100 TAB | Refills: 0 | Status: SHIPPED | OUTPATIENT
Start: 2019-12-10 | End: 2020-02-06

## 2019-12-10 NOTE — TELEPHONE ENCOUNTER
Was the patient seen in the last year in this department? Yes lov 7/31/19    Does patient have an active prescription for medications requested? No     Received Request Via: Pharmacy

## 2020-01-02 ENCOUNTER — OFFICE VISIT (OUTPATIENT)
Dept: URGENT CARE | Facility: PHYSICIAN GROUP | Age: 80
End: 2020-01-02
Payer: MEDICARE

## 2020-01-02 VITALS
HEIGHT: 71 IN | WEIGHT: 237.6 LBS | SYSTOLIC BLOOD PRESSURE: 128 MMHG | RESPIRATION RATE: 16 BRPM | HEART RATE: 88 BPM | TEMPERATURE: 100.2 F | BODY MASS INDEX: 33.26 KG/M2 | DIASTOLIC BLOOD PRESSURE: 92 MMHG | OXYGEN SATURATION: 92 %

## 2020-01-02 DIAGNOSIS — R05.9 COUGH: ICD-10-CM

## 2020-01-02 DIAGNOSIS — J22 LRTI (LOWER RESPIRATORY TRACT INFECTION): ICD-10-CM

## 2020-01-02 PROCEDURE — 99214 OFFICE O/P EST MOD 30 MIN: CPT | Performed by: NURSE PRACTITIONER

## 2020-01-02 RX ORDER — CODEINE PHOSPHATE AND GUAIFENESIN 10; 100 MG/5ML; MG/5ML
5 SOLUTION ORAL EVERY 6 HOURS PRN
Qty: 200 ML | Refills: 0 | Status: SHIPPED | OUTPATIENT
Start: 2020-01-02 | End: 2020-01-09

## 2020-01-02 RX ORDER — DOXYCYCLINE HYCLATE 100 MG/1
100 CAPSULE ORAL 2 TIMES DAILY
Qty: 20 CAP | Refills: 0 | Status: SHIPPED | OUTPATIENT
Start: 2020-01-02 | End: 2020-01-12

## 2020-01-02 ASSESSMENT — ENCOUNTER SYMPTOMS
HEADACHES: 0
WHEEZING: 0
MYALGIAS: 0
RHINORRHEA: 0
SWEATS: 0
WEIGHT LOSS: 0
SORE THROAT: 0
COUGH: 1
CHILLS: 0
HEMOPTYSIS: 0
SHORTNESS OF BREATH: 0
HEARTBURN: 0
FEVER: 0

## 2020-01-02 NOTE — PROGRESS NOTES
"Subjective:      Bay Forrester is a 79 y.o. male who presents with Cough (cough not constant, headache, head and chest cold, slight sore throat x2 weeks )    Reviewed past medical, surgical and family history. Reviewed prescription and OTC medications with patient in electronic health record today      Allergies   Allergen Reactions   • Penicillins      Hx of, difficulty breathing  RXN=25 years ago             Cough   This is a new problem. The current episode started 1 to 4 weeks ago. The problem has been gradually worsening. The problem occurs constantly. The cough is productive of sputum. Pertinent negatives include no chest pain, chills, ear congestion, ear pain, fever, headaches, heartburn, hemoptysis, myalgias, nasal congestion, postnasal drip, rash, rhinorrhea, sore throat, shortness of breath, sweats, weight loss or wheezing. Nothing aggravates the symptoms. The treatment provided mild relief.        Review of Systems   Constitutional: Negative for chills, fever and weight loss.   HENT: Negative for ear pain, postnasal drip, rhinorrhea and sore throat.    Respiratory: Positive for cough. Negative for hemoptysis, shortness of breath and wheezing.    Cardiovascular: Negative for chest pain.   Gastrointestinal: Negative for heartburn.   Musculoskeletal: Negative for myalgias.   Skin: Negative for rash.   Neurological: Negative for headaches.          Objective:     /92 (BP Location: Left arm, Patient Position: Sitting, BP Cuff Size: Adult)   Pulse 88   Temp 37.9 °C (100.2 °F) (Temporal)   Resp 16   Ht 1.803 m (5' 11\")   Wt 107.8 kg (237 lb 9.6 oz)   SpO2 92%   BMI 33.14 kg/m²      Physical Exam  Vitals signs and nursing note reviewed.   Constitutional:       General: He is not in acute distress.     Appearance: He is well-developed and normal weight. He is not toxic-appearing.   HENT:      Head: Normocephalic and atraumatic.      Jaw: No tenderness.      Right Ear: Tympanic membrane, ear " canal and external ear normal.      Left Ear: Tympanic membrane, ear canal and external ear normal.      Nose: Nose normal.      Mouth/Throat:      Lips: Pink.      Mouth: Mucous membranes are moist.      Pharynx: Uvula midline. Posterior oropharyngeal erythema present.   Eyes:      General:         Right eye: No discharge.         Left eye: No discharge.      Conjunctiva/sclera: Conjunctivae normal.      Pupils: Pupils are equal, round, and reactive to light.   Neck:      Musculoskeletal: Full passive range of motion without pain and neck supple. Normal range of motion. No neck rigidity.      Trachea: Trachea and phonation normal.   Cardiovascular:      Rate and Rhythm: Normal rate and regular rhythm.   Pulmonary:      Effort: Pulmonary effort is normal. No accessory muscle usage or respiratory distress.      Breath sounds: Wheezing and rhonchi present. No decreased breath sounds or rales.   Lymphadenopathy:      Cervical: No cervical adenopathy.      Upper Body:      Right upper body: No supraclavicular adenopathy.      Left upper body: No supraclavicular adenopathy.   Skin:     General: Skin is warm and dry.      Capillary Refill: Capillary refill takes less than 2 seconds.   Neurological:      Mental Status: He is alert and oriented to person, place, and time.   Psychiatric:         Mood and Affect: Mood normal.         Behavior: Behavior normal. Behavior is cooperative.                 Assessment/Plan:     1. LRTI (lower respiratory tract infection)  doxycycline (VIBRAMYCIN) 100 MG Cap   2. Cough  guaifenesin-codeine (ROBITUSSIN AC) Solution oral solution     Educated in proper administration of medication(s) ordered today including safety, possible SE, risks, benefits, rationale and alternatives to therapy.     Return to urgent care clinic or PCP  5-7  days if current symptoms are not resolving in a satisfactory manner or sooner if new or worsening symptoms occur. Differential diagnosis, natural history,  supportive care, and indications for immediate follow-up discussed at length.   Advised of signs and symptoms which would warrant further evaluation and /or emergent evaluation in ER.    Verbalized agreement with this treatment plan and seemed to understand without barriers. Questions were encouraged and answered to patients satisfaction.     Keep well hydrated    Advised not to drink ETOH, drive car or operate machinery while taking narcotic RX . Verbalizes understanding of safety instructions. Narc Check verified. [place]  Aware web site evaluation: I have obtained and reviewed patient utilization report from [facility] pharmacy database prior to writing prescription for controlled substance II, III or IV per [place] bill . Opioid Risk Tool screen completed.  I believe the benefits of controlled substance therapy outweigh the risks. The reasons for prescribing controlled substances include non-narcotic alternatives have been inadequate for symptom control. Accordingly, I have discussed the risk and benefits, treatment plan, and alternative therapies with the patient.

## 2020-02-03 DIAGNOSIS — N40.1 BENIGN NON-NODULAR PROSTATIC HYPERPLASIA WITH LOWER URINARY TRACT SYMPTOMS: ICD-10-CM

## 2020-02-05 ENCOUNTER — TELEPHONE (OUTPATIENT)
Dept: MEDICAL GROUP | Facility: PHYSICIAN GROUP | Age: 80
End: 2020-02-05

## 2020-02-05 RX ORDER — CIPROFLOXACIN 500 MG/1
TABLET, FILM COATED ORAL
COMMUNITY
End: 2020-02-06

## 2020-02-05 RX ORDER — ALPRAZOLAM 0.25 MG/1
0.25 TABLET ORAL PRN
Status: ON HOLD | COMMUNITY
End: 2020-03-27

## 2020-02-05 RX ORDER — POLYMYXIN B SULFATE AND TRIMETHOPRIM 1; 10000 MG/ML; [USP'U]/ML
SOLUTION OPHTHALMIC
COMMUNITY
End: 2020-02-06

## 2020-02-05 RX ORDER — CHOLECALCIFEROL (VITAMIN D3) 10(400)/ML
DROPS ORAL
COMMUNITY
End: 2020-02-06

## 2020-02-05 RX ORDER — DOXYCYCLINE HYCLATE 100 MG
TABLET ORAL
COMMUNITY
End: 2020-02-06

## 2020-02-05 RX ORDER — BENZONATATE 100 MG/1
CAPSULE ORAL
COMMUNITY
End: 2020-02-06

## 2020-02-05 RX ORDER — BUSPIRONE HYDROCHLORIDE 10 MG/1
10 TABLET ORAL PRN
Status: ON HOLD | COMMUNITY
End: 2020-03-27

## 2020-02-05 RX ORDER — OXYBUTYNIN CHLORIDE 5 MG/1
5 TABLET, EXTENDED RELEASE ORAL EVERY MORNING
Status: ON HOLD | COMMUNITY
End: 2020-03-27

## 2020-02-05 RX ORDER — LISINOPRIL 40 MG/1
40 TABLET ORAL
Status: ON HOLD | COMMUNITY
End: 2021-02-19

## 2020-02-05 RX ORDER — LISINOPRIL 20 MG/1
TABLET ORAL
COMMUNITY
End: 2020-02-06

## 2020-02-05 RX ORDER — DOXAZOSIN 8 MG/1
TABLET ORAL
Qty: 90 TAB | Refills: 0 | Status: SHIPPED | OUTPATIENT
Start: 2020-02-05 | End: 2020-04-29

## 2020-02-05 RX ORDER — HYDROCODONE BITARTRATE AND ACETAMINOPHEN 5; 325 MG/1; MG/1
TABLET ORAL
COMMUNITY
End: 2020-02-06

## 2020-02-05 RX ORDER — HYDROCODONE BITARTRATE AND ACETAMINOPHEN 7.5; 325 MG/1; MG/1
TABLET ORAL
COMMUNITY
End: 2020-02-06

## 2020-02-05 RX ORDER — TRAMADOL HYDROCHLORIDE 50 MG/1
50 TABLET ORAL EVERY 4 HOURS PRN
COMMUNITY
End: 2020-02-06

## 2020-02-05 RX ORDER — TRAZODONE HYDROCHLORIDE 100 MG/1
TABLET ORAL
COMMUNITY
End: 2020-02-06 | Stop reason: SDUPTHER

## 2020-02-05 RX ORDER — NITROFURANTOIN 25 MG/5ML
SUSPENSION ORAL
COMMUNITY
End: 2020-02-06

## 2020-02-05 RX ORDER — BUSPIRONE HYDROCHLORIDE 30 MG/1
TABLET ORAL
COMMUNITY
End: 2020-02-06

## 2020-02-05 RX ORDER — DOXAZOSIN 8 MG/1
TABLET ORAL
COMMUNITY
End: 2020-02-06

## 2020-02-05 NOTE — TELEPHONE ENCOUNTER
Was the patient seen in the last year in this department? Yes    Does patient have an active prescription for medications requested? No     Received Request Via: Pharmacy      Pt met protocol?: Yes    OV 1/20

## 2020-02-05 NOTE — TELEPHONE ENCOUNTER
Future Appointments       Provider Department Center    2/6/2020 1:10 PM Jessenia Ruelas M.D. [facility]      NEW PATIENT VISIT PRE-VISIT PLANNING    1.  [facility] Patient is checked in Patient Demographics? YES    2.  Immunizations were updated in [facility] using WebIZ?: Yes       •  Web Iz Recommendations: FLU, PNEUMOVAX (PPSV23), TD, VARICELLA (Chicken Pox)  and SHINGRIX (Shingles)    3.  Is this appointment scheduled as a Hospital Follow-Up? No    4.  Patient is due for the following Health Maintenance Topics:   Health Maintenance Due   Topic Date Due   • IMM HEP B VACCINE (1 of 3 - Risk 3-dose series) 01/10/1959   • IMM ZOSTER VACCINES (1 of 2) 01/10/1989   • IMM PNEUMOCOCCAL VACCINE: 65+ Years (2 of 2 - PPSV23) 01/06/2017   • Annual Wellness Visit  01/17/2019   • IMM INFLUENZA (1) 09/01/2019     5. Orders for overdue Health Maintenance topics pended in Pre-Charting? NO    6.  Reviewed/Updated the following with patient:   •   Preferred Pharmacy? YES       •   Preferred Lab? YES       •   Preferred Communication? YES       •   Allergies? YES       •   Medications? YES. Was Abstract Encounter opened and chart updated? YES       •   Social History? YES. Was Abstract Encounter opened and chart updated? YES       •   Family History (document living status of immediate family members and if + hx of cancer, diabetes, hypertension, hyperlipidemia, heart attack, stroke) YES. Was Abstract Encounter opened and chart updated? YES    7.  Updated Care Team?       •   DME Company (gait device, O2, CPAP, etc.) YES       •   Other Specialists (eye doctor, derm, GYN, cardiology, endo, etc): YES    8.  Patient was informed to arrive 15 min prior to their   scheduled appointment and bring in their medication bottles.

## 2020-02-06 ENCOUNTER — OFFICE VISIT (OUTPATIENT)
Dept: MEDICAL GROUP | Facility: PHYSICIAN GROUP | Age: 80
End: 2020-02-06
Payer: MEDICARE

## 2020-02-06 VITALS
HEART RATE: 60 BPM | WEIGHT: 244 LBS | OXYGEN SATURATION: 93 % | SYSTOLIC BLOOD PRESSURE: 140 MMHG | DIASTOLIC BLOOD PRESSURE: 90 MMHG | BODY MASS INDEX: 34.16 KG/M2 | HEIGHT: 71 IN | TEMPERATURE: 99.8 F

## 2020-02-06 DIAGNOSIS — K64.8 INTERNAL HEMORRHOID: ICD-10-CM

## 2020-02-06 DIAGNOSIS — K57.30 DIVERTICULOSIS OF COLON: ICD-10-CM

## 2020-02-06 DIAGNOSIS — G47.09 OTHER INSOMNIA: ICD-10-CM

## 2020-02-06 DIAGNOSIS — M18.12 OSTEOARTHRITIS OF CARPOMETACARPAL JOINT OF LEFT THUMB, UNSPECIFIED OSTEOARTHRITIS TYPE: ICD-10-CM

## 2020-02-06 DIAGNOSIS — Z87.19 HISTORY OF PORTAL HYPERTENSION: ICD-10-CM

## 2020-02-06 DIAGNOSIS — C68.9 TRANSITIONAL CELL CARCINOMA (HCC): ICD-10-CM

## 2020-02-06 PROBLEM — R31.0 GROSS HEMATURIA: Status: RESOLVED | Noted: 2019-02-20 | Resolved: 2020-02-06

## 2020-02-06 PROBLEM — R30.0 DYSURIA: Status: RESOLVED | Noted: 2019-02-20 | Resolved: 2020-02-06

## 2020-02-06 PROBLEM — R42 DIZZINESS: Status: RESOLVED | Noted: 2018-09-20 | Resolved: 2020-02-06

## 2020-02-06 PROBLEM — R94.31 NONSPECIFIC ABNORMAL ELECTROCARDIOGRAM (ECG) (EKG): Status: RESOLVED | Noted: 2018-11-15 | Resolved: 2020-02-06

## 2020-02-06 PROCEDURE — 99204 OFFICE O/P NEW MOD 45 MIN: CPT | Performed by: INTERNAL MEDICINE

## 2020-02-06 RX ORDER — TRAZODONE HYDROCHLORIDE 100 MG/1
TABLET ORAL
Qty: 90 TAB | Refills: 1 | Status: SHIPPED | OUTPATIENT
Start: 2020-02-06 | End: 2020-06-24

## 2020-02-06 NOTE — PATIENT INSTRUCTIONS
-For blood pressure measurement, need to be quietly seated, not talking, in a chair for ?3 to 5 minutes, with the back supported, feet on the floor, legs uncrossed, and the arm bared and supported on a flat surface, with the upper arm at the heart level. The cuff size should be appropriately chosen to ensure that the bladder encircles at least 80% of the upper arm.     -Check blood pressure before breakfast after using bathroom as well as before dinner 3 days a week, preferably one weekend day to be included,  (both right and left arm).

## 2020-02-06 NOTE — LETTER
[facility]  Jessenia Ruelas M.D.  [address]  [place] [address]  Fax: [phone]   Authorization for Release/Disclosure of   Protected Health Information   Name: BAY ANDERSON : 1940 SSN: [id number]   Address: [address] [place] [address] [address] [place] [address] Phone:    [phone] (home)    I authorize the entity listed below to release/disclose the PHI below to:   [facility]/Jessenia Ruelas M.D. and Jessenia Ruelas M.D.   Provider or Entity Name:  [facility] [place]   Address   City, State, [facility]   Phone:      Fax:  [phone]   Reason for request: continuity of care   Information to be released:    [  ] LAST COLONOSCOPY,  including any PATH REPORT and follow-up  [  ] LAST FIT/COLOGUARD RESULT [  ] LAST DEXA  [  ] LAST MAMMOGRAM  [  ] LAST PAP  [  ] LAST LABS [  ] RETINA EXAM REPORT  [  ] IMMUNIZATION RECORDS  [ x ] Release all info  **Last office visit notes      [  ] Check here and initial the line next to each item to release ALL health information INCLUDING  _____ Care and treatment for drug and / or alcohol abuse  _____ HIV testing, infection status, or AIDS  _____ Genetic Testing    DATES OF SERVICE OR TIME PERIOD TO BE DISCLOSED: _____________  I understand and acknowledge that:  * This Authorization may be revoked at any time by you in writing, except if your health information has already been used or disclosed.  * Your health information that will be used or disclosed as a result of you signing this authorization could be re-disclosed by the recipient. If this occurs, your re-disclosed health information may no longer be protected by State or Federal laws.  * You may refuse to sign this Authorization. Your refusal will not affect your ability to obtain treatment.  * This Authorization becomes effective upon signing and will  on (date) __________.      If no date is indicated, this Authorization will  one (1) year from the signature date.    Name: Bay  Elvis Forrester    Signature: continuity of care   Date:     2/6/2020       PLEASE FAX REQUESTED RECORDS BACK TO: [phone]

## 2020-02-06 NOTE — PROGRESS NOTES
New Patient to establish    Chief Complaint   Patient presents with   • Establish Care   • Medication Refill     oxybutynin and trazodone       Subjective:     History of Present Illness: Patient is a 80 y.o. 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  OPERATION AND PROCEDURE PERFORMED:  1.  Rigid cystourethroscopy.  2.  Transurethral resection of bladder scar site.  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  PROCEDURES PERFORMED:  1.  Rigid cystourethroscopy.  2.  Transurethral resection of bladder tumor scar site 6x5 cm in area and fulguration of base.  3.  Transurethral resection of left proximal and distal trigonal area 2x3 cm.  SURGEON:  Benji Murphy MD    -Currently follow-up with urology, mention every 3 months undergo chemotherapy with bladder irrigation, next one will be 3/5/2020  -Patient currently denied having dysuria, hematuria, lower abdominal pain, urinary difficulties.      Osteoarthritis of carpometacarpal joint of left thumb, unspecified osteoarthritis type  -Mention chronic pain of bilateral thumb secondary to osteoarthritis  -Sometimes take ibuprofen for that  -No imaging available     Other insomnia  -Chronic, stable, compliant with the trazodone 100 mg nightly    Hypertension  -Chronic, stable, compliant with lisinopril 40 mg daily as well as doxazosin 8 mg for BPH also  -Blood pressure today in the office is 140/90, denies having hypertension symptoms  -Patient sometimes check blood pressure at [facility] with normal range     History of portal hypertension  DATE OF PROCEDURE:  10/13/2008  PROCEDURE:  Esophagogastroduodenoscopy.  SURGEON:  Polly Baltazar M.D.  PREPROCEDURE DIAGNOSIS:  Gastrointestinal  bleeding.  POSTPROCEDURE DIAGNOSES:  1. Portal hypertensive gastropathy with punctate mucosal      erythema/hemorrhage without any active oozing seen within the      body of the stomach.  2. No varices seen in the esophagus, stomach, or duodenum.  3. No blood was noted anywhere in the upper gastrointestinal tract.    -Denied having abdominal pain      Diverticulosis of colon  Internal hemorrhoid    DATE OF PROCEDURE:  10/14/2008  PROCEDURE:  Colonoscopy.  ENDOSCOPIST:  Polly Baltazar M.D.  PREPROCEDURE DIAGNOSIS:  Gastrointestinal bleeding of uncertain source.  POSTPROCEDURE DIAGNOSES:  1. Single small arteriovenous malformation or angiodysplasia noted at the ascending colon without any evidence of current bleeding.  2. Moderate diverticulosis noted at the sigmoid colon without      stigmata of recent bleeding.  3. Internal hemorrhoids.    -Denies having upper GI bleeding, denies melena      Current Outpatient Medications on File Prior to Visit   Medication Sig Dispense Refill   • doxazosin (CARDURA) 8 MG tablet TAKE 1 TABLET BY MOUTH EVERY DAY 90 Tab 0   • ALPRAZolam (XANAX) 0.25 MG Tab alprazolam 0.25 mg tablet     • lisinopril (PRINIVIL) 40 MG tablet lisinopril 40 mg tablet     • oxybutynin SR (DITROPAN-XL) 5 MG TABLET SR 24 HR oxybutynin chloride ER 5 mg tablet,extended release 24 hr     • B Complex Vitamins (VITAMIN B COMPLEX PO) Take 1 Tab by mouth every 48 hours.     • vitamin D (CHOLECALCIFEROL) 1000 UNIT Tab Take 2,000 Units by mouth every 48 hours.     • busPIRone (BUSPAR) 10 MG Tab tablet buspirone 10 mg tablet     • HYDROcodone-acetaminophen (NORCO) 5-325 MG Tab per tablet Take 1 Tab by mouth every 8 hours as needed.       No current facility-administered medications on file prior to visit.      No Known Allergies  Patient Active Problem List    Diagnosis Date Noted   • IFG (impaired fasting glucose) 01/10/2017     Priority: Medium   • HTN (hypertension), benign 03/18/2010     Priority: Medium   •  Hypovitaminosis D 01/16/2018     Priority: Low   • Obesity (BMI 30-39.9) 08/03/2017     Priority: Low   • DDD (degenerative disc disease), lumbar 09/10/2015     Priority: Low   • DDD (degenerative disc disease), cervical 09/10/2015     Priority: Low   • Migraine without status migrainosus, not intractable 08/19/2015     Priority: Low   • Osteoarthritis of hip 05/01/2014     Priority: Low   • History of hepatitis C 04/23/2013     Priority: Low   • Hypotestosteronism 10/24/2011     Priority: Low   • BPH (benign prostatic hypertrophy)      Priority: Low   • Anxiety      Priority: Low   • History of portal hypertension 02/06/2020   • Diverticulosis of colon 02/06/2020   • Internal hemorrhoid 02/06/2020   • Osteoarthritis of carpometacarpal (CMC) joint of left thumb 02/06/2020   • Other insomnia 02/06/2020   • Status post cystoscopy 05/22/2019   • Transitional cell carcinoma (HCC) 04/16/2019   • History of nephrolithiasis 02/20/2019   • Aortic stenosis 09/07/2018   • Hx of seizure disorder 06/12/2018   • PVD (peripheral vascular disease) (HCC) 05/17/2018   • Localized swelling of both lower extremities 05/01/2018     Past Medical History:   Diagnosis Date   • Anxiety    • Aortic stenosis    • Arthritis     back/neck/hands   • BPH (benign prostatic hypertrophy)    • Cancer (HCC) 2019    bladder    • Cataract    • Dental disorder     dentures upper   • Depression    • Diverticulitis    • Dizziness 9/20/2018   • DJD (degenerative joint disease)    • Dysuria 2/20/2019   • Gross hematuria 2/20/2019   • Heart burn    • Heart murmur    • Hemorrhoid    • Hepatitis B 1989   • Hepatitis C 1991    rec'd tx in 2004   • Hypertension    • Liver disease    • Low back pain    • Nonspecific abnormal electrocardiogram (ECG) (EKG) 11/15/2018   • Other specified disorder of intestines     constipation/diarrhea   • Pneumonia     hx   • Recurrent major depressive disorder, in remission (HCC)    • Urinary bladder disorder    • Urinary  incontinence      Past Surgical History:   Procedure Laterality Date   • CYSTOSCOPY  2019    Procedure: CYSTOSCOPY;  Surgeon: Benji Murphy M.D.;  Location: SURGERY [facility];  Service: Urology   • TRANS URETHRAL RESECTION BLADDER  2019    Procedure: TRANS URETHRAL RESECTION BLADDER SCAR;  Surgeon: Benji Murphy M.D.;  Location: SURGERY [facility];  Service: Urology   • TRANS URETHRAL RESECTION BLADDER Left 2019    Procedure: TRANS URETHRAL RESECTION BLADDER - SCAR, TRANS URETHRAL RESECTION LEFT TRIGONE;  Surgeon: Benij Murphy M.D.;  Location: SURGERY [facility];  Service: Urology   • CYSTOSCOPY N/A 2019    Procedure: CYSTOSCOPY - RIGID;  Surgeon: Benji Murphy M.D.;  Location: SURGERY [facility];  Service: Urology   • TURP-VAPOR  2019   • RECOVERY  2011    Performed by SURGERY, IR-RECOVERY at SURGERY SAME DAY [facility] ORS   • COLONOSCOPY - ENDO  10/14/08    Performed by CECY DIAZ at ENDOSCOPY [facility] ORS   • GASTROSCOPY-ENDO  10/13/08    Performed by CECY DIAZ at ENDOSCOPY [facility] ORS   • OTHER ORTHOPEDIC SURGERY  2003    left knee replacement   • CYST EXCISION       Family History   Problem Relation Age of Onset   • Heart Disease Mother    • Other Father         MS    • Heart Disease Brother    • Diabetes Brother    • Diabetes Brother    • Heart Disease Brother    • Suicide Attempts Maternal Grandmother    • No Known Problems Maternal Grandfather    • No Known Problems Paternal Grandmother    • No Known Problems Paternal Grandfather    • Alzheimer's Disease Brother    • Depression Brother      Social History     Tobacco Use   • Smoking status: Former Smoker     Packs/day: 1.00     Years: 40.00     Pack years: 40.00     Types: Cigarettes, Cigars     Last attempt to quit: 1994     Years since quittin.1   • Smokeless tobacco: Never Used   • Tobacco comment: avoid all tobacco products   Substance Use Topics   • Alcohol use: Never      "Alcohol/week: 0.0 oz     Frequency: Never     Comment: 1 drinks per month    • Drug use: No       ROS:     - Constitutional: Negative for fever, chills,    - Eye: Negative for blurry vision    -ENT: Negative for ear pain    - Respiratory: Negative for cough, hemoptysis    - Cardiovascular: Negative for chest pain     - Gastrointestinal: Negative for abdominal pain    - Genitourinary: Negative for dysuria    - Musculoskeletal: Negative for joint swelling    - Skin: Negative for itching    - Neurological: Negative for focal weakness     - Psychiatric/Behavioral: Negative for depression        Physical Exam:     /90 (BP Location: Left arm, Patient Position: Sitting, BP Cuff Size: Large adult)   Pulse 60   Temp 37.7 °C (99.8 °F) (Temporal)   Ht 1.803 m (5' 11\")   Wt 110.7 kg (244 lb)   SpO2 93%   BMI 34.03 kg/m²   General: Normal appearing. No distress.  ENT: oropharynx without exudates.    Eyes: conjunctiva clear lids without ptosis  Pulmonary: Clear to ausculation.  Normal effort.   Cardiovascular: Regular rate and rhythm  Abdomen: Soft, nontender, protuberant  Lymph: No cervical or supraclavicular palpable lymph nodes  Psych: Normal mood and affect.   Extremities: Bilateral lower extremity erythematous changes with mild swelling consistent with venous insufficiency    I have reviewed pertinent labs and diagnostic tests associated with this visit with specific comments listed under the assessment and plan below      Assessment and Plan:     1. Transitional cell carcinoma (HCC)  -Currently undergoing chemotherapy for bladder irrigation by urology  - Advised to sign to get records from urology    Osteoarthritis of carpometacarpal joint of left thumb, unspecified osteoarthritis type  -Chronic, stable, controlled with PRN NSAIDs without having side effect.    Other insomnia  - traZODone (DESYREL) 100 MG Tab; trazodone 100 mg tablet  Dispense: 90 Tab; Refill: 1      Hypertension  -Chronic, stable, compliant " with lisinopril 40 mg daily as well as doxazosin 8 mg for BPH also    Latest labs    Lab Results   Component Value Date/Time    CHOLSTRLTOT 141 01/12/2018 08:10 AM    LDL 81 01/12/2018 08:10 AM    HDL 41 01/12/2018 08:10 AM    TRIGLYCERIDE 94 01/12/2018 08:10 AM       Lab Results   Component Value Date/Time    SODIUM 141 08/29/2019 10:08 AM    POTASSIUM 4.3 08/29/2019 10:08 AM    GLUCOSE 107 (H) 08/29/2019 10:08 AM    BUN 23 (H) 08/29/2019 10:08 AM    CREATININE 1.31 08/29/2019 10:08 AM    CREATININE 1.00 12/23/2010 02:45 PM    GLOMRATE >59 12/23/2010 02:45 PM     -discussion in details on target blood pressure goal, advised monitoring BP closely at home.  Have BP log to present at follow-up visit or send through my chart.   -Advised low-salt diet, healthy dietary option, a lot of vegetables, less carbs and no added sugars, regular exercise, also try to avoid alcohol, no NSAIDs  If symptoms worsen or persist patient can return to clinic for reevaluation.  Red flags and strict emergency room precautions discussed.  Discussed side effects of medication. Patient confirmed understanding of information.      Follow Up:      Return in about 3 months (around 5/6/2020) for follow up.    Please note that this dictation was created using voice recognition software. I have made every reasonable attempt to correct obvious errors, but I expect that there are errors of grammar and possibly content that I did not discover before finalizing the note.    Signed by: Jessenia Ruelas M.D.

## 2020-02-10 ENCOUNTER — TELEPHONE (OUTPATIENT)
Dept: MEDICAL GROUP | Facility: PHYSICIAN GROUP | Age: 80
End: 2020-02-10

## 2020-02-11 NOTE — TELEPHONE ENCOUNTER
1. Caller Name: Saadia                       Call Back Number: [phone] (home)         How would the patient prefer to be contacted with a response: Phone call OK to leave a detailed message    Patient's spouse, Saadia, called stating that patient is having bilateral hand pain due to his arthritis. Patient's spouse would like to know if he could get a tramadol prescription to help with the pain.    Please advise.

## 2020-02-11 NOTE — TELEPHONE ENCOUNTER
Records from urology 12/17/2019:  Provider Dr. Benji Murphy  Assessment/plan:  1.  History of malignant neoplasm of bladder  Follow-up in 3 months for repeat cystoscopy    History of urinary urgency, on oxybutynin ER 5 mg daily

## 2020-02-13 ENCOUNTER — TELEPHONE (OUTPATIENT)
Dept: MEDICAL GROUP | Facility: PHYSICIAN GROUP | Age: 80
End: 2020-02-13

## 2020-02-13 NOTE — TELEPHONE ENCOUNTER
ESTABLISHED PATIENT PRE-VISIT PLANNING     Patient was NOT contacted to complete PVP.    1.  Reviewed notes from the last few office visits within the medical group: Yes    2.  If any orders were placed at last visit or intended to be done for this visit (i.e. 6 mos follow-up), do we have Results/Consult Notes?        •  Labs - Labs were not ordered at last office visit.       •  Imaging - Imaging was not ordered at last office visit.       •  Referrals - No referrals were ordered at last office visit.    3. Is this appointment scheduled as a Hospital Follow-Up? No    4.  Immunizations were updated in [facility] using WebIZ?: Yes       •  Web Iz Recommendations: FLU, PNEUMOVAX (PPSV23), TD, VARICELLA (Chicken Pox)  and SHINGRIX (Shingles)    5.  Patient is due for the following Health Maintenance Topics:   Health Maintenance Due   Topic Date Due   • IMM HEP B VACCINE (1 of 3 - Risk 3-dose series) 01/10/1959   • IMM ZOSTER VACCINES (1 of 2) 01/10/1989   • IMM PNEUMOCOCCAL VACCINE: 65+ Years (2 of 2 - PPSV23) 01/06/2017   • Annual Wellness Visit  01/17/2019   • IMM INFLUENZA (1) 09/01/2019     6. Orders for overdue Health Maintenance topics pended in Pre-Charting? NO    7.  AHA (MDX) form printed for Provider? YES    8.  Patient was NOT informed to arrive 15 min prior to their scheduled appointment and bring in their medication bottles.

## 2020-02-19 ENCOUNTER — OFFICE VISIT (OUTPATIENT)
Dept: MEDICAL GROUP | Facility: PHYSICIAN GROUP | Age: 80
End: 2020-02-19
Payer: MEDICARE

## 2020-02-19 VITALS
WEIGHT: 244 LBS | HEART RATE: 72 BPM | BODY MASS INDEX: 34.16 KG/M2 | OXYGEN SATURATION: 92 % | HEIGHT: 71 IN | DIASTOLIC BLOOD PRESSURE: 86 MMHG | TEMPERATURE: 99.8 F | SYSTOLIC BLOOD PRESSURE: 138 MMHG

## 2020-02-19 DIAGNOSIS — I10 HTN (HYPERTENSION), BENIGN: ICD-10-CM

## 2020-02-19 DIAGNOSIS — C68.9 TRANSITIONAL CELL CARCINOMA (HCC): ICD-10-CM

## 2020-02-19 PROCEDURE — 99214 OFFICE O/P EST MOD 30 MIN: CPT | Performed by: INTERNAL MEDICINE

## 2020-02-19 RX ORDER — HYDROCODONE BITARTRATE AND ACETAMINOPHEN 5; 325 MG/1; MG/1
1 TABLET ORAL EVERY 8 HOURS PRN
Qty: 30 TAB | Refills: 0 | Status: ON HOLD | OUTPATIENT
Start: 2020-03-20 | End: 2020-03-27

## 2020-02-19 RX ORDER — NALOXONE HYDROCHLORIDE 4 MG/.1ML
SPRAY NASAL
Qty: 1 PACKAGE | Refills: 0 | Status: ON HOLD | OUTPATIENT
Start: 2020-02-19 | End: 2020-03-27

## 2020-02-19 RX ORDER — HYDROCODONE BITARTRATE AND ACETAMINOPHEN 5; 325 MG/1; MG/1
1 TABLET ORAL EVERY 8 HOURS PRN
Qty: 30 TAB | Refills: 0 | Status: SHIPPED | OUTPATIENT
Start: 2020-02-19 | End: 2020-02-19 | Stop reason: SDUPTHER

## 2020-02-20 NOTE — PROGRESS NOTES
Established Patient    Chief Complaint   Patient presents with   • Medication Refill     Tramadol       Subjective:     HPI:   Bay presents today with the following.    1. Transitional cell carcinoma (HCC)  -Continue follow-up with urology for intra-bladder chemotherapy  -Patient taking Norco for pain, possibly 1 pills a day, sometimes half pills a day  -Pain is controlled with Norco      2. HTN (hypertension), benign  -Compliant with lisinopril 40 mg daily, mention check sometime blood pressure at home with normal reference numbers,  Deny hypertension symptoms      Patient Active Problem List    Diagnosis Date Noted   • IFG (impaired fasting glucose) 01/10/2017     Priority: Medium   • HTN (hypertension), benign 03/18/2010     Priority: Medium   • Hypovitaminosis D 01/16/2018     Priority: Low   • Obesity (BMI 30-39.9) 08/03/2017     Priority: Low   • DDD (degenerative disc disease), lumbar 09/10/2015     Priority: Low   • DDD (degenerative disc disease), cervical 09/10/2015     Priority: Low   • Migraine without status migrainosus, not intractable 08/19/2015     Priority: Low   • Osteoarthritis of hip 05/01/2014     Priority: Low   • History of hepatitis C 04/23/2013     Priority: Low   • Hypotestosteronism 10/24/2011     Priority: Low   • BPH (benign prostatic hypertrophy)      Priority: Low   • Anxiety      Priority: Low   • History of portal hypertension 02/06/2020   • Diverticulosis of colon 02/06/2020   • Internal hemorrhoid 02/06/2020   • Osteoarthritis of carpometacarpal (CMC) joint of left thumb 02/06/2020   • Other insomnia 02/06/2020   • Status post cystoscopy 05/22/2019   • Transitional cell carcinoma (HCC) 04/16/2019   • History of nephrolithiasis 02/20/2019   • Aortic stenosis 09/07/2018   • Hx of seizure disorder 06/12/2018   • PVD (peripheral vascular disease) (HCC) 05/17/2018   • Localized swelling of both lower extremities 05/01/2018       Current Outpatient Medications on File Prior to Visit  "  Medication Sig Dispense Refill   • traZODone (DESYREL) 100 MG Tab trazodone 100 mg tablet 90 Tab 1   • doxazosin (CARDURA) 8 MG tablet TAKE 1 TABLET BY MOUTH EVERY DAY 90 Tab 0   • ALPRAZolam (XANAX) 0.25 MG Tab alprazolam 0.25 mg tablet     • busPIRone (BUSPAR) 10 MG Tab tablet buspirone 10 mg tablet     • lisinopril (PRINIVIL) 40 MG tablet lisinopril 40 mg tablet     • oxybutynin SR (DITROPAN-XL) 5 MG TABLET SR 24 HR oxybutynin chloride ER 5 mg tablet,extended release 24 hr     • B Complex Vitamins (VITAMIN B COMPLEX PO) Take 1 Tab by mouth every 48 hours.     • vitamin D (CHOLECALCIFEROL) 1000 UNIT Tab Take 2,000 Units by mouth every 48 hours.       No current facility-administered medications on file prior to visit.        Allergies, past medical history, past surgical history, family history, social history reviewed and updated    ROS:     - Constitutional: Negative for fever, chills,    - Eye: Negative for blurry vision    - Cardiovascular: Negative for chest pain      Physical Exam:     /86 (BP Location: Left arm, Patient Position: Sitting, BP Cuff Size: Large adult)   Pulse 72   Temp 37.7 °C (99.8 °F) (Temporal)   Ht 1.803 m (5' 11\")   Wt 110.7 kg (244 lb)   SpO2 92%   BMI 34.03 kg/m²   General: Normal appearing. No distress.  ENT: oropharynx without exudates.    Eyes: conjunctiva clear lids without ptosis  Pulmonary: Clear to ausculation.  Normal effort.   Cardiovascular: Regular rate and rhythm  Abdomen: Soft, nontender,  Lymph: No cervical or supraclavicular palpable lymph nodes  Psych: Normal mood and affect.     I have reviewed pertinent labs and diagnostic tests associated with this visit with specific comments listed under the assessment and plan below      Assessment and Plan:     80 y.o. male with the following issues.    1. Transitional cell carcinoma (HCC)  - Naloxone (NARCAN) 4 MG/0.1ML Liquid; One spray in one nostril for overdose and call 911.  Dispense: 1 Package; Refill: 0  - " HYDROcodone-acetaminophen (NORCO) 5-325 MG Tab per tablet; Take 1 Tab by mouth every 8 hours as needed for up to 30 days.  Dispense: 30 Tab; Refill: 0  - HYDROcodone-acetaminophen (NORCO) 5-325 MG Tab per tablet; Take 1 Tab by mouth every 8 hours as needed for up to 30 days.  Dispense: 30 Tab; Refill: 0    -Patient given Norco 60 pills for potential 3 months      He  reports no history of alcohol use.  He  reports no history of drug use.      Consequences of Chronic Opiate therapy:  (5 A's)  Analgesia: Compared to no treatment or prior treatment, pain is currently improved  Activity: not changed  Adverse Events: He denies constipation, dry mouth, itchy skin, nausea and sedation  Aberrant Behaviors: He reports he is taking medication as prescribed and is not veering from agreed treatment regimen or provider recommendations. There have been no inappropriate refills or lost/stolen meds reported.  Affect/Mood: Pain is impacting patient's mood.  Patient denies depression/anxiety.    Nonnarcotic treatments that are being used: NSAIDs/BARR-2.     Opioid Risk Score: 0    Interpretation of Opioid Risk Score   Score 0-3 = Low risk of abuse. Do UDS at least once per year.  Score 4-7 = Moderate risk of abuse. Do UDS 1-4 times per year.  Score 8+ = High risk of abuse. Refer to specialist.    Last order of CONTROLLED SUBSTANCE TREATMENT AGREEMENT was found on 5/22/2019 from Office Visit on 5/22/2019     UDS Summary     Patient has no health maintenance due at this time        Most recent UDS reviewed today and is consistent with prescribed medications.     I have reviewed the medical records, the Prescription Monitoring Program and I have determined that controlled substance treatment is medically indicated.     2. HTN (hypertension), benign  -Continue lisinopril 40 mg daily for now    -discussion in details on target blood pressure goal, advised monitoring BP closely at home.  Have BP log to present at follow-up visit or send  through my chart.   -Advised low-salt diet, healthy dietary option, a lot of vegetables, less carbs and no added sugars, regular exercise, also try to avoid alcohol, no NSAIDs          Chronic viral hepatitis C>Treated with interferon around 2001, Treated with interferon and harvoni in 2014        CT abdomen pelvis with 2/21/2019:  Adrenals: 1.3 x 2.7 cm left adrenal gland low density nodule is unchanged. No right adrenal gland nodule.  -stable     -Patient denied having depression     PVD (peripheral vascular disease) (HCC)  Per records:  S/P procedures on the both the lower extremities in 10/2018 as per the patient  - status post procedures at [facility]     HERSON study 5/16/2018:  -Conclusions   Area of elevated velocities at the Right, mid anterior tibial artery.    Velocities are consistent with >50% stenosis (438 cm/sec).        -History of seizure  - No more evidence of seizure or convulsion      Health Maintenance:   Patient refused vaccination secondary to chemotherapy currently    Follow Up:      Return in about 3 months (around 5/19/2020) for follow up.    Please note that this dictation was created using voice recognition software. I have made every reasonable attempt to correct obvious errors, but I expect that there are errors of grammar and possibly content that I did not discover before finalizing the note.    Signed by: Jessenia Ruelas M.D.

## 2020-02-24 ENCOUNTER — PATIENT OUTREACH (OUTPATIENT)
Dept: HEALTH INFORMATION MANAGEMENT | Facility: OTHER | Age: 80
End: 2020-02-24

## 2020-02-24 SDOH — ECONOMIC STABILITY: FOOD INSECURITY: WITHIN THE PAST 12 MONTHS, YOU WORRIED THAT YOUR FOOD WOULD RUN OUT BEFORE YOU GOT MONEY TO BUY MORE.: NEVER TRUE

## 2020-02-24 SDOH — ECONOMIC STABILITY: TRANSPORTATION INSECURITY
IN THE PAST 12 MONTHS, HAS LACK OF TRANSPORTATION KEPT YOU FROM MEETINGS, WORK, OR FROM GETTING THINGS NEEDED FOR DAILY LIVING?: NO

## 2020-02-24 SDOH — ECONOMIC STABILITY: TRANSPORTATION INSECURITY
IN THE PAST 12 MONTHS, HAS THE LACK OF TRANSPORTATION KEPT YOU FROM MEDICAL APPOINTMENTS OR FROM GETTING MEDICATIONS?: NO

## 2020-02-24 SDOH — ECONOMIC STABILITY: FOOD INSECURITY: WITHIN THE PAST 12 MONTHS, THE FOOD YOU BOUGHT JUST DIDN'T LAST AND YOU DIDN'T HAVE MONEY TO GET MORE.: NEVER TRUE

## 2020-02-24 NOTE — PROGRESS NOTES
1. [facility] Verified: yes    2. Verify PCP: yes    3. Review and add  to Care Team: yes        5. Reviewed/Updated the following with patient:       •   Communication Preference Obtained? YES  • MyChart Activation: declined       •   E-Mail Address Obtained? YES       •   Appointment Day and Time Preferences? YES       •   Preferred Pharmacy? YES       •   Preferred Lab? YES      Called member to introduce myself as their SCP  and schedule AHA/AWV. Member had no questions at this time, direct phone number given for future contact.

## 2020-02-26 ENCOUNTER — TELEPHONE (OUTPATIENT)
Dept: MEDICAL GROUP | Facility: PHYSICIAN GROUP | Age: 80
End: 2020-02-26

## 2020-02-26 NOTE — TELEPHONE ENCOUNTER
[facility] pulse 8 form    Chronic viral hepatitis C>Treated with interferon around 2001, Treated with interferon and harvoni in 2014      CT abdomen pelvis with 2/21/2019:  Adrenals: 1.3 x 2.7 cm left adrenal gland low density nodule is unchanged. No right adrenal gland nodule.  -stable    -Patient denied having depression    PVD (peripheral vascular disease) (HCC)  Per records:  S/P procedures on the both the lower extremities in 10/2018 as per the patient  - status post procedures at [facility]    HERSON study 5/16/2018:  -Conclusions   Area of elevated velocities at the Right, mid anterior tibial artery.    Velocities are consistent with >50% stenosis (438 cm/sec).       -History of seizure  - No more evidence of seizure or convulsion.

## 2020-03-25 DIAGNOSIS — Z01.810 PRE-OPERATIVE CARDIOVASCULAR EXAMINATION: ICD-10-CM

## 2020-03-25 DIAGNOSIS — Z01.812 PRE-OPERATIVE LABORATORY EXAMINATION: ICD-10-CM

## 2020-03-25 LAB
ALBUMIN SERPL BCP-MCNC: 4 G/DL (ref 3.2–4.9)
ALBUMIN/GLOB SERPL: 1.3 G/DL
ALP SERPL-CCNC: 62 U/L (ref 30–99)
ALT SERPL-CCNC: 7 U/L (ref 2–50)
ANION GAP SERPL CALC-SCNC: 11 MMOL/L (ref 7–16)
APPEARANCE UR: CLEAR
AST SERPL-CCNC: 8 U/L (ref 12–45)
BACTERIA #/AREA URNS HPF: NEGATIVE /HPF
BASOPHILS # BLD AUTO: 0.6 % (ref 0–1.8)
BASOPHILS # BLD: 0.05 K/UL (ref 0–0.12)
BILIRUB SERPL-MCNC: 0.4 MG/DL (ref 0.1–1.5)
BILIRUB UR QL STRIP.AUTO: NEGATIVE
BUN SERPL-MCNC: 25 MG/DL (ref 8–22)
CALCIUM SERPL-MCNC: 9.2 MG/DL (ref 8.5–10.5)
CHLORIDE SERPL-SCNC: 105 MMOL/L (ref 96–112)
CO2 SERPL-SCNC: 25 MMOL/L (ref 20–33)
COLOR UR: YELLOW
CREAT SERPL-MCNC: 1.28 MG/DL (ref 0.5–1.4)
EOSINOPHIL # BLD AUTO: 0.1 K/UL (ref 0–0.51)
EOSINOPHIL NFR BLD: 1.1 % (ref 0–6.9)
EPI CELLS #/AREA URNS HPF: NEGATIVE /HPF
ERYTHROCYTE [DISTWIDTH] IN BLOOD BY AUTOMATED COUNT: 47.5 FL (ref 35.9–50)
GLOBULIN SER CALC-MCNC: 3 G/DL (ref 1.9–3.5)
GLUCOSE SERPL-MCNC: 114 MG/DL (ref 65–99)
GLUCOSE UR STRIP.AUTO-MCNC: NEGATIVE MG/DL
HCT VFR BLD AUTO: 43.9 % (ref 42–52)
HGB BLD-MCNC: 14.5 G/DL (ref 14–18)
HYALINE CASTS #/AREA URNS LPF: ABNORMAL /LPF
IMM GRANULOCYTES # BLD AUTO: 0.08 K/UL (ref 0–0.11)
IMM GRANULOCYTES NFR BLD AUTO: 0.9 % (ref 0–0.9)
KETONES UR STRIP.AUTO-MCNC: NEGATIVE MG/DL
LEUKOCYTE ESTERASE UR QL STRIP.AUTO: ABNORMAL
LYMPHOCYTES # BLD AUTO: 1.23 K/UL (ref 1–4.8)
LYMPHOCYTES NFR BLD: 13.9 % (ref 22–41)
MCH RBC QN AUTO: 30.5 PG (ref 27–33)
MCHC RBC AUTO-ENTMCNC: 33 G/DL (ref 33.7–35.3)
MCV RBC AUTO: 92.4 FL (ref 81.4–97.8)
MICRO URNS: ABNORMAL
MONOCYTES # BLD AUTO: 0.78 K/UL (ref 0–0.85)
MONOCYTES NFR BLD AUTO: 8.8 % (ref 0–13.4)
NEUTROPHILS # BLD AUTO: 6.6 K/UL (ref 1.82–7.42)
NEUTROPHILS NFR BLD: 74.7 % (ref 44–72)
NITRITE UR QL STRIP.AUTO: NEGATIVE
NRBC # BLD AUTO: 0 K/UL
NRBC BLD-RTO: 0 /100 WBC
PH UR STRIP.AUTO: 5.5 [PH] (ref 5–8)
PLATELET # BLD AUTO: 195 K/UL (ref 164–446)
PMV BLD AUTO: 9.7 FL (ref 9–12.9)
POTASSIUM SERPL-SCNC: 4.6 MMOL/L (ref 3.6–5.5)
PROT SERPL-MCNC: 7 G/DL (ref 6–8.2)
PROT UR QL STRIP: NEGATIVE MG/DL
RBC # BLD AUTO: 4.75 M/UL (ref 4.7–6.1)
RBC # URNS HPF: ABNORMAL /HPF
RBC UR QL AUTO: ABNORMAL
SODIUM SERPL-SCNC: 141 MMOL/L (ref 135–145)
SP GR UR STRIP.AUTO: 1.02
UROBILINOGEN UR STRIP.AUTO-MCNC: 0.2 MG/DL
WBC # BLD AUTO: 8.8 K/UL (ref 4.8–10.8)
WBC #/AREA URNS HPF: ABNORMAL /HPF

## 2020-03-25 PROCEDURE — 93010 ELECTROCARDIOGRAM REPORT: CPT | Performed by: INTERNAL MEDICINE

## 2020-03-25 PROCEDURE — 36415 COLL VENOUS BLD VENIPUNCTURE: CPT

## 2020-03-25 PROCEDURE — 87086 URINE CULTURE/COLONY COUNT: CPT

## 2020-03-25 PROCEDURE — 85025 COMPLETE CBC W/AUTO DIFF WBC: CPT

## 2020-03-25 PROCEDURE — 93005 ELECTROCARDIOGRAM TRACING: CPT

## 2020-03-25 PROCEDURE — 81001 URINALYSIS AUTO W/SCOPE: CPT

## 2020-03-25 PROCEDURE — 80053 COMPREHEN METABOLIC PANEL: CPT

## 2020-03-25 SDOH — HEALTH STABILITY: MENTAL HEALTH: HOW OFTEN DO YOU HAVE A DRINK CONTAINING ALCOHOL?: NOT ASKED

## 2020-03-25 ASSESSMENT — FIBROSIS 4 INDEX: FIB4 SCORE: 1.16

## 2020-03-26 ENCOUNTER — ANESTHESIA EVENT (OUTPATIENT)
Dept: SURGERY | Facility: MEDICAL CENTER | Age: 80
End: 2020-03-26
Payer: MEDICARE

## 2020-03-26 LAB — EKG IMPRESSION: NORMAL

## 2020-03-26 RX ORDER — GABAPENTIN 300 MG/1
300 CAPSULE ORAL ONCE
Status: CANCELLED | OUTPATIENT
Start: 2020-03-26 | End: 2020-03-26

## 2020-03-26 NOTE — OR NURSING
COVID-19 Pre-surgery screenin. Do you have an undiagnosed respiratory illness or symptoms such asoughing or sneezing? /No)  a. Onset of Sx   b. Acute vs. chronic respiratory illness     2. Do you have an unexplained fever greater than 100.4 degrees Fahrenheit or 38 degrees Celsius?     No)    3. Have you had direct exposure to a patient who tested positive for Covid-19?    No)    4. Have you traveled within the last 14 days to [place], [place], [place], [place], or [place]?    /No)

## 2020-03-27 ENCOUNTER — ANESTHESIA (OUTPATIENT)
Dept: SURGERY | Facility: MEDICAL CENTER | Age: 80
End: 2020-03-27
Payer: MEDICARE

## 2020-03-27 ENCOUNTER — APPOINTMENT (OUTPATIENT)
Dept: RADIOLOGY | Facility: MEDICAL CENTER | Age: 80
End: 2020-03-27
Attending: UROLOGY
Payer: MEDICARE

## 2020-03-27 ENCOUNTER — HOSPITAL ENCOUNTER (OUTPATIENT)
Facility: MEDICAL CENTER | Age: 80
End: 2020-03-27
Attending: UROLOGY | Admitting: UROLOGY
Payer: MEDICARE

## 2020-03-27 VITALS
HEIGHT: 71 IN | DIASTOLIC BLOOD PRESSURE: 79 MMHG | RESPIRATION RATE: 18 BRPM | OXYGEN SATURATION: 93 % | BODY MASS INDEX: 34.35 KG/M2 | WEIGHT: 245.37 LBS | SYSTOLIC BLOOD PRESSURE: 161 MMHG | HEART RATE: 56 BPM | TEMPERATURE: 98.4 F

## 2020-03-27 LAB
BACTERIA UR CULT: NORMAL
PATHOLOGY CONSULT NOTE: NORMAL
SIGNIFICANT IND 70042: NORMAL
SITE SITE: NORMAL
SOURCE SOURCE: NORMAL

## 2020-03-27 PROCEDURE — 700102 HCHG RX REV CODE 250 W/ 637 OVERRIDE(OP): Performed by: ANESTHESIOLOGY

## 2020-03-27 PROCEDURE — 700117 HCHG RX CONTRAST REV CODE 255: Performed by: UROLOGY

## 2020-03-27 PROCEDURE — 88307 TISSUE EXAM BY PATHOLOGIST: CPT

## 2020-03-27 PROCEDURE — 700105 HCHG RX REV CODE 258: Performed by: ANESTHESIOLOGY

## 2020-03-27 PROCEDURE — 160028 HCHG SURGERY MINUTES - 1ST 30 MINS LEVEL 3: Performed by: UROLOGY

## 2020-03-27 PROCEDURE — A4338 INDWELLING CATHETER LATEX: HCPCS | Performed by: UROLOGY

## 2020-03-27 PROCEDURE — 700105 HCHG RX REV CODE 258: Performed by: UROLOGY

## 2020-03-27 PROCEDURE — 160046 HCHG PACU - 1ST 60 MINS PHASE II: Performed by: UROLOGY

## 2020-03-27 PROCEDURE — 700111 HCHG RX REV CODE 636 W/ 250 OVERRIDE (IP): Performed by: ANESTHESIOLOGY

## 2020-03-27 PROCEDURE — 700101 HCHG RX REV CODE 250: Performed by: ANESTHESIOLOGY

## 2020-03-27 PROCEDURE — C1769 GUIDE WIRE: HCPCS | Performed by: UROLOGY

## 2020-03-27 PROCEDURE — 501329 HCHG SET, CYSTO IRRIG Y TUR: Performed by: UROLOGY

## 2020-03-27 PROCEDURE — 160002 HCHG RECOVERY MINUTES (STAT): Performed by: UROLOGY

## 2020-03-27 PROCEDURE — A9270 NON-COVERED ITEM OR SERVICE: HCPCS | Performed by: ANESTHESIOLOGY

## 2020-03-27 PROCEDURE — 500879 HCHG PACK, CYSTO: Performed by: UROLOGY

## 2020-03-27 PROCEDURE — C1758 CATHETER, URETERAL: HCPCS | Performed by: UROLOGY

## 2020-03-27 PROCEDURE — 160039 HCHG SURGERY MINUTES - EA ADDL 1 MIN LEVEL 3: Performed by: UROLOGY

## 2020-03-27 PROCEDURE — 500042 HCHG BAG, URINARY DRAINAGE (CLOSED): Performed by: UROLOGY

## 2020-03-27 PROCEDURE — 160036 HCHG PACU - EA ADDL 30 MINS PHASE I: Performed by: UROLOGY

## 2020-03-27 PROCEDURE — 160048 HCHG OR STATISTICAL LEVEL 1-5: Performed by: UROLOGY

## 2020-03-27 PROCEDURE — 160009 HCHG ANES TIME/MIN: Performed by: UROLOGY

## 2020-03-27 PROCEDURE — 160025 RECOVERY II MINUTES (STATS): Performed by: UROLOGY

## 2020-03-27 PROCEDURE — 160035 HCHG PACU - 1ST 60 MINS PHASE I: Performed by: UROLOGY

## 2020-03-27 PROCEDURE — 700104 HCHG RX REV CODE 254: Performed by: ANESTHESIOLOGY

## 2020-03-27 RX ORDER — ROCURONIUM BROMIDE 10 MG/ML
INJECTION, SOLUTION INTRAVENOUS PRN
Status: DISCONTINUED | OUTPATIENT
Start: 2020-03-27 | End: 2020-03-27 | Stop reason: SURG

## 2020-03-27 RX ORDER — HYDROMORPHONE HYDROCHLORIDE 1 MG/ML
0.2 INJECTION, SOLUTION INTRAMUSCULAR; INTRAVENOUS; SUBCUTANEOUS
Status: DISCONTINUED | OUTPATIENT
Start: 2020-03-27 | End: 2020-03-27 | Stop reason: HOSPADM

## 2020-03-27 RX ORDER — LIDOCAINE HYDROCHLORIDE 20 MG/ML
INJECTION, SOLUTION EPIDURAL; INFILTRATION; INTRACAUDAL; PERINEURAL PRN
Status: DISCONTINUED | OUTPATIENT
Start: 2020-03-27 | End: 2020-03-27 | Stop reason: SURG

## 2020-03-27 RX ORDER — ONDANSETRON 2 MG/ML
4 INJECTION INTRAMUSCULAR; INTRAVENOUS
Status: DISCONTINUED | OUTPATIENT
Start: 2020-03-27 | End: 2020-03-27 | Stop reason: HOSPADM

## 2020-03-27 RX ORDER — DEXAMETHASONE SODIUM PHOSPHATE 4 MG/ML
INJECTION, SOLUTION INTRA-ARTICULAR; INTRALESIONAL; INTRAMUSCULAR; INTRAVENOUS; SOFT TISSUE PRN
Status: DISCONTINUED | OUTPATIENT
Start: 2020-03-27 | End: 2020-03-27 | Stop reason: SURG

## 2020-03-27 RX ORDER — REMIFENTANIL HYDROCHLORIDE 1 MG/ML
INJECTION, POWDER, LYOPHILIZED, FOR SOLUTION INTRAVENOUS PRN
Status: DISCONTINUED | OUTPATIENT
Start: 2020-03-27 | End: 2020-03-27 | Stop reason: SURG

## 2020-03-27 RX ORDER — ACETAMINOPHEN 500 MG
1000 TABLET ORAL ONCE
Status: COMPLETED | OUTPATIENT
Start: 2020-03-27 | End: 2020-03-27

## 2020-03-27 RX ORDER — LABETALOL HYDROCHLORIDE 5 MG/ML
5 INJECTION, SOLUTION INTRAVENOUS
Status: DISCONTINUED | OUTPATIENT
Start: 2020-03-27 | End: 2020-03-27 | Stop reason: HOSPADM

## 2020-03-27 RX ORDER — HYDRALAZINE HYDROCHLORIDE 20 MG/ML
5 INJECTION INTRAMUSCULAR; INTRAVENOUS
Status: DISCONTINUED | OUTPATIENT
Start: 2020-03-27 | End: 2020-03-27 | Stop reason: HOSPADM

## 2020-03-27 RX ORDER — OXYCODONE HCL 5 MG/5 ML
10 SOLUTION, ORAL ORAL
Status: COMPLETED | OUTPATIENT
Start: 2020-03-27 | End: 2020-03-27

## 2020-03-27 RX ORDER — HYDROMORPHONE HYDROCHLORIDE 1 MG/ML
0.1 INJECTION, SOLUTION INTRAMUSCULAR; INTRAVENOUS; SUBCUTANEOUS
Status: DISCONTINUED | OUTPATIENT
Start: 2020-03-27 | End: 2020-03-27 | Stop reason: HOSPADM

## 2020-03-27 RX ORDER — CELECOXIB 200 MG/1
200 CAPSULE ORAL ONCE
Status: COMPLETED | OUTPATIENT
Start: 2020-03-27 | End: 2020-03-27

## 2020-03-27 RX ORDER — ONDANSETRON 2 MG/ML
INJECTION INTRAMUSCULAR; INTRAVENOUS PRN
Status: DISCONTINUED | OUTPATIENT
Start: 2020-03-27 | End: 2020-03-27 | Stop reason: SURG

## 2020-03-27 RX ORDER — CEFAZOLIN SODIUM 1 G/3ML
INJECTION, POWDER, FOR SOLUTION INTRAMUSCULAR; INTRAVENOUS PRN
Status: DISCONTINUED | OUTPATIENT
Start: 2020-03-27 | End: 2020-03-27 | Stop reason: SURG

## 2020-03-27 RX ORDER — OXYCODONE HCL 5 MG/5 ML
5 SOLUTION, ORAL ORAL
Status: COMPLETED | OUTPATIENT
Start: 2020-03-27 | End: 2020-03-27

## 2020-03-27 RX ORDER — SODIUM CHLORIDE, SODIUM LACTATE, POTASSIUM CHLORIDE, CALCIUM CHLORIDE 600; 310; 30; 20 MG/100ML; MG/100ML; MG/100ML; MG/100ML
INJECTION, SOLUTION INTRAVENOUS CONTINUOUS
Status: DISCONTINUED | OUTPATIENT
Start: 2020-03-27 | End: 2020-03-27 | Stop reason: HOSPADM

## 2020-03-27 RX ORDER — HALOPERIDOL 5 MG/ML
1 INJECTION INTRAMUSCULAR
Status: DISCONTINUED | OUTPATIENT
Start: 2020-03-27 | End: 2020-03-27 | Stop reason: HOSPADM

## 2020-03-27 RX ADMIN — LABETALOL HYDROCHLORIDE 5 MG: 5 INJECTION, SOLUTION INTRAVENOUS at 08:57

## 2020-03-27 RX ADMIN — PROPOFOL 150 MG: 10 INJECTION, EMULSION INTRAVENOUS at 07:40

## 2020-03-27 RX ADMIN — LABETALOL HYDROCHLORIDE 5 MG: 5 INJECTION, SOLUTION INTRAVENOUS at 08:47

## 2020-03-27 RX ADMIN — FENTANYL CITRATE 50 MCG: 50 INJECTION, SOLUTION INTRAMUSCULAR; INTRAVENOUS at 08:54

## 2020-03-27 RX ADMIN — SODIUM CHLORIDE, POTASSIUM CHLORIDE, SODIUM LACTATE AND CALCIUM CHLORIDE: 600; 310; 30; 20 INJECTION, SOLUTION INTRAVENOUS at 06:13

## 2020-03-27 RX ADMIN — HYDROMORPHONE HYDROCHLORIDE 0.2 MG: 1 INJECTION, SOLUTION INTRAMUSCULAR; INTRAVENOUS; SUBCUTANEOUS at 09:43

## 2020-03-27 RX ADMIN — REMIFENTANIL HYDROCHLORIDE 50 MCG: 1 INJECTION, POWDER, LYOPHILIZED, FOR SOLUTION INTRAVENOUS at 08:13

## 2020-03-27 RX ADMIN — SUGAMMADEX 200 MG: 100 INJECTION, SOLUTION INTRAVENOUS at 08:25

## 2020-03-27 RX ADMIN — LABETALOL HYDROCHLORIDE 5 MG: 5 INJECTION, SOLUTION INTRAVENOUS at 09:16

## 2020-03-27 RX ADMIN — ROCURONIUM BROMIDE 50 MG: 10 INJECTION, SOLUTION INTRAVENOUS at 07:41

## 2020-03-27 RX ADMIN — HYDROMORPHONE HYDROCHLORIDE 0.2 MG: 1 INJECTION, SOLUTION INTRAMUSCULAR; INTRAVENOUS; SUBCUTANEOUS at 10:05

## 2020-03-27 RX ADMIN — OXYCODONE HYDROCHLORIDE 10 MG: 5 SOLUTION ORAL at 09:00

## 2020-03-27 RX ADMIN — ACETAMINOPHEN 1000 MG: 500 TABLET, FILM COATED ORAL at 06:13

## 2020-03-27 RX ADMIN — DEXAMETHASONE SODIUM PHOSPHATE 4 MG: 4 INJECTION, SOLUTION INTRA-ARTICULAR; INTRALESIONAL; INTRAMUSCULAR; INTRAVENOUS; SOFT TISSUE at 07:43

## 2020-03-27 RX ADMIN — REMIFENTANIL HYDROCHLORIDE 100 MCG: 1 INJECTION, POWDER, LYOPHILIZED, FOR SOLUTION INTRAVENOUS at 07:39

## 2020-03-27 RX ADMIN — INDIGO CARMINE 2.5 ML: 8 INJECTION, SOLUTION INTRAMUSCULAR; INTRAVENOUS at 08:05

## 2020-03-27 RX ADMIN — CEFAZOLIN 2 G: 330 INJECTION, POWDER, FOR SOLUTION INTRAMUSCULAR; INTRAVENOUS at 07:45

## 2020-03-27 RX ADMIN — HYDROMORPHONE HYDROCHLORIDE 0.2 MG: 1 INJECTION, SOLUTION INTRAMUSCULAR; INTRAVENOUS; SUBCUTANEOUS at 10:00

## 2020-03-27 RX ADMIN — ONDANSETRON 4 MG: 2 INJECTION INTRAMUSCULAR; INTRAVENOUS at 08:25

## 2020-03-27 RX ADMIN — FENTANYL CITRATE 50 MCG: 50 INJECTION, SOLUTION INTRAMUSCULAR; INTRAVENOUS at 09:10

## 2020-03-27 RX ADMIN — CELECOXIB 200 MG: 200 CAPSULE ORAL at 06:13

## 2020-03-27 RX ADMIN — FENTANYL CITRATE 50 MCG: 50 INJECTION, SOLUTION INTRAMUSCULAR; INTRAVENOUS at 08:49

## 2020-03-27 RX ADMIN — HYDROMORPHONE HYDROCHLORIDE 0.2 MG: 1 INJECTION, SOLUTION INTRAMUSCULAR; INTRAVENOUS; SUBCUTANEOUS at 09:26

## 2020-03-27 RX ADMIN — PROPOFOL 50 MG: 10 INJECTION, EMULSION INTRAVENOUS at 07:56

## 2020-03-27 RX ADMIN — HYDROMORPHONE HYDROCHLORIDE 0.2 MG: 1 INJECTION, SOLUTION INTRAMUSCULAR; INTRAVENOUS; SUBCUTANEOUS at 09:31

## 2020-03-27 RX ADMIN — LIDOCAINE HYDROCHLORIDE 100 MG: 20 INJECTION, SOLUTION EPIDURAL; INFILTRATION; INTRACAUDAL at 07:41

## 2020-03-27 RX ADMIN — PHENYLEPHRINE HYDROCHLORIDE 40 MCG/MIN: 10 INJECTION INTRAVENOUS at 07:40

## 2020-03-27 RX ADMIN — FENTANYL CITRATE 50 MCG: 50 INJECTION, SOLUTION INTRAMUSCULAR; INTRAVENOUS at 09:03

## 2020-03-27 ASSESSMENT — PAIN SCALES - GENERAL: PAIN_LEVEL: 0

## 2020-03-27 ASSESSMENT — FIBROSIS 4 INDEX: FIB4 SCORE: 1.26

## 2020-03-27 NOTE — DISCHARGE INSTRUCTIONS
ACTIVITY: Rest and take it easy for the first 24 hours.  A responsible adult is recommended to remain with you during that time.  It is normal to feel sleepy.  We encourage you to not do anything that requires balance, judgment or coordination.    MILD FLU-LIKE SYMPTOMS ARE NORMAL. YOU MAY EXPERIENCE GENERALIZED MUSCLE ACHES, THROAT IRRITATION, HEADACHE AND/OR SOME NAUSEA.    FOR 24 HOURS DO NOT:  Drive, operate machinery or run household appliances.  Drink beer or alcoholic beverages.   Make important decisions or sign legal documents.    SPECIAL INSTRUCTIONS:     Follow-up in office on Tuesday for voiding trial and catheter removal.  Teach toro care prior to discharge.  Activity: Avoid heavy lifting or straining for 3 weeks.    Discharge Instructions: Caring for Your Indwelling Urinary Catheter  You have been discharged with an indwelling urinary catheter (also called a Toro catheter ). A catheter is a thin, flexible tube. An indwelling urinary catheter has two parts. The first part is a tube that drains urine from your bladder. The second part is a bag or other device that collects the urine.   The most important thing to remember is that you want to prevent infection. Always wash your hands before handling your catheter bag or tubing.   Draining the bedside bag  · Wash your hands with soap and clean, running water. Or use an alcohol-based hand  that contains at least 60% alcohol.   · Hold the drainage tube over a toilet or measuring container.   · Unclamp the tube and let the bag drain.   · Don’t touch the tip of the drainage tube or let it touch the toilet or container.   · You don't need to rinse the bag or drainage tube.    Cleaning the drainage tube  · When the bag is empty, clean the tip of the drainage tube with an alcohol wipe.  · Clamp the tube.  · Reinsert the tube into the pocket on the drainage bag.    Cleaning your skin and tubing  · Clean the skin near the catheter with soap and  water.  · Wash your genital area from front to back.  · Wash the catheter tubing. Always wash the catheter in the direction away from your body.   · You will be told when and how to change your bag and tubing.  · Don’t try to remove the catheter by yourself.  · You may shower with the catheter in place.    Emptying a leg bag  · Wash your hands.  · Remove the stopper on the bag.  · Drain the bag into the toilet or a measuring container. Don’t let the tip of the drainage tube touch anything, including your fingers.   · Clean the tip of the drainage tube with alcohol.  · Replace the stopper.    Follow-up care  Make a follow-up appointment, or as directed by your healthcare provider   When to call your healthcare provider  Call your healthcare provider right away if you have any of the following:  · Fever of 100.4°F ( 38°C) or higher, or as directed by your provider   · Chills  · Leakage around the catheter insertion site   · Increased spasms (uncontrollable twitching) in your legs, belly (abdomen), or bladder. Occasional mild spasms are normal.   · Burning in the urinary tract, penis, or genital area   · Nausea and vomiting  · Aching in the lower back  · Cloudy or bloody (pink or red) urine, sediment or mucus in the urine, or bad-smelling urine       DIET: To avoid nausea, slowly advance diet as tolerated, avoiding spicy or greasy foods for the first day.  Add more substantial food to your diet according to your physician's instructions.  Babies can be fed formula or breast milk as soon as they are hungry.  INCREASE FLUIDS AND FIBER TO AVOID CONSTIPATION.    SURGICAL DRESSING/BATHING: N/A    FOLLOW-UP APPOINTMENT:  A follow-up appointment should be arranged with your doctor on Tuesday (3/31/2020); call to schedule.    You should CALL YOUR PHYSICIAN if you develop:  Fever greater than 101 degrees F.  Pain not relieved by medication, or persistent nausea or vomiting.  Excessive bleeding (blood soaking through dressing)  or unexpected drainage from the wound.  Extreme redness or swelling around the incision site, drainage of pus or foul smelling drainage.  Inability to urinate or empty your bladder within 8 hours.  Problems with breathing or chest pain.    You should call 911 if you develop problems with breathing or chest pain.  If you are unable to contact your doctor or surgical center, you should go to the nearest emergency room or urgent care center.  Physician's telephone #: Dr. Murphy [phone]    If any questions arise, call your doctor.  If your doctor is not available, please feel free to call the Surgical Center at [phone].  The Center is open Monday through Friday from 7AM to 7PM.  You can also call the [facility] HOTLINE open 24 hours/day, 7 days/week and speak to a nurse at [phone], or toll free at [phone].    A registered nurse may call you a few days after your surgery to see how you are doing after your procedure.    MEDICATIONS: Resume taking daily medication.  Take prescribed pain medication with food.  If no medication is prescribed, you may take non-aspirin pain medication if needed.  PAIN MEDICATION CAN BE VERY CONSTIPATING.  Take a stool softener or laxative such as senokot, pericolace, or milk of magnesia if needed.    Prescription given for NORCO.  Last pain medication given at 9:00am.    If your physician has prescribed pain medication that includes Acetaminophen (Tylenol), do not take additional Acetaminophen (Tylenol) while taking the prescribed medication.    Depression / Suicide Risk    As you are discharged from this [facility] facility, it is important to learn how to keep safe from harming yourself.    Recognize the warning signs:  · Abrupt changes in personality, positive or negative- including increase in energy   · Giving away possessions  · Change in eating patterns- significant weight changes-  positive or negative  · Change in sleeping patterns- unable to sleep or sleeping  all the time   · Unwillingness or inability to communicate  · Depression  · Unusual sadness, discouragement and loneliness  · Talk of wanting to die  · Neglect of personal appearance   · Rebelliousness- reckless behavior  · Withdrawal from people/activities they love  · Confusion- inability to concentrate     If you or a loved one observes any of these behaviors or has concerns about self-harm, here's what you can do:  · Talk about it- your feelings and reasons for harming yourself  · Remove any means that you might use to hurt yourself (examples: pills, rope, extension cords, firearm)  · Get professional help from the community (Mental Health, Substance Abuse, psychological counseling)  · Do not be alone:Call your Safe Contact- someone whom you trust who will be there for you.  · Call your local CRISIS HOTLINE 731-0595 or 861-242-7148  · Call your local Children's Mobile Crisis Response Team [place] (100) 496-7747 or www.BasharJobs  · Call the toll free National Suicide Prevention Hotlines   · National Suicide Prevention Lifeline 692-760-BKFO (5738)  · National Hope Line Network 800-SUICIDE (063-4554)

## 2020-03-27 NOTE — ANESTHESIA PREPROCEDURE EVALUATION
81yo M here for cystoscopy, biopsy, and urethrogram.     No Known Allergies     Relevant Problems   PULMONARY   (+) History of hepatitis C      NEURO   (+) History of hepatitis C   (+) History of nephrolithiasis   (+) History of portal hypertension   (+) Hx of seizure disorder      CARDIAC   (+) Aortic stenosis   (+) HTN (hypertension), benign   (+) Internal hemorrhoid   (+) Migraine without status migrainosus, not intractable   (+) PVD (peripheral vascular disease) (HCC)      Other   (+) Obesity (BMI 30-39.9)     Past Medical History:   Diagnosis Date   • Anxiety    • Aortic stenosis    • Arthritis     back/neck/hands   • BPH (benign prostatic hypertrophy)    • Cancer (HCC) 2019    bladder    • Dental disorder     dentures upper   • Depression    • Diverticulitis    • Dizziness 9/20/2018   • DJD (degenerative joint disease)    • Gross hematuria 2/20/2019   • Heart murmur    • Hemorrhoid    • Hepatitis B 1989   • Hepatitis C     rec'd tx in 2004   • Hypertension    • Liver disease    • Nonspecific abnormal electrocardiogram (ECG) (EKG) 11/15/2018   • Pneumonia     hx   • Recurrent major depressive disorder, in remission (HCC)    • Snoring     no sleep study   • Urinary bladder disorder    • Urinary incontinence       No current facility-administered medications on file prior to encounter.      Current Outpatient Medications on File Prior to Encounter   Medication Sig Dispense Refill   • traZODone (DESYREL) 100 MG Tab trazodone 100 mg tablet (Patient taking differently: Take 100 mg by mouth every evening. trazodone 100 mg tablet) 90 Tab 1   • doxazosin (CARDURA) 8 MG tablet TAKE 1 TABLET BY MOUTH EVERY DAY 90 Tab 0   • lisinopril (PRINIVIL) 40 MG tablet Take 40 mg by mouth every bedtime. Indications: High Blood Pressure Disorder     • B Complex Vitamins (VITAMIN B COMPLEX PO) Take 1 Tab by mouth every 48 hours.     • vitamin D (CHOLECALCIFEROL) 1000 UNIT Tab Take 2,000 Units by mouth every 48 hours.        Past  Surgical History:   Procedure Laterality Date   • CYSTOSCOPY  9/9/2019    Procedure: CYSTOSCOPY;  Surgeon: Benji Murphy M.D.;  Location: SURGERY [facility];  Service: Urology   • TRANS URETHRAL RESECTION BLADDER  9/9/2019    Procedure: TRANS URETHRAL RESECTION BLADDER SCAR;  Surgeon: Benji Murphy M.D.;  Location: SURGERY [facility];  Service: Urology   • TRANS URETHRAL RESECTION BLADDER Left 5/13/2019    Procedure: TRANS URETHRAL RESECTION BLADDER - SCAR, TRANS URETHRAL RESECTION LEFT TRIGONE;  Surgeon: Benji Murphy M.D.;  Location: SURGERY [facility];  Service: Urology   • CYSTOSCOPY N/A 5/13/2019    Procedure: CYSTOSCOPY - RIGID;  Surgeon: Benji Murphy M.D.;  Location: SURGERY [facility];  Service: Urology   • TURP-VAPOR  04/01/2019   • RECOVERY  8/4/2011    Performed by SURGERY, IR-RECOVERY at SURGERY SAME DAY [facility] ORS   • COLONOSCOPY - ENDO  10/14/08    Performed by CECY DIAZ at ENDOSCOPY [facility] ORS   • GASTROSCOPY-ENDO  10/13/08    Performed by CECY DIAZ at ENDOSCOPY [facility] ORS   • OTHER ORTHOPEDIC SURGERY  2003    left knee replacement   • CYST EXCISION        Vitals:    03/27/20 0542   BP: 160/93   Pulse: 80   Resp: 18   Temp: 36.8 °C (98.2 °F)   SpO2: 92%      Physical Exam    Airway   Mallampati: II  TM distance: <3 FB  Neck ROM: limited    Comments: Louis present   Cardiovascular   Rhythm: regular  Rate: normal     Dental   (+) upper dentures         Pulmonary   Breath sounds clear to auscultation     Abdominal    Neurological - normal exam                 Anesthesia Plan    ASA 3 (PVD, seizures, hepatitis, aortic stenosis)   ASA physical status 3 criteria: other (comment)    Plan - general       Airway plan will be ETT      Plan Factors:   Patient was previously instructed to abstain from smoking on day of procedure.  Patient did not smoke on day of procedure.      Induction: intravenous    Postoperative Plan: Postoperative administration of opioids is  intended.    Pertinent diagnostic labs and testing reviewed    Informed Consent:    Anesthetic plan and risks discussed with patient.

## 2020-03-27 NOTE — ANESTHESIA PROCEDURE NOTES
Airway  Date/Time: 3/27/2020 7:42 AM  Performed by: Dunia Paiz M.D.  Authorized by: Dunia Paiz M.D.     Location:  OR  Urgency:  Elective  Indications for Airway Management:  Anesthesia  Spontaneous Ventilation: absent    Sedation Level:  Deep  Preoxygenated: Yes    Patient Position:  Sniffing  Mask Difficulty Assessment:  0 - not attempted  Final Airway Type:  Endotracheal airway  Final Endotracheal Airway:  ETT  Cuffed: Yes    Technique Used for Successful ETT Placement:  Video laryngoscopy  Devices/Methods Used in Placement:  Intubating stylet  Insertion Site:  Oral  Blade Type:  Glide  Laryngoscope Blade/Videolaryngoscope Blade Size:  4  ETT Size (mm):  7.5  Leak Pressue (cm H2O):  35  Measured from:  Teeth  ETT to Teeth (cm):  23  Placement Verified by: auscultation and capnometry    Cormack-Lehane Classification:  Grade I - full view of glottis  Number of Attempts at Approach:  1

## 2020-03-27 NOTE — OR NURSING
The pt is awake and oriented. Respirations are regular and easy. Pain is controlled, the pt is comfortable.The F/C output is clearing to pink. Dr Murphy in to examine..

## 2020-03-27 NOTE — ANESTHESIA TIME REPORT
Anesthesia Start and Stop Event Times     Date Time Event    3/27/2020 0720 Ready for Procedure     0730 Anesthesia Start     0844 Anesthesia Stop        Responsible Staff  03/27/20    Name Role Begin End    Dunia Paiz M.D. Anesth 0730 0844        Preop Diagnosis (Free Text):  Pre-op Diagnosis     MALIGNANT TUMOROF URINARY BLADDER        Preop Diagnosis (Codes):    Post op Diagnosis  Bladder tumor      Premium Reason  Non-Premium    Comments:

## 2020-03-27 NOTE — OR NURSING
1050- Pt arrives from phase 1, transferred to recliner, red tinged urine draining from toro.    1107- Pt's son, Deepak, brought to bedside.  Pt and son provided with d/c paperwork and instructions, script for norco.  All questions answered.  Toro teaching provided.  Pt changing with son's help.    1117- Pt changed, teaching reinforced and pt's son verbalized understanding.  Iv removed, pt taken out via w/c.

## 2020-03-27 NOTE — OR SURGEON
Immediate Post OP Note    PreOp Diagnosis: History of high grade TCC of the bladder                                History of CIS of the bladder                                Left bladder neck lesion 5.5cm                                Positive urine cytology                                    PostOp Diagnosis: As above    Procedure(s):  CYSTOSCOPY   LEFT RETROGRADE PYELOGRAM  TRANSURETHRAL RESECTION OF LEFT BLADDER NECK TUMOR 5.5 cm AREA with RESECTION OF LEFT PROSTATIC URETHRAL FLOOR - Wound Class: Clean Contaminated      Surgeon(s):  Benji Murphy M.D.    Anesthesiologist/Type of Anesthesia:  Anesthesiologist: Dunia Paiz M.D./General    Surgical Staff:  Circulator: Mari Lara R.N.  Scrub Person: Zaheer Black    Specimens removed if any:  ID Type Source Tests Collected by Time Destination   A : LEFT BLADDER NECK TUMOR Other Other PATHOLOGY SPECIMEN Benji Murphy M.D. 3/27/2020  8:07 AM        Estimated Blood Loss: 10ml    Findings: Unable to identify the right ureteral orifice and no efflux noted with indigo carmine.    Complications: None  Drains: 22 Greenlandic toro to gravity        3/27/2020 8:40 AM Benji Murphy M.D.

## 2020-03-27 NOTE — ANESTHESIA POSTPROCEDURE EVALUATION
Patient: Bay Forrester    Procedure Summary     Date:  03/27/20 Room / Location:  [place] [address] / SURGERY [facility]    Anesthesia Start:  0730 Anesthesia Stop:  0844    Procedures:       CYSTOSCOPY, WITH BLADDER BIOPSY- AND TRANSURETHRAL BIOPSY OF PROSTATIC URETHRA (Bladder)      CYSTOSCOPY, WITH LEFT RETROGRADE PYELOGRAM (Left Bladder) Diagnosis:  (MALIGNANT TUMOROF URINARY BLADDER)    Surgeon:  Benji Murphy M.D. Responsible Provider:  Dunia Paiz M.D.    Anesthesia Type:  general ASA Status:  3          Final Anesthesia Type: general  Last vitals  BP   Blood Pressure : (!) 169/108    Temp   36.8 °C (98.2 °F)    Pulse   Pulse: 91   Resp   (!) 23    SpO2   100 %      Anesthesia Post Evaluation    Patient location during evaluation: PACU  Patient participation: complete - patient participated  Level of consciousness: awake and alert  Pain score: 0    Airway patency: patent  Anesthetic complications: no  Cardiovascular status: hemodynamically stable  Respiratory status: acceptable  Hydration status: euvolemic    PONV: none

## 2020-03-29 ENCOUNTER — HOSPITAL ENCOUNTER (EMERGENCY)
Facility: MEDICAL CENTER | Age: 80
End: 2020-03-29
Attending: EMERGENCY MEDICINE
Payer: MEDICARE

## 2020-03-29 VITALS
DIASTOLIC BLOOD PRESSURE: 88 MMHG | WEIGHT: 245.37 LBS | HEIGHT: 71 IN | OXYGEN SATURATION: 96 % | RESPIRATION RATE: 16 BRPM | HEART RATE: 64 BPM | SYSTOLIC BLOOD PRESSURE: 174 MMHG | BODY MASS INDEX: 34.35 KG/M2 | TEMPERATURE: 97.6 F

## 2020-03-29 DIAGNOSIS — Z46.6 ENCOUNTER FOR FOLEY CATHETER REMOVAL: ICD-10-CM

## 2020-03-29 PROCEDURE — 99283 EMERGENCY DEPT VISIT LOW MDM: CPT

## 2020-03-29 ASSESSMENT — FIBROSIS 4 INDEX: FIB4 SCORE: 1.26

## 2020-03-29 NOTE — ED NOTES
Patient discharged to home with son. Patient alert and oriented x 4, ambulatory with steady gait. Patient verbalizes understanding of discharge instructions, follow up care, and return precautions. Patient denies questions at time of discharge.

## 2020-03-29 NOTE — ED TRIAGE NOTES
"Wheeled to triage  Chief Complaint   Patient presents with   • Urinary Catheter Problem     Hx of bladder cancer, had a catheter placed 2 days ago after a \"scraping,\" pt fell off the couch and mostly pulled out his catheter out, called Dr. Augustin who told him to go the ER and have his catheter removed.  "

## 2020-03-29 NOTE — ED PROVIDER NOTES
ED Provider Note    Scribed for Mark Fragoso M.D. by Salvatore Banks. 3/29/2020, 11:50 AM.    Primary care provider: Jessenia Ruelas M.D.  Means of arrival: walk-in  History obtained from: Patient  History limited by: None    CHIEF COMPLAINT  Chief Complaint   Patient presents with   • Urinary Catheter Problem       HPI  Bay Forrester is a 80 y.o. male with a history of bladder cancer who presents to the Emergency Department with concerns about his urinary catheter. He originaly had this in place for a bladder procedure. He states that he fell off of the couch and mostly pulled it out. He contacted Dr. Murphy (Urology) who recommended presenting to the ED to have it removed. Patient denies any fever.    REVIEW OF SYSTEMS  Pertinent positives include catheter dysfunction.   Pertinent negatives include no fever.       PAST MEDICAL HISTORY   has a past medical history of Anxiety, Aortic stenosis, Arthritis, BPH (benign prostatic hypertrophy), Cancer (HCC) (2019), Dental disorder, Depression, Diverticulitis, Dizziness (9/20/2018), DJD (degenerative joint disease), Gross hematuria (2/20/2019), Heart murmur, Hemorrhoid, Hepatitis B (1989), Hepatitis C, Hypertension, Liver disease, Nonspecific abnormal electrocardiogram (ECG) (EKG) (11/15/2018), Pneumonia, Recurrent major depressive disorder, in remission (HCC), Snoring, Urinary bladder disorder, and Urinary incontinence.    SURGICAL HISTORY   has a past surgical history that includes gastroscopy-endo (10/13/08); colonoscopy - endo (10/14/08); recovery (8/4/2011); turp-vapor (04/01/2019); cyst excision; trans urethral resection bladder (Left, 5/13/2019); cystoscopy (N/A, 5/13/2019); other orthopedic surgery (2003); cystoscopy (9/9/2019); trans urethral resection bladder (9/9/2019); cystourethroscopy,biopsies (3/27/2020); and cystourethroscopy,ureter catheter (Left, 3/27/2020).    SOCIAL HISTORY  Social History     Tobacco Use   • Smoking status: Former Smoker  "    Packs/day: 1.00     Years: 40.00     Pack years: 40.00     Types: Cigarettes, Cigars     Last attempt to quit: 1989     Years since quittin.2   • Smokeless tobacco: Never Used   • Tobacco comment: avoid all tobacco products   Substance Use Topics   • Alcohol use: Yes     Alcohol/week: 0.0 oz     Comment: 1 drinks per month    • Drug use: No      Social History     Substance and Sexual Activity   Drug Use No       FAMILY HISTORY  Family History   Problem Relation Age of Onset   • Heart Disease Mother    • Other Father         MS    • Heart Disease Brother    • Diabetes Brother    • Diabetes Brother    • Heart Disease Brother    • Suicide Attempts Maternal Grandmother    • No Known Problems Maternal Grandfather    • No Known Problems Paternal Grandmother    • No Known Problems Paternal Grandfather    • Alzheimer's Disease Brother    • Depression Brother        CURRENT MEDICATIONS  Current Outpatient Medications:   •  traZODone (DESYREL) 100 MG Tab, trazodone 100 mg tablet (Patient taking differently: Take 100 mg by mouth every evening. trazodone 100 mg tablet), Disp: 90 Tab, Rfl: 1  •  doxazosin (CARDURA) 8 MG tablet, TAKE 1 TABLET BY MOUTH EVERY DAY, Disp: 90 Tab, Rfl: 0  •  lisinopril (PRINIVIL) 40 MG tablet, Take 40 mg by mouth every bedtime. Indications: High Blood Pressure Disorder, Disp: , Rfl:   •  B Complex Vitamins (VITAMIN B COMPLEX PO), Take 1 Tab by mouth every 48 hours., Disp: , Rfl:   •  vitamin D (CHOLECALCIFEROL) 1000 UNIT Tab, Take 2,000 Units by mouth every 48 hours., Disp: , Rfl:      ALLERGIES  No Known Allergies    PHYSICAL EXAM  VITAL SIGNS: BP (!) 183/108   Pulse (!) 105   Temp 36.4 °C (97.6 °F) (Temporal)   Resp 17   Ht 1.803 m (5' 11\")   Wt 111.3 kg (245 lb 6 oz)   SpO2 95%   BMI 34.22 kg/m²     Nursing note and vitals reviewed.  Constitutional: No distress.   HENT: Head is atraumatic. Oropharynx is moist.   Eyes: Conjunctivae are normal. Pupils are equal, round, and " reactive to light.   Cardiovascular: Normal peripheral perfusion  Respiratory: No respiratory distress.   : Toro catheter in place.   Musculoskeletal: Normal range of motion.   Neurological: Alert. No focal deficits noted.    Skin: No rash.   Psych: Appropriate for clinical situation     COURSE & MEDICAL DECISION MAKING  Nursing notes, VS, PMSFHx reviewed in chart.    11:50 AM - Patient seen and examined at bedside. Nursing staff removed the patient's toro catheter. Patient was able to urinate without any difficulty following removal. Discussed ED return precautions including but not limited to fever and an inability to urinate.     HTN/IDDM FOLLOW UP:  The patient has known hypertension and is being followed by their primary care doctor      DISPOSITION:  Patient will be discharged home in stable condition.    FOLLOW UP:  [facility], Emergency Dept  [address] [facility] [address] [phone]    If symptoms worsen    Benji Murphy M.D.  [address]  [facility] [address]  [phone]    Schedule an appointment as soon as possible for a visit       The patient was discharged home with an information sheet on urinary retention and told to return immediately for any signs or symptoms listed.  The patient agreed to the discharge precautions and follow-up plan which is documented in EPIC.    FINAL IMPRESSION  1. Encounter for Toro catheter removal          Salvatore PALM (Smooth), am scribing for, and in the presence of, Mark Fragoso M.D..    Electronically signed by: Salvatore Banks (mSooth), 3/29/2020    IMark M.D. personally performed the services described in this documentation, as scribed by Salvatore Banks in my presence, and it is both accurate and complete. E    The note accurately reflects work and decisions made by me.  Mark Fragoso M.D.  3/29/2020  1:04 PM

## 2020-03-29 NOTE — ED NOTES
Pre-hospital indwelling toro catheter removed. 30cc of saline removed from balloon. patient tolerated well stating his pain is much improved after removal. Patient provided with urinal to attempt to void.

## 2020-03-30 NOTE — PROGRESS NOTES
Called member for ED List. Member had no questions at this time, direct phone number given for future contact.

## 2020-04-01 NOTE — OP REPORT
DATE OF SERVICE:  03/27/2020    PREOPERATIVE DIAGNOSES:  1.  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  2.  History of carcinoma in situ of the bladder.  3.  Left bladder neck lesion 5.5 cm in size.  4.  Positive urine cytology.    OPERATION AND PROCEDURE PERFORMED:  1.  Rigid cystourethroscopy.  2.  Left retrograde ureteral pyelogram with inability to perform right   retrograde ureteral pyelogram despite administration of indigo carmine.  3.  Transurethral resection of left bladder neck tumor 5.5 cm area resected   and transurethral resection of left prostatic urethral floor adjacent to   bladder neck.    SURGEON:  Benji Murphy MD    POSTOPERATIVE DIAGNOSES:  1.  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  2.  History of carcinoma in situ of the bladder.  3.  Left bladder neck lesion 5.5 cm in size.  4.  Positive urine cytology.    ANESTHESIA:  General laryngeal mask.    ANESTHESIOLOGIST:  Dunia Paiz MD    SPECIMENS:  Left bladder neck tumor to pathology for permanent section.    DRAINS:  A 22-Armenian Bishop to gravity.    ESTIMATED BLOOD LOSS:  10 mL.    FINDINGS:  I was unable to identify the right ureteral orifice at the time of   the procedure despite administering indigo carmine.  I did identify the left   ureteral orifice, which had good efflux.    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  He in 2019 underwent a transurethral resection, was   treated with BCG intravesical immunotherapy and a recent cystoscopy, has   recurred.  I recommended cystoscopy, transurethral resection, retrograde   pyelograms, and prior to surgery, I discussed the risk including but not   limited to risk of perioperative urinary tract infection, risk of urethral   strictures, a delayed complication of instrumentation, a risk of bladder   perforation, the  potential need for additional therapy including chemotherapy   or surgery, the fact that with the positive cytology, this is likely cancer.    We discussed the risk of postoperative bleeding, pain as well as the   perioperative risk of deep vein thrombosis, pulmonary embolism, aspiration   pneumonia, heart attack, stroke and death.  Informed consent was given to me   by the patient to proceed.    PROCEDURE IN DETAIL:  After informed consent was obtained, the patient was   brought to the operating room and placed supine.  General laryngeal mask   anesthetic administered in a balanced fashion.  Patient received intravenous   Ancef.  Bilateral sequential compression devices were in place and   operational.  He was positioned in modified lithotomy and the operative area   was Betadine prepped and draped in the usual sterile fashion.    Surgical time-out was called.  All members of the operative team agree as the   patient's name, procedure to be performed.  I began by dilating the penile   urethra with Etelvina sounds from 20-Bahamian to 26-Bahamian and then passed a   26-Bahamian resectoscope with the visualizing obturator and 30-degree lens per   urethra.  The anterior urethra was normal.  Prostatic fossa measured about   4.5-5 cm in length.  He is status post resection of the proximal prostatic   fossa on the left side.  Upon entering the bladder, serial evaluation shows   evidence of efflux from the left ureteral orifice.  There is a tumor involving   the bladder neck about 5.5 cm involving really from 5:00-12:00 position in   the lateral wall of the bladder.  At this point in time, I could not find the   right ureteral orifice.  So, Indigo carmine was administered by Dr. Paiz.    Despite waiting and seeing strong efflux on the left side, I never was able to   visualize efflux from the right side.  I proceeded to cannulate the left   ureteral orifice with a 6-Bahamian open-ended ureteral catheter and performed a    retrograde ureteral pyelogram, which showed a small air bubble in the proximal   ureter, which drained eventually.  There was no persistent filling defects   and no evidence of hydroureteronephrosis.  The patient has 2+ trabeculation.    Surgical scar is seen from prior biopsy.  At this point in time, utilizing   loupe electrode and monopolar current, I resected the 5.5 cm tumor completely.    The ureteral orifice was not involved in the resection.  Deep muscle was   obtained and the prostatic floor adjacent to the left bladder neck and bladder   wall tumor was also resected as part of the resection.  I used a rollerball   to fulgurate this as well as the base of the tumor and surrounding area of   about 0.5 cm and the ureteral orifice was uninvolved as stated.  Again, I   looked for efflux from the right side, never could be found.  At the end of   the case, the chips were removed.  Hemostasis was excellent.  A 22-Senegalese   Bishop was passed per urethra and left to gravity drainage.  He tolerated the   procedure well without complication, was awakened in the operating room,   transferred to the recovery room where he arrived in stable condition.       ____________________________________     MD RUBINA Love / BENJY    DD:  03/31/2020 22:15:37  DT:  03/31/2020 23:27:51    D#:  [id number]  Job#:  [id number]

## 2020-04-17 ENCOUNTER — HOSPITAL ENCOUNTER (OUTPATIENT)
Dept: LAB | Facility: MEDICAL CENTER | Age: 80
End: 2020-04-17
Attending: UROLOGY
Payer: MEDICARE

## 2020-04-17 LAB
ALBUMIN SERPL BCP-MCNC: 3.6 G/DL (ref 3.2–4.9)
ALBUMIN/GLOB SERPL: 1.3 G/DL
ALP SERPL-CCNC: 59 U/L (ref 30–99)
ALT SERPL-CCNC: 10 U/L (ref 2–50)
ANION GAP SERPL CALC-SCNC: 9 MMOL/L (ref 7–16)
APPEARANCE UR: ABNORMAL
AST SERPL-CCNC: 12 U/L (ref 12–45)
BACTERIA #/AREA URNS HPF: NEGATIVE /HPF
BASOPHILS # BLD AUTO: 0.6 % (ref 0–1.8)
BASOPHILS # BLD: 0.05 K/UL (ref 0–0.12)
BILIRUB SERPL-MCNC: 0.5 MG/DL (ref 0.1–1.5)
BILIRUB UR QL STRIP.AUTO: NEGATIVE
BUN SERPL-MCNC: 23 MG/DL (ref 8–22)
CALCIUM SERPL-MCNC: 8.9 MG/DL (ref 8.5–10.5)
CHLORIDE SERPL-SCNC: 105 MMOL/L (ref 96–112)
CO2 SERPL-SCNC: 26 MMOL/L (ref 20–33)
COLOR UR: ABNORMAL
CREAT SERPL-MCNC: 1.35 MG/DL (ref 0.5–1.4)
EOSINOPHIL # BLD AUTO: 0.25 K/UL (ref 0–0.51)
EOSINOPHIL NFR BLD: 3.1 % (ref 0–6.9)
EPI CELLS #/AREA URNS HPF: NEGATIVE /HPF
ERYTHROCYTE [DISTWIDTH] IN BLOOD BY AUTOMATED COUNT: 48.6 FL (ref 35.9–50)
FASTING STATUS PATIENT QL REPORTED: NORMAL
GLOBULIN SER CALC-MCNC: 2.8 G/DL (ref 1.9–3.5)
GLUCOSE SERPL-MCNC: 121 MG/DL (ref 65–99)
GLUCOSE UR STRIP.AUTO-MCNC: NEGATIVE MG/DL
HCT VFR BLD AUTO: 43.4 % (ref 42–52)
HGB BLD-MCNC: 13.9 G/DL (ref 14–18)
HYALINE CASTS #/AREA URNS LPF: ABNORMAL /LPF
IMM GRANULOCYTES # BLD AUTO: 0.04 K/UL (ref 0–0.11)
IMM GRANULOCYTES NFR BLD AUTO: 0.5 % (ref 0–0.9)
KETONES UR STRIP.AUTO-MCNC: NEGATIVE MG/DL
LEUKOCYTE ESTERASE UR QL STRIP.AUTO: ABNORMAL
LYMPHOCYTES # BLD AUTO: 1.11 K/UL (ref 1–4.8)
LYMPHOCYTES NFR BLD: 13.6 % (ref 22–41)
MCH RBC QN AUTO: 30.2 PG (ref 27–33)
MCHC RBC AUTO-ENTMCNC: 32 G/DL (ref 33.7–35.3)
MCV RBC AUTO: 94.3 FL (ref 81.4–97.8)
MICRO URNS: ABNORMAL
MONOCYTES # BLD AUTO: 0.67 K/UL (ref 0–0.85)
MONOCYTES NFR BLD AUTO: 8.2 % (ref 0–13.4)
NEUTROPHILS # BLD AUTO: 6.02 K/UL (ref 1.82–7.42)
NEUTROPHILS NFR BLD: 74 % (ref 44–72)
NITRITE UR QL STRIP.AUTO: NEGATIVE
NRBC # BLD AUTO: 0 K/UL
NRBC BLD-RTO: 0 /100 WBC
PH UR STRIP.AUTO: 5.5 [PH] (ref 5–8)
PLATELET # BLD AUTO: 204 K/UL (ref 164–446)
PMV BLD AUTO: 10.3 FL (ref 9–12.9)
POTASSIUM SERPL-SCNC: 5.2 MMOL/L (ref 3.6–5.5)
PROT SERPL-MCNC: 6.4 G/DL (ref 6–8.2)
PROT UR QL STRIP: 30 MG/DL
RBC # BLD AUTO: 4.6 M/UL (ref 4.7–6.1)
RBC # URNS HPF: ABNORMAL /HPF
RBC UR QL AUTO: ABNORMAL
SODIUM SERPL-SCNC: 140 MMOL/L (ref 135–145)
SP GR UR STRIP.AUTO: 1.02
UROBILINOGEN UR STRIP.AUTO-MCNC: 0.2 MG/DL
WBC # BLD AUTO: 8.1 K/UL (ref 4.8–10.8)
WBC #/AREA URNS HPF: ABNORMAL /HPF

## 2020-04-17 PROCEDURE — 36415 COLL VENOUS BLD VENIPUNCTURE: CPT

## 2020-04-17 PROCEDURE — 87086 URINE CULTURE/COLONY COUNT: CPT

## 2020-04-17 PROCEDURE — 80053 COMPREHEN METABOLIC PANEL: CPT

## 2020-04-17 PROCEDURE — 81001 URINALYSIS AUTO W/SCOPE: CPT

## 2020-04-17 PROCEDURE — 85025 COMPLETE CBC W/AUTO DIFF WBC: CPT

## 2020-04-19 LAB
BACTERIA UR CULT: NORMAL
SIGNIFICANT IND 70042: NORMAL
SITE SITE: NORMAL
SOURCE SOURCE: NORMAL

## 2020-04-20 NOTE — TELEPHONE ENCOUNTER
"----- Message from Christiana Harrington M.D. sent at 2/28/2019  8:13 PM PST -----  Your Urine Cytology is positive for \"  Positive for high-grade urothelial carcinoma \" per pathology report which is concerning. You will need to follow up with your Urologist ASAP.   I have already placed Urology referral on 02/21/2019. The referral department have tried to reach you to follow up with -   [facility]  [address] [place] [address] [place]  [address]  Phone: [phone]    Please call them and set up an appointment ASAP.  Christiana Harrington M.D.  " Orders placed

## 2020-04-28 DIAGNOSIS — N40.1 BENIGN NON-NODULAR PROSTATIC HYPERPLASIA WITH LOWER URINARY TRACT SYMPTOMS: ICD-10-CM

## 2020-04-29 RX ORDER — DOXAZOSIN 8 MG/1
TABLET ORAL
Qty: 90 TAB | Refills: 0 | Status: SHIPPED | OUTPATIENT
Start: 2020-04-29 | End: 2020-07-24

## 2020-06-22 DIAGNOSIS — G47.09 OTHER INSOMNIA: ICD-10-CM

## 2020-06-25 RX ORDER — TRAZODONE HYDROCHLORIDE 100 MG/1
TABLET ORAL
Qty: 90 TAB | Refills: 1 | Status: SHIPPED | OUTPATIENT
Start: 2020-06-25 | End: 2020-11-10

## 2020-07-21 DIAGNOSIS — N40.1 BENIGN NON-NODULAR PROSTATIC HYPERPLASIA WITH LOWER URINARY TRACT SYMPTOMS: ICD-10-CM

## 2020-07-24 RX ORDER — DOXAZOSIN 8 MG/1
TABLET ORAL
Qty: 90 TAB | Refills: 0 | Status: SHIPPED | OUTPATIENT
Start: 2020-07-24 | End: 2020-10-27

## 2020-07-24 NOTE — TELEPHONE ENCOUNTER
Pt last seen regarding this issue 02/2020. Will send 3 months of fills to pharmacy.  Due to COVID-19 concerns and social distancing guidelines, patient is recommended to have a virtual visit with provider if able per insurance.

## 2020-07-27 ENCOUNTER — HOSPITAL ENCOUNTER (OUTPATIENT)
Dept: LAB | Facility: MEDICAL CENTER | Age: 80
End: 2020-07-27
Attending: UROLOGY
Payer: MEDICARE

## 2020-07-27 PROCEDURE — 87086 URINE CULTURE/COLONY COUNT: CPT

## 2020-07-27 PROCEDURE — 85025 COMPLETE CBC W/AUTO DIFF WBC: CPT

## 2020-07-27 PROCEDURE — 85610 PROTHROMBIN TIME: CPT

## 2020-07-27 PROCEDURE — 85730 THROMBOPLASTIN TIME PARTIAL: CPT

## 2020-07-27 PROCEDURE — 80053 COMPREHEN METABOLIC PANEL: CPT

## 2020-07-27 PROCEDURE — 81001 URINALYSIS AUTO W/SCOPE: CPT

## 2020-07-28 LAB
ALBUMIN SERPL BCP-MCNC: 4.1 G/DL (ref 3.2–4.9)
ALBUMIN/GLOB SERPL: 1.5 G/DL
ALP SERPL-CCNC: 58 U/L (ref 30–99)
ALT SERPL-CCNC: 7 U/L (ref 2–50)
ANION GAP SERPL CALC-SCNC: 13 MMOL/L (ref 7–16)
APPEARANCE UR: ABNORMAL
APTT PPP: 32.7 SEC (ref 24.7–36)
AST SERPL-CCNC: 11 U/L (ref 12–45)
BACTERIA #/AREA URNS HPF: ABNORMAL /HPF
BASOPHILS # BLD AUTO: 0.4 % (ref 0–1.8)
BASOPHILS # BLD: 0.04 K/UL (ref 0–0.12)
BILIRUB SERPL-MCNC: 0.5 MG/DL (ref 0.1–1.5)
BILIRUB UR QL STRIP.AUTO: NEGATIVE
BUN SERPL-MCNC: 19 MG/DL (ref 8–22)
CALCIUM SERPL-MCNC: 9.1 MG/DL (ref 8.5–10.5)
CHLORIDE SERPL-SCNC: 104 MMOL/L (ref 96–112)
CO2 SERPL-SCNC: 24 MMOL/L (ref 20–33)
COLOR UR: YELLOW
CREAT SERPL-MCNC: 1.28 MG/DL (ref 0.5–1.4)
EOSINOPHIL # BLD AUTO: 0.12 K/UL (ref 0–0.51)
EOSINOPHIL NFR BLD: 1.3 % (ref 0–6.9)
EPI CELLS #/AREA URNS HPF: NEGATIVE /HPF
ERYTHROCYTE [DISTWIDTH] IN BLOOD BY AUTOMATED COUNT: 53.1 FL (ref 35.9–50)
GLOBULIN SER CALC-MCNC: 2.8 G/DL (ref 1.9–3.5)
GLUCOSE SERPL-MCNC: 91 MG/DL (ref 65–99)
GLUCOSE UR STRIP.AUTO-MCNC: NEGATIVE MG/DL
HCT VFR BLD AUTO: 46 % (ref 42–52)
HGB BLD-MCNC: 14.1 G/DL (ref 14–18)
HYALINE CASTS #/AREA URNS LPF: ABNORMAL /LPF
IMM GRANULOCYTES # BLD AUTO: 0.11 K/UL (ref 0–0.11)
IMM GRANULOCYTES NFR BLD AUTO: 1.2 % (ref 0–0.9)
INR PPP: 1.09 (ref 0.87–1.13)
KETONES UR STRIP.AUTO-MCNC: NEGATIVE MG/DL
LEUKOCYTE ESTERASE UR QL STRIP.AUTO: ABNORMAL
LYMPHOCYTES # BLD AUTO: 1.04 K/UL (ref 1–4.8)
LYMPHOCYTES NFR BLD: 11.2 % (ref 22–41)
MCH RBC QN AUTO: 30.1 PG (ref 27–33)
MCHC RBC AUTO-ENTMCNC: 30.7 G/DL (ref 33.7–35.3)
MCV RBC AUTO: 98.3 FL (ref 81.4–97.8)
MICRO URNS: ABNORMAL
MONOCYTES # BLD AUTO: 0.73 K/UL (ref 0–0.85)
MONOCYTES NFR BLD AUTO: 7.8 % (ref 0–13.4)
NEUTROPHILS # BLD AUTO: 7.26 K/UL (ref 1.82–7.42)
NEUTROPHILS NFR BLD: 78.1 % (ref 44–72)
NITRITE UR QL STRIP.AUTO: NEGATIVE
NRBC # BLD AUTO: 0 K/UL
NRBC BLD-RTO: 0 /100 WBC
PH UR STRIP.AUTO: 6 [PH] (ref 5–8)
PLATELET # BLD AUTO: 216 K/UL (ref 164–446)
PMV BLD AUTO: 9.8 FL (ref 9–12.9)
POTASSIUM SERPL-SCNC: 4.6 MMOL/L (ref 3.6–5.5)
PROT SERPL-MCNC: 6.9 G/DL (ref 6–8.2)
PROT UR QL STRIP: NEGATIVE MG/DL
PROTHROMBIN TIME: 14.4 SEC (ref 12–14.6)
RBC # BLD AUTO: 4.68 M/UL (ref 4.7–6.1)
RBC # URNS HPF: ABNORMAL /HPF
RBC UR QL AUTO: ABNORMAL
SODIUM SERPL-SCNC: 141 MMOL/L (ref 135–145)
SP GR UR STRIP.AUTO: 1.02
UROBILINOGEN UR STRIP.AUTO-MCNC: 0.2 MG/DL
WBC # BLD AUTO: 9.3 K/UL (ref 4.8–10.8)
WBC #/AREA URNS HPF: ABNORMAL /HPF

## 2020-07-29 ENCOUNTER — OFFICE VISIT (OUTPATIENT)
Dept: ADMISSIONS | Facility: MEDICAL CENTER | Age: 80
End: 2020-07-29
Attending: UROLOGY
Payer: MEDICARE

## 2020-07-29 DIAGNOSIS — Z01.812 PRE-OPERATIVE LABORATORY EXAMINATION: ICD-10-CM

## 2020-07-29 LAB
COVID ORDER STATUS COVID19: NORMAL
SARS-COV-2 RNA RESP QL NAA+PROBE: NOTDETECTED
SPECIMEN SOURCE: NORMAL

## 2020-07-29 PROCEDURE — 87086 URINE CULTURE/COLONY COUNT: CPT

## 2020-07-29 PROCEDURE — U0003 INFECTIOUS AGENT DETECTION BY NUCLEIC ACID (DNA OR RNA); SEVERE ACUTE RESPIRATORY SYNDROME CORONAVIRUS 2 (SARS-COV-2) (CORONAVIRUS DISEASE [COVID-19]), AMPLIFIED PROBE TECHNIQUE, MAKING USE OF HIGH THROUGHPUT TECHNOLOGIES AS DESCRIBED BY CMS-2020-01-R: HCPCS

## 2020-07-29 ASSESSMENT — FIBROSIS 4 INDEX: FIB4 SCORE: 1.56

## 2020-07-30 LAB
BACTERIA UR CULT: NORMAL
SIGNIFICANT IND 70042: NORMAL
SITE SITE: NORMAL
SOURCE SOURCE: NORMAL

## 2020-07-31 LAB
BACTERIA UR CULT: NORMAL
SIGNIFICANT IND 70042: NORMAL
SITE SITE: NORMAL
SOURCE SOURCE: NORMAL

## 2020-08-02 NOTE — PROGRESS NOTES
"Pharmacy Chemotherapy Verification  Patient Name: Bay Forrester   DX: Bladder Cancer     Cycle 1  Previous treatment = N/A     Regimen: Intravesical Gemcitabine  Gemcitabine 2000 mg in  ml instilled intravesically within 3 hours after TURBT  Grabiel NUR, et al. Effect of Intravesical Instillation of Gemcitabine vs Saline Immediately Following Resection of Suspected Low-Grade Non-Muscle-Invasive Bladder Cancer on Tumor Recurrence SWOG  Randomized Clinical Trial. GISSEL. 2018;319(18):0920-2558     Allergies:Patient has no known allergies.  Ht 1.816 m (5' 11.5\")   Wt 111.9 kg (246 lb 11.1 oz)   BMI 33.93 kg/m²   Body surface area is 2.38 meters squared.     LABS: not required      Gemcitabine 1000 mg in NS 50 ml intravesically via cath tipped syringe x 2 syringes              Total dose = 2000 mg               Fixed dose, no calculation required. To be administered by MD. Juli Donis, PharmD, BCOP      "

## 2020-08-03 ENCOUNTER — HOSPITAL ENCOUNTER (OUTPATIENT)
Facility: MEDICAL CENTER | Age: 80
End: 2020-08-03
Attending: UROLOGY | Admitting: UROLOGY
Payer: MEDICARE

## 2020-08-03 ENCOUNTER — ANESTHESIA EVENT (OUTPATIENT)
Dept: SURGERY | Facility: MEDICAL CENTER | Age: 80
End: 2020-08-03
Payer: MEDICARE

## 2020-08-03 ENCOUNTER — ANESTHESIA (OUTPATIENT)
Dept: SURGERY | Facility: MEDICAL CENTER | Age: 80
End: 2020-08-03
Payer: MEDICARE

## 2020-08-03 VITALS
SYSTOLIC BLOOD PRESSURE: 173 MMHG | BODY MASS INDEX: 32.61 KG/M2 | HEART RATE: 75 BPM | WEIGHT: 240.74 LBS | OXYGEN SATURATION: 96 % | RESPIRATION RATE: 18 BRPM | DIASTOLIC BLOOD PRESSURE: 94 MMHG | TEMPERATURE: 97.6 F | HEIGHT: 72 IN

## 2020-08-03 PROCEDURE — 700101 HCHG RX REV CODE 250: Performed by: ANESTHESIOLOGY

## 2020-08-03 PROCEDURE — 88307 TISSUE EXAM BY PATHOLOGIST: CPT

## 2020-08-03 PROCEDURE — A4338 INDWELLING CATHETER LATEX: HCPCS | Performed by: UROLOGY

## 2020-08-03 PROCEDURE — 700105 HCHG RX REV CODE 258: Performed by: UROLOGY

## 2020-08-03 PROCEDURE — 700101 HCHG RX REV CODE 250: Performed by: UROLOGY

## 2020-08-03 PROCEDURE — 700102 HCHG RX REV CODE 250 W/ 637 OVERRIDE(OP): Performed by: UROLOGY

## 2020-08-03 PROCEDURE — 160048 HCHG OR STATISTICAL LEVEL 1-5: Performed by: UROLOGY

## 2020-08-03 PROCEDURE — 160028 HCHG SURGERY MINUTES - 1ST 30 MINS LEVEL 3: Performed by: UROLOGY

## 2020-08-03 PROCEDURE — 500879 HCHG PACK, CYSTO: Performed by: UROLOGY

## 2020-08-03 PROCEDURE — 160036 HCHG PACU - EA ADDL 30 MINS PHASE I: Performed by: UROLOGY

## 2020-08-03 PROCEDURE — 160035 HCHG PACU - 1ST 60 MINS PHASE I: Performed by: UROLOGY

## 2020-08-03 PROCEDURE — 160046 HCHG PACU - 1ST 60 MINS PHASE II: Performed by: UROLOGY

## 2020-08-03 PROCEDURE — 160009 HCHG ANES TIME/MIN: Performed by: UROLOGY

## 2020-08-03 PROCEDURE — A9270 NON-COVERED ITEM OR SERVICE: HCPCS | Performed by: ANESTHESIOLOGY

## 2020-08-03 PROCEDURE — 160025 RECOVERY II MINUTES (STATS): Performed by: UROLOGY

## 2020-08-03 PROCEDURE — 501329 HCHG SET, CYSTO IRRIG Y TUR: Performed by: UROLOGY

## 2020-08-03 PROCEDURE — 700111 HCHG RX REV CODE 636 W/ 250 OVERRIDE (IP): Performed by: UROLOGY

## 2020-08-03 PROCEDURE — 700111 HCHG RX REV CODE 636 W/ 250 OVERRIDE (IP): Performed by: ANESTHESIOLOGY

## 2020-08-03 PROCEDURE — 160039 HCHG SURGERY MINUTES - EA ADDL 1 MIN LEVEL 3: Performed by: UROLOGY

## 2020-08-03 PROCEDURE — 700102 HCHG RX REV CODE 250 W/ 637 OVERRIDE(OP): Performed by: ANESTHESIOLOGY

## 2020-08-03 PROCEDURE — A9270 NON-COVERED ITEM OR SERVICE: HCPCS | Performed by: UROLOGY

## 2020-08-03 PROCEDURE — 160002 HCHG RECOVERY MINUTES (STAT): Performed by: UROLOGY

## 2020-08-03 RX ORDER — OXYCODONE HCL 5 MG/5 ML
10 SOLUTION, ORAL ORAL
Status: COMPLETED | OUTPATIENT
Start: 2020-08-03 | End: 2020-08-03

## 2020-08-03 RX ORDER — HYDROMORPHONE HYDROCHLORIDE 1 MG/ML
0.4 INJECTION, SOLUTION INTRAMUSCULAR; INTRAVENOUS; SUBCUTANEOUS
Status: DISCONTINUED | OUTPATIENT
Start: 2020-08-03 | End: 2020-08-03 | Stop reason: HOSPADM

## 2020-08-03 RX ORDER — CEFAZOLIN SODIUM 1 G/3ML
INJECTION, POWDER, FOR SOLUTION INTRAMUSCULAR; INTRAVENOUS PRN
Status: DISCONTINUED | OUTPATIENT
Start: 2020-08-03 | End: 2020-08-03 | Stop reason: SURG

## 2020-08-03 RX ORDER — SODIUM CHLORIDE, SODIUM LACTATE, POTASSIUM CHLORIDE, CALCIUM CHLORIDE 600; 310; 30; 20 MG/100ML; MG/100ML; MG/100ML; MG/100ML
INJECTION, SOLUTION INTRAVENOUS CONTINUOUS
Status: DISCONTINUED | OUTPATIENT
Start: 2020-08-03 | End: 2020-08-03 | Stop reason: HOSPADM

## 2020-08-03 RX ORDER — HYDROMORPHONE HYDROCHLORIDE 1 MG/ML
0.1 INJECTION, SOLUTION INTRAMUSCULAR; INTRAVENOUS; SUBCUTANEOUS
Status: DISCONTINUED | OUTPATIENT
Start: 2020-08-03 | End: 2020-08-03 | Stop reason: HOSPADM

## 2020-08-03 RX ORDER — LIDOCAINE HYDROCHLORIDE 20 MG/ML
INJECTION, SOLUTION EPIDURAL; INFILTRATION; INTRACAUDAL; PERINEURAL PRN
Status: DISCONTINUED | OUTPATIENT
Start: 2020-08-03 | End: 2020-08-03 | Stop reason: HOSPADM

## 2020-08-03 RX ORDER — HALOPERIDOL 5 MG/ML
1 INJECTION INTRAMUSCULAR
Status: DISCONTINUED | OUTPATIENT
Start: 2020-08-03 | End: 2020-08-03 | Stop reason: HOSPADM

## 2020-08-03 RX ORDER — ONDANSETRON 2 MG/ML
4 INJECTION INTRAMUSCULAR; INTRAVENOUS
Status: DISCONTINUED | OUTPATIENT
Start: 2020-08-03 | End: 2020-08-03 | Stop reason: HOSPADM

## 2020-08-03 RX ORDER — DIPHENHYDRAMINE HYDROCHLORIDE 50 MG/ML
12.5 INJECTION INTRAMUSCULAR; INTRAVENOUS
Status: DISCONTINUED | OUTPATIENT
Start: 2020-08-03 | End: 2020-08-03 | Stop reason: HOSPADM

## 2020-08-03 RX ORDER — HYDROMORPHONE HYDROCHLORIDE 1 MG/ML
0.2 INJECTION, SOLUTION INTRAMUSCULAR; INTRAVENOUS; SUBCUTANEOUS
Status: DISCONTINUED | OUTPATIENT
Start: 2020-08-03 | End: 2020-08-03 | Stop reason: HOSPADM

## 2020-08-03 RX ORDER — OXYCODONE HCL 5 MG/5 ML
5 SOLUTION, ORAL ORAL
Status: COMPLETED | OUTPATIENT
Start: 2020-08-03 | End: 2020-08-03

## 2020-08-03 RX ORDER — MEPERIDINE HYDROCHLORIDE 25 MG/ML
6.25 INJECTION INTRAMUSCULAR; INTRAVENOUS; SUBCUTANEOUS
Status: DISCONTINUED | OUTPATIENT
Start: 2020-08-03 | End: 2020-08-03 | Stop reason: HOSPADM

## 2020-08-03 RX ADMIN — SODIUM CHLORIDE, POTASSIUM CHLORIDE, SODIUM LACTATE AND CALCIUM CHLORIDE: 600; 310; 30; 20 INJECTION, SOLUTION INTRAVENOUS at 15:46

## 2020-08-03 RX ADMIN — POVIDONE-IODINE 15 ML: 10 SOLUTION TOPICAL at 15:15

## 2020-08-03 RX ADMIN — HYDROMORPHONE HYDROCHLORIDE 0.2 MG: 1 INJECTION, SOLUTION INTRAMUSCULAR; INTRAVENOUS; SUBCUTANEOUS at 19:15

## 2020-08-03 RX ADMIN — OXYCODONE HYDROCHLORIDE 10 MG: 5 SOLUTION ORAL at 17:57

## 2020-08-03 RX ADMIN — LIDOCAINE HYDROCHLORIDE 100 MG: 20 INJECTION, SOLUTION EPIDURAL; INFILTRATION; INTRACAUDAL at 16:41

## 2020-08-03 RX ADMIN — MIDAZOLAM 2 MG: 1 INJECTION INTRAMUSCULAR; INTRAVENOUS at 16:20

## 2020-08-03 RX ADMIN — FENTANYL CITRATE 25 MCG: 50 INJECTION INTRAMUSCULAR; INTRAVENOUS at 18:40

## 2020-08-03 RX ADMIN — FENTANYL CITRATE 25 MCG: 50 INJECTION INTRAMUSCULAR; INTRAVENOUS at 18:45

## 2020-08-03 RX ADMIN — HYDROMORPHONE HYDROCHLORIDE 0.2 MG: 1 INJECTION, SOLUTION INTRAMUSCULAR; INTRAVENOUS; SUBCUTANEOUS at 19:08

## 2020-08-03 RX ADMIN — FENTANYL CITRATE 100 MCG: 50 INJECTION INTRAMUSCULAR; INTRAVENOUS at 16:41

## 2020-08-03 RX ADMIN — FENTANYL CITRATE 25 MCG: 50 INJECTION INTRAMUSCULAR; INTRAVENOUS at 18:28

## 2020-08-03 RX ADMIN — PROPOFOL 200 MG: 10 INJECTION, EMULSION INTRAVENOUS at 16:41

## 2020-08-03 RX ADMIN — CEFAZOLIN 1 G: 330 INJECTION, POWDER, FOR SOLUTION INTRAMUSCULAR; INTRAVENOUS at 16:50

## 2020-08-03 RX ADMIN — FENTANYL CITRATE 25 MCG: 50 INJECTION INTRAMUSCULAR; INTRAVENOUS at 18:35

## 2020-08-03 RX ADMIN — GEMCITABINE HYDROCHLORIDE 1000 MG: 1 INJECTION, POWDER, LYOPHILIZED, FOR SOLUTION INTRAVENOUS at 17:08

## 2020-08-03 ASSESSMENT — FIBROSIS 4 INDEX: FIB4 SCORE: 1.56

## 2020-08-03 NOTE — ANESTHESIA PREPROCEDURE EVALUATION
Relevant Problems   PULMONARY   (+) History of hepatitis C      NEURO   (+) History of hepatitis C   (+) History of nephrolithiasis   (+) History of portal hypertension   (+) Hx of seizure disorder      CARDIAC   (+) Aortic stenosis   (+) HTN (hypertension), benign   (+) Internal hemorrhoid   (+) Migraine without status migrainosus, not intractable   (+) PVD (peripheral vascular disease) (HCC)       Physical Exam    Airway   Mallampati: II  TM distance: >3 FB  Neck ROM: full       Cardiovascular - normal exam  Rhythm: regular  Rate: normal  (-) murmur     Dental - normal exam           Pulmonary - normal exam  Breath sounds clear to auscultation     Abdominal    Neurological - normal exam                 Anesthesia Plan    ASA 3       Plan - general       Airway plan will be LMA  (Aortic stenosis)      Induction: intravenous    Postoperative Plan: Postoperative administration of opioids is intended.    Pertinent diagnostic labs and testing reviewed    Informed Consent:    Anesthetic plan and risks discussed with patient.    Use of blood products discussed with: patient whom consented to blood products.

## 2020-08-03 NOTE — PROGRESS NOTES
"Pharmacy Chemotherapy Calculation:    Pt Name: Bay Forrester  DX: Bladder Cancer    Regimen: Intravesical Gemcitabine  Gemcitabine 2000 mg in  ml instilled intravesically within 3 hours after TURBT  Grabiel NUR, et al. Effect of Intravesical Instillation of Gemcitabine vs Saline Immediately Following Resection of Suspected Low-Grade Non-Muscle-Invasive Bladder Cancer on Tumor Recurrence SWOG  Randomized Clinical Trial. GISSEL. 2018;319(18):3536-7223       Ht 1.816 m (5' 11.5\")   Wt 111.9 kg (246 lb 11.1 oz)   BMI 33.93 kg/m²   Body surface area is 2.38 meters squared.  LABS: not required        Drug Order   (Drug name, dose, route, IV Fluid & volume, frequency, number of doses) Cycle 1  Previous treatment = n/a     Medication = Gemcitabine (Gemzar)  Base Dose= 1000 mg x 2 syringes  Calc Dose: Fixed dose, no calculation required  Final Dose = 1000 mg x 2 syringes   Route = intravesically   Fluid & Volume = NS 50 mL x 2 syringes  Admin Duration = To be administered by MD only          Fixed dose, no calculation required. Okay to treat with final dose.         Stevan Morgan, PharmD    "

## 2020-08-04 LAB — PATHOLOGY CONSULT NOTE: NORMAL

## 2020-08-04 NOTE — OR NURSING
1810 Drained gemcitabine chemotherapy in collection bag per MD order.    1830 New collection bag for toro catheter attached. Pt tolerated procedure well. Pt reports burning sensation to his penis. Pain medication given.    Pt AA/Ox4. VSS. Intact toro catheter with adequate urine output. Pt will go home with toro catheter in place. Pt reports 4-5/10 pain scale at this time. Oral and IV pain medications given. Pt denies numbness or tingling. No nausea or vomiting. SCDs in place.     Pt's son, Deepak, updated regarding plan of care.     Will continue to monitor Pt closely.

## 2020-08-04 NOTE — ANESTHESIA TIME REPORT
Anesthesia Start and Stop Event Times     Date Time Event    8/3/2020 1620 Anesthesia Start     1636 Ready for Procedure     1736 Anesthesia Stop        Responsible Staff  08/03/20    Name Role Begin End    Hill Dobbins M.D. Anesth 1620 1736        Preop Diagnosis (Free Text):  Pre-op Diagnosis     HISTORY OF MALIGNANT NEOPLASM OF BLADDER        Preop Diagnosis (Codes):    Post op Diagnosis  Bladder cancer (HCC)      Premium Reason  A. 3PM - 7AM    Comments:

## 2020-08-04 NOTE — ANESTHESIA POSTPROCEDURE EVALUATION
Patient: Bay Forrester    Procedure Summary     Date:  08/03/20 Room / Location:  [place] [address] / SURGERY [facility]    Anesthesia Start:  1620 Anesthesia Stop:  1724    Procedure:  TURBT (TRANSURETHRAL RESECTION OF BLADDER TUMOR)- WITH INTRAVESICAL GEMCITABINE (N/A ) Diagnosis:  (HISTORY OF MALIGNANT NEOPLASM OF BLADDER)    Surgeon:  Benji Murphy M.D. Responsible Provider:  Hill Dobbins M.D.    Anesthesia Type:  general ASA Status:  3          Final Anesthesia Type: general  Last vitals  BP   Blood Pressure : (!) 165/82    Temp   37.3 °C (99.1 °F)    Pulse   Pulse: 85   Resp   17    SpO2   96 %      Anesthesia Post Evaluation    Patient location during evaluation: PACU  Patient participation: complete - patient participated  Level of consciousness: awake and alert    Airway patency: patent  Anesthetic complications: no  Cardiovascular status: hemodynamically stable  Respiratory status: acceptable  Hydration status: euvolemic    PONV: none           Nurse Pain Score: 5 (NPRS)

## 2020-08-04 NOTE — OR NURSING
Pt's VSS; BP elevated but within basline for pt. Pt. States he takes his BP meds at night and will take it when he gets home. Denies N/V; states pain is at tolerable level. D/c orders received. IV removed. Pt changed into clothing with assistance. Discharge instructions given as well as pain management handout; pt and family verbalized understanding and questions answered. Patient states ready to d/c home. Son dropped off Rx at pharmacy but is unable to  pain medication tonight since pharmacy closes at 8pm. Advised pt. To take tylenol and motrin for pain control until he can get his pain meds from pharmacy. All questions answered. Bishop care instructions reviewed with pt. And son. Both understand and had no questions. Pt. DC with RN via wheelchair in stable condition.

## 2020-08-04 NOTE — ANESTHESIA PROCEDURE NOTES
Airway    Date/Time: 8/3/2020 4:42 PM  Performed by: Hill Dobbins M.D.  Authorized by: Hill Dobbins M.D.     Location:  OR  Urgency:  Elective  Indications for Airway Management:  Anesthesia      Spontaneous Ventilation: absent    Sedation Level:  Deep  Preoxygenated: Yes    Final Airway Type:  Supraglottic airway  Final Supraglottic Airway:  Standard LMA    SGA Size:  4  Number of Attempts at Approach:  1

## 2020-08-04 NOTE — OR NURSING
Arrived to Phase II after report from Sergio YI, denies nausea, reports pain 4/10. Ambulated from gurney to chair with standby assist.    Bishop catheter present.    Alarms on and set to appropriate parameters. Call light within reach, rounding in place. Belongings given to pt. Pt. Son at bedside

## 2020-08-04 NOTE — DISCHARGE INSTRUCTIONS
ACTIVITY: Rest and take it easy for the first 24 hours.  A responsible adult is recommended to remain with you during that time.  It is normal to feel sleepy.  We encourage you to not do anything that requires balance, judgment or coordination.    MILD FLU-LIKE SYMPTOMS ARE NORMAL. YOU MAY EXPERIENCE GENERALIZED MUSCLE ACHES, THROAT IRRITATION, HEADACHE AND/OR SOME NAUSEA.    FOR 24 HOURS DO NOT:  Drive, operate machinery or run household appliances.  Drink beer or alcoholic beverages.   Make important decisions or sign legal documents.    SPECIAL INSTRUCTIONS:  Follow-up in office on 8/7 for voiding trial.    DIET: To avoid nausea, slowly advance diet as tolerated, avoiding spicy or greasy foods for the first day.  Add more substantial food to your diet according to your physician's instructions.  Babies can be fed formula or breast milk as soon as they are hungry.  INCREASE FLUIDS AND FIBER TO AVOID CONSTIPATION.    SURGICAL DRESSING/BATHING: May shower     FOLLOW-UP APPOINTMENT:  A follow-up appointment should be arranged with your doctor on August 7th (Friday); call to schedule.    You should CALL YOUR PHYSICIAN if you develop:  Fever greater than 101 degrees F.  Pain not relieved by medication, or persistent nausea or vomiting.  Excessive bleeding (blood soaking through dressing) or unexpected drainage from the wound.  Extreme redness or swelling around the incision site, drainage of pus or foul smelling drainage.  Inability to urinate or empty your bladder within 8 hours.  Problems with breathing or chest pain.    You should call 911 if you develop problems with breathing or chest pain.  If you are unable to contact your doctor or surgical center, you should go to the nearest emergency room or urgent care center.  Physician's telephone #: [phone]    If any questions arise, call your doctor.  If your doctor is not available, please feel free to call the Surgical Center at [phone].  The Center is  open Monday through Friday from 7AM to 7PM.  You can also call the HEALTH HOTLINE open 24 hours/day, 7 days/week and speak to a nurse at [phone], or toll free at [phone].    A registered nurse may call you a few days after your surgery to see how you are doing after your procedure.    MEDICATIONS: Resume taking daily medication.  Take prescribed pain medication with food.  If no medication is prescribed, you may take non-aspirin pain medication if needed.  PAIN MEDICATION CAN BE VERY CONSTIPATING.  Take a stool softener or laxative such as senokot, pericolace, or milk of magnesia if needed.    Prescriptions given to your son.  Last pain medication given at 5:57 PM 10mg Oxycodone.     If your physician has prescribed pain medication that includes Acetaminophen (Tylenol), do not take additional Acetaminophen (Tylenol) while taking the prescribed medication.    Depression / Suicide Risk    As you are discharged from this [facility] facility, it is important to learn how to keep safe from harming yourself.    Recognize the warning signs:  · Abrupt changes in personality, positive or negative- including increase in energy   · Giving away possessions  · Change in eating patterns- significant weight changes-  positive or negative  · Change in sleeping patterns- unable to sleep or sleeping all the time   · Unwillingness or inability to communicate  · Depression  · Unusual sadness, discouragement and loneliness  · Talk of wanting to die  · Neglect of personal appearance   · Rebelliousness- reckless behavior  · Withdrawal from people/activities they love  · Confusion- inability to concentrate     If you or a loved one observes any of these behaviors or has concerns about self-harm, here's what you can do:  · Talk about it- your feelings and reasons for harming yourself  · Remove any means that you might use to hurt yourself (examples: pills, rope, extension cords, firearm)  · Get professional help from the  community (Mental Health, Substance Abuse, psychological counseling)  · Do not be alone:Call your Safe Contact- someone whom you trust who will be there for you.  · Call your local CRISIS HOTLINE 487-7786 or 113-443-2146  · Call your local Children's Mobile Crisis Response Team [place] (655) 066-0610 or www.MobPanel  · Call the toll free National Suicide Prevention Hotlines   · National Suicide Prevention Lifeline 713-062-ZRAR (7350)  · National Hope Line Network 800-SUICIDE (441-4259)

## 2020-08-04 NOTE — OP REPORT
DATE OF SERVICE:  08/03/2020    PREOPERATIVE DIAGNOSES:  1.  Bladder neck and anterior bladder wall tumor.  2.  Positive urinary cytology.  3.  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    POSTOPERATIVE DIAGNOSES:  1.  Bladder neck and anterior bladder wall tumor.  2.  Positive urinary cytology.  3.  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    OPERATIONS AND PROCEDURES PERFORMED:  1.  Rigid cystourethroscopy.  2.  Transurethral resection of 3.5 cm anterior bladder wall and bladder neck   tumor with fulguration of base.  3.  Intravesical placement of 2 g gemcitabine chemotherapy.    SURGEON:  Benji Murphy MD    ANESTHESIA:  General laryngeal mask.    ANESTHESIOLOGIST:  Hill Dobbins MD    COMPLICATIONS:  None.    DRAINS:  A 22-Malaysian Bishop with catheter plug in place and 2 g intravesical   chemotherapy placed at 1708.    SPECIMENS:  Anterior bladder and bladder neck wall tumor sessile to pathology   for permanent section.    INDICATIONS:  The patient is an 80-year-old male with a history of high-grade   transitional cell carcinoma of the bladder, recently under surveillance   cystoscopy, was found to have a 3.5 sessile area on the anterior bladder wall,   which was erythematous and suggestive of potential recurrence of high-grade   tumor.  His urine cytology is positive, and I recommended to the patient that   he undergo a cystoscopy, transurethral resection locally with intravesical   gemcitabine chemotherapy.    We discussed the risk of the procedure including, but not limited to risk of   perioperative urinary tract infection, urethral strictures, a delayed   complication of instrumentation, risk of bladder perforation, risk of   gemcitabine chemical cystitis with prolonged urgency, frequency, dysuria.  We   discussed the potential need for adjunctive therapy given the history of   high-risk cancer and he is well aware of the significance of the tumor.  In    addition, we discussed the perioperative risk of postoperative pain,   incontinence, perioperative risk of deep vein thrombosis, pulmonary embolism,   aspiration pneumonia, heart attack, stroke, and death.  Informed consent was   given to me by the patient to proceed.    DESCRIPTION OF PROCEDURE IN DETAIL:  After informed consent was obtained, the   patient was brought to the operating room and placed supine.  Bilateral   sequential compression devices were in place and activated.  General laryngeal   mask anesthetic administered in a balanced fashion.  He was positioned in   modified lithotomy.  The operative area was Betadine prepped and draped in   usual sterile fashion.  A surgical time-out was called.  All members of the   operative team agree as the patient's name, procedure to be performed without   objections, attention was directed towards procedure.    I began the procedure by passing a generously lubricated 26-Bermudian   resectoscope with a 30-degree lens.  The anterior urethra was normal.  The   prostatic fossa measured 4.5 cm in length.  Upon entering the bladder, serial   evaluation showed a 3.5 cm erythematous area in the anterior bladder wall   extending from the bladder neck at 12 o'clock to approximately 5 o'clock.    This area of tumor was deeply resected to muscle.  The bladder was relatively   thin, overall has poor bladder capacity.  After resecting the tumor, a roller   ball was used to fulgurate the base and the surrounding area of about 0.5 cm.    Hemostasis was evaluated, it was felt to be excellent.  There was no evidence   of perforation and subsequently the bladder was drained.  I would note that I   was able to identify the right ureteral orifice.  I could not identify the   left ureteral orifice at endoscopy.  At this juncture in time, a 22-Bermudian   Bishop was inserted in the bladder, after all of the irrigant was removed, a 2   g of gemcitabine were placed and a catheter plug was  positioned at 1708.  The   patient, at the end of the case, had tolerated the procedure well without   complication, was awakened in the operating room and transferred to recovery   room where he arrived in stable condition with post-chemotherapy orders for   removal of the chemotherapy at 1608 per chemotherapy protocol.       ____________________________________     MD RUBINA Love / BENJY    DD:  08/04/2020 06:49:13  DT:  08/04/2020 07:16:13    D#:  [id number]  Job#:  [id number]

## 2020-08-04 NOTE — OR SURGEON
Immediate Post OP Note    PreOp Diagnosis: Bladder Neck Tumor                                History of high grade bladder cancer    PostOp Diagnosis: As above    Procedure(s):  TURBT of 3.5 cm tumor (TRANSURETHRAL RESECTION OF BLADDER TUMOR)- WITH INTRAVESICAL GEMCITABINE - Wound Class: Clean Contaminated    Surgeon(s):  Benji Murphy M.D.    Anesthesiologist/Type of Anesthesia:  Anesthesiologist: Hill Dobbins M.D./General LMA    Surgical Staff:  Circulator: Daniella Vega R.N.  Relief Circulator: Lonnie Busby R.N.  Scrub Person: Tasneem Ramirez    Specimens removed if any:  ID Type Source Tests Collected by Time Destination   A : Bladder neck tumor Other Other PATHOLOGY SPECIMEN Benji Murphy M.D. 8/3/2020 1705        Estimated Blood Loss: N/  Findings: 3.5 bladder tumor at the bladder neck and anterior bladder wall    Complications: None  Drains: 22 Persian toro with 2 grams of gemcitabine in the bladder at 1708.        8/3/2020 6:40 PM Benji Murphy M.D.

## 2020-08-04 NOTE — OR NURSING
"Pt's has elevated LJ=938/81. Pt states, \"that's how high it is.\" \"my BP is always high.\" \"I take my blood pressure medication at night.\" Notified Dr. Dobbins, anesthesiologist. MD okay for Pt to go home. Pt is within his baseline BP per MD. No new orders received.  "

## 2020-09-08 NOTE — OR SURGEON
Immediate Post OP Note    PreOp Diagnosis: History of High Grade TCC of the bladder    PostOp Diagnosis: As abo    Procedure(s):  CYSTOSCOPY - RIGID  TRANSURETHRAL RESECTION OF BLADDER TUMOR SCAR 6cm x 5cm  TRANSURETRHAL RESCECTION OF LEFT PROXIMAL TRIGONE    Surgeon(s):  Benji Murphy M.D.    Anesthesiologist/Type of Anesthesia:  Anesthesiologist: Lester Vickers M.D./General LMA    Surgical Staff:  Circulator: Katheryn Ramirez R.N.  Scrub Person: Jade [place]    Specimens removed if any:  A: Bladder tumor scar  B: Left trigone    Estimated Blood Loss: 75ml    Findings: Erythematous old site and abnormal appearing left trigone.    Complications: None  Drains: 20 Vietnamese toro to gravity        5/13/2019 1:30 PM Benji Murphy M.D.      
No

## 2020-11-06 DIAGNOSIS — G47.09 OTHER INSOMNIA: ICD-10-CM

## 2020-11-09 DIAGNOSIS — G47.09 OTHER INSOMNIA: ICD-10-CM

## 2020-11-10 RX ORDER — TRAZODONE HYDROCHLORIDE 100 MG/1
TABLET ORAL
Qty: 90 TAB | Refills: 1 | Status: SHIPPED | OUTPATIENT
Start: 2020-11-10

## 2020-11-11 RX ORDER — TRAZODONE HYDROCHLORIDE 100 MG/1
TABLET ORAL
Qty: 90 TAB | OUTPATIENT
Start: 2020-11-11

## 2020-11-18 ENCOUNTER — HOSPITAL ENCOUNTER (OUTPATIENT)
Facility: MEDICAL CENTER | Age: 80
End: 2020-11-18
Attending: UROLOGY | Admitting: UROLOGY
Payer: MEDICARE

## 2020-12-09 ENCOUNTER — HOSPITAL ENCOUNTER (OUTPATIENT)
Facility: MEDICAL CENTER | Age: 80
End: 2020-12-09
Attending: PHYSICIAN ASSISTANT
Payer: MEDICARE

## 2020-12-09 ENCOUNTER — APPOINTMENT (OUTPATIENT)
Dept: ADMISSIONS | Facility: MEDICAL CENTER | Age: 80
End: 2020-12-09
Payer: MEDICARE

## 2020-12-09 PROCEDURE — U0003 INFECTIOUS AGENT DETECTION BY NUCLEIC ACID (DNA OR RNA); SEVERE ACUTE RESPIRATORY SYNDROME CORONAVIRUS 2 (SARS-COV-2) (CORONAVIRUS DISEASE [COVID-19]), AMPLIFIED PROBE TECHNIQUE, MAKING USE OF HIGH THROUGHPUT TECHNOLOGIES AS DESCRIBED BY CMS-2020-01-R: HCPCS

## 2020-12-10 LAB — COVID ORDER STATUS COVID19: NORMAL

## 2020-12-11 LAB
SARS-COV-2 RNA RESP QL NAA+PROBE: DETECTED
SPECIMEN SOURCE: ABNORMAL

## 2021-01-11 DIAGNOSIS — Z23 NEED FOR VACCINATION: ICD-10-CM

## 2021-01-25 ENCOUNTER — HOSPITAL ENCOUNTER (OUTPATIENT)
Facility: MEDICAL CENTER | Age: 81
DRG: 682 | End: 2021-01-25
Attending: UROLOGY | Admitting: UROLOGY
Payer: MEDICARE

## 2021-02-09 ENCOUNTER — APPOINTMENT (OUTPATIENT)
Dept: RADIOLOGY | Facility: MEDICAL CENTER | Age: 81
DRG: 682 | End: 2021-02-09
Attending: EMERGENCY MEDICINE
Payer: MEDICARE

## 2021-02-09 ENCOUNTER — HOSPITAL ENCOUNTER (INPATIENT)
Facility: MEDICAL CENTER | Age: 81
LOS: 11 days | DRG: 682 | End: 2021-02-21
Attending: EMERGENCY MEDICINE | Admitting: STUDENT IN AN ORGANIZED HEALTH CARE EDUCATION/TRAINING PROGRAM
Payer: MEDICARE

## 2021-02-09 ENCOUNTER — PRE-ADMISSION TESTING (OUTPATIENT)
Dept: ADMISSIONS | Facility: MEDICAL CENTER | Age: 81
DRG: 682 | End: 2021-02-09
Attending: UROLOGY
Payer: MEDICARE

## 2021-02-09 DIAGNOSIS — C68.9 TRANSITIONAL CELL CARCINOMA (HCC): ICD-10-CM

## 2021-02-09 DIAGNOSIS — E87.5 HYPERKALEMIA: ICD-10-CM

## 2021-02-09 DIAGNOSIS — N17.9 ACUTE RENAL FAILURE, UNSPECIFIED ACUTE RENAL FAILURE TYPE (HCC): ICD-10-CM

## 2021-02-09 DIAGNOSIS — Z01.810 PRE-OPERATIVE CARDIOVASCULAR EXAMINATION: ICD-10-CM

## 2021-02-09 DIAGNOSIS — Z01.812 PRE-OPERATIVE LABORATORY EXAMINATION: ICD-10-CM

## 2021-02-09 DIAGNOSIS — I27.20 PULMONARY HYPERTENSION (HCC): ICD-10-CM

## 2021-02-09 DIAGNOSIS — I31.39 PERICARDIAL EFFUSION: ICD-10-CM

## 2021-02-09 DIAGNOSIS — N17.9 AKI (ACUTE KIDNEY INJURY) (HCC): ICD-10-CM

## 2021-02-09 LAB
ANION GAP SERPL CALC-SCNC: 20 MMOL/L (ref 7–16)
BASOPHILS # BLD AUTO: 0.4 % (ref 0–1.8)
BASOPHILS # BLD: 0.05 K/UL (ref 0–0.12)
BUN SERPL-MCNC: 168 MG/DL (ref 8–22)
CALCIUM SERPL-MCNC: 8.9 MG/DL (ref 8.5–10.5)
CHLORIDE SERPL-SCNC: 106 MMOL/L (ref 96–112)
CO2 SERPL-SCNC: 11 MMOL/L (ref 20–33)
CREAT SERPL-MCNC: 12.65 MG/DL (ref 0.5–1.4)
EKG IMPRESSION: NORMAL
EOSINOPHIL # BLD AUTO: 0.01 K/UL (ref 0–0.51)
EOSINOPHIL NFR BLD: 0.1 % (ref 0–6.9)
ERYTHROCYTE [DISTWIDTH] IN BLOOD BY AUTOMATED COUNT: 55.1 FL (ref 35.9–50)
GLUCOSE SERPL-MCNC: 120 MG/DL (ref 65–99)
HCT VFR BLD AUTO: 34.3 % (ref 42–52)
HGB BLD-MCNC: 10.4 G/DL (ref 14–18)
IMM GRANULOCYTES # BLD AUTO: 0.29 K/UL (ref 0–0.11)
IMM GRANULOCYTES NFR BLD AUTO: 2.5 % (ref 0–0.9)
INR PPP: 1.4 (ref 0.87–1.13)
LYMPHOCYTES # BLD AUTO: 0.52 K/UL (ref 1–4.8)
LYMPHOCYTES NFR BLD: 4.5 % (ref 22–41)
MCH RBC QN AUTO: 29.3 PG (ref 27–33)
MCHC RBC AUTO-ENTMCNC: 30.3 G/DL (ref 33.7–35.3)
MCV RBC AUTO: 96.6 FL (ref 81.4–97.8)
MONOCYTES # BLD AUTO: 1.09 K/UL (ref 0–0.85)
MONOCYTES NFR BLD AUTO: 9.5 % (ref 0–13.4)
NEUTROPHILS # BLD AUTO: 9.56 K/UL (ref 1.82–7.42)
NEUTROPHILS NFR BLD: 83 % (ref 44–72)
NRBC # BLD AUTO: 0 K/UL
NRBC BLD-RTO: 0 /100 WBC
PLATELET # BLD AUTO: 284 K/UL (ref 164–446)
PMV BLD AUTO: 10.2 FL (ref 9–12.9)
POTASSIUM SERPL-SCNC: 7.2 MMOL/L (ref 3.6–5.5)
PROTHROMBIN TIME: 17.6 SEC (ref 12–14.6)
RBC # BLD AUTO: 3.55 M/UL (ref 4.7–6.1)
SODIUM SERPL-SCNC: 137 MMOL/L (ref 135–145)
WBC # BLD AUTO: 11.5 K/UL (ref 4.8–10.8)

## 2021-02-09 PROCEDURE — 85730 THROMBOPLASTIN TIME PARTIAL: CPT

## 2021-02-09 PROCEDURE — 85007 BL SMEAR W/DIFF WBC COUNT: CPT

## 2021-02-09 PROCEDURE — 99291 CRITICAL CARE FIRST HOUR: CPT

## 2021-02-09 PROCEDURE — 85610 PROTHROMBIN TIME: CPT

## 2021-02-09 PROCEDURE — 85025 COMPLETE CBC W/AUTO DIFF WBC: CPT

## 2021-02-09 PROCEDURE — 36415 COLL VENOUS BLD VENIPUNCTURE: CPT

## 2021-02-09 PROCEDURE — C9803 HOPD COVID-19 SPEC COLLECT: HCPCS | Performed by: EMERGENCY MEDICINE

## 2021-02-09 PROCEDURE — 87086 URINE CULTURE/COLONY COUNT: CPT

## 2021-02-09 PROCEDURE — 93010 ELECTROCARDIOGRAM REPORT: CPT | Performed by: INTERNAL MEDICINE

## 2021-02-09 PROCEDURE — 85610 PROTHROMBIN TIME: CPT | Mod: 91

## 2021-02-09 PROCEDURE — 83735 ASSAY OF MAGNESIUM: CPT

## 2021-02-09 PROCEDURE — 80053 COMPREHEN METABOLIC PANEL: CPT

## 2021-02-09 PROCEDURE — 80048 BASIC METABOLIC PNL TOTAL CA: CPT

## 2021-02-09 PROCEDURE — 84100 ASSAY OF PHOSPHORUS: CPT

## 2021-02-09 PROCEDURE — 85027 COMPLETE CBC AUTOMATED: CPT

## 2021-02-09 PROCEDURE — 93005 ELECTROCARDIOGRAM TRACING: CPT

## 2021-02-09 PROCEDURE — 93005 ELECTROCARDIOGRAM TRACING: CPT | Performed by: EMERGENCY MEDICINE

## 2021-02-09 RX ORDER — ONDANSETRON 4 MG/1
TABLET, ORALLY DISINTEGRATING ORAL
COMMUNITY
Start: 2021-02-02

## 2021-02-09 RX ORDER — DEXTROSE MONOHYDRATE 25 G/50ML
50 INJECTION, SOLUTION INTRAVENOUS ONCE
Status: COMPLETED | OUTPATIENT
Start: 2021-02-10 | End: 2021-02-10

## 2021-02-09 RX ORDER — HYDROCODONE BITARTRATE AND ACETAMINOPHEN 5; 325 MG/1; MG/1
TABLET ORAL
COMMUNITY

## 2021-02-09 ASSESSMENT — FIBROSIS 4 INDEX: FIB4 SCORE: 1.2

## 2021-02-09 ASSESSMENT — LIFESTYLE VARIABLES: DO YOU DRINK ALCOHOL: NO

## 2021-02-09 NOTE — OR NURSING
Abnormal EKG faxed to dr Murphy's ofice.Pt has no symptoms or complains. I called and notified Dr Jeffrey silva as well.    I called and talk to Palmira from Dr Murphy , and informed her about the abnormal blood work from today. I also faxed the results to their ofice. She assured me that she will communicate with Sara the surgery schedule and dr Murphy's medical assistant.   I attempt calling the pt to see he he is feeling and no answer, no VM .  Second attempt , wife answer stating that the pt is asleep.    Pt also doesn't need a Covid test due to a recent positive Covid     05:30pm I haven't heard from dr Murphy's office   and I called the answering service to inform the physician on call . JERSEY MUSA called back and I informed him of the critical results. He said he will communicate with Dr Murphy and call pt/ fam with further  instructions.

## 2021-02-10 ENCOUNTER — APPOINTMENT (OUTPATIENT)
Dept: CARDIOLOGY | Facility: MEDICAL CENTER | Age: 81
DRG: 682 | End: 2021-02-10
Attending: EMERGENCY MEDICINE
Payer: MEDICARE

## 2021-02-10 ENCOUNTER — APPOINTMENT (OUTPATIENT)
Dept: RADIOLOGY | Facility: MEDICAL CENTER | Age: 81
DRG: 682 | End: 2021-02-10
Attending: INTERNAL MEDICINE
Payer: MEDICARE

## 2021-02-10 PROBLEM — I31.39 PERICARDIAL EFFUSION: Status: ACTIVE | Noted: 2021-02-10

## 2021-02-10 PROBLEM — E87.5 HYPERKALEMIA: Status: ACTIVE | Noted: 2021-02-10

## 2021-02-10 PROBLEM — J90 PLEURAL EFFUSION: Status: ACTIVE | Noted: 2021-02-10

## 2021-02-10 PROBLEM — I50.21 ACUTE SYSTOLIC CHF (CONGESTIVE HEART FAILURE) (HCC): Status: ACTIVE | Noted: 2021-02-10

## 2021-02-10 PROBLEM — R82.81 PYURIA: Status: ACTIVE | Noted: 2021-02-10

## 2021-02-10 PROBLEM — N19 UREMIA: Status: ACTIVE | Noted: 2021-02-10

## 2021-02-10 PROBLEM — I27.20 PULMONARY HYPERTENSION (HCC): Status: ACTIVE | Noted: 2021-02-10

## 2021-02-10 PROBLEM — I48.91 ATRIAL FIBRILLATION (HCC): Status: ACTIVE | Noted: 2021-02-10

## 2021-02-10 PROBLEM — N17.9 ACUTE RENAL FAILURE (ARF) (HCC): Status: ACTIVE | Noted: 2021-02-10

## 2021-02-10 LAB
ALBUMIN SERPL BCP-MCNC: 2.8 G/DL (ref 3.2–4.9)
ALBUMIN SERPL BCP-MCNC: 3.3 G/DL (ref 3.2–4.9)
ALBUMIN SERPL BCP-MCNC: 3.4 G/DL (ref 3.2–4.9)
ALBUMIN/GLOB SERPL: 0.9 G/DL
ALBUMIN/GLOB SERPL: 0.9 G/DL
ALBUMIN/GLOB SERPL: 1 G/DL
ALP SERPL-CCNC: 77 U/L (ref 30–99)
ALP SERPL-CCNC: 89 U/L (ref 30–99)
ALP SERPL-CCNC: 94 U/L (ref 30–99)
ALT SERPL-CCNC: 22 U/L (ref 2–50)
ANION GAP SERPL CALC-SCNC: 20 MMOL/L (ref 7–16)
ANION GAP SERPL CALC-SCNC: 21 MMOL/L (ref 7–16)
ANION GAP SERPL CALC-SCNC: 21 MMOL/L (ref 7–16)
ANISOCYTOSIS BLD QL SMEAR: ABNORMAL
APPEARANCE UR: ABNORMAL
APTT PPP: 36.8 SEC (ref 24.7–36)
AST SERPL-CCNC: 10 U/L (ref 12–45)
AST SERPL-CCNC: 12 U/L (ref 12–45)
AST SERPL-CCNC: 14 U/L (ref 12–45)
BACTERIA #/AREA URNS HPF: ABNORMAL /HPF
BASOPHILS # BLD AUTO: 0 % (ref 0–1.8)
BASOPHILS # BLD: 0 K/UL (ref 0–0.12)
BILIRUB SERPL-MCNC: 0.6 MG/DL (ref 0.1–1.5)
BILIRUB UR QL STRIP.AUTO: NEGATIVE
BUN SERPL-MCNC: 166 MG/DL (ref 8–22)
BUN SERPL-MCNC: 173 MG/DL (ref 8–22)
BUN SERPL-MCNC: 174 MG/DL (ref 8–22)
BURR CELLS BLD QL SMEAR: NORMAL
CALCIUM SERPL-MCNC: 8.2 MG/DL (ref 8.5–10.5)
CALCIUM SERPL-MCNC: 8.7 MG/DL (ref 8.5–10.5)
CALCIUM SERPL-MCNC: 9 MG/DL (ref 8.5–10.5)
CHLORIDE SERPL-SCNC: 103 MMOL/L (ref 96–112)
CHLORIDE SERPL-SCNC: 106 MMOL/L (ref 96–112)
CHLORIDE SERPL-SCNC: 106 MMOL/L (ref 96–112)
CO2 SERPL-SCNC: 10 MMOL/L (ref 20–33)
CO2 SERPL-SCNC: 10 MMOL/L (ref 20–33)
CO2 SERPL-SCNC: 9 MMOL/L (ref 20–33)
COLOR UR: ABNORMAL
CREAT SERPL-MCNC: 12.77 MG/DL (ref 0.5–1.4)
CREAT SERPL-MCNC: 12.86 MG/DL (ref 0.5–1.4)
CREAT SERPL-MCNC: 13.87 MG/DL (ref 0.5–1.4)
EKG IMPRESSION: NORMAL
EOSINOPHIL # BLD AUTO: 0.1 K/UL (ref 0–0.51)
EOSINOPHIL NFR BLD: 0.9 % (ref 0–6.9)
EPI CELLS #/AREA URNS HPF: ABNORMAL /HPF
ERYTHROCYTE [DISTWIDTH] IN BLOOD BY AUTOMATED COUNT: 53.5 FL (ref 35.9–50)
FLUAV RNA SPEC QL NAA+PROBE: NEGATIVE
FLUBV RNA SPEC QL NAA+PROBE: NEGATIVE
GLOBULIN SER CALC-MCNC: 3 G/DL (ref 1.9–3.5)
GLOBULIN SER CALC-MCNC: 3.3 G/DL (ref 1.9–3.5)
GLOBULIN SER CALC-MCNC: 3.7 G/DL (ref 1.9–3.5)
GLUCOSE SERPL-MCNC: 108 MG/DL (ref 65–99)
GLUCOSE SERPL-MCNC: 182 MG/DL (ref 65–99)
GLUCOSE SERPL-MCNC: 85 MG/DL (ref 65–99)
GLUCOSE UR STRIP.AUTO-MCNC: NEGATIVE MG/DL
HAV IGM SERPL QL IA: ABNORMAL
HBV CORE IGM SER QL: ABNORMAL
HBV SURFACE AB SERPL IA-ACNC: <3.5 MIU/ML (ref 0–10)
HBV SURFACE AG SER QL: ABNORMAL
HCT VFR BLD AUTO: 33.7 % (ref 42–52)
HCV AB SER QL: REACTIVE
HGB BLD-MCNC: 10.4 G/DL (ref 14–18)
INR PPP: 1.43 (ref 0.87–1.13)
KETONES UR STRIP.AUTO-MCNC: NEGATIVE MG/DL
LACTATE BLD-SCNC: 1.5 MMOL/L (ref 0.5–2)
LEUKOCYTE ESTERASE UR QL STRIP.AUTO: ABNORMAL
LV EJECT FRACT  99904: 30
LV EJECT FRACT MOD 2C 99903: 38.12
LV EJECT FRACT MOD 4C 99902: 24.75
LV EJECT FRACT MOD BP 99901: 30.7
LYMPHOCYTES # BLD AUTO: 0.49 K/UL (ref 1–4.8)
LYMPHOCYTES NFR BLD: 4.3 % (ref 22–41)
MACROCYTES BLD QL SMEAR: ABNORMAL
MAGNESIUM SERPL-MCNC: 2.1 MG/DL (ref 1.5–2.5)
MANUAL DIFF BLD: NORMAL
MCH RBC QN AUTO: 29.4 PG (ref 27–33)
MCHC RBC AUTO-ENTMCNC: 30.9 G/DL (ref 33.7–35.3)
MCV RBC AUTO: 95.2 FL (ref 81.4–97.8)
MICRO URNS: ABNORMAL
MONOCYTES # BLD AUTO: 0.4 K/UL (ref 0–0.85)
MONOCYTES NFR BLD AUTO: 3.5 % (ref 0–13.4)
MORPHOLOGY BLD-IMP: NORMAL
MYELOCYTES NFR BLD MANUAL: 0.9 %
NEUTROPHILS # BLD AUTO: 10.4 K/UL (ref 1.82–7.42)
NEUTROPHILS NFR BLD: 90.4 % (ref 44–72)
NITRITE UR QL STRIP.AUTO: NEGATIVE
NRBC # BLD AUTO: 0 K/UL
NRBC BLD-RTO: 0 /100 WBC
NT-PROBNP SERPL IA-MCNC: ABNORMAL PG/ML (ref 0–125)
PH UR STRIP.AUTO: 5 [PH] (ref 5–8)
PHOSPHATE SERPL-MCNC: 7.2 MG/DL (ref 2.5–4.5)
PLATELET # BLD AUTO: 264 K/UL (ref 164–446)
PLATELET BLD QL SMEAR: NORMAL
PMV BLD AUTO: 10.1 FL (ref 9–12.9)
POIKILOCYTOSIS BLD QL SMEAR: NORMAL
POTASSIUM SERPL-SCNC: 6.2 MMOL/L (ref 3.6–5.5)
POTASSIUM SERPL-SCNC: 6.4 MMOL/L (ref 3.6–5.5)
POTASSIUM SERPL-SCNC: 7 MMOL/L (ref 3.6–5.5)
PROCALCITONIN SERPL-MCNC: 0.38 NG/ML
PROT SERPL-MCNC: 5.8 G/DL (ref 6–8.2)
PROT SERPL-MCNC: 6.6 G/DL (ref 6–8.2)
PROT SERPL-MCNC: 7.1 G/DL (ref 6–8.2)
PROT UR QL STRIP: 100 MG/DL
PROTHROMBIN TIME: 17.9 SEC (ref 12–14.6)
RBC # BLD AUTO: 3.54 M/UL (ref 4.7–6.1)
RBC # URNS HPF: ABNORMAL /HPF
RBC BLD AUTO: PRESENT
RBC UR QL AUTO: ABNORMAL
RSV RNA SPEC QL NAA+PROBE: NEGATIVE
SARS-COV-2 RNA RESP QL NAA+PROBE: NOTDETECTED
SODIUM SERPL-SCNC: 134 MMOL/L (ref 135–145)
SODIUM SERPL-SCNC: 136 MMOL/L (ref 135–145)
SODIUM SERPL-SCNC: 136 MMOL/L (ref 135–145)
SP GR UR STRIP.AUTO: 1.01
SPECIMEN SOURCE: NORMAL
TROPONIN T SERPL-MCNC: 108 NG/L (ref 6–19)
UROBILINOGEN UR STRIP.AUTO-MCNC: 0.2 MG/DL
WBC # BLD AUTO: 11.5 K/UL (ref 4.8–10.8)
WBC #/AREA URNS HPF: ABNORMAL /HPF

## 2021-02-10 PROCEDURE — 96365 THER/PROPH/DIAG IV INF INIT: CPT

## 2021-02-10 PROCEDURE — 90935 HEMODIALYSIS ONE EVALUATION: CPT

## 2021-02-10 PROCEDURE — 700111 HCHG RX REV CODE 636 W/ 250 OVERRIDE (IP): Performed by: INTERNAL MEDICINE

## 2021-02-10 PROCEDURE — 02HV33Z INSERTION OF INFUSION DEVICE INTO SUPERIOR VENA CAVA, PERCUTANEOUS APPROACH: ICD-10-PCS | Performed by: INTERNAL MEDICINE

## 2021-02-10 PROCEDURE — 84484 ASSAY OF TROPONIN QUANT: CPT

## 2021-02-10 PROCEDURE — 700102 HCHG RX REV CODE 250 W/ 637 OVERRIDE(OP): Performed by: EMERGENCY MEDICINE

## 2021-02-10 PROCEDURE — C1752 CATH,HEMODIALYSIS,SHORT-TERM: HCPCS

## 2021-02-10 PROCEDURE — 700111 HCHG RX REV CODE 636 W/ 250 OVERRIDE (IP): Performed by: STUDENT IN AN ORGANIZED HEALTH CARE EDUCATION/TRAINING PROGRAM

## 2021-02-10 PROCEDURE — 700102 HCHG RX REV CODE 250 W/ 637 OVERRIDE(OP): Performed by: STUDENT IN AN ORGANIZED HEALTH CARE EDUCATION/TRAINING PROGRAM

## 2021-02-10 PROCEDURE — 99223 1ST HOSP IP/OBS HIGH 75: CPT | Mod: AI | Performed by: STUDENT IN AN ORGANIZED HEALTH CARE EDUCATION/TRAINING PROGRAM

## 2021-02-10 PROCEDURE — 96366 THER/PROPH/DIAG IV INF ADDON: CPT

## 2021-02-10 PROCEDURE — A9270 NON-COVERED ITEM OR SERVICE: HCPCS | Performed by: STUDENT IN AN ORGANIZED HEALTH CARE EDUCATION/TRAINING PROGRAM

## 2021-02-10 PROCEDURE — 86706 HEP B SURFACE ANTIBODY: CPT

## 2021-02-10 PROCEDURE — 99292 CRITICAL CARE ADDL 30 MIN: CPT | Mod: 25 | Performed by: INTERNAL MEDICINE

## 2021-02-10 PROCEDURE — 700105 HCHG RX REV CODE 258: Performed by: EMERGENCY MEDICINE

## 2021-02-10 PROCEDURE — 80053 COMPREHEN METABOLIC PANEL: CPT | Mod: 91

## 2021-02-10 PROCEDURE — 770022 HCHG ROOM/CARE - ICU (200)

## 2021-02-10 PROCEDURE — 83880 ASSAY OF NATRIURETIC PEPTIDE: CPT

## 2021-02-10 PROCEDURE — 700101 HCHG RX REV CODE 250: Performed by: EMERGENCY MEDICINE

## 2021-02-10 PROCEDURE — B548ZZA ULTRASONOGRAPHY OF SUPERIOR VENA CAVA, GUIDANCE: ICD-10-PCS | Performed by: INTERNAL MEDICINE

## 2021-02-10 PROCEDURE — 700101 HCHG RX REV CODE 250: Performed by: INTERNAL MEDICINE

## 2021-02-10 PROCEDURE — 36556 INSERT NON-TUNNEL CV CATH: CPT

## 2021-02-10 PROCEDURE — 700105 HCHG RX REV CODE 258: Performed by: INTERNAL MEDICINE

## 2021-02-10 PROCEDURE — 84145 PROCALCITONIN (PCT): CPT

## 2021-02-10 PROCEDURE — 99223 1ST HOSP IP/OBS HIGH 75: CPT | Performed by: INTERNAL MEDICINE

## 2021-02-10 PROCEDURE — 74176 CT ABD & PELVIS W/O CONTRAST: CPT

## 2021-02-10 PROCEDURE — 87040 BLOOD CULTURE FOR BACTERIA: CPT

## 2021-02-10 PROCEDURE — 0241U HCHG SARS-COV-2 COVID-19 NFCT DS RESP RNA 4 TRGT MIC: CPT

## 2021-02-10 PROCEDURE — 96375 TX/PRO/DX INJ NEW DRUG ADDON: CPT

## 2021-02-10 PROCEDURE — 36556 INSERT NON-TUNNEL CV CATH: CPT | Mod: RT | Performed by: INTERNAL MEDICINE

## 2021-02-10 PROCEDURE — 80074 ACUTE HEPATITIS PANEL: CPT

## 2021-02-10 PROCEDURE — 87522 HEPATITIS C REVRS TRNSCRPJ: CPT

## 2021-02-10 PROCEDURE — 83605 ASSAY OF LACTIC ACID: CPT

## 2021-02-10 PROCEDURE — 99291 CRITICAL CARE FIRST HOUR: CPT | Mod: 25 | Performed by: INTERNAL MEDICINE

## 2021-02-10 PROCEDURE — 700111 HCHG RX REV CODE 636 W/ 250 OVERRIDE (IP): Performed by: EMERGENCY MEDICINE

## 2021-02-10 PROCEDURE — 81001 URINALYSIS AUTO W/SCOPE: CPT

## 2021-02-10 PROCEDURE — 93306 TTE W/DOPPLER COMPLETE: CPT

## 2021-02-10 PROCEDURE — 93306 TTE W/DOPPLER COMPLETE: CPT | Mod: 26 | Performed by: INTERNAL MEDICINE

## 2021-02-10 PROCEDURE — 700105 HCHG RX REV CODE 258: Performed by: STUDENT IN AN ORGANIZED HEALTH CARE EDUCATION/TRAINING PROGRAM

## 2021-02-10 RX ORDER — SODIUM CHLORIDE, SODIUM LACTATE, POTASSIUM CHLORIDE, CALCIUM CHLORIDE 600; 310; 30; 20 MG/100ML; MG/100ML; MG/100ML; MG/100ML
INJECTION, SOLUTION INTRAVENOUS CONTINUOUS
Status: DISCONTINUED | OUTPATIENT
Start: 2021-02-10 | End: 2021-02-10

## 2021-02-10 RX ORDER — HEPARIN SODIUM 1000 [USP'U]/ML
2800 INJECTION, SOLUTION INTRAVENOUS; SUBCUTANEOUS ONCE
Status: COMPLETED | OUTPATIENT
Start: 2021-02-10 | End: 2021-02-10

## 2021-02-10 RX ORDER — SEVELAMER CARBONATE 800 MG/1
800 TABLET, FILM COATED ORAL
Status: DISCONTINUED | OUTPATIENT
Start: 2021-02-10 | End: 2021-02-20

## 2021-02-10 RX ORDER — ACETAMINOPHEN 325 MG/1
650 TABLET ORAL EVERY 6 HOURS PRN
Status: DISCONTINUED | OUTPATIENT
Start: 2021-02-10 | End: 2021-02-21 | Stop reason: HOSPADM

## 2021-02-10 RX ORDER — HEPARIN SODIUM 1000 [USP'U]/ML
2800 INJECTION, SOLUTION INTRAVENOUS; SUBCUTANEOUS PRN
Status: DISCONTINUED | OUTPATIENT
Start: 2021-02-10 | End: 2021-02-18

## 2021-02-10 RX ORDER — BISACODYL 10 MG
10 SUPPOSITORY, RECTAL RECTAL
Status: DISCONTINUED | OUTPATIENT
Start: 2021-02-10 | End: 2021-02-21 | Stop reason: HOSPADM

## 2021-02-10 RX ORDER — NOREPINEPHRINE BITARTRATE 0.03 MG/ML
0-30 INJECTION, SOLUTION INTRAVENOUS CONTINUOUS
Status: DISCONTINUED | OUTPATIENT
Start: 2021-02-10 | End: 2021-02-10

## 2021-02-10 RX ORDER — POLYETHYLENE GLYCOL 3350 17 G/17G
1 POWDER, FOR SOLUTION ORAL
Status: DISCONTINUED | OUTPATIENT
Start: 2021-02-10 | End: 2021-02-21 | Stop reason: HOSPADM

## 2021-02-10 RX ORDER — SODIUM BICARBONATE IN D5W 150/1000ML
PLASTIC BAG, INJECTION (ML) INTRAVENOUS CONTINUOUS
Status: DISCONTINUED | OUTPATIENT
Start: 2021-02-10 | End: 2021-02-10

## 2021-02-10 RX ORDER — LABETALOL HYDROCHLORIDE 5 MG/ML
10 INJECTION, SOLUTION INTRAVENOUS EVERY 4 HOURS PRN
Status: DISCONTINUED | OUTPATIENT
Start: 2021-02-10 | End: 2021-02-21 | Stop reason: HOSPADM

## 2021-02-10 RX ORDER — MIDAZOLAM HYDROCHLORIDE 1 MG/ML
1-5 INJECTION INTRAMUSCULAR; INTRAVENOUS ONCE
Status: COMPLETED | OUTPATIENT
Start: 2021-02-10 | End: 2021-02-10

## 2021-02-10 RX ORDER — DOXAZOSIN 2 MG/1
8 TABLET ORAL DAILY
Status: DISCONTINUED | OUTPATIENT
Start: 2021-02-10 | End: 2021-02-11

## 2021-02-10 RX ORDER — TRAZODONE HYDROCHLORIDE 50 MG/1
100 TABLET ORAL
Status: DISCONTINUED | OUTPATIENT
Start: 2021-02-10 | End: 2021-02-20

## 2021-02-10 RX ORDER — DEXTROSE MONOHYDRATE 25 G/50ML
50 INJECTION, SOLUTION INTRAVENOUS ONCE
Status: COMPLETED | OUTPATIENT
Start: 2021-02-10 | End: 2021-02-10

## 2021-02-10 RX ORDER — HEPARIN SODIUM 5000 [USP'U]/ML
5000 INJECTION, SOLUTION INTRAVENOUS; SUBCUTANEOUS EVERY 8 HOURS
Status: DISCONTINUED | OUTPATIENT
Start: 2021-02-10 | End: 2021-02-19

## 2021-02-10 RX ORDER — AMOXICILLIN 250 MG
2 CAPSULE ORAL 2 TIMES DAILY
Status: DISCONTINUED | OUTPATIENT
Start: 2021-02-10 | End: 2021-02-21 | Stop reason: HOSPADM

## 2021-02-10 RX ADMIN — DOCUSATE SODIUM 50 MG AND SENNOSIDES 8.6 MG 2 TABLET: 8.6; 5 TABLET, FILM COATED ORAL at 17:54

## 2021-02-10 RX ADMIN — TRAZODONE HYDROCHLORIDE 100 MG: 50 TABLET ORAL at 21:09

## 2021-02-10 RX ADMIN — DOCUSATE SODIUM 50 MG AND SENNOSIDES 8.6 MG 2 TABLET: 8.6; 5 TABLET, FILM COATED ORAL at 05:47

## 2021-02-10 RX ADMIN — SEVELAMER CARBONATE 800 MG: 800 TABLET, FILM COATED ORAL at 10:40

## 2021-02-10 RX ADMIN — SODIUM CHLORIDE, POTASSIUM CHLORIDE, SODIUM LACTATE AND CALCIUM CHLORIDE 75 ML: 600; 310; 30; 20 INJECTION, SOLUTION INTRAVENOUS at 04:05

## 2021-02-10 RX ADMIN — DEXTROSE MONOHYDRATE 50 ML: 25 INJECTION, SOLUTION INTRAVENOUS at 01:40

## 2021-02-10 RX ADMIN — SEVELAMER CARBONATE 800 MG: 800 TABLET, FILM COATED ORAL at 06:44

## 2021-02-10 RX ADMIN — CALCIUM GLUCONATE 2000 MG: 98 INJECTION, SOLUTION INTRAVENOUS at 01:15

## 2021-02-10 RX ADMIN — DEXTROSE MONOHYDRATE 50 ML: 25 INJECTION, SOLUTION INTRAVENOUS at 06:44

## 2021-02-10 RX ADMIN — SODIUM BICARBONATE: 84 INJECTION, SOLUTION INTRAVENOUS at 12:30

## 2021-02-10 RX ADMIN — HEPARIN SODIUM 5000 UNITS: 5000 INJECTION, SOLUTION INTRAVENOUS; SUBCUTANEOUS at 21:09

## 2021-02-10 RX ADMIN — DOXAZOSIN 8 MG: 2 TABLET ORAL at 05:47

## 2021-02-10 RX ADMIN — HEPARIN SODIUM 5000 UNITS: 5000 INJECTION, SOLUTION INTRAVENOUS; SUBCUTANEOUS at 05:47

## 2021-02-10 RX ADMIN — MIDAZOLAM HYDROCHLORIDE 3 MG: 1 INJECTION, SOLUTION INTRAMUSCULAR; INTRAVENOUS at 13:20

## 2021-02-10 RX ADMIN — HEPARIN SODIUM 2800 UNITS: 1000 INJECTION, SOLUTION INTRAVENOUS; SUBCUTANEOUS at 13:20

## 2021-02-10 RX ADMIN — HEPARIN SODIUM 2800 UNITS: 1000 INJECTION, SOLUTION INTRAVENOUS; SUBCUTANEOUS at 17:11

## 2021-02-10 RX ADMIN — NOREPINEPHRINE BITARTRATE 10 MCG/MIN: 1 INJECTION, SOLUTION, CONCENTRATE INTRAVENOUS at 14:18

## 2021-02-10 RX ADMIN — PHENYLEPHRINE HYDROCHLORIDE 50 MCG/MIN: 10 INJECTION INTRAVENOUS at 19:24

## 2021-02-10 RX ADMIN — HEPARIN SODIUM 5000 UNITS: 5000 INJECTION, SOLUTION INTRAVENOUS; SUBCUTANEOUS at 15:11

## 2021-02-10 RX ADMIN — INSULIN HUMAN 10 UNITS: 100 INJECTION, SOLUTION PARENTERAL at 01:40

## 2021-02-10 RX ADMIN — CEFTRIAXONE SODIUM 1 G: 1 INJECTION, POWDER, FOR SOLUTION INTRAMUSCULAR; INTRAVENOUS at 10:41

## 2021-02-10 RX ADMIN — ACETAMINOPHEN 650 MG: 325 TABLET ORAL at 08:06

## 2021-02-10 RX ADMIN — SEVELAMER CARBONATE 800 MG: 800 TABLET, FILM COATED ORAL at 17:53

## 2021-02-10 RX ADMIN — SODIUM BICARBONATE 125 ML/HR: 84 INJECTION, SOLUTION INTRAVENOUS at 02:00

## 2021-02-10 ASSESSMENT — ENCOUNTER SYMPTOMS
MEMORY LOSS: 1
HEADACHES: 0
BRUISES/BLEEDS EASILY: 0
TREMORS: 0
WHEEZING: 0
DOUBLE VISION: 0
NERVOUS/ANXIOUS: 0
DEPRESSION: 0
SHORTNESS OF BREATH: 1
CONSTIPATION: 0
BLOOD IN STOOL: 0
BACK PAIN: 0
ABDOMINAL PAIN: 1
INSOMNIA: 0
SHORTNESS OF BREATH: 0
FEVER: 0
DIARRHEA: 0
NAUSEA: 1
BLURRED VISION: 0
FLANK PAIN: 0
COUGH: 0
NECK PAIN: 0
LOSS OF CONSCIOUSNESS: 0
FOCAL WEAKNESS: 0
NAUSEA: 0
SORE THROAT: 0
VOMITING: 0
EYE PAIN: 0
ABDOMINAL PAIN: 0
WEAKNESS: 1
WEIGHT LOSS: 1
HEARTBURN: 0
TINGLING: 0
PALPITATIONS: 0
EYE DISCHARGE: 0
CHILLS: 0

## 2021-02-10 ASSESSMENT — LIFESTYLE VARIABLES
ON A TYPICAL DAY WHEN YOU DRINK ALCOHOL HOW MANY DRINKS DO YOU HAVE: 0
TOTAL SCORE: 0
ALCOHOL_USE: NO
AVERAGE NUMBER OF DAYS PER WEEK YOU HAVE A DRINK CONTAINING ALCOHOL: 0
HAVE PEOPLE ANNOYED YOU BY CRITICIZING YOUR DRINKING: NO
ON A TYPICAL DAY WHEN YOU DRINK ALCOHOL HOW MANY DRINKS DO YOU HAVE: 0
TOTAL SCORE: 0
EVER FELT BAD OR GUILTY ABOUT YOUR DRINKING: NO
TOTAL SCORE: 0
HAVE YOU EVER FELT YOU SHOULD CUT DOWN ON YOUR DRINKING: NO
DOES PATIENT WANT TO STOP DRINKING: NO
CONSUMPTION TOTAL: NEGATIVE
DOES PATIENT WANT TO STOP DRINKING: NO
CONSUMPTION TOTAL: INCOMPLETE
EVER HAD A DRINK FIRST THING IN THE MORNING TO STEADY YOUR NERVES TO GET RID OF A HANGOVER: NO
HAVE YOU EVER FELT YOU SHOULD CUT DOWN ON YOUR DRINKING: NO
HOW MANY TIMES IN THE PAST YEAR HAVE YOU HAD 5 OR MORE DRINKS IN A DAY: 0
EVER HAD A DRINK FIRST THING IN THE MORNING TO STEADY YOUR NERVES TO GET RID OF A HANGOVER: NO
EVER FELT BAD OR GUILTY ABOUT YOUR DRINKING: NO
ALCOHOL_USE: NO
HAVE PEOPLE ANNOYED YOU BY CRITICIZING YOUR DRINKING: NO
TOTAL SCORE: 0

## 2021-02-10 ASSESSMENT — COGNITIVE AND FUNCTIONAL STATUS - GENERAL
SUGGESTED CMS G CODE MODIFIER DAILY ACTIVITY: CK
SUGGESTED CMS G CODE MODIFIER MOBILITY: CK
TOILETING: A LITTLE
MOVING FROM LYING ON BACK TO SITTING ON SIDE OF FLAT BED: A LITTLE
DAILY ACTIVITIY SCORE: 19
DRESSING REGULAR UPPER BODY CLOTHING: A LITTLE
HELP NEEDED FOR BATHING: A LITTLE
MOVING TO AND FROM BED TO CHAIR: A LOT
STANDING UP FROM CHAIR USING ARMS: A LITTLE
DRESSING REGULAR LOWER BODY CLOTHING: A LOT
TURNING FROM BACK TO SIDE WHILE IN FLAT BAD: A LITTLE
MOBILITY SCORE: 17
WALKING IN HOSPITAL ROOM: A LOT

## 2021-02-10 ASSESSMENT — PAIN DESCRIPTION - PAIN TYPE
TYPE: ACUTE PAIN

## 2021-02-10 ASSESSMENT — FIBROSIS 4 INDEX: FIB4 SCORE: 0.66

## 2021-02-10 ASSESSMENT — PATIENT HEALTH QUESTIONNAIRE - PHQ9
2. FEELING DOWN, DEPRESSED, IRRITABLE, OR HOPELESS: NOT AT ALL
SUM OF ALL RESPONSES TO PHQ9 QUESTIONS 1 AND 2: 0
1. LITTLE INTEREST OR PLEASURE IN DOING THINGS: NOT AT ALL

## 2021-02-10 NOTE — DISCHARGE PLANNING
Care Transition Team Assessment  Spoke w/ wife Saadia at beside and verified facesheet info. PCP Dr. Jessenia Ruelas. Pt lives w/ wife and independent w/ ADL/IADL's until pt became weak a week prior to admission. No hx of DME. Pt has support from wife and family.    Information Source  Orientation : Disoriented to Event, Disoriented to Time  Information Given By: Spouse  Informant's Name: Saadia Lewisopeindira Risk  Legal Hold: No    Interdisciplinary Discharge Planning  Primary Care Physician: Jessenia Ruelas  Lives with - Patient's Self Care Capacity: Spouse  Support Systems: Children, Spouse / Significant Other  Able to Return to Previous ADL's: Yes  Mobility Issues: No  Prior Services: Home-Independent  Assistance Needed: Unknown at this Time  Durable Medical Equipment: Unknown    Discharge Preparedness  What is your plan after discharge?: Uncertain - pending medical team collaboration  What are your discharge supports?: Child, Spouse  Prior Functional Level: Independent with Activities of Daily Living    Functional Assesment  Prior Functional Level: Independent with Activities of Daily Living    Finances  Financial Barriers to Discharge: No    Vision / Hearing Impairment  Vision Impairment : Yes  Right Eye Vision: Impaired, Wears Glasses  Left Eye Vision: Impaired, Wears Glasses  Hearing Impairment : No         Advance Directive  Advance Directive?: None    Domestic Abuse  Have you ever been the victim of abuse or violence?: No  Physical Abuse or Sexual Abuse: No  Verbal Abuse or Emotional Abuse: No  Possible Abuse/Neglect Reported to:: Not Applicable         Discharge Risks or Barriers  Discharge risks or barriers?: No    Anticipated Discharge Information  Discharge Address: [facility] [id number], [place] [address]  Discharge Contact Phone Number: [phone]

## 2021-02-10 NOTE — CONSULTS
Critical Care Consultation    Date of consult: 2/10/2021    Referring Physician  Tacos Pickens D.O.    Reason for Consultation  Critical care management for hyperkalemia and NOEL    History of Presenting Illness  Mr. Forrester is an 81 year old male with the past medical history of bladder cancer, hepatitis C, hypertension, and aortic stenosis who was sent in by the patient's urologist for abnormal laboratories.  All history was obtained from patient's son who is at bedside at the time of my evaluation.  According to the son the patient was diagnosed with bladder cancer in the last couple of years and was seen by his urologist on Monday, February 8 and was found to have abnormal laboratories of an elevated creatinine and potassium.  Urologist had the patient sent into the emergency department for further evaluation.  According to the son the patient has had worsening confusion over the past few months as well as ongoing nausea.  The patient has not described any shortness of breath or abdominal discomfort.  He has had hematuria at times.  Patient denies any chest pain or palpitations.  The son has also noted that the patient has lost approximately 24 pounds in the last 3 months.    The patient was found to have an elevated creatinine at 13 as well as a potassium level at 7.  A CT of the abdomen renal protocol did reveal bilateral hydronephrosis.  It also revealed a possible pericardial effusion.  A stat echo was obtained which shows a pericardial effusion without tamponade present.  The patient does also have a left pleural effusion as well is new onset atrial fibrillation and systolic heart failure with an ejection fraction of approximately 30%.    The patient will be admitted to the ICU for further monitoring of hyperkalemia as well as acute kidney injury.    Code Status  Full Code    Review of Systems  Review of Systems   Constitutional: Positive for malaise/fatigue and weight loss. Negative for chills and  fever.   HENT: Negative for congestion and sore throat.    Eyes: Negative for pain and discharge.   Respiratory: Negative for cough and shortness of breath.    Cardiovascular: Negative for chest pain and leg swelling.   Gastrointestinal: Positive for nausea. Negative for abdominal pain, constipation, diarrhea and vomiting.   Genitourinary: Positive for hematuria. Negative for flank pain.   Musculoskeletal: Negative for back pain and neck pain.   Skin: Negative for rash.   Neurological: Positive for weakness. Negative for tingling, tremors and headaches.   Endo/Heme/Allergies: Does not bruise/bleed easily.   Psychiatric/Behavioral: Negative for depression. The patient is not nervous/anxious.        Past Medical History   has a past medical history of Anxiety, Aortic stenosis, Arthritis, BPH (benign prostatic hypertrophy), Cancer (HCC) (2019), Dental disorder, Depression, Diverticulitis, Dizziness (9/20/2018), DJD (degenerative joint disease), Gross hematuria (2/20/2019), Heart murmur, Hemorrhoid, Hepatitis B (1989), Hepatitis C (1984), Hypertension, Liver disease, Nonspecific abnormal electrocardiogram (ECG) (EKG) (11/15/2018), Pneumonia, Recurrent major depressive disorder, in remission (HCC), Snoring, Urinary bladder disorder, and Urinary incontinence.    Surgical History   has a past surgical history that includes gastroscopy-endo (10/13/08); colonoscopy - endo (10/14/08); recovery (8/4/2011); turp-vapor (04/01/2019); cyst excision; trans urethral resection bladder (Left, 5/13/2019); cystoscopy (N/A, 5/13/2019); other orthopedic surgery (2003); cystoscopy (9/9/2019); trans urethral resection bladder (9/9/2019); pr cystourethroscopy,biopsies (3/27/2020); pr cystourethroscopy,ureter catheter (Left, 3/27/2020); and turbt (transurethral resection of bladder tumor) (N/A, 8/3/2020).    Family History  family history includes Alzheimer's Disease in his brother; Depression in his brother; Diabetes in his brother and  brother; Heart Disease in his brother, brother, and mother; No Known Problems in his maternal grandfather, paternal grandfather, and paternal grandmother; Other in his father; Suicide Attempts in his maternal grandmother.    Social History   reports that he quit smoking about 32 years ago. His smoking use included cigarettes and cigars. He has a 40.00 pack-year smoking history. He has never used smokeless tobacco. He reports previous alcohol use. He reports that he does not use drugs.    Medications  Home Medications    **Home medications have not yet been reviewed for this encounter**       Current Facility-Administered Medications   Medication Dose Route Frequency Provider Last Rate Last Admin   • doxazosin (CARDURA) tablet 8 mg  8 mg Oral DAILY SHERLYN MaravillaO.       • traZODone (DESYREL) tablet 100 mg  100 mg Oral QHS JERSEY Maravilla.O.       • senna-docusate (PERICOLACE or SENOKOT S) 8.6-50 MG per tablet 2 tablet  2 tablet Oral BID SHERLYN MaravillaO.        And   • polyethylene glycol/lytes (MIRALAX) PACKET 1 Packet  1 Packet Oral QDAY PRN JERSEY Maravilla.O.        And   • magnesium hydroxide (MILK OF MAGNESIA) suspension 30 mL  30 mL Oral QDAY PRN JERSEY Maravilla.O.        And   • bisacodyl (DULCOLAX) suppository 10 mg  10 mg Rectal QDAY PRN Tacos Pickens D.O.       • heparin injection 5,000 Units  5,000 Units Subcutaneous Q8HRS JERSEY Maravilla.O.       • acetaminophen (Tylenol) tablet 650 mg  650 mg Oral Q6HRS PRN JERSEY Maravilla.O.       • labetalol (NORMODYNE/TRANDATE) injection 10 mg  10 mg Intravenous Q4HRS PRN JERSEY Maravilla.O.       • sodium bicarbonate 150 mEq in D5W infusion (premix)   Intravenous Continuous JERSEY Maravilla.O. 83 mL/hr at 02/10/21 0226 83 mL/hr at 02/10/21 0226   • sevelamer carbonate (RENVELA) tablet 800 mg  800 mg Oral TID WITH MEALS JERSEY Maravilla.O.       • lactated ringers infusion   Intravenous Continuous Tacos SANDERS  DANNY Pickens         Current Outpatient Medications   Medication Sig Dispense Refill   • ALPRAZolam (XANAX PO) alprazolam     • HYDROcodone-acetaminophen (NORCO) 5-325 MG Tab per tablet hydrocodone 5 mg-acetaminophen 325 mg tablet     • ondansetron (ZOFRAN ODT) 4 MG TABLET DISPERSIBLE      • traZODone (DESYREL) 100 MG Tab TAKE 1 TABLET BY MOUTH EVERY DAY AT BEDTIME AS NEEDED FOR SLEEP 90 Tab 1   • doxazosin (CARDURA) 8 MG tablet TAKE 1 TABLET BY MOUTH EVERY DAY 90 Tab 0   • lisinopril (PRINIVIL) 40 MG tablet Take 40 mg by mouth every bedtime. Indications: High Blood Pressure Disorder     • B Complex Vitamins (VITAMIN B COMPLEX PO) Take 1 Tab by mouth every 48 hours.     • vitamin D (CHOLECALCIFEROL) 1000 UNIT Tab Take 2,000 Units by mouth every 48 hours.         Allergies  No Known Allergies    Vital Signs last 24 hours  Temp:  [36.9 °C (98.4 °F)] 36.9 °C (98.4 °F)  Pulse:  [42-95] 91  Resp:  [12-25] 21  BP: (103-139)/(60-79) 103/71  SpO2:  [96 %-99 %] 96 %    Physical Exam  Physical Exam  Vitals and nursing note reviewed.   Constitutional:       General: He is not in acute distress.     Appearance: He is ill-appearing. He is not toxic-appearing.      Comments: Pt appears older than stated age and chronically ill   HENT:      Head: Normocephalic and atraumatic.      Right Ear: External ear normal.      Left Ear: External ear normal.      Nose: Nose normal. No rhinorrhea.      Mouth/Throat:      Mouth: Mucous membranes are moist.      Pharynx: Oropharynx is clear. No oropharyngeal exudate.   Eyes:      General: No scleral icterus.     Extraocular Movements: Extraocular movements intact.      Conjunctiva/sclera: Conjunctivae normal.      Pupils: Pupils are equal, round, and reactive to light.   Cardiovascular:      Rate and Rhythm: Normal rate. Rhythm irregularly irregular.      Pulses:           Radial pulses are 2+ on the right side and 2+ on the left side.        Dorsalis pedis pulses are 2+ on the right side and  2+ on the left side.      Heart sounds: No murmur.   Pulmonary:      Effort: No respiratory distress.      Breath sounds: No wheezing.      Comments: Diminished let base otherwise clear  Chest:      Chest wall: No tenderness.   Abdominal:      General: Abdomen is flat. Bowel sounds are normal. There is no distension.      Palpations: Abdomen is soft.      Tenderness: There is no abdominal tenderness. There is no rebound.   Musculoskeletal:      Cervical back: Normal range of motion and neck supple.      Right lower le+ Edema present.      Left lower le+ Edema present.   Lymphadenopathy:      Cervical: No cervical adenopathy.   Skin:     General: Skin is warm and dry.      Capillary Refill: Capillary refill takes less than 2 seconds.      Findings: No rash.   Neurological:      Mental Status: He is alert. He is disoriented.      Cranial Nerves: No cranial nerve deficit.      Sensory: No sensory deficit.      Motor: No weakness.   Psychiatric:         Mood and Affect: Mood normal.         Behavior: Behavior normal.         Thought Content: Thought content normal.         Fluids    Intake/Output Summary (Last 24 hours) at 2/10/2021 0351  Last data filed at 2/10/2021 0141  Gross per 24 hour   Intake 150 ml   Output --   Net 150 ml       Laboratory  Recent Results (from the past 48 hour(s))   EKG    Collection Time: 21  1:43 PM   Result Value Ref Range    Report       [facility]    Test Date:  2021  Pt Name:    KARON ANDERSON               [facility]  MRN:        [id number]                      Room:  Gender:     Male                         Technician: [id number]  :        [id number]                   Requested By:XOCHITL CRUZ  Order #:    [id number]                    Reading MD: Christopher Herrera MD    Measurements  Intervals                                Axis  Rate:       98                           P:  MN:                                      QRS:        -42  QRSD:       134                           T:          94  QT:         360  QTc:        460    Interpretive Statements  ATRIAL FIBRILLATION  IVCD  LAFB  Compared to ECG 03/25/2020 13:50:22  Atrial fib is now present  Electronically Signed On 2-9-2021 21:44:14 PST by Christopher Herrera MD     CBC WITH DIFFERENTIAL    Collection Time: 02/09/21  1:50 PM   Result Value Ref Range    WBC 11.5 (H) 4.8 - 10.8 K/uL    RBC 3.55 (L) 4.70 - 6.10 M/uL    Hemoglobin 10.4 (L) 14.0 - 18.0 g/dL    Hematocrit 34.3 (L) 42.0 - 52.0 %    MCV 96.6 81.4 - 97.8 fL    MCH 29.3 27.0 - 33.0 pg    MCHC 30.3 (L) 33.7 - 35.3 g/dL    RDW 55.1 (H) 35.9 - 50.0 fL    Platelet Count 284 164 - 446 K/uL    MPV 10.2 9.0 - 12.9 fL    Neutrophils-Polys 83.00 (H) 44.00 - 72.00 %    Lymphocytes 4.50 (L) 22.00 - 41.00 %    Monocytes 9.50 0.00 - 13.40 %    Eosinophils 0.10 0.00 - 6.90 %    Basophils 0.40 0.00 - 1.80 %    Immature Granulocytes 2.50 (H) 0.00 - 0.90 %    Nucleated RBC 0.00 /100 WBC    Neutrophils (Absolute) 9.56 (H) 1.82 - 7.42 K/uL    Lymphs (Absolute) 0.52 (L) 1.00 - 4.80 K/uL    Monos (Absolute) 1.09 (H) 0.00 - 0.85 K/uL    Eos (Absolute) 0.01 0.00 - 0.51 K/uL    Baso (Absolute) 0.05 0.00 - 0.12 K/uL    Immature Granulocytes (abs) 0.29 (H) 0.00 - 0.11 K/uL    NRBC (Absolute) 0.00 K/uL   Basic Metabolic Panel    Collection Time: 02/09/21  1:50 PM   Result Value Ref Range    Sodium 137 135 - 145 mmol/L    Potassium 7.2 (HH) 3.6 - 5.5 mmol/L    Chloride 106 96 - 112 mmol/L    Co2 11 (L) 20 - 33 mmol/L    Glucose 120 (H) 65 - 99 mg/dL    Bun 168 (HH) 8 - 22 mg/dL    Creatinine 12.65 (HH) 0.50 - 1.40 mg/dL    Calcium 8.9 8.5 - 10.5 mg/dL    Anion Gap 20.0 (H) 7.0 - 16.0   PT    Collection Time: 02/09/21  1:50 PM   Result Value Ref Range    PT 17.6 (H) 12.0 - 14.6 sec    INR 1.40 (H) 0.87 - 1.13   ESTIMATED GFR    Collection Time: 02/09/21  1:50 PM   Result Value Ref Range    GFR If African American 5 (A) >60 mL/min/1.73 m 2    GFR If Non African American 4 (A) >60 mL/min/1.73 m 2    EKG (NOW)    Collection Time: 21 11:27 PM   Result Value Ref Range    Report       [facility]    Test Date:  2021  Pt Name:    KARON ANDERSON               Department: ER  MRN:        [id number]                      Room:        03  Gender:     Male                         Technician: [id number]  :        [id number]                   Requested By:FABI CHANDRA  Order #:    [id number]                    Reading MD: Fabi Chandra MD    Measurements  Intervals                                Axis  Rate:       92                           P:  AK:                                      QRS:        -43  QRSD:       127                          T:          92  QT:         347  QTc:        430    Interpretive Statements  Atrial fibrillation  Left bundle branch block  No STEMI  Compared to ECG 2021 13:43:04  Left bundle-branch block now present  Intraventricular conduction delay no longer present  Left anterior fascicular block no longer present  Electronically Signed On 2- 1:57:22 PST by Fabi Chandra MD     CBC WITH DIFFERENTIAL    Collection Time: 21 11:37 PM   Result Value Ref Range    WBC 11.5 (H) 4.8 - 10.8 K/uL    RBC 3.54 (L) 4.70 - 6.10 M/uL    Hemoglobin 10.4 (L) 14.0 - 18.0 g/dL    Hematocrit 33.7 (L) 42.0 - 52.0 %    MCV 95.2 81.4 - 97.8 fL    MCH 29.4 27.0 - 33.0 pg    MCHC 30.9 (L) 33.7 - 35.3 g/dL    RDW 53.5 (H) 35.9 - 50.0 fL    Platelet Count 264 164 - 446 K/uL    MPV 10.1 9.0 - 12.9 fL    Neutrophils-Polys 90.40 (H) 44.00 - 72.00 %    Lymphocytes 4.30 (L) 22.00 - 41.00 %    Monocytes 3.50 0.00 - 13.40 %    Eosinophils 0.90 0.00 - 6.90 %    Basophils 0.00 0.00 - 1.80 %    Nucleated RBC 0.00 /100 WBC    Neutrophils (Absolute) 10.40 (H) 1.82 - 7.42 K/uL    Lymphs (Absolute) 0.49 (L) 1.00 - 4.80 K/uL    Monos (Absolute) 0.40 0.00 - 0.85 K/uL    Eos (Absolute) 0.10 0.00 - 0.51 K/uL    Baso (Absolute) 0.00 0.00 - 0.12 K/uL    NRBC (Absolute) 0.00  K/uL    Anisocytosis 1+     Macrocytosis 1+    COMP METABOLIC PANEL    Collection Time: 02/09/21 11:37 PM   Result Value Ref Range    Sodium 136 135 - 145 mmol/L    Potassium 7.0 (HH) 3.6 - 5.5 mmol/L    Chloride 106 96 - 112 mmol/L    Co2 10 (L) 20 - 33 mmol/L    Anion Gap 20.0 (H) 7.0 - 16.0    Glucose 108 (H) 65 - 99 mg/dL    Bun 166 (HH) 8 - 22 mg/dL    Creatinine 13.87 (HH) 0.50 - 1.40 mg/dL    Calcium 8.7 8.5 - 10.5 mg/dL    AST(SGOT) 10 (L) 12 - 45 U/L    ALT(SGPT) 22 2 - 50 U/L    Alkaline Phosphatase 94 30 - 99 U/L    Total Bilirubin 0.6 0.1 - 1.5 mg/dL    Albumin 3.4 3.2 - 4.9 g/dL    Total Protein 7.1 6.0 - 8.2 g/dL    Globulin 3.7 (H) 1.9 - 3.5 g/dL    A-G Ratio 0.9 g/dL   MAGNESIUM    Collection Time: 02/09/21 11:37 PM   Result Value Ref Range    Magnesium 2.1 1.5 - 2.5 mg/dL   PHOSPHORUS    Collection Time: 02/09/21 11:37 PM   Result Value Ref Range    Phosphorus 7.2 (H) 2.5 - 4.5 mg/dL   PROTHROMBIN TIME (INR)    Collection Time: 02/09/21 11:37 PM   Result Value Ref Range    PT 17.9 (H) 12.0 - 14.6 sec    INR 1.43 (H) 0.87 - 1.13   APTT    Collection Time: 02/09/21 11:37 PM   Result Value Ref Range    APTT 36.8 (H) 24.7 - 36.0 sec   ESTIMATED GFR    Collection Time: 02/09/21 11:37 PM   Result Value Ref Range    GFR If  4 (A) >60 mL/min/1.73 m 2    GFR If Non African American 3 (A) >60 mL/min/1.73 m 2   PERIPHERAL SMEAR REVIEW    Collection Time: 02/09/21 11:37 PM   Result Value Ref Range    Peripheral Smear Review see below    PLATELET ESTIMATE    Collection Time: 02/09/21 11:37 PM   Result Value Ref Range    Plt Estimation Normal    MORPHOLOGY    Collection Time: 02/09/21 11:37 PM   Result Value Ref Range    RBC Morphology Present     Poikilocytosis 2+     Echinocytes 2+    DIFFERENTIAL MANUAL    Collection Time: 02/09/21 11:37 PM   Result Value Ref Range    Myelocytes 0.90 %    Manual Diff Status PERFORMED    COV-2, FLU A/B, AND RSV BY PCR (2-4 HOURS CEPHEID): Collect NP swab in VTM     Collection Time: 02/10/21 12:15 AM    Specimen: Respirate   Result Value Ref Range    Influenza virus A RNA Negative Negative    Influenza virus B, PCR Negative Negative    RSV, PCR Negative Negative    SARS-CoV-2 by PCR NotDetected     SARS-CoV-2 Source NP Swab    TROPONIN    Collection Time: 02/10/21 12:55 AM   Result Value Ref Range    Troponin T 108 (H) 6 - 19 ng/L   proBrain Natriuretic Peptide, NT    Collection Time: 02/10/21 12:55 AM   Result Value Ref Range    NT-proBNP 37427 (H) 0 - 125 pg/mL   LACTIC ACID    Collection Time: 02/10/21 12:55 AM   Result Value Ref Range    Lactic Acid 1.5 0.5 - 2.0 mmol/L   EC-ECHOCARDIOGRAM COMPLETE W/O CONT    Collection Time: 02/10/21  2:04 AM   Result Value Ref Range    Eject.Frac. MOD BP 30.7     Eject.Frac. MOD 4C 24.75     Eject.Frac. MOD 2C 38.12     Left Ventrical Ejection Fraction 30        Imaging  CT Renal:  1.  Moderate pericardial effusion measuring 30 Hounsfield units, consider proteinaceous or hemorrhagic fluid. Further evaluation with echocardiogram to evaluate for component of cardiac tamponade.  2.  Moderate left pleural effusion with dependent consolidation which could represent atelectasis or infiltrate  3.  Bilateral hydronephrosis and hydroureter without visualized obstructing stone  4.  Diverticulosis  5.  Large left lower quadrant lateral abdominal wall hernia containing mesenteric fat in these portion of the sigmoid colon without visualized obstructive change  6.  3.6 cm fusiform distal abdominal aortic aneurysm, radiographic follow-up and surveillance as clinically appropriate  7.  Bladder wall thickening with slight adjacent hazy fat stranding, likely corresponding with history of bladder malignancy.  8.  Cholelithiasis     ECHO:  Prior Echo - 10/18/19. By comparison , LV systolic function is no   longer normal and a pericardial effusion is now present.  Aortic valve gradients are lower on the current echo.  Severely reduced left ventricular  systolic function.  Left ventricular ejection fraction is visually estimated to be 30%.  Global hypokinesis.  Mild concentric left ventricular hypertrophy.  Moderate pericardial effusion without evidence of hemodynamic   compromise.  Moderate aortic stenosis with low grade/low flow.  Transvalvular gradients are - Peak: 33 mmHg, Mean: 21 mmHg.  The severity of the aortic stenosis may be underestimated due to the   poor LV systolic function.  Mild aortic insufficiency.  Mild mitral regurgitation.  Estimated right ventricular systolic pressure  is 60 mmHg.  Mild tricuspid regurgitation.  The aortic root is normal.    Assessment/Plan  * NOEL (acute kidney injury) ([facility])- (present on admission)  Assessment & Plan  Likely post-renal/obstructive in nature  Nephrology consulted-->no immediate need for HD at this time  BMP monitoring  Bishop catheter with UOP monitored  Bicarb drip      Atrial fibrillation ([facility])- (present on admission)  Assessment & Plan  New onset  Rate controlled   Will give metoprolol 5mg as needed for rate control    Acute systolic CHF (congestive heart failure) ([facility])- (present on admission)  Assessment & Plan  Noted on ECHO  Monitor I's/O's  Judicious fluid resuscitation    Hyperkalemia- (present on admission)  Assessment & Plan  Life threateningly high  S/p D50/insulin/Ca in the ER  Cont bicarb drip per nephrology  BMP every 4 hours    Pericardial effusion- (present on admission)  Assessment & Plan  Noted on ECHO  No signs of tamponade  ? 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 Murphy  Sent by urology for NOEL and hyperkalemia    Pleural effusion- (present on admission)  Assessment & Plan  Left sided  No symptoms  ?related to CHF vs NOEL/uremia  Monitor for need for thoracentesis      Discussed patient condition and risk of morbidity and/or mortality with Hospitalist, Family, RN, RT, Pharmacy, Patient and nephrology.    The patient  remains critically ill.  Critical care time = 45 minutes in directly providing and coordinating critical care and extensive data review.  No time overlap and excludes procedures.

## 2021-02-10 NOTE — ASSESSMENT & PLAN NOTE
Rate control  Echo with LVEF of 30%, moderate pericardial effusion and moderate aortic stenosis  Optimize potassium and magnesium

## 2021-02-10 NOTE — ED TRIAGE NOTES
"Chief Complaint   Patient presents with   • Sent by MD     pt had labs drawn for upcoming biopsy. pts son was called due to his potassium being 7.2 and creatinine 12.65       Pt wheeled in by his son tonight for above complaint. Pts son states pt is not actively receiving chemo. Pt aox4. Per pts son, pt feels nauseated. Pt has not been eating as much either per son. Educated pt and son on triage process and to notify if there is any change      /79   Pulse (!) 42   Temp 36.9 °C (98.4 °F) (Tympanic)   Resp 12   Ht 1.803 m (5' 11\")   Wt 99.8 kg (220 lb)   SpO2 99%   BMI 30.68 kg/m²     "

## 2021-02-10 NOTE — H&P
Hospital Medicine History & Physical Note    Date of Service  2/10/2021    Primary Care Physician  Jessenia Ruelas M.D.    Consultants  Intensivist/nephrologist    Code Status  Full Code    Chief Complaint  Chief Complaint   Patient presents with   • Sent by MD     pt had labs drawn for upcoming biopsy. pts son was called due to his potassium being 7.2 and creatinine 12.65       History of Presenting Illness  81 y.o. male who presented 2/9/2021 with nonspecific complaints for the past month.  He lives at home with his wife who has brain cancer and takes care of him.  Son present at bedside and provided majority of history.  Patient has history of bladder cancer receiving immunotherapy.  Last immunotherapy was in August 2020.  He went to his oncologist earlier today for follow-up after having nonspecific complaints of fatigue, confusion, nausea vomiting, pruritus and shortness of breath.  While at oncology office, patient had labs drawn which revealed acute renal failure with creatinine of 12.65.  Patient was also noted to be hyperkalemic with potassium over 7 and was sent to ED for further management.    While in ED patient had the following vital signs on presentation: 98.4, 42, 12, 125/79, 99% room air.    CBC was drawn and revealed neutrophilic predominant leukocytosis at 11.5, H/H 10.4/33.7 with MCV of 95.2 and platelet count of 264.  Acanthocytes noted on manual differential indicating uremia.  Phosphorus level was elevated at 7.2, magnesium 2.1.  PT 17.9 seconds, INR 1.43, APTT 36.8 and CMP reveals hyperkalemia at 7, anion gap metabolic acidosis with anion gap of 20, CO2 of 10, uremia with BUN of 166 and creatinine 13.87.  Troponin T was measured at 108 and proBNP 19,878.    CT reveals moderate pericardial effusion, moderate left pleural effusion, bilateral hydronephrosis without obstructing stone, diverticulosis, large left lower quadrant lateral abdominal wall hernia, 3.6 cm fusiform distal aortic aneurysm,  and bladder wall thickening with slight adjacent hazy fat stranding corresponding with his history of malignancy.    Nephrology was consulted from [facility] and recommends bicarb drip.  He is received calcium gluconate and insulin as well for his hyperkalemia while in ED.  [facility] had contacted intensivist Dr. Lau who is agreed to accept patient for closer observation in unit.  Patient agrees to full CODE STATUS at time of evaluation.  He is alert and oriented x2 and son agrees with full CODE STATUS.  No advanced directive or living will per son.        Review of Systems  Review of Systems   Constitutional: Positive for malaise/fatigue. Negative for chills and fever.   HENT: Negative for congestion and sore throat.    Eyes: Negative for blurred vision and double vision.   Respiratory: Positive for shortness of breath. Negative for cough and wheezing.    Cardiovascular: Negative for chest pain and palpitations.   Gastrointestinal: Positive for abdominal pain. Negative for blood in stool, constipation, diarrhea, heartburn, melena, nausea and vomiting.   Genitourinary: Negative for dysuria and frequency.   Musculoskeletal: Negative for back pain and neck pain.   Skin: Negative for itching and rash.   Neurological: Positive for weakness. Negative for focal weakness, loss of consciousness and headaches.   Endo/Heme/Allergies: Negative for environmental allergies. Does not bruise/bleed easily.   Psychiatric/Behavioral: Positive for memory loss. Negative for depression. The patient does not have insomnia.        Past Medical History   has a past medical history of Anxiety, Aortic stenosis, Arthritis, BPH (benign prostatic hypertrophy), Cancer (HCC) (2019), Dental disorder, Depression, Diverticulitis, Dizziness (9/20/2018), DJD (degenerative joint disease), Gross hematuria (2/20/2019), Heart murmur, Hemorrhoid, Hepatitis B (1989), Hepatitis C (1984), Hypertension, Liver disease, Nonspecific abnormal electrocardiogram (ECG) (EKG)  (11/15/2018), Pneumonia, Recurrent major depressive disorder, in remission (HCC), Snoring, Urinary bladder disorder, and Urinary incontinence.    Surgical History   has a past surgical history that includes gastroscopy-endo (10/13/08); colonoscopy - endo (10/14/08); recovery (8/4/2011); turp-vapor (04/01/2019); cyst excision; trans urethral resection bladder (Left, 5/13/2019); cystoscopy (N/A, 5/13/2019); other orthopedic surgery (2003); cystoscopy (9/9/2019); trans urethral resection bladder (9/9/2019); pr cystourethroscopy,biopsies (3/27/2020); pr cystourethroscopy,ureter catheter (Left, 3/27/2020); and turbt (transurethral resection of bladder tumor) (N/A, 8/3/2020).     Family History  family history includes Alzheimer's Disease in his brother; Depression in his brother; Diabetes in his brother and brother; Heart Disease in his brother, brother, and mother; No Known Problems in his maternal grandfather, paternal grandfather, and paternal grandmother; Other in his father; Suicide Attempts in his maternal grandmother.     Social History   reports that he quit smoking about 32 years ago. His smoking use included cigarettes and cigars. He has a 40.00 pack-year smoking history. He has never used smokeless tobacco. He reports previous alcohol use. He reports that he does not use drugs.    Allergies  No Known Allergies    Medications  Prior to Admission Medications   Prescriptions Last Dose Informant Patient Reported? Taking?   ALPRAZolam (XANAX PO)   Yes No   Sig: alprazolam   B Complex Vitamins (VITAMIN B COMPLEX PO)  Patient Yes No   Sig: Take 1 Tab by mouth every 48 hours.   HYDROcodone-acetaminophen (NORCO) 5-325 MG Tab per tablet   Yes No   Sig: hydrocodone 5 mg-acetaminophen 325 mg tablet   doxazosin (CARDURA) 8 MG tablet   No No   Sig: TAKE 1 TABLET BY MOUTH EVERY DAY   lisinopril (PRINIVIL) 40 MG tablet  Patient Yes No   Sig: Take 40 mg by mouth every bedtime. Indications: High Blood Pressure Disorder    ondansetron (ZOFRAN ODT) 4 MG TABLET DISPERSIBLE   Yes No   traZODone (DESYREL) 100 MG Tab   No No   Sig: TAKE 1 TABLET BY MOUTH EVERY DAY AT BEDTIME AS NEEDED FOR SLEEP   vitamin D (CHOLECALCIFEROL) 1000 UNIT Tab  Patient Yes No   Sig: Take 2,000 Units by mouth every 48 hours.      Facility-Administered Medications: None       Physical Exam  Temp:  [36.9 °C (98.4 °F)] 36.9 °C (98.4 °F)  Pulse:  [42-95] 95  Resp:  [12-16] 16  BP: (125-132)/(60-79) 132/60  SpO2:  [97 %-99 %] 97 %    Physical Exam  Vitals reviewed.   Constitutional:       Appearance: Normal appearance. He is obese. He is ill-appearing. He is not toxic-appearing or diaphoretic.   HENT:      Head: Normocephalic and atraumatic.      Mouth/Throat:      Mouth: Mucous membranes are moist.      Pharynx: Oropharynx is clear. No oropharyngeal exudate or posterior oropharyngeal erythema.      Comments: Poor dentition, no oral lesions or pharyngeal erythema  Eyes:      General: No scleral icterus.     Extraocular Movements: Extraocular movements intact.      Pupils: Pupils are equal, round, and reactive to light.      Comments: Pale conjunctiva   Neck:      Vascular: No carotid bruit.   Cardiovascular:      Rate and Rhythm: Normal rate and regular rhythm.      Pulses: Normal pulses.      Heart sounds: Normal heart sounds. No murmur. No friction rub. No gallop.       Comments: Soft S1 and S2 without appreciable rubs clicks gallops or murmurs.  Pulmonary:      Effort: Pulmonary effort is normal.      Breath sounds: No wheezing, rhonchi or rales.      Comments: Diminished breath sounds at left lung base  Abdominal:      General: Bowel sounds are normal. There is distension.      Palpations: Abdomen is soft. There is no mass.      Tenderness: There is no abdominal tenderness. There is no guarding or rebound.      Hernia: No hernia is present.   Musculoskeletal:         General: Normal range of motion.      Cervical back: Normal range of motion and neck supple. No  muscular tenderness.      Right lower leg: Edema (trace) present.      Left lower leg: Edema (trace) present.   Lymphadenopathy:      Cervical: No cervical adenopathy.   Skin:     General: Skin is warm and dry.      Capillary Refill: Capillary refill takes less than 2 seconds.      Coloration: Skin is not pale.      Findings: No erythema or rash.   Neurological:      Mental Status: He is alert. Mental status is at baseline. He is disoriented.      Cranial Nerves: No cranial nerve deficit.      Sensory: No sensory deficit.      Motor: No weakness.   Psychiatric:         Mood and Affect: Mood normal.         Behavior: Behavior normal.         Laboratory:  Recent Labs     02/09/21  1350 02/09/21  2337   WBC 11.5* 11.5*   RBC 3.55* 3.54*   HEMOGLOBIN 10.4* 10.4*   HEMATOCRIT 34.3* 33.7*   MCV 96.6 95.2   MCH 29.3 29.4   MCHC 30.3* 30.9*   RDW 55.1* 53.5*   PLATELETCT 284 264   MPV 10.2 10.1     Recent Labs     02/09/21  1350 02/09/21  2337   SODIUM 137 136   POTASSIUM 7.2* 7.0*   CHLORIDE 106 106   CO2 11* 10*   GLUCOSE 120* 108*   * 166*   CREATININE 12.65* 13.87*   CALCIUM 8.9 8.7     Recent Labs     02/09/21  1350 02/09/21  2337   ALTSGPT  --  22   ASTSGOT  --  10*   ALKPHOSPHAT  --  94   TBILIRUBIN  --  0.6   GLUCOSE 120* 108*     Recent Labs     02/09/21  1350 02/09/21  2337   APTT  --  36.8*   INR 1.40* 1.43*     Recent Labs     02/10/21  0055   NTPROBNP 75255*         Recent Labs     02/10/21  0055   TROPONINT 108*       I have reviewed and interpreted the above twelve-lead EKG.  I do appreciate inverted T waves in lead I and aVL otherwise no other ST segment elevations depressions or ischemic changes appreciated.  QTc within normal limit.    Imaging:  CT-RENAL COLIC EVALUATION(A/P W/O)   Final Result         1.  Moderate pericardial effusion measuring 30 Hounsfield units, consider proteinaceous or hemorrhagic fluid. Further evaluation with echocardiogram to evaluate for component of cardiac tamponade.    2.  Moderate left pleural effusion with dependent consolidation which could represent atelectasis or infiltrate   3.  Bilateral hydronephrosis and hydroureter without visualized obstructing stone   4.  Diverticulosis   5.  Large left lower quadrant lateral abdominal wall hernia containing mesenteric fat in these portion of the sigmoid colon without visualized obstructive change   6.  3.6 cm fusiform distal abdominal aortic aneurysm, radiographic follow-up and surveillance as clinically appropriate   7.  Bladder wall thickening with slight adjacent hazy fat stranding, likely corresponding with history of bladder malignancy.   8.  Cholelithiasis      These findings were discussed with the patient's clinician, MAIDA CHANDRA, on 2/10/2021 12:18 AM.      EC-ECHOCARDIOGRAM COMPLETE W/ CONT    (Results Pending)       I reviewed the above CT and appreciate pericardial effusion with pleural effusion on left lung as interpreted per myself.    Assessment/Plan:  I anticipate this patient will require at least two midnights for appropriate medical management, necessitating inpatient admission.    * Acute renal failure (ARF) (HCC)- (present on admission)  Assessment & Plan  Patient had creatinine greater than 12 and potassium greater than 7 on admission.  Calcium gluconate, insulin, bicarb drip initiated   Likely patient will need temporary if not permanent dialysis  Nephrology consulted and will follow up  Bicarbonate infusion 3 A at 125 cc/h  Bilateral hydronephrosis noted on CT without evidence of clear stenosis or stone.  Likely related to his transitional cell carcinoma.    Uremia- (present on admission)  Assessment & Plan  Acanthocytes appreciated on differential  Greater than 160 on admission  Slightly confused on presentation  Likely require hemodialysis and nephrology has been consulted    Pericardial effusion- (present on admission)  Assessment & Plan  Likely related to his acute renal failure.  He denies any chest  pain at this time and echocardiogram ordered and currently pending.  Bedside echocardiogram performed with ERP and no compressive issues noted at bedside.    Transitional cell carcinoma (HCC)- (present on admission)  Assessment & Plan  Follow-up outpatient oncology for further management.    Pleural effusion- (present on admission)  Assessment & Plan  Likely related to his acute renal failure/malignancy.  He may require thoracentesis for analysis at some point.  He likely will be on dialysis and we will monitor his respiratory status.

## 2021-02-10 NOTE — ASSESSMENT & PLAN NOTE
2/17 continued renal insufficiency, moderate urine output, potassium within normal limits  2/16 slightly improved renal parameters, potassium stable  2/15 BUN:47, Cr:5.53 (nephrology holding on HD for now and continue to keep HD catheter inplace).  2/14 BUN:61, Cr:5.55  Patient had creatinine greater than 12 and potassium greater than 7 on admission.  Nephrology consulting (Dr Stevenson) and holding dialysis 2/13  Nephrostomy tube placed 2/12 2/14 Fair urine output from left nephrostomy, right nephrostomy with bloody fluids  Bilateral hydronephrosis noted on CT without evidence of clear stenosis or stone.  Likely related to his transitional cell carcinoma.

## 2021-02-10 NOTE — ASSESSMENT & PLAN NOTE
History of original diagnosis in 2019, history of transurethral resection  Likely now a significant recurrence with hydronephrosis resulting  Related renal failure  Unknown current plan of care from urology given the patient's stability, age, renal function and prior refusal to undergo aggressive care, discussed with patient as well as the patient's son Deepak

## 2021-02-10 NOTE — ASSESSMENT & PLAN NOTE
Hydronephrosis and hydroureter on imaging  Keep Bishop catheter in place  Continue HD with UF  Avoid nephrotoxins and renal dose medications

## 2021-02-10 NOTE — ASSESSMENT & PLAN NOTE
Acanthocytes appreciated on differential  Greater than 160 on admission  Slightly confused on presentation  Likely require hemodialysis and nephrology has been consulted

## 2021-02-10 NOTE — INFECTION CONTROL
This patient is considered COVID RECOVERED.    Patient initially tested positive for COVID on 12/9/2020.  Patient is greater than or equal to 21 days from symptom onset and/or positive test, with symptom improvement.  Per the CDC guidance, this patient no longer requires transmission based precautions.  Patient may be placed on any unit per the bed assignment policy (except CNU), including placement in a semi-private room with a roommate.

## 2021-02-10 NOTE — ED PROVIDER NOTES
"ED Provider Note    Scribed for Fabi Duggan M.D. by Ruth Renteria. 2/9/2021  11:11 PM    Means of arrival: Walk-in  History obtained from: Patient  History limited by: None      CHIEF COMPLAINT  Chief Complaint   Patient presents with    Sent by MD     pt had labs drawn for upcoming biopsy. pts son was called due to his potassium being 7.2 and creatinine 12.65       HPI  Bay Forrester is a 81 y.o. male with history of bladder cancer who presents to the Emergency Department for evaluation of abnormal labs. He was sent here today from his urologist due to labs with a potassium of 7.2 and a creatinine of 12.65. He had blood work done today as pre operative precautions due to an upcoming biopsy. Dr. Reed is his urologist and he has a history of urine cancer. Over the last several months he has been having nausea, loss of appetite, leg swelling, lower abdominal pain, dysuria, and urinary frequency. His son notes that \"everything has gotten worse\" over the last few weeks. He denies any vomiting, fever, cough, chest pain, or shortness of breath. He has been unable to be seen by his primary care physician as his appointments have been getting cancelled. He is on immunotherapy and his last treatment was in August. He does not currently have a Bishop catheter and the and the last time he had one was in August as well.     REVIEW OF SYSTEMS  Pertinent positive include nausea, loss of appetite, leg swelling, lower abdominal pain, dysuria, and urinary frequency. Pertinent negative include no vomiting, fever, cough, chest pain, or shortness of breath. All other systems reviewed and are negative.    PAST MEDICAL HISTORY   has a past medical history of Anxiety, Aortic stenosis, Arthritis, BPH (benign prostatic hypertrophy), Cancer (HCC) (2019), Dental disorder, Depression, Diverticulitis, Dizziness (9/20/2018), DJD (degenerative joint disease), Gross hematuria (2/20/2019), Heart murmur, Hemorrhoid, Hepatitis B (1989), " Hepatitis C (1984), Hypertension, Liver disease, Nonspecific abnormal electrocardiogram (ECG) (EKG) (11/15/2018), Pneumonia, Recurrent major depressive disorder, in remission (HCC), Snoring, Urinary bladder disorder, and Urinary incontinence.    SOCIAL HISTORY  Social History     Tobacco Use    Smoking status: Former Smoker     Packs/day: 1.00     Years: 40.00     Pack years: 40.00     Types: Cigarettes, Cigars     Quit date: 1989     Years since quittin.1    Smokeless tobacco: Never Used    Tobacco comment: avoid all tobacco products   Substance and Sexual Activity    Alcohol use: Not Currently     Alcohol/week: 0.0 oz     Comment: 1 drinks per month     Drug use: No    Sexual activity: Not noted       SURGICAL HISTORY   has a past surgical history that includes gastroscopy-endo (10/13/08); colonoscopy - endo (10/14/08); recovery (2011); turp-vapor (2019); cyst excision; trans urethral resection bladder (Left, 2019); cystoscopy (N/A, 2019); other orthopedic surgery (); cystoscopy (2019); trans urethral resection bladder (2019); cystourethroscopy,biopsies (3/27/2020); cystourethroscopy,ureter catheter (Left, 3/27/2020); and turbt (transurethral resection of bladder tumor) (N/A, 8/3/2020).    CURRENT MEDICATIONS  Current Outpatient Medications   Medication Instructions    ALPRAZolam (XANAX PO) alprazolam    B Complex Vitamins (VITAMIN B COMPLEX PO) 1 Tab, Oral, EVERY 48 HOURS    doxazosin (CARDURA) 8 MG tablet TAKE 1 TABLET BY MOUTH EVERY DAY    HYDROcodone-acetaminophen (NORCO) 5-325 MG Tab per tablet hydrocodone 5 mg-acetaminophen 325 mg tablet    lisinopril (PRINIVIL) 40 mg, Oral, EVERY BEDTIME    ondansetron (ZOFRAN ODT) 4 MG TABLET DISPERSIBLE No dose, route, or frequency recorded.    traZODone (DESYREL) 100 MG Tab TAKE 1 TABLET BY MOUTH EVERY DAY AT BEDTIME AS NEEDED FOR SLEEP    vitamin D (CHOLECALCIFEROL) 2,000 Units, Oral, EVERY 48 HOURS       ALLERGIES  No Known  "Allergies    PHYSICAL EXAM   VITAL SIGNS: /79   Pulse (!) 42   Temp 36.9 °C (98.4 °F) (Tympanic)   Resp 12   Ht 1.803 m (5' 11\")   Wt 99.8 kg (220 lb)   SpO2 99%   BMI 30.68 kg/m²    Constitutional: Chronically ill appearing. Alert in no apparent distress.  HENT: Normocephalic, Atraumatic. Bilateral external ears normal. Nose normal.  Dry mucous membranes.  Oropharynx clear.  Eyes: Pupils are equal and reactive. Conjunctiva normal.   Neck: Supple, full range of motion  Heart: Regular rate and rhythm.  No murmurs.    Lungs: No respiratory distress, normal work of breathing. Lungs clear to auscultation bilaterally.  Abdomen  Diffuse abdominal tenderness, worse in lower abdomen. No rebound or guarding. Soft, no distention.  Musculoskeletal: Atraumatic. No obvious deformities noted. Trace pitting edema bilaterally.  Skin: Warm, Dry.  No erythema, No rash.   Neurologic: Alert and oriented x3. Moving all extremities spontaneously without focal deficits.  Psychiatric: Affect normal, Mood normal, Appears appropriate and not intoxicated.    DIAGNOSTIC STUDIES    EKG  Results for orders placed or performed during the hospital encounter of 21   EKG (NOW)   Result Value Ref Range    Report       [facility].    Test Date:  2021  Pt Name:    KARON ANDERSON               Department: ER  MRN:        [id number]                      Room:        03  Gender:     Male                         Technician: [id number]  :        [id number]                   Requested By:FABI CHANDRA  Order #:    [id number]                    Reading MD: Fabi Chandra MD    Measurements  Intervals                                Axis  Rate:       92                           P:  IL:                                      QRS:        -43  QRSD:       127                          T:          92  QT:         347  QTc:        430    Interpretive Statements  Atrial fibrillation  Left bundle branch block  No " STEMI  Compared to ECG 02/09/2021 13:43:04  Left bundle-branch block now present  Intraventricular conduction delay no longer present  Left anterior fascicular block no longer present  Electronically Signed On 2- 1:57:22 PST by Fabi Duggan MD           LABS  Personally reviewed by me  Labs Reviewed   CBC WITH DIFFERENTIAL - Abnormal; Notable for the following components:       Result Value    WBC 11.5 (*)     RBC 3.54 (*)     Hemoglobin 10.4 (*)     Hematocrit 33.7 (*)     MCHC 30.9 (*)     RDW 53.5 (*)     Neutrophils-Polys 90.40 (*)     Lymphocytes 4.30 (*)     Neutrophils (Absolute) 10.40 (*)     Lymphs (Absolute) 0.49 (*)     All other components within normal limits   COMP METABOLIC PANEL - Abnormal; Notable for the following components:    Potassium 7.0 (*)     Co2 10 (*)     Anion Gap 20.0 (*)     Glucose 108 (*)     Bun 166 (*)     Creatinine 13.87 (*)     AST(SGOT) 10 (*)     Globulin 3.7 (*)     All other components within normal limits   PHOSPHORUS - Abnormal; Notable for the following components:    Phosphorus 7.2 (*)     All other components within normal limits   TROPONIN - Abnormal; Notable for the following components:    Troponin T 108 (*)     All other components within normal limits    Narrative:     Indicate which anticoagulants the patient is on:->UNKNOWN   PROBRAIN NATRIURETIC PEPTIDE, NT - Abnormal; Notable for the following components:    NT-proBNP 19878 (*)     All other components within normal limits    Narrative:     Indicate which anticoagulants the patient is on:->UNKNOWN   PROTHROMBIN TIME - Abnormal; Notable for the following components:    PT 17.9 (*)     INR 1.43 (*)     All other components within normal limits    Narrative:     Indicate which anticoagulants the patient is on:->UNKNOWN   APTT - Abnormal; Notable for the following components:    APTT 36.8 (*)     All other components within normal limits    Narrative:     Indicate which anticoagulants the patient is  "on:->UNKNOWN   ESTIMATED GFR - Abnormal; Notable for the following components:    GFR If  4 (*)     GFR If Non  3 (*)     All other components within normal limits   PROCALCITONIN - Abnormal; Notable for the following components:    Procalcitonin 0.38 (*)     All other components within normal limits   MAGNESIUM   COV-2, FLU A/B, AND RSV BY PCR    Narrative:     Have you been in close contact with a person who is suspected  or known to be positive for COVID-19 within the last 30 days  (e.g. last seen that person < 30 days ago)->Unknown   LACTIC ACID   BLOOD CULTURE    Narrative:     Per Hospital Policy: Only change Specimen Src: to \"Line\" if  specified by physician order.   BLOOD CULTURE    Narrative:     Per Hospital Policy: Only change Specimen Src: to \"Line\" if  specified by physician order.   PERIPHERAL SMEAR REVIEW   PLATELET ESTIMATE   MORPHOLOGY   DIFFERENTIAL MANUAL   COMP METABOLIC PANEL   URINALYSIS,CULTURE IF INDICATED   URINALYSIS         RADIOLOGY  Personally reviewed by me  EC-ECHOCARDIOGRAM COMPLETE W/O CONT   Final Result      CT-RENAL COLIC EVALUATION(A/P W/O)   Final Result         1.  Moderate pericardial effusion measuring 30 Hounsfield units, consider proteinaceous or hemorrhagic fluid. Further evaluation with echocardiogram to evaluate for component of cardiac tamponade.   2.  Moderate left pleural effusion with dependent consolidation which could represent atelectasis or infiltrate   3.  Bilateral hydronephrosis and hydroureter without visualized obstructing stone   4.  Diverticulosis   5.  Large left lower quadrant lateral abdominal wall hernia containing mesenteric fat in these portion of the sigmoid colon without visualized obstructive change   6.  3.6 cm fusiform distal abdominal aortic aneurysm, radiographic follow-up and surveillance as clinically appropriate   7.  Bladder wall thickening with slight adjacent hazy fat stranding, likely corresponding with " history of bladder malignancy.   8.  Cholelithiasis      These findings were discussed with the patient's clinician, MAIDA CHANDRA, on 2/10/2021 12:18 AM.          ED COURSE  Vitals:    02/10/21 0200 02/10/21 0300 02/10/21 0400 02/10/21 0415   BP: 103/71 114/73 122/80 117/79   Pulse: 91 97 76 73   Resp: (!) 21 19 (!) 29 (!) 22   Temp:   (!) 35.7 °C (96.3 °F)    TempSrc:   Temporal    SpO2: 96% 97% 96% 97%   Weight:   95.6 kg (210 lb 12.2 oz)    Height:             Medications administered:  Calcium gluconate, insulin, dextrose, bicarbonate drip    Old records personally reviewed:  History of high grade bladder cancer. He is followed by Dr. Murphy. He had a positive COVID test at the beginning of December. Labs reviewed with a Potassium of 7.2, Creatinine of 12.65 today. Prior to this, six months ago, Creatinine was 1.2    11:11 PM Patient seen and examined at bedside. The patient presents with abnormal labs. Ordered for CBC with differential, CMP, Magnesium, Phosphorus, UA, CT-renal colic, and EKG to evaluate.     11:44 PM Patient treated with calcium Gluconate, D50W, and insulin.    12:20 AM Discussed radiology findings regarding pericardial effusion with Dr. Hagan as noted above.     12:26 AM Spoke with Dr. Herrera (Cardiology) who approved ordering a stat ECHO.    1:06 AM Performed bedside echo which showed moderate pericardial effusion without signs of tamponade.     MEDICAL DECISION MAKING  Elderly male with known history of bladder cancer who presents at recommendation of his urologist after labs today showed high potassium and renal failure.  The patient is alert with normal vital signs on arrival.  He has had nonspecific symptoms that have ongoing for the last few months.  His EKG shows a new diagnosis of atrial fibrillation and a borderline prolonged QRS however no other significant hyperkalemic changes.  No evidence of ischemia or arrhythmia.  Repeat labs do show a persistently high potassium as well as  severe uremia.  Patient was treated with calcium gluconate, insulin, dextrose for this hyperkalemia.  He does not need emergent dialysis at this time however may likely needed initiated in the morning.  Imaging shows evidence of bilateral hydronephrosis which likely indicates that the renal failure is due to chronic bladder outlet obstruction however he has no signs of acute urinary retention at this time.  He does have evidence of a pericardial effusion which I am assuming is associated with his uremia.  Bedside ultrasound does not show signs of tamponade and formal echo is pending.  Troponin and BNP are both elevated which I am assuming is due to volume overload.  The patient does not have any symptoms concerning for ACS.    12:43 AM Patient was reevaluated at bedside. Discussed lab and radiology results with the patient and informed them that his CT indicated hydronephrosis, bladder wall thickening, and a pericardial effusion. I informed him of the need for a catheter, though he does not want one placed at this time. He would like to speak with Dr. Murphy before having a toro placed.  We discussed the need for admission and possible initiation for dialysis.  The patient and his son understand and are agreeable with plan of care.    1:03 AM I discussed the patient's case and the above findings with Dr. Pickens (Hospitalist) who agrees to evaluate the patient for hospitalization.    1:17 AM I discussed the patient's case and the above findings with Dr. Lau (Intensivist) who agrees to evaluate the patient.    1:24 AM I discussed the patient's case and the above findings with Dr. Stevenson (Nephrology) who recommends a toro and bicarb drip, and will evaluate the patient in the morning.     CRITICAL CARE TIME  Upon my evaluation, this patient had a high probability of imminent or life-threatening deterioration due to acute renal failure, hyperkalemia, pericardial effusion which required my direct attention,  intervention, and personal management.     I personally provided 40 minutes of total critical care time outside of time spent on separately billable/documented procedures. Time includes: review of laboratory data, review of radiology studies, discussion with consultants, discussion with family/patient, monitoring for potential decompensation.  Interventions were performed as documented above.       DISPOSITION:  Patient will be hospitalized by Dr. Pickens in critical condition.    IMPRESSION  (E87.5) Hyperkalemia  (N17.9) Acute renal failure, unspecified acute renal failure type (HCC)  (I31.3) Pericardial effusion     I personally provided 40 minutes of total critical care time outside of time spent on separately billable/documented procedures.    Results, diagnoses, and treatment options were discussed with the patient and/or family. Patient verbalized understanding of plan of care.    Ruth PALM (Smooth), am scribing for, and in the presence of, Fabi Duggan M.D..    Electronically signed by: Ruth Renteria (Smooth), 2/9/2021    Fabi PALM M.D. personally performed the services described in this documentation, as scribed by Ruth Renteria in my presence, and it is both accurate and complete.     The note accurately reflects work and decisions made by me.  Fabi Duggan M.D.  2/10/2021  5:06 AM

## 2021-02-10 NOTE — CARE PLAN
Problem: Communication  Goal: The ability to communicate needs accurately and effectively will improve  Outcome: PROGRESSING AS EXPECTED     Problem: Safety  Goal: Will remain free from injury  Outcome: PROGRESSING AS EXPECTED  Goal: Will remain free from falls  Outcome: PROGRESSING AS EXPECTED     Problem: Venous Thromboembolism (VTW)/Deep Vein Thrombosis (DVT) Prevention:  Goal: Patient will participate in Venous Thrombosis (VTE)/Deep Vein Thrombosis (DVT)Prevention Measures  Outcome: PROGRESSING AS EXPECTED     Problem: Bowel/Gastric:  Goal: Normal bowel function is maintained or improved  Outcome: PROGRESSING AS EXPECTED  Goal: Will not experience complications related to bowel motility  Outcome: PROGRESSING AS EXPECTED     Problem: Knowledge Deficit  Goal: Knowledge of disease process/condition, treatment plan, diagnostic tests, and medications will improve  Outcome: PROGRESSING AS EXPECTED  Goal: Knowledge of the prescribed therapeutic regimen will improve  Outcome: PROGRESSING AS EXPECTED     Problem: Discharge Barriers/Planning  Goal: Patient's continuum of care needs will be met  Outcome: PROGRESSING AS EXPECTED

## 2021-02-10 NOTE — PROGRESS NOTES
Critical Care Progress Note    Date of admission  2/9/2021    Chief Complaint  81 y.o. male admitted 2/9/2021 with life-threatening hyperkalemia, acute kidney injury, pericardial effusion and acute systolic heart failure.  He has a history of transitional cell carcinoma, HTN and hepatitis C.     Hospital Course      2/10 -    stop bicarbonate drip and begin emergent HD.  Start Rocephin for UTI.      Interval Problem Update  Reviewed last 24 hour events:      SR  Start Rocephin  HD catheter placed -see procedure note  98.6  +624 mL since admit  Very low urine output      Review of Systems  Review of Systems   Unable to perform ROS: Acuity of condition        Vital Signs for last 24 hours   Temp:  [35.7 °C (96.3 °F)-37 °C (98.6 °F)] 37 °C (98.6 °F)  Pulse:  [] 102  Resp:  [12-29] 21  BP: ()/(52-87) 102/78  SpO2:  [95 %-99 %] 98 %    Hemodynamic parameters for last 24 hours       Respiratory Information for the last 24 hours       Physical Exam   Physical Exam  Constitutional:       Appearance: He is ill-appearing. He is not diaphoretic.   HENT:      Head: Normocephalic and atraumatic.      Right Ear: External ear normal.      Left Ear: External ear normal.      Nose: Nose normal.      Mouth/Throat:      Mouth: Mucous membranes are dry.      Pharynx: Oropharynx is clear.   Eyes:      Conjunctiva/sclera: Conjunctivae normal.      Pupils: Pupils are equal, round, and reactive to light.   Cardiovascular:      Pulses: Normal pulses.      Heart sounds: No murmur. No gallop.       Comments: Sinus rhythm  Pulmonary:      Breath sounds: No wheezing or rales.   Abdominal:      General: Bowel sounds are normal. There is no distension.      Tenderness: There is no abdominal tenderness. There is no guarding.   Musculoskeletal:         General: Normal range of motion.      Cervical back: Normal range of motion and neck supple.      Comments: No clubbing or cyanosis   Skin:     General: Skin is warm and dry.       Capillary Refill: Capillary refill takes less than 2 seconds.   Neurological:      Comments: Lethargic.  Arouses easily.  Confused.  Moves all 4.         Medications  Current Facility-Administered Medications   Medication Dose Route Frequency Provider Last Rate Last Admin   • doxazosin (CARDURA) tablet 8 mg  8 mg Oral DAILY Tacos Pickens D.O.   8 mg at 02/10/21 0547   • traZODone (DESYREL) tablet 100 mg  100 mg Oral QHS JERSEY Maravilla.O.       • senna-docusate (PERICOLACE or SENOKOT S) 8.6-50 MG per tablet 2 tablet  2 tablet Oral BID JERSEY Maravilla.O.   2 tablet at 02/10/21 0547    And   • polyethylene glycol/lytes (MIRALAX) PACKET 1 Packet  1 Packet Oral QDAY PRN JERSEY Maravilla.O.        And   • magnesium hydroxide (MILK OF MAGNESIA) suspension 30 mL  30 mL Oral QDAY PRN JERSEY Maravilla.O.        And   • bisacodyl (DULCOLAX) suppository 10 mg  10 mg Rectal QDAY PRN Tacos Pickens D.O.       • heparin injection 5,000 Units  5,000 Units Subcutaneous Q8HRS Tacos Pickens D.O.   5,000 Units at 02/10/21 0547   • acetaminophen (Tylenol) tablet 650 mg  650 mg Oral Q6HRS PRN Tacos Pickens D.O.   650 mg at 02/10/21 0806   • labetalol (NORMODYNE/TRANDATE) injection 10 mg  10 mg Intravenous Q4HRS PRN Tacos Pickens D.O.       • sevelamer carbonate (RENVELA) tablet 800 mg  800 mg Oral TID WITH MEALS Tacos Pickens D.O.   800 mg at 02/10/21 1040   • cefTRIAXone (ROCEPHIN) 1 g in  mL IVPB  1 g Intravenous Q24HRS Benji Hernández M.D.   Stopped at 02/10/21 1111   • norepinephrine (Levophed) infusion 8 mg/250 mL (premix)  0-30 mcg/min Intravenous Continuous Benji Hernández M.D.           Fluids    Intake/Output Summary (Last 24 hours) at 2/10/2021 1408  Last data filed at 2/10/2021 0600  Gross per 24 hour   Intake 643.95 ml   Output 20 ml   Net 623.95 ml       Laboratory          Recent Labs     02/09/21  2337 02/10/21  0417 02/10/21  0810   SODIUM 136 136 134*    POTASSIUM 7.0* 6.4* 6.2*   CHLORIDE 106 106 103   CO2 10* 9* 10*   * 173* 174*   CREATININE 13.87* 12.86* 12.77*   MAGNESIUM 2.1  --   --    PHOSPHORUS 7.2*  --   --    CALCIUM 8.7 9.0 8.2*     Recent Labs     02/09/21  2337 02/10/21  0417 02/10/21  0810   ALTSGPT 22 22 22   ASTSGOT 10* 12 14   ALKPHOSPHAT 94 89 77   TBILIRUBIN 0.6 0.6 0.6   GLUCOSE 108* 85 182*     Recent Labs     02/09/21  1350 02/09/21  2337 02/10/21  0417 02/10/21  0810   WBC 11.5* 11.5*  --   --    NEUTSPOLYS 83.00* 90.40*  --   --    LYMPHOCYTES 4.50* 4.30*  --   --    MONOCYTES 9.50 3.50  --   --    EOSINOPHILS 0.10 0.90  --   --    BASOPHILS 0.40 0.00  --   --    ASTSGOT  --  10* 12 14   ALTSGPT  --  22 22 22   ALKPHOSPHAT  --  94 89 77   TBILIRUBIN  --  0.6 0.6 0.6     Recent Labs     02/09/21  1350 02/09/21 2337   RBC 3.55* 3.54*   HEMOGLOBIN 10.4* 10.4*   HEMATOCRIT 34.3* 33.7*   PLATELETCT 284 264   PROTHROMBTM 17.6* 17.9*   APTT  --  36.8*   INR 1.40* 1.43*       Imaging  X-Ray:  I have personally reviewed the images and compared with prior images. and My impression is: The lungs appear clear    Assessment/Plan  * NOEL (acute kidney injury) (HCC)- (present on admission)  Assessment & Plan  Hydronephrosis and hydroureter on imaging  Keep Bishop catheter in place  Begin HD  Monitor urine output and renal function  Avoid nephrotoxins and renal dose medications    Hyperkalemia- (present on admission)  Assessment & Plan  Life-threatening, critical hyperkalemia  Emergent HD    Pyuria  Assessment & Plan  Begin empiric Rocephin    Pulmonary hypertension (HCC)  Assessment & Plan  RVSP 60 mm Hg    Atrial fibrillation (HCC)- (present on admission)  Assessment & Plan  He is now in sinus rhythm  Check thyroid function  Echo with LVEF of 30%, moderate pericardial effusion and moderate aortic stenosis  Optimize potassium and magnesium    Acute systolic CHF (congestive heart failure) (HCC)- (present on admission)  Assessment & Plan  EF  30%    Pericardial effusion- (present on admission)  Assessment & Plan  Echo reveals no evidence of hemodynamic compromise or tamponade    Transitional cell carcinoma (HCC)- (present on admission)  Assessment & Plan  Followed by Dr. Murphy    Aortic stenosis- (present on admission)  Assessment & Plan  Moderate AS with mean gradient of 21 mmHg    Essential hypertension- (present on admission)  Assessment & Plan  Stop lisinopril with NOEL       VTE:  Heparin  Ulcer: Not Indicated  Lines: Central Line  Ongoing indication addressed and Bishop Catheter  Ongoing indication addressed    I have performed a physical exam and reviewed and updated ROS and Plan today (2/10/2021). In review of yesterday's note (2/9/2021), there are no changes except as documented above.     I have assessed and reassessed his respiratory status, blood pressure, hemodynamics, cardiovascular status, urine output, serial determinations of his acid-base status as well as his neurologic status.  He is at increased risk for worsening respiratory, cardiovascular and renal system dysfunction.    Discussed patient condition and risk of morbidity and/or mortality with RN, RT, Pharmacy, Charge nurse / hot rounds, QA team and nephrology     The patient remains critically ill.  Critical care time = 50 minutes in directly providing and coordinating critical care and extensive data review.  No time overlap and excludes procedures.    The time spent is in addition to the time spent by Dr. Lau earlier today.    Benji Hernández MD  Pulmonary and Critical Care Medicine

## 2021-02-11 LAB
ANION GAP SERPL CALC-SCNC: 13 MMOL/L (ref 7–16)
BASOPHILS # BLD AUTO: 0.2 % (ref 0–1.8)
BASOPHILS # BLD: 0.02 K/UL (ref 0–0.12)
BUN SERPL-MCNC: 97 MG/DL (ref 8–22)
CALCIUM SERPL-MCNC: 7.7 MG/DL (ref 8.5–10.5)
CHLORIDE SERPL-SCNC: 98 MMOL/L (ref 96–112)
CO2 SERPL-SCNC: 21 MMOL/L (ref 20–33)
CORTIS SERPL-MCNC: 20.8 UG/DL (ref 0–23)
CREAT SERPL-MCNC: 9.12 MG/DL (ref 0.5–1.4)
EOSINOPHIL # BLD AUTO: 0.02 K/UL (ref 0–0.51)
EOSINOPHIL NFR BLD: 0.2 % (ref 0–6.9)
ERYTHROCYTE [DISTWIDTH] IN BLOOD BY AUTOMATED COUNT: 49.2 FL (ref 35.9–50)
GLUCOSE SERPL-MCNC: 126 MG/DL (ref 65–99)
HCT VFR BLD AUTO: 26.2 % (ref 42–52)
HGB BLD-MCNC: 8.7 G/DL (ref 14–18)
IMM GRANULOCYTES # BLD AUTO: 0.09 K/UL (ref 0–0.11)
IMM GRANULOCYTES NFR BLD AUTO: 1 % (ref 0–0.9)
LYMPHOCYTES # BLD AUTO: 0.37 K/UL (ref 1–4.8)
LYMPHOCYTES NFR BLD: 3.9 % (ref 22–41)
MAGNESIUM SERPL-MCNC: 1.7 MG/DL (ref 1.5–2.5)
MCH RBC QN AUTO: 30 PG (ref 27–33)
MCHC RBC AUTO-ENTMCNC: 33.2 G/DL (ref 33.7–35.3)
MCV RBC AUTO: 90.3 FL (ref 81.4–97.8)
MONOCYTES # BLD AUTO: 0.75 K/UL (ref 0–0.85)
MONOCYTES NFR BLD AUTO: 7.9 % (ref 0–13.4)
NEUTROPHILS # BLD AUTO: 8.21 K/UL (ref 1.82–7.42)
NEUTROPHILS NFR BLD: 86.8 % (ref 44–72)
NRBC # BLD AUTO: 0 K/UL
NRBC BLD-RTO: 0 /100 WBC
PHOSPHATE SERPL-MCNC: 5 MG/DL (ref 2.5–4.5)
PLATELET # BLD AUTO: 179 K/UL (ref 164–446)
PMV BLD AUTO: 10.1 FL (ref 9–12.9)
POTASSIUM SERPL-SCNC: 4.6 MMOL/L (ref 3.6–5.5)
RBC # BLD AUTO: 2.9 M/UL (ref 4.7–6.1)
SODIUM SERPL-SCNC: 132 MMOL/L (ref 135–145)
T4 FREE SERPL-MCNC: 0.84 NG/DL (ref 0.93–1.7)
TSH SERPL DL<=0.005 MIU/L-ACNC: 4.17 UIU/ML (ref 0.38–5.33)
WBC # BLD AUTO: 9.5 K/UL (ref 4.8–10.8)

## 2021-02-11 PROCEDURE — 700111 HCHG RX REV CODE 636 W/ 250 OVERRIDE (IP)

## 2021-02-11 PROCEDURE — 700111 HCHG RX REV CODE 636 W/ 250 OVERRIDE (IP): Performed by: INTERNAL MEDICINE

## 2021-02-11 PROCEDURE — 770022 HCHG ROOM/CARE - ICU (200)

## 2021-02-11 PROCEDURE — 83735 ASSAY OF MAGNESIUM: CPT

## 2021-02-11 PROCEDURE — 84439 ASSAY OF FREE THYROXINE: CPT

## 2021-02-11 PROCEDURE — 80048 BASIC METABOLIC PNL TOTAL CA: CPT

## 2021-02-11 PROCEDURE — 700105 HCHG RX REV CODE 258: Performed by: INTERNAL MEDICINE

## 2021-02-11 PROCEDURE — 99291 CRITICAL CARE FIRST HOUR: CPT | Performed by: INTERNAL MEDICINE

## 2021-02-11 PROCEDURE — 84100 ASSAY OF PHOSPHORUS: CPT

## 2021-02-11 PROCEDURE — A9270 NON-COVERED ITEM OR SERVICE: HCPCS | Performed by: STUDENT IN AN ORGANIZED HEALTH CARE EDUCATION/TRAINING PROGRAM

## 2021-02-11 PROCEDURE — 84443 ASSAY THYROID STIM HORMONE: CPT

## 2021-02-11 PROCEDURE — 90935 HEMODIALYSIS ONE EVALUATION: CPT

## 2021-02-11 PROCEDURE — 700111 HCHG RX REV CODE 636 W/ 250 OVERRIDE (IP): Performed by: STUDENT IN AN ORGANIZED HEALTH CARE EDUCATION/TRAINING PROGRAM

## 2021-02-11 PROCEDURE — 85025 COMPLETE CBC W/AUTO DIFF WBC: CPT

## 2021-02-11 PROCEDURE — 700102 HCHG RX REV CODE 250 W/ 637 OVERRIDE(OP): Performed by: STUDENT IN AN ORGANIZED HEALTH CARE EDUCATION/TRAINING PROGRAM

## 2021-02-11 PROCEDURE — 82533 TOTAL CORTISOL: CPT

## 2021-02-11 RX ORDER — MAGNESIUM SULFATE HEPTAHYDRATE 40 MG/ML
2 INJECTION, SOLUTION INTRAVENOUS ONCE
Status: COMPLETED | OUTPATIENT
Start: 2021-02-11 | End: 2021-02-11

## 2021-02-11 RX ORDER — ONDANSETRON 2 MG/ML
4 INJECTION INTRAMUSCULAR; INTRAVENOUS EVERY 4 HOURS PRN
Status: DISCONTINUED | OUTPATIENT
Start: 2021-02-11 | End: 2021-02-21 | Stop reason: HOSPADM

## 2021-02-11 RX ORDER — ONDANSETRON 2 MG/ML
INJECTION INTRAMUSCULAR; INTRAVENOUS
Status: COMPLETED
Start: 2021-02-11 | End: 2021-02-11

## 2021-02-11 RX ORDER — DIGOXIN 0.25 MG/ML
500 INJECTION INTRAMUSCULAR; INTRAVENOUS ONCE
Status: COMPLETED | OUTPATIENT
Start: 2021-02-11 | End: 2021-02-11

## 2021-02-11 RX ORDER — MAGNESIUM SULFATE HEPTAHYDRATE 40 MG/ML
2 INJECTION, SOLUTION INTRAVENOUS ONCE
Status: DISCONTINUED | OUTPATIENT
Start: 2021-02-11 | End: 2021-02-11

## 2021-02-11 RX ADMIN — ONDANSETRON 4 MG: 2 INJECTION INTRAMUSCULAR; INTRAVENOUS at 18:30

## 2021-02-11 RX ADMIN — ONDANSETRON 4 MG: 2 INJECTION INTRAMUSCULAR; INTRAVENOUS at 01:58

## 2021-02-11 RX ADMIN — ONDANSETRON 4 MG: 2 INJECTION INTRAMUSCULAR; INTRAVENOUS at 14:06

## 2021-02-11 RX ADMIN — DOCUSATE SODIUM 50 MG AND SENNOSIDES 8.6 MG 2 TABLET: 8.6; 5 TABLET, FILM COATED ORAL at 05:37

## 2021-02-11 RX ADMIN — SEVELAMER CARBONATE 800 MG: 800 TABLET, FILM COATED ORAL at 18:30

## 2021-02-11 RX ADMIN — SEVELAMER CARBONATE 800 MG: 800 TABLET, FILM COATED ORAL at 08:41

## 2021-02-11 RX ADMIN — TRAZODONE HYDROCHLORIDE 100 MG: 50 TABLET ORAL at 22:29

## 2021-02-11 RX ADMIN — SEVELAMER CARBONATE 800 MG: 800 TABLET, FILM COATED ORAL at 12:37

## 2021-02-11 RX ADMIN — DOXAZOSIN 8 MG: 2 TABLET ORAL at 05:38

## 2021-02-11 RX ADMIN — PHENYLEPHRINE HYDROCHLORIDE 70 MCG/MIN: 10 INJECTION INTRAVENOUS at 01:47

## 2021-02-11 RX ADMIN — HEPARIN SODIUM 5000 UNITS: 5000 INJECTION, SOLUTION INTRAVENOUS; SUBCUTANEOUS at 14:06

## 2021-02-11 RX ADMIN — ONDANSETRON 4 MG: 2 INJECTION INTRAMUSCULAR; INTRAVENOUS at 22:32

## 2021-02-11 RX ADMIN — HEPARIN SODIUM 5000 UNITS: 5000 INJECTION, SOLUTION INTRAVENOUS; SUBCUTANEOUS at 22:29

## 2021-02-11 RX ADMIN — DOCUSATE SODIUM 50 MG AND SENNOSIDES 8.6 MG 2 TABLET: 8.6; 5 TABLET, FILM COATED ORAL at 18:30

## 2021-02-11 RX ADMIN — HEPARIN SODIUM 5000 UNITS: 5000 INJECTION, SOLUTION INTRAVENOUS; SUBCUTANEOUS at 05:38

## 2021-02-11 RX ADMIN — PHENYLEPHRINE HYDROCHLORIDE 110 MCG/MIN: 10 INJECTION INTRAVENOUS at 07:45

## 2021-02-11 RX ADMIN — MAGNESIUM SULFATE 2 G: 2 INJECTION INTRAVENOUS at 11:45

## 2021-02-11 RX ADMIN — DIGOXIN 500 MCG: 0.25 INJECTION INTRAMUSCULAR; INTRAVENOUS at 09:29

## 2021-02-11 RX ADMIN — HEPARIN SODIUM 2800 UNITS: 1000 INJECTION, SOLUTION INTRAVENOUS; SUBCUTANEOUS at 09:22

## 2021-02-11 RX ADMIN — CEFTRIAXONE SODIUM 1 G: 1 INJECTION, POWDER, FOR SOLUTION INTRAMUSCULAR; INTRAVENOUS at 05:37

## 2021-02-11 ASSESSMENT — PATIENT HEALTH QUESTIONNAIRE - PHQ9
SUM OF ALL RESPONSES TO PHQ9 QUESTIONS 1 AND 2: 0
2. FEELING DOWN, DEPRESSED, IRRITABLE, OR HOPELESS: NOT AT ALL
1. LITTLE INTEREST OR PLEASURE IN DOING THINGS: NOT AT ALL

## 2021-02-11 ASSESSMENT — ENCOUNTER SYMPTOMS
NECK PAIN: 0
FOCAL WEAKNESS: 0
ORTHOPNEA: 0
SINUS PAIN: 0
EYE REDNESS: 0
EYES NEGATIVE: 1
STRIDOR: 0
PSYCHIATRIC NEGATIVE: 1
FEVER: 0
DIARRHEA: 0
WEAKNESS: 1
HALLUCINATIONS: 0
BLOOD IN STOOL: 0
ABDOMINAL PAIN: 0
DIZZINESS: 0
NAUSEA: 0
BRUISES/BLEEDS EASILY: 0
FLANK PAIN: 1
FLANK PAIN: 0
COUGH: 0
CHILLS: 0
DIAPHORESIS: 0
MYALGIAS: 0
VOMITING: 0
PALPITATIONS: 0
SHORTNESS OF BREATH: 0
EYE DISCHARGE: 0
CONSTIPATION: 0
HEADACHES: 0
HEMOPTYSIS: 0
EYE PAIN: 0
SEIZURES: 0
WHEEZING: 0

## 2021-02-11 ASSESSMENT — PAIN DESCRIPTION - PAIN TYPE
TYPE: ACUTE PAIN

## 2021-02-11 NOTE — PROGRESS NOTES
[facility] Progress Note     Hemodialysis treatment ordered today per Dr. CAROLA Stevenson x 2.5 hours.   Treatment initiated at 0725 ended at 0955.      Asymptomatic hypotension noted throughout HD treatment despite pressor; see paper flow sheet for details.      Net  mL.      Post tx, CVC flushed with saline then locked with heparin 1000 units/mL per designated amount in each wing then clamped and capped. Aspirate heparin prior to next CVC use.     Report given to Primary RN.

## 2021-02-11 NOTE — PROGRESS NOTES
"Nephrology Daily Progress Note    Date of Service  2/11/2021    Chief Complaint  81 y.o. male admitted 2/9/2021 with life threatening hyperkalemia, acute kidney injury creatinine 11 (baseline 1.2-1.3), pericardial effusion and acute systolic heart failure.  He has a history of transitional cell carcinoma, HTN and hepatitis C.     Interval Problem Update  Patient states feeling \"about the same,\" and still considering treatment options. Denies abdominal/groin pain, SOB, chest pain, dyspnea. No questions/concerns.     Patient underwent emergent HD (2/10 @1400 UF 1L, and 2/11 @0725 UF 750mL) via R internal jugular vein with correction of hyperkalemia, and acidosis. UOP 20mL.  Patient continues to have hypotensive episodes overnight and with HD, norepinephrine triturated. Echo completed, LV systolic dysfunction EF 30%, pericardial effusion. Started Rocephin for UTI (2/10). COVID recovered (12/2020). Urology consulted, patient deciding between b/l nephrostomy tubes and/or palliative care (Dr. Murphy).     Review of Systems  Review of Systems   Constitutional: Positive for malaise/fatigue. Negative for chills, diaphoresis and fever.   Respiratory: Negative for shortness of breath and wheezing.    Cardiovascular: Negative for chest pain, palpitations and leg swelling.   Gastrointestinal: Negative for abdominal pain, constipation, diarrhea, nausea and vomiting.   Genitourinary: Negative for flank pain.   Skin: Negative for rash.   Neurological: Positive for weakness. Negative for dizziness and headaches.   Psychiatric/Behavioral: Negative.         Physical Exam  Temp:  [35.9 °C (96.6 °F)-36.5 °C (97.7 °F)] 36.2 °C (97.2 °F)  Pulse:  [] 70  Resp:  [13-34] 25  BP: ()/(38-84) 92/66  SpO2:  [87 %-98 %] 95 %    Physical Exam  Constitutional:       Appearance: He is ill-appearing.   HENT:      Head: Normocephalic and atraumatic.      Mouth/Throat:      Mouth: Mucous membranes are moist.   Eyes:      Pupils: Pupils are " equal, round, and reactive to light.   Cardiovascular:      Rate and Rhythm: Rhythm irregular.      Pulses: Normal pulses.      Heart sounds: Normal heart sounds.   Pulmonary:      Effort: Pulmonary effort is normal.      Breath sounds: Normal breath sounds.   Abdominal:      General: Bowel sounds are normal. There is no distension.      Palpations: Abdomen is soft. There is no mass.      Tenderness: There is no abdominal tenderness.   Musculoskeletal:      Cervical back: Normal range of motion.   Skin:     General: Skin is warm and dry.      Capillary Refill: Capillary refill takes less than 2 seconds.   Neurological:      Mental Status: He is alert. Mental status is at baseline.         Fluids    Intake/Output Summary (Last 24 hours) at 2/11/2021 1221  Last data filed at 2/11/2021 1200  Gross per 24 hour   Intake 3603.96 ml   Output 2743 ml   Net 860.96 ml       Laboratory  Recent Labs     02/09/21  1350 02/09/21  2337 02/11/21  0238   WBC 11.5* 11.5* 9.5   RBC 3.55* 3.54* 2.90*   HEMOGLOBIN 10.4* 10.4* 8.7*   HEMATOCRIT 34.3* 33.7* 26.2*   MCV 96.6 95.2 90.3   MCH 29.3 29.4 30.0   MCHC 30.3* 30.9* 33.2*   RDW 55.1* 53.5* 49.2   PLATELETCT 284 264 179   MPV 10.2 10.1 10.1     Recent Labs     02/10/21  0417 02/10/21  0810 02/11/21  0238   SODIUM 136 134* 132*   POTASSIUM 6.4* 6.2* 4.6   CHLORIDE 106 103 98   CO2 9* 10* 21   GLUCOSE 85 182* 126*   * 174* 97*   CREATININE 12.86* 12.77* 9.12*   CALCIUM 9.0 8.2* 7.7*     Recent Labs     02/09/21  1350 02/09/21 2337   APTT  --  36.8*   INR 1.40* 1.43*     Recent Labs     02/10/21  0055   NTPROBNP 23163*           Imaging  {Wetread or Wildcard:[id number]}     Assessment/Plan  The patient is an 81-year-old male with multiple medical problems, acute kidney injury and chronic kidney disease stage III, bladder cancer, urinary obstruction and obstructive nephropathy.  1.  Acute kidney injury with no improvement.  Poor urine output.  Completed two rounds of HD.   2.   Electrolytes.  Hyperkalemia corrected with dialysis.  3.  Acidosis corrected with dialysis.  4.  Hypotension.  Continue pressor to keep mean arterial pressure above 65.  5.  Anemia.  Hemoglobin stable to monitor.  6.  Bilateral hydronephrosis, hydroureter with history of bladder cancer. Urology consulted recs: b/l nephrostomy tubes or palliative care patient undecided. Urology will continue to follow.     RECOMMENDATIONS: Daily dialysis. Daily monitoring of basic metabolic panel.    Follow Urology recs.  Avoid nephrotoxic agents.  Keep mean arterial pressure above 65.  We will follow the patient closely.

## 2021-02-11 NOTE — PROGRESS NOTES
"Urology Progress Note      S: Patient states that he is feeling better; admits some fatigue.  Denies flank pain.  Bishop remains in place and draining well.    O:   BP (!) 92/66   Pulse 70   Temp 36.2 °C (97.2 °F) (Temporal)   Resp (!) 25   Ht 1.803 m (5' 11\")   Wt 95.6 kg (210 lb 12.2 oz)   SpO2 95%   Recent Labs     02/10/21  0417 02/10/21  0810 02/11/21  0238   SODIUM 136 134* 132*   POTASSIUM 6.4* 6.2* 4.6   CHLORIDE 106 103 98   CO2 9* 10* 21   GLUCOSE 85 182* 126*   * 174* 97*   CREATININE 12.86* 12.77* 9.12*   CALCIUM 9.0 8.2* 7.7*     Recent Labs     02/09/21  1350 02/09/21  2337 02/11/21  0238   WBC 11.5* 11.5* 9.5   RBC 3.55* 3.54* 2.90*   HEMOGLOBIN 10.4* 10.4* 8.7*   HEMATOCRIT 34.3* 33.7* 26.2*   MCV 96.6 95.2 90.3   MCH 29.3 29.4 30.0   MCHC 30.3* 30.9* 33.2*   RDW 55.1* 53.5* 49.2   PLATELETCT 284 264 179   MPV 10.2 10.1 10.1         Intake/Output Summary (Last 24 hours) at 2/11/2021 1230  Last data filed at 2/11/2021 1200  Gross per 24 hour   Intake 3603.96 ml   Output 2743 ml   Net 860.96 ml       Exam:  Gen: NAD   Urine: No CVAT    A/P:    Active Hospital Problems    Diagnosis    • NOEL (acute kidney injury) ([facility]) [N17.9]      Priority: High   • Hyperkalemia [E87.5]      Priority: High   • Pericardial effusion [I31.3]      Priority: Medium   • Acute systolic CHF (congestive heart failure) ([facility]) [I50.21]      Priority: Medium   • Atrial fibrillation ([facility]) [I48.91]      Priority: Medium   • Pulmonary hypertension ([facility]) [I27.20]      Priority: Medium   • Pyuria [R82.81]      Priority: Medium   • Transitional cell carcinoma ([facility]) [C68.9]      Priority: Medium       DATE OF SERVICE:  09/09/2019  PREOPERATIVE DIAGNOSIS:  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  OPERATION AND PROCEDURE PERFORMED:  1.  Rigid cystourethroscopy.  2.  Transurethral resection of bladder scar site.  SURGEON:  Benji Murphy MD    DATE OF SERVICE:  05/13/2019  PREOPERATIVE DIAGNOSES:  History " of high-grade transitional cell carcinoma of the bladder involving the trigone and bladder neck.  PROCEDURES PERFORMED:  1.  Rigid cystourethroscopy.  2.  Transurethral resection of bladder tumor scar site 6x5 cm in area and fulguration of base.  3.  Transurethral resection of left proximal and distal trigonal area 2x3 cm.  SURGEON:  Benji Murphy MD       • Aortic stenosis [I35.0]      Priority: Medium   • Essential hypertension [I10]      Priority: Medium   • Pleural effusion [J90]      Priority: Low     81 y.o. M with history of bladder cancer. Original diagnosis was 09/2019, he did not want to pursue treatment at that time due to age including chemo, radiation, or surgery like a cystesctomy.  Now with renal insufficiency, hyperkalemia.   Plan:  - no changes from urology  - to consider the role of bilateral nephrostomy tubes versus palliative care  - urology will follow

## 2021-02-11 NOTE — CONSULTS
DATE OF SERVICE:  02/10/2021     NEPHROLOGY CONSULTATION     REQUESTING PHYSICIAN:  Fabi Duggan MD     REASON FOR CONSULTATION:  Evaluate the patient with acute kidney injury.     HISTORY OF PRESENT ILLNESS:  The patient is an 81-year-old male with baseline   creatinine level at 1.2 to 1.3,  acute on chronic kidney disease stage III,   history of bladder cancer followed by urologist, Dr. Murphy who presented to the   emergency room as was called with abnormal laboratory results and found upon   evaluation creatinine level of 12.65, , sodium 137, potassium 7.2, CO2   of 11.  CT of the abdomen revealed bilateral hydronephrosis and hydroureter   without obstructing stone, bladder wall thickening likely corresponding with   history of bladder malignancy. After Bishop catheter placed, noticed poor urine   output.  With treatment, his potassium improved to 6.4,  BUN worse to 173 and   creatinine stayed at 12.8. At this point, the patient was scheduled for   emergent dialysis.  Currently, the patient is in intensive care unit, confused   with no specific complaints.     REVIEW OF SYSTEMS:    CONSTITUTIONAL:  Positive for malaise and fatigue.  No fever or chills.  HEENT:  No sore throat.  No congestion or no nosebleeds.  Eyes:  No double or   blurry vision.  RESPIRATORY:  No cough, no hemoptysis, no wheezing.  CARDIOVASCULAR:  No chest pain, no palpitations.  No edema.  GASTROINTESTINAL:  Positive abdominal pain, especially at suprapubic area.  No   nausea or vomiting.  GENITOURINARY:  Now with Bishop catheter.  All other systems reviewed, all negative.     PAST MEDICAL HISTORY:  Positive for anxiety, aortic stenosis, arthritis,   benign prostate hypertrophy, bladder cancer, depression, diverticulitis,   hepatitis B, liver disease, hypertension and pneumonia.     PAST SURGICAL HISTORY:  Gastroscopy, colonoscopy, TURP, cystoscopy and bladder   biopsies.     FAMILY HISTORY:  Alzheimer's disease, depression, diabetes  mellitus type 2 and   heart disease.     SOCIAL HISTORY:  Quit smoking over 30 years ago.  No alcohol or drug use.     ALLERGIES:  No known drug allergies.     OUTPATIENT MEDICATIONS:  Reviewed.     PHYSICAL EXAMINATION:    VITAL SIGNS:  Blood pressure 101/63, pulse 54, temperature 37 Celsius.  GENERAL APPEARANCE:  Well-developed, well-nourished male in no acute distress.  HEENT:  Normocephalic, atraumatic.  Pupils equal, round, reactive to light.    Extraocular movement intact.  Nares patent.  Oropharynx clear, moist mucosa.   No erythema or exudate.  NECK:  Supple. No lymphadenopathy, no thyromegaly appreciated.  LUNGS:  Clear to auscultation bilaterally.  No rales, wheezes, no rhonchi.  HEART:  Regular rhythm. No rub or gallop.  ABDOMEN:  Soft, nondistended.  There is tenderness to palpation in the lower   abdomen area.  No rebound tenderness, no palpable mass.  EXTREMITIES:  No cyanosis, no clubbing, no edema.  NEUROLOGIC:  The patient is alert, following commands, moving all extremities.     LABORATORY RESULTS:  Reviewed, revealed hemoglobin level 10.4. Sodium 134,   potassium 6.2, CO2 of 10, , creatinine 12.6     ASSESSMENT AND PLAN:  The patient is an 81-year-old male with multiple medical   problems, acute kidney injury and chronic kidney disease stage III, bladder   cancer, urinary obstruction and obstructive nephropathy.  1.  Acute kidney injury with no improvement.  Poor urine output.  We will   initiate the emergent dialysis today and continue daily.  2.  Electrolytes.  Hyperkalemia will be corrected with dialysis.  3.  Acidosis will be corrected with dialysis.  4.  Hypotension.  Continue pressor to keep mean arterial pressure above 65.  5.  Anemia.  Hemoglobin stable to monitor.  6.  Bilateral hydronephrosis, hydroureter with history of bladder cancer. To   consult urology.     RECOMMENDATIONS: Daily dialysis. Daily monitoring of basic metabolic panel.    Urology consult.  Avoid nephrotoxic  agents.  Keep mean arterial pressure above   65.  We will follow the patient closely.     The above plan was discussed with Dr. Benji Hernández.        ______________________________  MD EDUARD PALOMO/FIDEL    DD:  02/10/2021 16:39  DT:  02/10/2021 19:32    Job#:  [id number]

## 2021-02-11 NOTE — CONSULTS
UROLOGY Consult Note:    MARI Montiel  Date & Time note created:    2/10/2021   4:15 PM       Patient ID:   Name:             Bay Forrester   YOB: 1940  Age:                 81 y.o.  male   MRN:               [id number]                                                             Reason for Consult:      Bladder cancer, renal failure    History of Present Illness:    This is a 81 y.o. M who is known to [facility] [place] as he has a history of bladder cancer. Original diagnosis was 09/2019, he did not want to pursue treatment at that time due to age including chemo, radiation, or surgery like a cystesctomy.  The most recent outpatient plan was to set up for a cystoscopy transurethral resection of bladder tumor, and intravesical placement of gemcitabine.  Yesterday, he was undergoing pre-operative labs that revealed hyperkalemia, elevated creatinine.  He presented to the ED for evaluation due to these labs. Additionally, he has had non specific complaints of fatigue, confusion, nausea, vomiting, shortness of breath. Imaging findings revealed bilateral hydronephrosis. At time of consult, patient undergoing dialysis.  Since admission he has had a toro catheter placed.  Son present, and much of history gained from patient's son and records.    Review of Systems:      Unable to perform.              Past Medical History:   Past Medical History:   Diagnosis Date   • Anxiety    • Aortic stenosis    • Arthritis     back/neck/hands   • BPH (benign prostatic hypertrophy)    • Cancer (HCC) 2019    bladder    • Dental disorder     dentures upper   • Depression    • Diverticulitis    • Dizziness 9/20/2018   • DJD (degenerative joint disease)    • Gross hematuria 2/20/2019   • Heart murmur    • Hemorrhoid    • Hepatitis B 1989   • Hepatitis C 1984    rec'd tx in 2004   • Hypertension    • Liver disease    • Nonspecific abnormal electrocardiogram (ECG) (EKG) 11/15/2018   • Pneumonia     hx   • Recurrent  major depressive disorder, in remission ([facility])    • Snoring     no sleep study   • Urinary bladder disorder     Bladder Cancer   • Urinary incontinence      Active Hospital Problems    Diagnosis    • NOEL (acute kidney injury) ([facility]) [N17.9]      Priority: High   • Hyperkalemia [E87.5]      Priority: High   • Pericardial effusion [I31.3]      Priority: Medium   • Acute systolic CHF (congestive heart failure) ([facility]) [I50.21]      Priority: Medium   • Atrial fibrillation (HCC) [I48.91]      Priority: Medium   • Pulmonary hypertension (HCC) [I27.20]      Priority: Medium   • Pyuria [R82.81]      Priority: Medium   • Transitional cell carcinoma (HCC) [C68.9]      Priority: Medium   • Aortic stenosis [I35.0]      Priority: Medium   • Essential hypertension [I10]      Priority: Medium   • Pleural effusion [J90]      Priority: Low       Past Surgical History:  Past Surgical History:   Procedure Laterality Date   • TURBT (TRANSURETHRAL RESECTION OF BLADDER TUMOR) N/A 8/3/2020    Procedure: TURBT (TRANSURETHRAL RESECTION OF BLADDER TUMOR)- WITH INTRAVESICAL GEMCITABINE;  Surgeon: Benji Murphy M.D.;  Location: [facility];  Service: Urology   • PB CYSTOURETHROSCOPY,BIOPSIES  3/27/2020    Procedure: CYSTOSCOPY, WITH BLADDER BIOPSY- AND TRANSURETHRAL BIOPSY OF PROSTATIC URETHRA;  Surgeon: Bejni Murphy M.D.;  Location: [facility];  Service: Urology   • PB CYSTOURETHROSCOPY,URETER CATHETER Left 3/27/2020    Procedure: CYSTOSCOPY, WITH LEFT RETROGRADE PYELOGRAM;  Surgeon: Benji Murphy M.D.;  Location: [facility];  Service: Urology   • CYSTOSCOPY  9/9/2019    Procedure: CYSTOSCOPY;  Surgeon: Benji Murphy M.D.;  Location: [facility];  Service: Urology   • TRANS URETHRAL RESECTION BLADDER  9/9/2019    Procedure: TRANS URETHRAL RESECTION BLADDER SCAR;  Surgeon: Benji Murphy M.D.;  Location: [facility];  Service: Urology   • TRANS URETHRAL RESECTION BLADDER Left 5/13/2019     Procedure: TRANS URETHRAL RESECTION BLADDER - SCAR, TRANS URETHRAL RESECTION LEFT TRIGONE;  Surgeon: Benji Murphy M.D.;  Location: SURGERY [facility];  Service: Urology   • CYSTOSCOPY N/A 5/13/2019    Procedure: CYSTOSCOPY - RIGID;  Surgeon: Benji Murphy M.D.;  Location: SURGERY [facility];  Service: Urology   • TURP-VAPOR  04/01/2019   • RECOVERY  8/4/2011    Performed by SURGERY, IR-RECOVERY at SURGERY SAME DAY [facility] ORS   • COLONOSCOPY - ENDO  10/14/08    Performed by CECY DIAZ at ENDOSCOPY [facility] ORS   • GASTROSCOPY-ENDO  10/13/08    Performed by CECY DIAZ at ENDOSCOPY [facility] ORS   • OTHER ORTHOPEDIC SURGERY  2003    left knee replacement   • CYST EXCISION         Hospital Medications:    Current Facility-Administered Medications:   •  doxazosin (CARDURA) tablet 8 mg, 8 mg, Oral, DAILY, Tacos Pickens, D.O., 8 mg at 02/10/21 0547  •  traZODone (DESYREL) tablet 100 mg, 100 mg, Oral, QHS, Tacos Pickens, D.O.  •  senna-docusate (PERICOLACE or SENOKOT S) 8.6-50 MG per tablet 2 tablet, 2 tablet, Oral, BID, 2 tablet at 02/10/21 0547 **AND** polyethylene glycol/lytes (MIRALAX) PACKET 1 Packet, 1 Packet, Oral, QDAY PRN **AND** magnesium hydroxide (MILK OF MAGNESIA) suspension 30 mL, 30 mL, Oral, QDAY PRN **AND** bisacodyl (DULCOLAX) suppository 10 mg, 10 mg, Rectal, QDAY PRN, Tacos SANDERS Pickens, D.O.  •  heparin injection 5,000 Units, 5,000 Units, Subcutaneous, Q8HRS, Tacos Walkergar, D.O., 5,000 Units at 02/10/21 1511  •  acetaminophen (Tylenol) tablet 650 mg, 650 mg, Oral, Q6HRS PRN, Tacos Walkergar, D.O., 650 mg at 02/10/21 0806  •  labetalol (NORMODYNE/TRANDATE) injection 10 mg, 10 mg, Intravenous, Q4HRS PRN, Tacos Pickens D.O.  •  sevelamer carbonate (RENVELA) tablet 800 mg, 800 mg, Oral, TID WITH MEALS, SHERLYN MaravillaOKanchan, 800 mg at 02/10/21 1040  •  cefTRIAXone (ROCEPHIN) 1 g in  mL IVPB, 1 g, Intravenous, Q24HRS, Bneji Hernández M.D.,  Stopped at 02/10/21 1111  •  norepinephrine (Levophed) infusion 8 mg/250 mL (premix), 0-30 mcg/min, Intravenous, Continuous, Benji Hernández M.D., Last Rate: 18.8 mL/hr at 02/10/21 1512, 10 mcg/min at 02/10/21 1512    Current Outpatient Medications:  Medications Prior to Admission   Medication Sig Dispense Refill Last Dose   • ALPRAZolam (XANAX PO) alprazolam      • HYDROcodone-acetaminophen (NORCO) 5-325 MG Tab per tablet hydrocodone 5 mg-acetaminophen 325 mg tablet      • ondansetron (ZOFRAN ODT) 4 MG TABLET DISPERSIBLE       • traZODone (DESYREL) 100 MG Tab TAKE 1 TABLET BY MOUTH EVERY DAY AT BEDTIME AS NEEDED FOR SLEEP 90 Tab 1 2/7/2021 at 2100   • doxazosin (CARDURA) 8 MG tablet TAKE 1 TABLET BY MOUTH EVERY DAY 90 Tab 0 2/8/2021 at 2100   • lisinopril (PRINIVIL) 40 MG tablet Take 40 mg by mouth every bedtime. Indications: High Blood Pressure Disorder   2/2/2021 at 1200   • B Complex Vitamins (VITAMIN B COMPLEX PO) Take 1 Tab by mouth every 48 hours.      • vitamin D (CHOLECALCIFEROL) 1000 UNIT Tab Take 2,000 Units by mouth every 48 hours.   2/8/2021 at 1200       Medication Allergy:  No Known Allergies    Family History:  Family History   Problem Relation Age of Onset   • Heart Disease Mother    • Other Father         MS    • Heart Disease Brother    • Diabetes Brother    • Diabetes Brother    • Heart Disease Brother    • Suicide Attempts Maternal Grandmother    • No Known Problems Maternal Grandfather    • No Known Problems Paternal Grandmother    • No Known Problems Paternal Grandfather    • Alzheimer's Disease Brother    • Depression Brother        Social History:  Social History     Socioeconomic History   • Marital status:      Spouse name: Not on file   • Number of children: Not on file   • Years of education: Not on file   • Highest education level: Not on file   Occupational History   • Not on file   Tobacco Use   • Smoking status: Former Smoker     Packs/day: 1.00     Years: 40.00      "Pack years: 40.00     Types: Cigarettes, Cigars     Quit date: 1989     Years since quittin.1   • Smokeless tobacco: Never Used   • Tobacco comment: avoid all tobacco products   Substance and Sexual Activity   • Alcohol use: Not Currently     Alcohol/week: 0.0 oz     Comment: 1 drinks per month    • Drug use: No   • Sexual activity: Not on file   Other Topics Concern   • Not on file   Social History Narrative   • Not on file     Social Determinants of Health     Financial Resource Strain:    • Difficulty of Paying Living Expenses:    Food Insecurity: No Food Insecurity   • Worried About Running Out of Food in the Last Year: Never true   • Ran Out of Food in the Last Year: Never true   Transportation Needs: No Transportation Needs   • Lack of Transportation (Medical): No   • Lack of Transportation (Non-Medical): No   Physical Activity:    • Days of Exercise per Week:    • Minutes of Exercise per Session:    Stress:    • Feeling of Stress :    Social Connections:    • Frequency of Communication with Friends and Family:    • Frequency of Social Gatherings with Friends and Family:    • Attends Jewish Services:    • Active Member of Clubs or Organizations:    • Attends Club or Organization Meetings:    • Marital Status:    Intimate Partner Violence:    • Fear of Current or Ex-Partner:    • Emotionally Abused:    • Physically Abused:    • Sexually Abused:          Physical Exam:  Vitals/ General Appearance:   Weight/BMI: Body mass index is 29.4 kg/m².  /63   Pulse (!) 54   Temp 37 °C (98.6 °F) (Temporal)   Resp 19   Ht 1.803 m (5' 11\")   Wt 95.6 kg (210 lb 12.2 oz)   SpO2 97%   Vitals:    02/10/21 1445 02/10/21 1500 02/10/21 1515 02/10/21 1530   BP: 110/58 101/70 109/61 101/63   Pulse: 76 67 64 (!) 54   Resp: 19 18 20 19   Temp:       TempSrc:       SpO2: 96% 98% 98% 97%   Weight:       Height:         Oxygen Therapy:  Pulse Oximetry: 97 %, O2 Delivery Device: None - Room Air    Constitutional:    " No acute distress  HENMT:  Normocephalic, Atraumatic, O  Eyes:  EOMI.  Neck:  Normal range of motion,  no JVD.  Lungs:  Normal respiratory effort.   Abd: soft, non-tender.  : Bishop in place.      MDM (Data Review):     Records reviewed and summarized in current documentation    Lab Data Review:  Recent Results (from the past 24 hour(s))   EKG (NOW)    Collection Time: 21 11:27 PM   Result Value Ref Range    Report       [facility]    Test Date:  2021  Pt Name:    KARON ANDERSON               Department: ER  MRN:        [id number]                      Room:       [facility]  Gender:     Male                         Technician: [id number]  :        [id number]                   Requested By:FABI CHANDRA  Order #:    [id number]                    Reading MD: Fabi Chandra MD    Measurements  Intervals                                Axis  Rate:       92                           P:  UT:                                      QRS:        -43  QRSD:       127                          T:          92  QT:         347  QTc:        430    Interpretive Statements  Atrial fibrillation  Left bundle branch block  No STEMI  Compared to ECG 2021 13:43:04  Left bundle-branch block now present  Intraventricular conduction delay no longer present  Left anterior fascicular block no longer present  Electronically Signed On 2- 1:57:22 PST by Fabi Chandra MD     CBC WITH DIFFERENTIAL    Collection Time: 21 11:37 PM   Result Value Ref Range    WBC 11.5 (H) 4.8 - 10.8 K/uL    RBC 3.54 (L) 4.70 - 6.10 M/uL    Hemoglobin 10.4 (L) 14.0 - 18.0 g/dL    Hematocrit 33.7 (L) 42.0 - 52.0 %    MCV 95.2 81.4 - 97.8 fL    MCH 29.4 27.0 - 33.0 pg    MCHC 30.9 (L) 33.7 - 35.3 g/dL    RDW 53.5 (H) 35.9 - 50.0 fL    Platelet Count 264 164 - 446 K/uL    MPV 10.1 9.0 - 12.9 fL    Neutrophils-Polys 90.40 (H) 44.00 - 72.00 %    Lymphocytes 4.30 (L) 22.00 - 41.00 %    Monocytes 3.50 0.00 - 13.40 %     Eosinophils 0.90 0.00 - 6.90 %    Basophils 0.00 0.00 - 1.80 %    Nucleated RBC 0.00 /100 WBC    Neutrophils (Absolute) 10.40 (H) 1.82 - 7.42 K/uL    Lymphs (Absolute) 0.49 (L) 1.00 - 4.80 K/uL    Monos (Absolute) 0.40 0.00 - 0.85 K/uL    Eos (Absolute) 0.10 0.00 - 0.51 K/uL    Baso (Absolute) 0.00 0.00 - 0.12 K/uL    NRBC (Absolute) 0.00 K/uL    Anisocytosis 1+     Macrocytosis 1+    COMP METABOLIC PANEL    Collection Time: 02/09/21 11:37 PM   Result Value Ref Range    Sodium 136 135 - 145 mmol/L    Potassium 7.0 (HH) 3.6 - 5.5 mmol/L    Chloride 106 96 - 112 mmol/L    Co2 10 (L) 20 - 33 mmol/L    Anion Gap 20.0 (H) 7.0 - 16.0    Glucose 108 (H) 65 - 99 mg/dL    Bun 166 (HH) 8 - 22 mg/dL    Creatinine 13.87 (HH) 0.50 - 1.40 mg/dL    Calcium 8.7 8.5 - 10.5 mg/dL    AST(SGOT) 10 (L) 12 - 45 U/L    ALT(SGPT) 22 2 - 50 U/L    Alkaline Phosphatase 94 30 - 99 U/L    Total Bilirubin 0.6 0.1 - 1.5 mg/dL    Albumin 3.4 3.2 - 4.9 g/dL    Total Protein 7.1 6.0 - 8.2 g/dL    Globulin 3.7 (H) 1.9 - 3.5 g/dL    A-G Ratio 0.9 g/dL   MAGNESIUM    Collection Time: 02/09/21 11:37 PM   Result Value Ref Range    Magnesium 2.1 1.5 - 2.5 mg/dL   PHOSPHORUS    Collection Time: 02/09/21 11:37 PM   Result Value Ref Range    Phosphorus 7.2 (H) 2.5 - 4.5 mg/dL   PROTHROMBIN TIME (INR)    Collection Time: 02/09/21 11:37 PM   Result Value Ref Range    PT 17.9 (H) 12.0 - 14.6 sec    INR 1.43 (H) 0.87 - 1.13   APTT    Collection Time: 02/09/21 11:37 PM   Result Value Ref Range    APTT 36.8 (H) 24.7 - 36.0 sec   ESTIMATED GFR    Collection Time: 02/09/21 11:37 PM   Result Value Ref Range    GFR If  4 (A) >60 mL/min/1.73 m 2    GFR If Non African American 3 (A) >60 mL/min/1.73 m 2   PERIPHERAL SMEAR REVIEW    Collection Time: 02/09/21 11:37 PM   Result Value Ref Range    Peripheral Smear Review see below    PLATELET ESTIMATE    Collection Time: 02/09/21 11:37 PM   Result Value Ref Range    Plt Estimation Normal    MORPHOLOGY     Collection Time: 02/09/21 11:37 PM   Result Value Ref Range    RBC Morphology Present     Poikilocytosis 2+     Echinocytes 2+    DIFFERENTIAL MANUAL    Collection Time: 02/09/21 11:37 PM   Result Value Ref Range    Myelocytes 0.90 %    Manual Diff Status PERFORMED    COV-2, FLU A/B, AND RSV BY PCR (2-4 HOURS CEPHEID): Collect NP swab in VTM    Collection Time: 02/10/21 12:15 AM    Specimen: Respirate   Result Value Ref Range    Influenza virus A RNA Negative Negative    Influenza virus B, PCR Negative Negative    RSV, PCR Negative Negative    SARS-CoV-2 by PCR NotDetected     SARS-CoV-2 Source NP Swab    TROPONIN    Collection Time: 02/10/21 12:55 AM   Result Value Ref Range    Troponin T 108 (H) 6 - 19 ng/L   proBrain Natriuretic Peptide, NT    Collection Time: 02/10/21 12:55 AM   Result Value Ref Range    NT-proBNP 47595 (H) 0 - 125 pg/mL   LACTIC ACID    Collection Time: 02/10/21 12:55 AM   Result Value Ref Range    Lactic Acid 1.5 0.5 - 2.0 mmol/L   EC-ECHOCARDIOGRAM COMPLETE W/O CONT    Collection Time: 02/10/21  2:04 AM   Result Value Ref Range    Eject.Frac. MOD BP 30.7     Eject.Frac. MOD 4C 24.75     Eject.Frac. MOD 2C 38.12     Left Ventrical Ejection Fraction 30    PROCALCITONIN    Collection Time: 02/10/21  2:59 AM   Result Value Ref Range    Procalcitonin 0.38 (H) <0.25 ng/mL   Comp Metabolic Panel    Collection Time: 02/10/21  4:17 AM   Result Value Ref Range    Sodium 136 135 - 145 mmol/L    Potassium 6.4 (H) 3.6 - 5.5 mmol/L    Chloride 106 96 - 112 mmol/L    Co2 9 (LL) 20 - 33 mmol/L    Anion Gap 21.0 (H) 7.0 - 16.0    Glucose 85 65 - 99 mg/dL    Bun 173 (HH) 8 - 22 mg/dL    Creatinine 12.86 (HH) 0.50 - 1.40 mg/dL    Calcium 9.0 8.5 - 10.5 mg/dL    AST(SGOT) 12 12 - 45 U/L    ALT(SGPT) 22 2 - 50 U/L    Alkaline Phosphatase 89 30 - 99 U/L    Total Bilirubin 0.6 0.1 - 1.5 mg/dL    Albumin 3.3 3.2 - 4.9 g/dL    Total Protein 6.6 6.0 - 8.2 g/dL    Globulin 3.3 1.9 - 3.5 g/dL    A-G Ratio 1.0 g/dL    ESTIMATED GFR    Collection Time: 02/10/21  4:17 AM   Result Value Ref Range    GFR If African American 5 (A) >60 mL/min/1.73 m 2    GFR If Non African American 4 (A) >60 mL/min/1.73 m 2   URINALYSIS CULTURE, IF INDICATED    Collection Time: 02/10/21  5:50 AM    Specimen: Urine   Result Value Ref Range    Color Brown (A)     Character Turbid (A)     Specific Gravity 1.013 <1.035    Ph 5.0 5.0 - 8.0    Glucose Negative Negative mg/dL    Ketones Negative Negative mg/dL    Protein 100 (A) Negative mg/dL    Bilirubin Negative Negative    Urobilinogen, Urine 0.2 Negative    Nitrite Negative Negative    Leukocyte Esterase Large (A) Negative    Occult Blood Large (A) Negative    Micro Urine Req Microscopic    URINE MICROSCOPIC (W/UA)    Collection Time: 02/10/21  5:50 AM   Result Value Ref Range    WBC Packed (A) /hpf    RBC 10-20 (A) /hpf    Bacteria Few (A) None /hpf    Epithelial Cells Moderate (A) /hpf   Comp Metabolic Panel    Collection Time: 02/10/21  8:10 AM   Result Value Ref Range    Sodium 134 (L) 135 - 145 mmol/L    Potassium 6.2 (H) 3.6 - 5.5 mmol/L    Chloride 103 96 - 112 mmol/L    Co2 10 (L) 20 - 33 mmol/L    Anion Gap 21.0 (H) 7.0 - 16.0    Glucose 182 (H) 65 - 99 mg/dL    Bun 174 (HH) 8 - 22 mg/dL    Creatinine 12.77 (HH) 0.50 - 1.40 mg/dL    Calcium 8.2 (L) 8.5 - 10.5 mg/dL    AST(SGOT) 14 12 - 45 U/L    ALT(SGPT) 22 2 - 50 U/L    Alkaline Phosphatase 77 30 - 99 U/L    Total Bilirubin 0.6 0.1 - 1.5 mg/dL    Albumin 2.8 (L) 3.2 - 4.9 g/dL    Total Protein 5.8 (L) 6.0 - 8.2 g/dL    Globulin 3.0 1.9 - 3.5 g/dL    A-G Ratio 0.9 g/dL   ESTIMATED GFR    Collection Time: 02/10/21  8:10 AM   Result Value Ref Range    GFR If African American 5 (A) >60 mL/min/1.73 m 2    GFR If Non African American 4 (A) >60 mL/min/1.73 m 2       Imaging/Procedures Review:    Reviewed    MDM (Assessment and Plan):     Active Hospital Problems    Diagnosis    • NOEL (acute kidney injury) (HCC) [N17.9]      Priority: High   •  Hyperkalemia [E87.5]      Priority: High   • Pericardial effusion [I31.3]      Priority: Medium   • Acute systolic CHF (congestive heart failure) (HCC) [I50.21]      Priority: Medium   • Atrial fibrillation (HCC) [I48.91]      Priority: Medium   • Pulmonary hypertension (HCC) [I27.20]      Priority: Medium   • Pyuria [R82.81]      Priority: Medium   • Transitional cell carcinoma (HCC) [C68.9]      Priority: Medium   • Aortic stenosis [I35.0]      Priority: Medium   • Essential hypertension [I10]      Priority: Medium   • Pleural effusion [J90]      Priority: Low     81 y.o. M with history of bladder cancer. Original diagnosis was 09/2019, he did not want to pursue treatment at that time due to age including chemo, radiation, or surgery like a cystesctomy.  Now with renal insufficiency, hyperkalemia. Case discussed with Dr. Murphy; additionally, I had a long discussion with son regarding potential treatment options. Options at this point due renal insufficiency, include placement of bilateral nephrostomy tubes versus palliative care.  I discussed with patient and patients son the rationale behind both, given the nature of his muscle invasive bladder care. If they are amenable to placement of bilateral nephrostomy tubes, urology recommends to have them placed via IR. Urology will continue to follow up along.    Dr. Murphy is aware of this consult and the direction of the plan of care.

## 2021-02-11 NOTE — PROGRESS NOTES
Hospital Medicine Daily Progress Note    Date of Service  2/12/2021    Chief Complaint  Sent by outside MD for hyperkalemia K:7.2    Hospital Course  81 y.o. male w/ hx of Alzheimers, bladder cancer (follows with Dr Murphy, urology), with worsening renal function.  He was admitted 2/9/2021 with Cr:12.65, K:7.2 and in need of emergent dialysis.  A CT of the abd showed bilateral hydronephrosis and hydroureter along w/ bladder thickening.    Interval Problem Update  On dialysis this am when I saw him,  Off pressors.  Alert and conversant. States he lives with his wife that has cancer in the brain.  He denies pain.  He is to get nephrostomy tube today.    Consultants/Specialty  Nephrology  Urology  Intensivist (signing off)    Code Status  Full Code    Disposition  Transfer to medical floor (order placed 2/12)    Review of Systems  Review of Systems   Constitutional: Negative for chills and fever.   Eyes: Negative for discharge.   Respiratory: Negative for cough, shortness of breath and stridor.    Cardiovascular: Negative for chest pain, palpitations and leg swelling.   Gastrointestinal: Negative for abdominal pain, diarrhea and nausea.   Genitourinary: Negative for dysuria and hematuria.   Musculoskeletal: Negative for back pain and joint pain.   Neurological: Negative for dizziness, speech change and headaches.   Psychiatric/Behavioral: Negative for depression. The patient is not nervous/anxious.         Physical Exam  Temp:  [36.3 °C (97.3 °F)-36.8 °C (98.2 °F)] 36.7 °C (98.1 °F)  Pulse:  [] 81  Resp:  [14-28] 18  BP: ()/(48-77) 99/60  SpO2:  [96 %-100 %] 97 %    Physical Exam  Vitals reviewed.   Constitutional:       Appearance: Normal appearance. He is not diaphoretic.   HENT:      Head: Normocephalic and atraumatic.      Nose: Nose normal.      Mouth/Throat:      Mouth: Mucous membranes are moist.      Pharynx: No oropharyngeal exudate.   Eyes:      General: No scleral icterus.        Right eye: No  discharge.         Left eye: No discharge.      Extraocular Movements: Extraocular movements intact.      Conjunctiva/sclera: Conjunctivae normal.   Neck:      Vascular: No carotid bruit.   Cardiovascular:      Rate and Rhythm: Normal rate and regular rhythm.      Pulses:           Radial pulses are 2+ on the right side and 2+ on the left side.        Dorsalis pedis pulses are 2+ on the right side and 2+ on the left side.      Heart sounds: No murmur.   Pulmonary:      Effort: Pulmonary effort is normal. No respiratory distress.      Breath sounds: Normal breath sounds. No wheezing or rales.   Abdominal:      General: Bowel sounds are normal. There is no distension.      Palpations: Abdomen is soft.      Tenderness: There is no abdominal tenderness.   Musculoskeletal:         General: No swelling or tenderness.      Cervical back: No muscular tenderness.      Right lower leg: No edema.      Left lower leg: No edema.   Lymphadenopathy:      Cervical: No cervical adenopathy.   Skin:     Coloration: Skin is not jaundiced or pale.   Neurological:      General: No focal deficit present.      Mental Status: He is alert. Mental status is at baseline.      Cranial Nerves: No cranial nerve deficit.   Psychiatric:         Mood and Affect: Mood normal.         Behavior: Behavior normal.         Fluids    Intake/Output Summary (Last 24 hours) at 2/12/2021 1458  Last data filed at 2/12/2021 1115  Gross per 24 hour   Intake 621.35 ml   Output 1303 ml   Net -681.65 ml       Laboratory  Recent Labs     02/09/21  2337 02/11/21  0238 02/12/21  0640   WBC 11.5* 9.5 9.0   RBC 3.54* 2.90* 2.80*   HEMOGLOBIN 10.4* 8.7* 8.4*   HEMATOCRIT 33.7* 26.2* 26.4*   MCV 95.2 90.3 94.3   MCH 29.4 30.0 30.0   MCHC 30.9* 33.2* 31.8*   RDW 53.5* 49.2 52.5*   PLATELETCT 264 179 158*   MPV 10.1 10.1 10.0     Recent Labs     02/10/21  0810 02/11/21  0238 02/12/21  0640   SODIUM 134* 132* 136   POTASSIUM 6.2* 4.6 5.0   CHLORIDE 103 98 98   CO2 10* 21 28    GLUCOSE 182* 126* 104*   * 97* 61*   CREATININE 12.77* 9.12* 7.44*   CALCIUM 8.2* 7.7* 7.4*     Recent Labs     02/09/21  2337   APTT 36.8*   INR 1.43*               Imaging  DX-CHEST-FOR LINE PLACEMENT Perform procedure in: Patient's Room   Final Result      Right IJ dialysis catheter has been placed and the tip projects appropriately over the superior vena cava/right atrial junction.      EC-ECHOCARDIOGRAM COMPLETE W/O CONT   Final Result      CT-RENAL COLIC EVALUATION(A/P W/O)   Final Result         1.  Moderate pericardial effusion measuring 30 Hounsfield units, consider proteinaceous or hemorrhagic fluid. Further evaluation with echocardiogram to evaluate for component of cardiac tamponade.   2.  Moderate left pleural effusion with dependent consolidation which could represent atelectasis or infiltrate   3.  Bilateral hydronephrosis and hydroureter without visualized obstructing stone   4.  Diverticulosis   5.  Large left lower quadrant lateral abdominal wall hernia containing mesenteric fat in these portion of the sigmoid colon without visualized obstructive change   6.  3.6 cm fusiform distal abdominal aortic aneurysm, radiographic follow-up and surveillance as clinically appropriate   7.  Bladder wall thickening with slight adjacent hazy fat stranding, likely corresponding with history of bladder malignancy.   8.  Cholelithiasis      These findings were discussed with the patient's clinician, MAIDA CHANDRA, on 2/10/2021 12:18 AM.      IR-PERQ NEPH CATH NEW ACCESS (ALL RADIOLOGY) BOTH    (Results Pending)        Assessment/Plan  * NOEL (acute kidney injury) (HCC)- (present on admission)  Assessment & Plan  Patient had creatinine greater than 12 and potassium greater than 7 on admission.  Calcium gluconate, insulin, bicarb drip initiated   Likely patient will need temporary if not permanent dialysis  Nephrology consulting (Dr Stevenson) and holding dialysis 2/13  Nephrostomy tube to be placed  2/12  Bilateral hydronephrosis noted on CT without evidence of clear stenosis or stone.  Likely related to his transitional cell carcinoma.    Hyperkalemia- (present on admission)  Assessment & Plan  2/12 K:5 and had hemodialysis after lab draw.  2/10 K:6.2  Monitor bmp    Pulmonary hypertension (HCC)  Assessment & Plan  Supplemental oxygen    Atrial fibrillation (HCC)- (present on admission)  Assessment & Plan  Monitor on telemetry and vitals  Rate control  S/P nephrostomy tubes consider initiation of anticoagulation    Acute systolic CHF (congestive heart failure) (HCC)- (present on admission)  Assessment & Plan  HFrEF:30% per 2/10 echo.    Pericardial effusion- (present on admission)  Assessment & Plan  Likely related to his acute renal failure.   Echo shows no acute tamponade.    Transitional cell carcinoma (HCC)- (present on admission)  Assessment & Plan  Follow-up outpatient oncology for further management.    Aortic stenosis- (present on admission)  Assessment & Plan  2/10 Echo: Moderate aortic stenosis with low grade/low flow.  Transvalvular gradients are - Peak: 33 mmHg, Mean: 21 mmHg.  The severity of the aortic stenosis may be underestimated due to the   poor LV systolic function.    Essential hypertension- (present on admission)  Assessment & Plan  Outpatient lisinopril 40mg held due to acute renal failure  Monitor vitals.  Consider amlodipine if on going HTN.    Abdominal aortic aneurysm (AAA) 35 to 39 mm in diameter ([facility])  Assessment & Plan  Noted on CT renal scan  3.6cm  Monitor BP.  Bigger picture is his bladder cancer, chf and renal failure.    Cholelithiasis  Assessment & Plan  Noted on CT  asymptomatic    Anemia  Assessment & Plan  Likely chronic disease (transitional cell cancer and renal failure)  Monitor cbc    Pleural effusion- (present on admission)  Assessment & Plan  Hemodialysis  Wean oxygen as tolerates.       VTE prophylaxis: Heparin

## 2021-02-11 NOTE — PROGRESS NOTES
Pulmonary and Critical Care Medicine Progress Note    This gentleman is doing super after dialysis yesterday.  His potassium is 4.6 this morning.  He is getting dialysis at this time.  He has no further critical care needs.    OK to transfer out of ICU.  [facility] will sign off.  Please call if you have any questions.    Benji Hernández MD  Pulmonary and Critical Care Medicine

## 2021-02-11 NOTE — PROGRESS NOTES
[facility] HD Note: HD completed. Tx time and UF goals met. Pt tolerated tx well. Pressor needed to be titrated during tx to keep sbp >65. Report with primary RN. Pt denies questions at this time.   Net UF= 1L  Tx run time: 5567-2966  See flow sheet for further details.

## 2021-02-11 NOTE — CARE PLAN
Problem: Safety  Goal: Will remain free from injury  2/10/2021 2342 by Brian Allen R.N.  Outcome: PROGRESSING AS EXPECTED  2/10/2021 2340 by Brian Allen R.N.  Outcome: PROGRESSING AS EXPECTED  Goal: Will remain free from falls  2/10/2021 2342 by Brian Allen R.N.  Outcome: PROGRESSING AS EXPECTED  2/10/2021 2340 by Brian Allen R.N.  Outcome: PROGRESSING AS EXPECTED     Problem: Venous Thromboembolism (VTW)/Deep Vein Thrombosis (DVT) Prevention:  Goal: Patient will participate in Venous Thrombosis (VTE)/Deep Vein Thrombosis (DVT)Prevention Measures  2/10/2021 2342 by Brian Allen R.N.  Outcome: PROGRESSING AS EXPECTED  2/10/2021 2340 by Brian Allen R.N.  Outcome: PROGRESSING AS EXPECTED     Problem: Infection  Goal: Will remain free from infection  2/10/2021 2342 by Brian Allen R.N.  Outcome: PROGRESSING SLOWER THAN EXPECTED  2/10/2021 2340 by Brian Allen R.N.  Outcome: PROGRESSING SLOWER THAN EXPECTED     Problem: Pain Management  Goal: Pain level will decrease to patient's comfort goal  2/10/2021 2342 by Brian Allen R.N.  Outcome: PROGRESSING SLOWER THAN EXPECTED  2/10/2021 2341 by Brian Allen R.N.  Outcome: PROGRESSING SLOWER THAN EXPECTED  2/10/2021 2340 by Brian Allen R.N.  Outcome: PROGRESSING SLOWER THAN EXPECTED     Problem: Urinary Elimination:  Goal: Ability to reestablish a normal urinary elimination pattern will improve  2/10/2021 2342 by Brian Allen R.N.  Outcome: PROGRESSING SLOWER THAN EXPECTED  2/10/2021 2341 by Brian Allen R.N.  Outcome: PROGRESSING SLOWER THAN EXPECTED  2/10/2021 2340 by Brian Allen R.N.  Outcome: PROGRESSING SLOWER THAN EXPECTED

## 2021-02-12 ENCOUNTER — APPOINTMENT (OUTPATIENT)
Dept: RADIOLOGY | Facility: MEDICAL CENTER | Age: 81
DRG: 682 | End: 2021-02-12
Attending: PHYSICIAN ASSISTANT
Payer: MEDICARE

## 2021-02-12 PROBLEM — D64.9 ANEMIA: Status: ACTIVE | Noted: 2021-02-12

## 2021-02-12 PROBLEM — K80.20 CHOLELITHIASIS: Status: ACTIVE | Noted: 2021-02-12

## 2021-02-12 PROBLEM — I71.40: Status: ACTIVE | Noted: 2021-02-12

## 2021-02-12 LAB
ANION GAP SERPL CALC-SCNC: 10 MMOL/L (ref 7–16)
APPEARANCE FLD: NORMAL
APPEARANCE FLD: NORMAL
BACTERIA UR CULT: NORMAL
BASOPHILS # BLD AUTO: 0.1 % (ref 0–1.8)
BASOPHILS # BLD: 0.01 K/UL (ref 0–0.12)
BODY FLD TYPE: NORMAL
BODY FLD TYPE: NORMAL
BUN SERPL-MCNC: 61 MG/DL (ref 8–22)
CALCIUM SERPL-MCNC: 7.4 MG/DL (ref 8.5–10.5)
CHLORIDE SERPL-SCNC: 98 MMOL/L (ref 96–112)
CO2 SERPL-SCNC: 28 MMOL/L (ref 20–33)
COLOR FLD: NORMAL
COLOR FLD: NORMAL
CREAT SERPL-MCNC: 7.44 MG/DL (ref 0.5–1.4)
EOSINOPHIL # BLD AUTO: 0.04 K/UL (ref 0–0.51)
EOSINOPHIL NFR BLD: 0.4 % (ref 0–6.9)
ERYTHROCYTE [DISTWIDTH] IN BLOOD BY AUTOMATED COUNT: 52.5 FL (ref 35.9–50)
GLUCOSE SERPL-MCNC: 104 MG/DL (ref 65–99)
HCT VFR BLD AUTO: 26.4 % (ref 42–52)
HCV RNA SERPL NAA+PROBE-ACNC: NOT DETECTED IU/ML
HCV RNA SERPL NAA+PROBE-LOG IU: NOT DETECTED LOG IU/ML
HCV RNA SERPL QL NAA+PROBE: NOT DETECTED
HGB BLD-MCNC: 8.4 G/DL (ref 14–18)
IMM GRANULOCYTES # BLD AUTO: 0.11 K/UL (ref 0–0.11)
IMM GRANULOCYTES NFR BLD AUTO: 1.2 % (ref 0–0.9)
LYMPHOCYTES # BLD AUTO: 0.58 K/UL (ref 1–4.8)
LYMPHOCYTES NFR BLD: 6.5 % (ref 22–41)
MAGNESIUM SERPL-MCNC: 2 MG/DL (ref 1.5–2.5)
MCH RBC QN AUTO: 30 PG (ref 27–33)
MCHC RBC AUTO-ENTMCNC: 31.8 G/DL (ref 33.7–35.3)
MCV RBC AUTO: 94.3 FL (ref 81.4–97.8)
MONOCYTES # BLD AUTO: 1.04 K/UL (ref 0–0.85)
MONOCYTES NFR BLD AUTO: 11.6 % (ref 0–13.4)
NEUTROPHILS # BLD AUTO: 7.17 K/UL (ref 1.82–7.42)
NEUTROPHILS NFR BLD: 80.2 % (ref 44–72)
NEUTROPHILS NFR FLD: 100 %
NRBC # BLD AUTO: 0 K/UL
NRBC BLD-RTO: 0 /100 WBC
PHOSPHATE SERPL-MCNC: 4.3 MG/DL (ref 2.5–4.5)
PLATELET # BLD AUTO: 158 K/UL (ref 164–446)
PMV BLD AUTO: 10 FL (ref 9–12.9)
POTASSIUM SERPL-SCNC: 5 MMOL/L (ref 3.6–5.5)
RBC # BLD AUTO: 2.8 M/UL (ref 4.7–6.1)
RBC # FLD: 611 CELLS/UL
RBC # FLD: NORMAL CELLS/UL
SIGNIFICANT IND 70042: NORMAL
SITE SITE: NORMAL
SODIUM SERPL-SCNC: 136 MMOL/L (ref 135–145)
SOURCE SOURCE: NORMAL
WBC # BLD AUTO: 9 K/UL (ref 4.8–10.8)
WBC # FLD: NORMAL CELLS/UL
WBC # FLD: NORMAL CELLS/UL

## 2021-02-12 PROCEDURE — 700111 HCHG RX REV CODE 636 W/ 250 OVERRIDE (IP): Performed by: RADIOLOGY

## 2021-02-12 PROCEDURE — 83735 ASSAY OF MAGNESIUM: CPT

## 2021-02-12 PROCEDURE — 84100 ASSAY OF PHOSPHORUS: CPT

## 2021-02-12 PROCEDURE — 90935 HEMODIALYSIS ONE EVALUATION: CPT

## 2021-02-12 PROCEDURE — 5A1D70Z PERFORMANCE OF URINARY FILTRATION, INTERMITTENT, LESS THAN 6 HOURS PER DAY: ICD-10-PCS | Performed by: INTERNAL MEDICINE

## 2021-02-12 PROCEDURE — 80048 BASIC METABOLIC PNL TOTAL CA: CPT

## 2021-02-12 PROCEDURE — 99153 MOD SED SAME PHYS/QHP EA: CPT

## 2021-02-12 PROCEDURE — 700102 HCHG RX REV CODE 250 W/ 637 OVERRIDE(OP): Performed by: STUDENT IN AN ORGANIZED HEALTH CARE EDUCATION/TRAINING PROGRAM

## 2021-02-12 PROCEDURE — 0T9430Z DRAINAGE OF LEFT KIDNEY PELVIS WITH DRAINAGE DEVICE, PERCUTANEOUS APPROACH: ICD-10-PCS | Performed by: RADIOLOGY

## 2021-02-12 PROCEDURE — 0T9330Z DRAINAGE OF RIGHT KIDNEY PELVIS WITH DRAINAGE DEVICE, PERCUTANEOUS APPROACH: ICD-10-PCS | Performed by: RADIOLOGY

## 2021-02-12 PROCEDURE — 700111 HCHG RX REV CODE 636 W/ 250 OVERRIDE (IP): Performed by: STUDENT IN AN ORGANIZED HEALTH CARE EDUCATION/TRAINING PROGRAM

## 2021-02-12 PROCEDURE — 89051 BODY FLUID CELL COUNT: CPT

## 2021-02-12 PROCEDURE — 700111 HCHG RX REV CODE 636 W/ 250 OVERRIDE (IP): Performed by: INTERNAL MEDICINE

## 2021-02-12 PROCEDURE — BT141ZZ FLUOROSCOPY OF KIDNEYS, URETERS AND BLADDER USING LOW OSMOLAR CONTRAST: ICD-10-PCS | Performed by: RADIOLOGY

## 2021-02-12 PROCEDURE — 700117 HCHG RX CONTRAST REV CODE 255: Performed by: PHYSICIAN ASSISTANT

## 2021-02-12 PROCEDURE — 99233 SBSQ HOSP IP/OBS HIGH 50: CPT | Performed by: HOSPITALIST

## 2021-02-12 PROCEDURE — 85025 COMPLETE CBC W/AUTO DIFF WBC: CPT

## 2021-02-12 PROCEDURE — 770022 HCHG ROOM/CARE - ICU (200)

## 2021-02-12 PROCEDURE — 700105 HCHG RX REV CODE 258: Performed by: INTERNAL MEDICINE

## 2021-02-12 PROCEDURE — 700111 HCHG RX REV CODE 636 W/ 250 OVERRIDE (IP)

## 2021-02-12 PROCEDURE — A9270 NON-COVERED ITEM OR SERVICE: HCPCS | Performed by: STUDENT IN AN ORGANIZED HEALTH CARE EDUCATION/TRAINING PROGRAM

## 2021-02-12 RX ORDER — ONDANSETRON 2 MG/ML
4 INJECTION INTRAMUSCULAR; INTRAVENOUS PRN
Status: ACTIVE | OUTPATIENT
Start: 2021-02-12 | End: 2021-02-12

## 2021-02-12 RX ORDER — MIDAZOLAM HYDROCHLORIDE 1 MG/ML
INJECTION INTRAMUSCULAR; INTRAVENOUS
Status: COMPLETED
Start: 2021-02-12 | End: 2021-02-12

## 2021-02-12 RX ORDER — MIDAZOLAM HYDROCHLORIDE 1 MG/ML
.5-2 INJECTION INTRAMUSCULAR; INTRAVENOUS PRN
Status: ACTIVE | OUTPATIENT
Start: 2021-02-12 | End: 2021-02-12

## 2021-02-12 RX ORDER — CEFAZOLIN SODIUM 2 G/100ML
2 INJECTION, SOLUTION INTRAVENOUS ONCE
Status: COMPLETED | OUTPATIENT
Start: 2021-02-12 | End: 2021-02-12

## 2021-02-12 RX ORDER — CEFAZOLIN SODIUM 1 G/3ML
INJECTION, POWDER, FOR SOLUTION INTRAMUSCULAR; INTRAVENOUS
Status: COMPLETED
Start: 2021-02-12 | End: 2021-02-12

## 2021-02-12 RX ORDER — SODIUM CHLORIDE 9 MG/ML
500 INJECTION, SOLUTION INTRAVENOUS
Status: ACTIVE | OUTPATIENT
Start: 2021-02-12 | End: 2021-02-12

## 2021-02-12 RX ADMIN — MIDAZOLAM HYDROCHLORIDE 1 MG: 1 INJECTION, SOLUTION INTRAMUSCULAR; INTRAVENOUS at 12:40

## 2021-02-12 RX ADMIN — FENTANYL CITRATE 25 MCG: 50 INJECTION, SOLUTION INTRAMUSCULAR; INTRAVENOUS at 12:32

## 2021-02-12 RX ADMIN — CEFTRIAXONE SODIUM 1 G: 1 INJECTION, POWDER, FOR SOLUTION INTRAMUSCULAR; INTRAVENOUS at 06:39

## 2021-02-12 RX ADMIN — SEVELAMER CARBONATE 800 MG: 800 TABLET, FILM COATED ORAL at 17:15

## 2021-02-12 RX ADMIN — MIDAZOLAM HYDROCHLORIDE 1 MG: 1 INJECTION, SOLUTION INTRAMUSCULAR; INTRAVENOUS at 12:32

## 2021-02-12 RX ADMIN — HEPARIN SODIUM 2800 UNITS: 1000 INJECTION, SOLUTION INTRAVENOUS; SUBCUTANEOUS at 10:43

## 2021-02-12 RX ADMIN — TRAZODONE HYDROCHLORIDE 100 MG: 50 TABLET ORAL at 21:12

## 2021-02-12 RX ADMIN — MIDAZOLAM HYDROCHLORIDE 1 MG: 1 INJECTION, SOLUTION INTRAMUSCULAR; INTRAVENOUS at 12:39

## 2021-02-12 RX ADMIN — HEPARIN SODIUM 5000 UNITS: 5000 INJECTION, SOLUTION INTRAVENOUS; SUBCUTANEOUS at 15:46

## 2021-02-12 RX ADMIN — FENTANYL CITRATE 25 MCG: 50 INJECTION, SOLUTION INTRAMUSCULAR; INTRAVENOUS at 12:39

## 2021-02-12 RX ADMIN — CEFAZOLIN 2000 MG: 330 INJECTION, POWDER, FOR SOLUTION INTRAMUSCULAR; INTRAVENOUS at 12:36

## 2021-02-12 RX ADMIN — HEPARIN SODIUM 5000 UNITS: 5000 INJECTION, SOLUTION INTRAVENOUS; SUBCUTANEOUS at 21:12

## 2021-02-12 RX ADMIN — FENTANYL CITRATE 50 MCG: 50 INJECTION, SOLUTION INTRAMUSCULAR; INTRAVENOUS at 12:40

## 2021-02-12 RX ADMIN — IOHEXOL 30 ML: 300 INJECTION, SOLUTION INTRAVENOUS at 13:05

## 2021-02-12 ASSESSMENT — PAIN DESCRIPTION - PAIN TYPE
TYPE: ACUTE PAIN

## 2021-02-12 ASSESSMENT — ENCOUNTER SYMPTOMS
DEPRESSION: 0
PALPITATIONS: 0
NERVOUS/ANXIOUS: 0
DIZZINESS: 0
FEVER: 0
STRIDOR: 0
NAUSEA: 0
SPEECH CHANGE: 0
EYE DISCHARGE: 0
HEADACHES: 0
COUGH: 0
BACK PAIN: 0
CHILLS: 0
DIARRHEA: 0
SHORTNESS OF BREATH: 0
ABDOMINAL PAIN: 0

## 2021-02-12 ASSESSMENT — PATIENT HEALTH QUESTIONNAIRE - PHQ9
1. LITTLE INTEREST OR PLEASURE IN DOING THINGS: NOT AT ALL
SUM OF ALL RESPONSES TO PHQ9 QUESTIONS 1 AND 2: 0
2. FEELING DOWN, DEPRESSED, IRRITABLE, OR HOPELESS: NOT AT ALL

## 2021-02-12 NOTE — PROGRESS NOTES
[facility] Progress Note     Hemodialysis treatment #3 ordered today per Dr. Stevenson x 3 hours.   Treatment initiated at 0756 ended at 1057.      Patient tolerated treatment; see paper flow sheet for details.      Net  mL.   Limited by SBP at 70-80's; asymptomatic     Seen and examined by Dr. Stevenson; Hold HD tomorrow to observe  Post tx, CVC flushed with saline then locked with heparin 1000 units/mL per designated amount in each wing then clamped and capped. Aspirate heparin prior to next CVC use.     Report given to Primary RN.

## 2021-02-12 NOTE — ASSESSMENT & PLAN NOTE
2/10 Echo: Moderate aortic stenosis with low grade/low flow.  Transvalvular gradients are - Peak: 33 mmHg, Mean: 21 mmHg.  The severity of the aortic stenosis may be underestimated due to the   poor LV systolic function.

## 2021-02-12 NOTE — PROGRESS NOTES
Patient out of room to IR at time of rounds.  Please monitor output via NTs.  Urology will continue to follow.

## 2021-02-12 NOTE — ASSESSMENT & PLAN NOTE
Outpatient lisinopril 40mg held due to acute renal failure  Monitor vitals.  Consider amlodipine if on going HTN.

## 2021-02-12 NOTE — OR SURGEON
Immediate Post- Operative Note    PostOp Diagnosis: RENAL OBSTRUCTION. BLADDER CA. RENAL FAILURE NOW REQUIRED DIALYSIS. BILATERAL MODERATE HYDRONEPHROSIS.       Procedure(s): BILATERAL PERCUTANEOUS NEPHROSTOMY PLACEMENT AND NEPHROSTOGRAM WITH U/S AND FLUOROSCOPIC GUIDANCE    8F LOCKING LOOP BILAT. MODERATE BILATERAL HYDRONEPHROSIS ON ANTEGRADE PYELOGRAMS, NEPHROSTOGRAMS.     SPECIMENS FROM INITIAL NEEDLE PUNCTURE SENT FOR BOTH KIDNEYS FOR C+S, GRAM STAIN, CELL COUNT    URINE WAS BROWN AND TURBID BILATERAL.     Estimated Blood Loss: <2 CC        Complications: NONE          2/12/2021     12:59 PM     Jono Villa M.D.

## 2021-02-12 NOTE — HEART FAILURE PROGRAM
Patient's primary reason for admission is life threatening hyperkalemia requiring dialysis to correct.    Per Intensivist notes patient has also been in acutely decompensated HF.    There are no therapy notes and RN CM note indicates that patient will dc to home so I've requested a HF f/u. Looks like pt is transferring out of the ICU today so could dc over the weekend.    Please see below for measures that must be addressed prior to discharge.     Daily Nurse: please begin to fill out the HF checklist (pink sheet in hard chart) and use it to guide your daily care.    Discharge Nurse: please ensure completeness of the HF checklist (pink sheet in hard chart) and have it co-signed by the charge RN before the patient leaves the hospital.    Thank you, Babita, Cardio RN Navigator [phone]    HF Measures:  1. Documentation of LV systolic function (echo or cath) PTA, during this hospitalization, or plan to assess post discharge or reason for not assessing documented  2. Documentation of fluid intake and urine output every nursing shift  3. 2 hour post diuretic assessment documented 2 hours after diuretic given  4. HF Patient Education using the Living Well With Heart Failure Booklet and Symptom Tracker documented every nursing shift  5. Nutrition consult for diet education  6. Daily weights (one weight documented every 24 hours) on a standing scale unless standing is contraindicated in which case bed scale can be used - have patient write weight on symptom tracker  7. For LVEF less than or equal to 40%, ACE-I, ARNI or ARB prescribed at discharge   8. For LVEF less than or equal to 40%, an Evidence Based Beta Blocker (bisoprolol, carvedilol, toprol xl) must be prescribed at discharge  9. For LVEF less than or equal to 35% aldosterone blockade prescribed at discharge  10. The combination of hydralazine and isosorbide dinitrate is recommended to reduce morbidity and mortality for patients self-described  Americans  with NYHA class III-IV HFrEF (EF 40% or less), receiving optimal therapy with ACE inhibitors and beta blockers, unless contraindicated (Class I, LOBO: A).  11. If a HF patient is diabetic or is newly diagnosed with DM: prescribed diabetes treatment at discharge in the form of glycemic control (diet or anti-hyperglycemic medication) or f/u appointment for diabetes management scheduled at discharge.  12. If a HF patient has diabetes: prescribed lipid lowering medication at discharge  13. Documented smoking cessation advice or counseling  14. If a HF patient has a-fib: anticoagulation is prescribed upon discharge or contraindication is documented  15. Screening for and administering immunizations as long as no contraindications: Pneumonia (regardless of age) and Influenza  16. Written discharge instructions include:  ? Daily weights  ? Record weight on tracker  ? Bring tracker to appointments  ? Call MD for weight gain of 3lb /day or 5lb/week  ? HF medication teaching  ? Low sodium diet  ? Follow up appointment within seven calendar days of d/c must include: date, time and location  ? Activity  ? Worsening symptoms    What if any of the above HF measures are contraindicated?  ? Request that the discharging provider document the medication/intervention and the contraindication specifically in a progress note  ? For example: “no CHF meds due to hypotension” is not enough. It needs to say: “No ACE-I, ARNI, ARB due to hypotension”; “No Beta Blockade due to bradycardia”…

## 2021-02-12 NOTE — PROGRESS NOTES
Pulmonary and Critical Care Medicine Progress Note    OK to transfer out of ICU.  [facility] will sign off.  Please call if you have any questions.    Benji Hernández MD  Pulmonary and Critical Care Medicine

## 2021-02-12 NOTE — ASSESSMENT & PLAN NOTE
Monitor on telemetry and vitals  Rate control  S/P nephrostomy tubes consider initiation of anticoagulation

## 2021-02-12 NOTE — CARE PLAN
Problem: Communication  Goal: The ability to communicate needs accurately and effectively will improve  Outcome: PROGRESSING AS EXPECTED     Problem: Safety  Goal: Will remain free from injury  Outcome: PROGRESSING AS EXPECTED  Goal: Will remain free from falls  Outcome: PROGRESSING AS EXPECTED     Problem: Infection  Goal: Will remain free from infection  Outcome: PROGRESSING AS EXPECTED     Problem: Venous Thromboembolism (VTW)/Deep Vein Thrombosis (DVT) Prevention:  Goal: Patient will participate in Venous Thrombosis (VTE)/Deep Vein Thrombosis (DVT)Prevention Measures  Outcome: PROGRESSING AS EXPECTED     Problem: Bowel/Gastric:  Goal: Normal bowel function is maintained or improved  Outcome: PROGRESSING AS EXPECTED  Goal: Will not experience complications related to bowel motility  Outcome: PROGRESSING AS EXPECTED     Problem: Knowledge Deficit  Goal: Knowledge of disease process/condition, treatment plan, diagnostic tests, and medications will improve  Outcome: PROGRESSING AS EXPECTED  Goal: Knowledge of the prescribed therapeutic regimen will improve  Outcome: PROGRESSING AS EXPECTED     Problem: Pain Management  Goal: Pain level will decrease to patient's comfort goal  Outcome: PROGRESSING AS EXPECTED

## 2021-02-12 NOTE — PROCEDURES
Nephrology/Hemodialysis note    Patient with NOEL/bladder cancer, bilateral hydrouretheronephrosis -on HD  Seen and examined during dialysis treatment -tolerates well  VS stable  Lab results reviewed: hyperkalemia and acidosis corrected  Please see dialysis flow sheet for details  Plan to place nephrostomy tubes today -will follow

## 2021-02-12 NOTE — PROGRESS NOTES
Nephrology Daily Progress Note    Date of Service  2/11/2021    Chief Complaint  81 y.o. male with hx/of bladder cancer admitted 2/9/2021 with NOEL, obstructive nephropathy   Acidosis,hyperkalemia, started HD    Interval Problem Update  2/11 -no acute events  Continue daily dialysis  Urology consulted -recommended nephrostomy tubes  Seen and examined during dialysis  Tolerates well    Review of Systems  Review of Systems   Constitutional: Positive for malaise/fatigue. Negative for chills and fever.   HENT: Negative for congestion, hearing loss and sinus pain.    Eyes: Negative.    Respiratory: Negative for cough, hemoptysis, shortness of breath and wheezing.    Cardiovascular: Negative for chest pain, palpitations, orthopnea and leg swelling.   Gastrointestinal: Negative for abdominal pain, nausea and vomiting.   Genitourinary: Positive for flank pain.        Bishop catheter   Skin: Negative.    All other systems reviewed and are negative.       Physical Exam  Temp:  [36.1 °C (97 °F)-36.5 °C (97.7 °F)] 36.2 °C (97.2 °F)  Pulse:  [] 89  Resp:  [13-34] 28  BP: ()/(54-84) 109/74  SpO2:  [87 %-99 %] 99 %    Physical Exam  Vitals and nursing note reviewed.   Constitutional:       General: He is not in acute distress.     Appearance: Normal appearance. He is well-developed. He is not diaphoretic.   HENT:      Head: Normocephalic and atraumatic.      Nose: Nose normal.      Mouth/Throat:      Mouth: Mucous membranes are moist.      Pharynx: Oropharynx is clear.   Eyes:      General: No scleral icterus.     Extraocular Movements: Extraocular movements intact.      Conjunctiva/sclera: Conjunctivae normal.      Pupils: Pupils are equal, round, and reactive to light.   Cardiovascular:      Rate and Rhythm: Normal rate and regular rhythm.      Pulses: Normal pulses.      Heart sounds: Normal heart sounds. No friction rub. No gallop.    Pulmonary:      Effort: Pulmonary effort is normal. No respiratory distress.       Breath sounds: Normal breath sounds. No wheezing, rhonchi or rales.   Abdominal:      General: Bowel sounds are normal. There is no distension.      Palpations: Abdomen is soft. There is no mass.      Tenderness: There is no abdominal tenderness. There is no left CVA tenderness or guarding.   Musculoskeletal:      Cervical back: Normal range of motion and neck supple.      Right lower leg: No edema.      Left lower leg: No edema.   Skin:     General: Skin is warm.      Coloration: Skin is pale.      Findings: No erythema or rash.   Neurological:      General: No focal deficit present.      Mental Status: He is alert and oriented to person, place, and time.      Cranial Nerves: No cranial nerve deficit.      Coordination: Coordination normal.   Psychiatric:         Mood and Affect: Mood normal.         Behavior: Behavior normal.         Thought Content: Thought content normal.         Judgment: Judgment normal.         Fluids    Intake/Output Summary (Last 24 hours) at 2/11/2021 1603  Last data filed at 2/11/2021 1345  Gross per 24 hour   Intake 3653.96 ml   Output 2743 ml   Net 910.96 ml       Laboratory  Recent Labs     02/09/21  1350 02/09/21  2337 02/11/21  0238   WBC 11.5* 11.5* 9.5   RBC 3.55* 3.54* 2.90*   HEMOGLOBIN 10.4* 10.4* 8.7*   HEMATOCRIT 34.3* 33.7* 26.2*   MCV 96.6 95.2 90.3   MCH 29.3 29.4 30.0   MCHC 30.3* 30.9* 33.2*   RDW 55.1* 53.5* 49.2   PLATELETCT 284 264 179   MPV 10.2 10.1 10.1     Recent Labs     02/10/21  0417 02/10/21  0810 02/11/21  0238   SODIUM 136 134* 132*   POTASSIUM 6.4* 6.2* 4.6   CHLORIDE 106 103 98   CO2 9* 10* 21   GLUCOSE 85 182* 126*   * 174* 97*   CREATININE 12.86* 12.77* 9.12*   CALCIUM 9.0 8.2* 7.7*     Recent Labs     02/09/21  1350 02/09/21 2337   APTT  --  36.8*   INR 1.40* 1.43*     Recent Labs     02/10/21  0055   NTPROBNP 04135*           Imaging  CT reviewed    Assessment/Plan  1.NOEL/CKD III/ obstructive nephropathy on daily dialysis -please see dialysis  flow sheet for details  2.HTN: low BP supported with pressor  3.Electrolytes: hyperkalemia corrected with dialysis, mild hyponatremia -to monitor  4.Anemia: drop in Hb level -to monitor   5.hx/of bladder cancer, urinary obstruction -nephrostomy tube placement/palliative care depends on patient choice   6.Volume: UF with HD as tolerates    Recs: daily dialysis             Daily labs             Avoid nephrotoxins             Renal diet             Will follow             D/w Dr.De Ahsan Sheets

## 2021-02-12 NOTE — ASSESSMENT & PLAN NOTE
Noted on CT renal scan  3.6cm  Monitor BP.  Bigger picture is his bladder cancer, chf and renal failure.

## 2021-02-13 LAB
ANION GAP SERPL CALC-SCNC: 8 MMOL/L (ref 7–16)
BASOPHILS # BLD AUTO: 0.1 % (ref 0–1.8)
BASOPHILS # BLD: 0.01 K/UL (ref 0–0.12)
BUN SERPL-MCNC: 38 MG/DL (ref 8–22)
CALCIUM SERPL-MCNC: 7.5 MG/DL (ref 8.5–10.5)
CHLORIDE SERPL-SCNC: 97 MMOL/L (ref 96–112)
CO2 SERPL-SCNC: 31 MMOL/L (ref 20–33)
CREAT SERPL-MCNC: 5.12 MG/DL (ref 0.5–1.4)
EOSINOPHIL # BLD AUTO: 0.09 K/UL (ref 0–0.51)
EOSINOPHIL NFR BLD: 1.1 % (ref 0–6.9)
ERYTHROCYTE [DISTWIDTH] IN BLOOD BY AUTOMATED COUNT: 54.2 FL (ref 35.9–50)
GLUCOSE SERPL-MCNC: 98 MG/DL (ref 65–99)
HCT VFR BLD AUTO: 27.4 % (ref 42–52)
HGB BLD-MCNC: 8.1 G/DL (ref 14–18)
IMM GRANULOCYTES # BLD AUTO: 0.17 K/UL (ref 0–0.11)
IMM GRANULOCYTES NFR BLD AUTO: 2 % (ref 0–0.9)
LYMPHOCYTES # BLD AUTO: 0.62 K/UL (ref 1–4.8)
LYMPHOCYTES NFR BLD: 7.2 % (ref 22–41)
MAGNESIUM SERPL-MCNC: 1.9 MG/DL (ref 1.5–2.5)
MCH RBC QN AUTO: 28.9 PG (ref 27–33)
MCHC RBC AUTO-ENTMCNC: 29.6 G/DL (ref 33.7–35.3)
MCV RBC AUTO: 97.9 FL (ref 81.4–97.8)
MONOCYTES # BLD AUTO: 0.96 K/UL (ref 0–0.85)
MONOCYTES NFR BLD AUTO: 11.2 % (ref 0–13.4)
NEUTROPHILS # BLD AUTO: 6.72 K/UL (ref 1.82–7.42)
NEUTROPHILS NFR BLD: 78.4 % (ref 44–72)
NRBC # BLD AUTO: 0 K/UL
NRBC BLD-RTO: 0 /100 WBC
PHOSPHATE SERPL-MCNC: 4.3 MG/DL (ref 2.5–4.5)
PLATELET # BLD AUTO: 164 K/UL (ref 164–446)
PMV BLD AUTO: 9.9 FL (ref 9–12.9)
POTASSIUM SERPL-SCNC: 4.2 MMOL/L (ref 3.6–5.5)
RBC # BLD AUTO: 2.8 M/UL (ref 4.7–6.1)
SODIUM SERPL-SCNC: 136 MMOL/L (ref 135–145)
WBC # BLD AUTO: 8.6 K/UL (ref 4.8–10.8)

## 2021-02-13 PROCEDURE — 84100 ASSAY OF PHOSPHORUS: CPT

## 2021-02-13 PROCEDURE — 700111 HCHG RX REV CODE 636 W/ 250 OVERRIDE (IP): Performed by: STUDENT IN AN ORGANIZED HEALTH CARE EDUCATION/TRAINING PROGRAM

## 2021-02-13 PROCEDURE — 99232 SBSQ HOSP IP/OBS MODERATE 35: CPT | Performed by: HOSPITALIST

## 2021-02-13 PROCEDURE — 700102 HCHG RX REV CODE 250 W/ 637 OVERRIDE(OP): Performed by: STUDENT IN AN ORGANIZED HEALTH CARE EDUCATION/TRAINING PROGRAM

## 2021-02-13 PROCEDURE — 85025 COMPLETE CBC W/AUTO DIFF WBC: CPT

## 2021-02-13 PROCEDURE — 99233 SBSQ HOSP IP/OBS HIGH 50: CPT | Performed by: INTERNAL MEDICINE

## 2021-02-13 PROCEDURE — 83735 ASSAY OF MAGNESIUM: CPT

## 2021-02-13 PROCEDURE — A9270 NON-COVERED ITEM OR SERVICE: HCPCS | Performed by: STUDENT IN AN ORGANIZED HEALTH CARE EDUCATION/TRAINING PROGRAM

## 2021-02-13 PROCEDURE — 770001 HCHG ROOM/CARE - MED/SURG/GYN PRIV*

## 2021-02-13 PROCEDURE — 80048 BASIC METABOLIC PNL TOTAL CA: CPT

## 2021-02-13 RX ADMIN — ACETAMINOPHEN 650 MG: 325 TABLET ORAL at 05:50

## 2021-02-13 RX ADMIN — TRAZODONE HYDROCHLORIDE 100 MG: 50 TABLET ORAL at 20:39

## 2021-02-13 RX ADMIN — HEPARIN SODIUM 5000 UNITS: 5000 INJECTION, SOLUTION INTRAVENOUS; SUBCUTANEOUS at 20:39

## 2021-02-13 RX ADMIN — SEVELAMER CARBONATE 800 MG: 800 TABLET, FILM COATED ORAL at 12:29

## 2021-02-13 RX ADMIN — HEPARIN SODIUM 5000 UNITS: 5000 INJECTION, SOLUTION INTRAVENOUS; SUBCUTANEOUS at 05:50

## 2021-02-13 RX ADMIN — DOCUSATE SODIUM 50 MG AND SENNOSIDES 8.6 MG 2 TABLET: 8.6; 5 TABLET, FILM COATED ORAL at 18:18

## 2021-02-13 RX ADMIN — SEVELAMER CARBONATE 800 MG: 800 TABLET, FILM COATED ORAL at 18:18

## 2021-02-13 RX ADMIN — HEPARIN SODIUM 5000 UNITS: 5000 INJECTION, SOLUTION INTRAVENOUS; SUBCUTANEOUS at 14:17

## 2021-02-13 RX ADMIN — DOCUSATE SODIUM 50 MG AND SENNOSIDES 8.6 MG 2 TABLET: 8.6; 5 TABLET, FILM COATED ORAL at 05:50

## 2021-02-13 RX ADMIN — ACETAMINOPHEN 650 MG: 325 TABLET ORAL at 20:39

## 2021-02-13 ASSESSMENT — ENCOUNTER SYMPTOMS
HEADACHES: 0
HEMOPTYSIS: 0
CHILLS: 0
FEVER: 0
DIARRHEA: 0
PALPITATIONS: 0
VOMITING: 0
ORTHOPNEA: 0
FLANK PAIN: 0
WHEEZING: 0
SHORTNESS OF BREATH: 0
EYES NEGATIVE: 1
BACK PAIN: 0
ABDOMINAL PAIN: 0
DEPRESSION: 0
NERVOUS/ANXIOUS: 0
NAUSEA: 0
EYE DISCHARGE: 0
DIZZINESS: 0
SINUS PAIN: 0
SPEECH CHANGE: 0
COUGH: 0
STRIDOR: 0
SENSORY CHANGE: 0

## 2021-02-13 ASSESSMENT — PAIN DESCRIPTION - PAIN TYPE
TYPE: ACUTE PAIN

## 2021-02-13 NOTE — PROGRESS NOTES
"Urology Progress Note      S: Seen and examined.  Denies pain. Bilateral NTs placed.  Denies fevers, chills.    O:   /59   Pulse 88   Temp 36.4 °C (97.6 °F) (Temporal)   Resp 18   Ht 1.803 m (5' 11\")   Wt 95.6 kg (210 lb 12.2 oz)   SpO2 95%   Recent Labs     02/11/21  0238 02/12/21  0640 02/13/21  0556   SODIUM 132* 136 136   POTASSIUM 4.6 5.0 4.2   CHLORIDE 98 98 97   CO2 21 28 31   GLUCOSE 126* 104* 98   BUN 97* 61* 38*   CREATININE 9.12* 7.44* 5.12*   CALCIUM 7.7* 7.4* 7.5*     Recent Labs     02/11/21 0238 02/12/21  0640 02/13/21  0556   WBC 9.5 9.0 8.6   RBC 2.90* 2.80* 2.80*   HEMOGLOBIN 8.7* 8.4* 8.1*   HEMATOCRIT 26.2* 26.4* 27.4*   MCV 90.3 94.3 97.9*   MCH 30.0 30.0 28.9   MCHC 33.2* 31.8* 29.6*   RDW 49.2 52.5* 54.2*   PLATELETCT 179 158* 164   MPV 10.1 10.0 9.9         Intake/Output Summary (Last 24 hours) at 2/11/2021 1230  Last data filed at 2/11/2021 1200  Gross per 24 hour   Intake 3603.96 ml   Output 2743 ml   Net 860.96 ml       Exam:  Gen: NAD   Urine: No CVAT. Right NT with light pink output.  Left NT with clear yellow output. Bishop with clear yellow output.    A/P:    Active Hospital Problems    Diagnosis    • NOEL (acute kidney injury) ([facility]) [N17.9]      Priority: High   • Hyperkalemia [E87.5]      Priority: High   • Pericardial effusion [I31.3]      Priority: Medium   • Acute systolic CHF (congestive heart failure) ([facility]) [I50.21]      Priority: Medium   • Atrial fibrillation (HCC) [I48.91]      Priority: Medium   • Pulmonary hypertension (HCC) [I27.20]      Priority: Medium   • Pyuria [R82.81]      Priority: Medium   • Transitional cell carcinoma (HCC) [C68.9]      Priority: Medium       DATE OF SERVICE:  09/09/2019  PREOPERATIVE DIAGNOSIS:  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  OPERATION AND PROCEDURE PERFORMED:  1.  Rigid cystourethroscopy.  2.  Transurethral resection of bladder scar site.  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  PROCEDURES PERFORMED:  1.  Rigid cystourethroscopy.  2.  Transurethral resection of bladder tumor scar site 6x5 cm in area and fulguration of base.  3.  Transurethral resection of left proximal and distal trigonal area 2x3 cm.  SURGEON:  Benji Murphy MD       • Aortic stenosis [I35.0]      Priority: Medium   • Essential hypertension [I10]      Priority: Medium   • Pleural effusion [J90]      Priority: Low   • Anemia [D64.9]    • Cholelithiasis [K80.20]    • Abdominal aortic aneurysm (AAA) 35 to 39 mm in diameter (HCC) [I71.4]      81 y.o. M with history of bladder cancer. Original diagnosis was 09/2019, he did not want to pursue treatment at that time due to age including chemo, radiation, or surgery like a cystesctomy.  Now with renal insufficiency, hyperkalemia.   Plan:  - s/p placement of bilateral NTs.  No further acute urologic intervention. Will plan to see as a outpatient for management of NTs, further discussion of treatment.  [facility] [place] will help facilitate this follow up.  - urology signing off

## 2021-02-13 NOTE — CARE PLAN
Problem: Communication  Goal: The ability to communicate needs accurately and effectively will improve  Outcome: PROGRESSING AS EXPECTED     Problem: Safety  Goal: Will remain free from injury  Outcome: PROGRESSING AS EXPECTED  Goal: Will remain free from falls  Outcome: PROGRESSING AS EXPECTED     Problem: Infection  Goal: Will remain free from infection  Outcome: PROGRESSING AS EXPECTED     Problem: Venous Thromboembolism (VTW)/Deep Vein Thrombosis (DVT) Prevention:  Goal: Patient will participate in Venous Thrombosis (VTE)/Deep Vein Thrombosis (DVT)Prevention Measures  Outcome: PROGRESSING AS EXPECTED     Problem: Bowel/Gastric:  Goal: Normal bowel function is maintained or improved  Outcome: PROGRESSING AS EXPECTED  Goal: Will not experience complications related to bowel motility  Outcome: PROGRESSING AS EXPECTED     Problem: Knowledge Deficit  Goal: Knowledge of disease process/condition, treatment plan, diagnostic tests, and medications will improve  Outcome: PROGRESSING AS EXPECTED  Goal: Knowledge of the prescribed therapeutic regimen will improve  Outcome: PROGRESSING AS EXPECTED     Problem: Pain Management  Goal: Pain level will decrease to patient's comfort goal  Outcome: PROGRESSING AS EXPECTED     Problem: Respiratory:  Goal: Respiratory status will improve  Outcome: PROGRESSING AS EXPECTED     Problem: Skin Integrity  Goal: Risk for impaired skin integrity will decrease  Outcome: PROGRESSING AS EXPECTED

## 2021-02-13 NOTE — PROGRESS NOTES
Hospital Medicine Daily Progress Note    Date of Service  2/13/2021    Chief Complaint  Sent by outside MD for hyperkalemia K:7.2    Hospital Course  81 y.o. male w/ hx of Alzheimers, bladder cancer (follows with Dr Murphy, urology), with worsening renal function.  He was admitted 2/9/2021 with Cr:12.65, K:7.2 and in need of emergent dialysis.  A CT of the abd showed bilateral hydronephrosis and hydroureter along w/ bladder thickening.    Interval Problem Update  2/12: On dialysis this am when I saw him,  Off pressors.  Alert and conversant. States he lives with his wife that has cancer in the brain.  He denies pain.  He is to get nephrostomy tube today.    2/13: Cr:5.12, Hgb:8.1. Urine & blood culture no growth to date. Weak.  Awaits transfer out of ICU.    Consultants/Specialty  Nephrology  Urology  Intensivist (signing off)    Code Status  Full Code    Disposition  Transfer to medical floor (order placed 2/12)    Review of Systems  Review of Systems   Constitutional: Positive for malaise/fatigue. Negative for chills and fever.   Eyes: Negative for discharge.   Respiratory: Negative for cough, shortness of breath and stridor.    Cardiovascular: Negative for chest pain, palpitations and leg swelling.   Gastrointestinal: Negative for abdominal pain, diarrhea and nausea.   Genitourinary: Negative for flank pain.        No pain from nephrostomy tubes   Musculoskeletal: Negative for back pain and joint pain.   Skin: Negative for rash.   Neurological: Negative for dizziness, sensory change, speech change and headaches.   Psychiatric/Behavioral: Negative for depression. The patient is not nervous/anxious.         Physical Exam  Temp:  [36.4 °C (97.6 °F)-37 °C (98.6 °F)] 36.4 °C (97.6 °F)  Pulse:  [50-97] 88  Resp:  [0-27] 18  BP: ()/(47-81) 107/59  SpO2:  [93 %-100 %] 95 %    Physical Exam  Vitals reviewed.   Constitutional:       Appearance: Normal appearance. He is not diaphoretic.   HENT:      Head: Normocephalic  and atraumatic.      Nose: Nose normal.      Mouth/Throat:      Mouth: Mucous membranes are moist.      Pharynx: No oropharyngeal exudate.   Eyes:      General: No scleral icterus.        Right eye: No discharge.         Left eye: No discharge.      Extraocular Movements: Extraocular movements intact.      Conjunctiva/sclera: Conjunctivae normal.   Cardiovascular:      Rate and Rhythm: Normal rate and regular rhythm.      Pulses:           Radial pulses are 2+ on the right side and 2+ on the left side.        Dorsalis pedis pulses are 2+ on the right side and 2+ on the left side.      Heart sounds: No murmur.   Pulmonary:      Effort: Pulmonary effort is normal. No respiratory distress.      Breath sounds: Normal breath sounds. No wheezing or rales.   Abdominal:      General: Bowel sounds are normal. There is no distension.      Palpations: Abdomen is soft.      Tenderness: There is no abdominal tenderness.   Musculoskeletal:         General: No swelling or tenderness.      Cervical back: Normal range of motion. No muscular tenderness.      Right lower leg: No edema.      Left lower leg: No edema.   Skin:     Coloration: Skin is not jaundiced or pale.   Neurological:      General: No focal deficit present.      Mental Status: He is alert. Mental status is at baseline.      Cranial Nerves: No cranial nerve deficit.   Psychiatric:         Mood and Affect: Mood normal.         Behavior: Behavior normal.         Fluids    Intake/Output Summary (Last 24 hours) at 2/13/2021 1437  Last data filed at 2/13/2021 0400  Gross per 24 hour   Intake 50 ml   Output 480 ml   Net -430 ml       Laboratory  Recent Labs     02/11/21  0238 02/12/21  0640 02/13/21  0556   WBC 9.5 9.0 8.6   RBC 2.90* 2.80* 2.80*   HEMOGLOBIN 8.7* 8.4* 8.1*   HEMATOCRIT 26.2* 26.4* 27.4*   MCV 90.3 94.3 97.9*   MCH 30.0 30.0 28.9   MCHC 33.2* 31.8* 29.6*   RDW 49.2 52.5* 54.2*   PLATELETCT 179 158* 164   MPV 10.1 10.0 9.9     Recent Labs     02/11/21  0238  02/12/21  0640 02/13/21  0556   SODIUM 132* 136 136   POTASSIUM 4.6 5.0 4.2   CHLORIDE 98 98 97   CO2 21 28 31   GLUCOSE 126* 104* 98   BUN 97* 61* 38*   CREATININE 9.12* 7.44* 5.12*   CALCIUM 7.7* 7.4* 7.5*                   Imaging  IR-PERQ NEPH CATH NEW ACCESS (ALL RADIOLOGY) BOTH   Final Result      1. ULTRASOUND AND FLUOROSCOPIC GUIDED PLACEMENT OF A LEFT 8 [place]  PIGTAIL LOCKING LOOP PERCUTANEOUS NEPHROSTOMY CATHETER. MODERATE LEFT HYDRONEPHROSIS.      2. ULTRASOUND AND FLUOROSCOPIC GUIDED PLACEMENT OF A RIGHT 8 [place] PIGTAIL LOCKING LOOP PERCUTANEOUS NEPHROSTOMY CATHETER. MODERATE RIGHT HYDRONEPHROSIS.      3. NEPHROSTOGRAMS SHOWING SATISFACTORY CATHETER POSITION WITHOUT EXTRAVASATION.      DX-CHEST-FOR LINE PLACEMENT Perform procedure in: Patient's Room   Final Result      Right IJ dialysis catheter has been placed and the tip projects appropriately over the superior vena cava/right atrial junction.      EC-ECHOCARDIOGRAM COMPLETE W/O CONT   Final Result      CT-RENAL COLIC EVALUATION(A/P W/O)   Final Result         1.  Moderate pericardial effusion measuring 30 Hounsfield units, consider proteinaceous or hemorrhagic fluid. Further evaluation with echocardiogram to evaluate for component of cardiac tamponade.   2.  Moderate left pleural effusion with dependent consolidation which could represent atelectasis or infiltrate   3.  Bilateral hydronephrosis and hydroureter without visualized obstructing stone   4.  Diverticulosis   5.  Large left lower quadrant lateral abdominal wall hernia containing mesenteric fat in these portion of the sigmoid colon without visualized obstructive change   6.  3.6 cm fusiform distal abdominal aortic aneurysm, radiographic follow-up and surveillance as clinically appropriate   7.  Bladder wall thickening with slight adjacent hazy fat stranding, likely corresponding with history of bladder malignancy.   8.  Cholelithiasis      These findings were discussed with the patient's  clinician, MAIDA CHANDRA, on 2/10/2021 12:18 AM.           Assessment/Plan  * NOEL (acute kidney injury) (HCC)- (present on admission)  Assessment & Plan  Patient had creatinine greater than 12 and potassium greater than 7 on admission.  Nephrology consulting (Dr Stevenson) and holding dialysis 2/13  Nephrostomy tube placed 2/12  Bilateral hydronephrosis noted on CT without evidence of clear stenosis or stone.  Likely related to his transitional cell carcinoma.    Hyperkalemia- (present on admission)  Assessment & Plan  2/13 K:4.2  2/12 K:5 and had hemodialysis after lab draw.  2/10 K:6.2  Monitor bmp    Pulmonary hypertension (HCC)  Assessment & Plan  Supplemental oxygen    Atrial fibrillation (HCC)- (present on admission)  Assessment & Plan  Monitor on telemetry and vitals  Rate control  S/P nephrostomy tubes consider initiation of anticoagulation    Acute systolic CHF (congestive heart failure) (HCC)- (present on admission)  Assessment & Plan  HFrEF:30% per 2/10 echo.    Pericardial effusion- (present on admission)  Assessment & Plan  Likely related to his acute renal failure.   Echo shows no acute tamponade.    Transitional cell carcinoma (HCC)- (present on admission)  Assessment & Plan  Follow-up outpatient oncology for further management.    Aortic stenosis- (present on admission)  Assessment & Plan  2/10 Echo: Moderate aortic stenosis with low grade/low flow.  Transvalvular gradients are - Peak: 33 mmHg, Mean: 21 mmHg.  The severity of the aortic stenosis may be underestimated due to the   poor LV systolic function.    Essential hypertension- (present on admission)  Assessment & Plan  Outpatient lisinopril 40mg held due to acute renal failure  Monitor vitals.  Consider amlodipine if on going HTN.    Abdominal aortic aneurysm (AAA) 35 to 39 mm in diameter (HCC)  Assessment & Plan  Noted on CT renal scan  3.6cm  Monitor BP.  Bigger picture is his bladder cancer, chf and renal failure.    Cholelithiasis  Assessment  & Plan  Noted on CT  asymptomatic    Anemia  Assessment & Plan  Likely chronic disease (transitional cell cancer and renal failure)  Monitor cbc    Pleural effusion- (present on admission)  Assessment & Plan  Hemodialysis  Wean oxygen as tolerates.       VTE prophylaxis: Heparin

## 2021-02-13 NOTE — PROGRESS NOTES
Nephrology Daily Progress Note    Date of Service  2/13/2021    Chief Complaint  81 y.o. male with hx/of bladder cancer admitted 2/9/2021 with NOEL, obstructive nephropathy   Acidosis,hyperkalemia, started HD    Interval Problem Update  2/11 -no acute events  Continue daily dialysis  Urology consulted -recommended nephrostomy tubes  Seen and examined during dialysis  Tolerates well  2/13 -s/p nephrostomy tube placement  Pt doing well  No complaints  Transferring to regular floor  Holding dialysis today to see if improving with NT  Review of Systems  Review of Systems   Constitutional: Positive for malaise/fatigue. Negative for chills and fever.   HENT: Negative for congestion, hearing loss and sinus pain.    Eyes: Negative.    Respiratory: Negative for cough, hemoptysis, shortness of breath and wheezing.    Cardiovascular: Negative for chest pain, palpitations, orthopnea and leg swelling.   Gastrointestinal: Negative for abdominal pain, nausea and vomiting.   Genitourinary:        Nephrostomy tubes  Bishop catheter   Skin: Negative.    All other systems reviewed and are negative.       Physical Exam  Temp:  [36.4 °C (97.6 °F)-37 °C (98.6 °F)] 36.4 °C (97.6 °F)  Pulse:  [47-97] 88  Resp:  [0-27] 18  BP: ()/(47-81) 107/59  SpO2:  [93 %-100 %] 95 %    Physical Exam  Vitals and nursing note reviewed.   Constitutional:       General: He is not in acute distress.     Appearance: Normal appearance. He is well-developed. He is not diaphoretic.   HENT:      Head: Normocephalic and atraumatic.      Nose: Nose normal.      Mouth/Throat:      Mouth: Mucous membranes are moist.      Pharynx: Oropharynx is clear.   Eyes:      Extraocular Movements: Extraocular movements intact.      Conjunctiva/sclera: Conjunctivae normal.      Pupils: Pupils are equal, round, and reactive to light.   Cardiovascular:      Rate and Rhythm: Normal rate. Rhythm irregular.      Heart sounds: No friction rub. No gallop.    Pulmonary:      Effort:  Pulmonary effort is normal. No respiratory distress.      Breath sounds: Normal breath sounds. No wheezing, rhonchi or rales.   Abdominal:      General: Bowel sounds are normal. There is no distension.      Palpations: Abdomen is soft. There is no mass.      Tenderness: There is no abdominal tenderness.   Musculoskeletal:      Cervical back: Normal range of motion and neck supple.      Right lower leg: No edema.      Left lower leg: No edema.   Skin:     General: Skin is warm.      Coloration: Skin is not jaundiced.      Findings: No erythema or rash.   Neurological:      General: No focal deficit present.      Mental Status: He is alert and oriented to person, place, and time.      Cranial Nerves: No cranial nerve deficit.      Coordination: Coordination normal.         Fluids    Intake/Output Summary (Last 24 hours) at 2/13/2021 1232  Last data filed at 2/13/2021 0400  Gross per 24 hour   Intake 50 ml   Output 480 ml   Net -430 ml       Laboratory  Recent Labs     02/11/21 0238 02/12/21  0640 02/13/21  0556   WBC 9.5 9.0 8.6   RBC 2.90* 2.80* 2.80*   HEMOGLOBIN 8.7* 8.4* 8.1*   HEMATOCRIT 26.2* 26.4* 27.4*   MCV 90.3 94.3 97.9*   MCH 30.0 30.0 28.9   MCHC 33.2* 31.8* 29.6*   RDW 49.2 52.5* 54.2*   PLATELETCT 179 158* 164   MPV 10.1 10.0 9.9     Recent Labs     02/11/21 0238 02/12/21  0640 02/13/21  0556   SODIUM 132* 136 136   POTASSIUM 4.6 5.0 4.2   CHLORIDE 98 98 97   CO2 21 28 31   GLUCOSE 126* 104* 98   BUN 97* 61* 38*   CREATININE 9.12* 7.44* 5.12*   CALCIUM 7.7* 7.4* 7.5*         No results for input(s): NTPROBNP in the last 72 hours.        Imaging  CT reviewed    Assessment/Plan  1.NOEL/CKD III/ obstructive nephropathy on dialysis - holding dialysis today to see if improving with NT  2.HTN: low BP better -off pressor  3.Electrolytes: well controlled  4.Anemia: drop in Hb level -to monitor   5.hx/of bladder cancer, urinary obstruction -s/p nephrostomy tube placement  6.Volume: well controlled    Recs:  holding dialysis today            Will reassess for dialysis needs AM             Daily labs             Avoid nephrotoxins             Renal diet             Will follow             D/w

## 2021-02-14 LAB
ANION GAP SERPL CALC-SCNC: 7 MMOL/L (ref 7–16)
BASOPHILS # BLD AUTO: 0.3 % (ref 0–1.8)
BASOPHILS # BLD: 0.03 K/UL (ref 0–0.12)
BUN SERPL-MCNC: 46 MG/DL (ref 8–22)
CALCIUM SERPL-MCNC: 7.6 MG/DL (ref 8.5–10.5)
CHLORIDE SERPL-SCNC: 97 MMOL/L (ref 96–112)
CO2 SERPL-SCNC: 31 MMOL/L (ref 20–33)
CREAT SERPL-MCNC: 5.55 MG/DL (ref 0.5–1.4)
EOSINOPHIL # BLD AUTO: 0.2 K/UL (ref 0–0.51)
EOSINOPHIL NFR BLD: 1.8 % (ref 0–6.9)
ERYTHROCYTE [DISTWIDTH] IN BLOOD BY AUTOMATED COUNT: 52.3 FL (ref 35.9–50)
GLUCOSE SERPL-MCNC: 100 MG/DL (ref 65–99)
HCT VFR BLD AUTO: 28.7 % (ref 42–52)
HGB BLD-MCNC: 8.7 G/DL (ref 14–18)
IMM GRANULOCYTES # BLD AUTO: 0.26 K/UL (ref 0–0.11)
IMM GRANULOCYTES NFR BLD AUTO: 2.4 % (ref 0–0.9)
LYMPHOCYTES # BLD AUTO: 0.74 K/UL (ref 1–4.8)
LYMPHOCYTES NFR BLD: 6.7 % (ref 22–41)
MAGNESIUM SERPL-MCNC: 1.8 MG/DL (ref 1.5–2.5)
MCH RBC QN AUTO: 29.9 PG (ref 27–33)
MCHC RBC AUTO-ENTMCNC: 30.3 G/DL (ref 33.7–35.3)
MCV RBC AUTO: 98.6 FL (ref 81.4–97.8)
MONOCYTES # BLD AUTO: 1.03 K/UL (ref 0–0.85)
MONOCYTES NFR BLD AUTO: 9.4 % (ref 0–13.4)
NEUTROPHILS # BLD AUTO: 8.75 K/UL (ref 1.82–7.42)
NEUTROPHILS NFR BLD: 79.4 % (ref 44–72)
NRBC # BLD AUTO: 0 K/UL
NRBC BLD-RTO: 0 /100 WBC
PHOSPHATE SERPL-MCNC: 4.3 MG/DL (ref 2.5–4.5)
PLATELET # BLD AUTO: 173 K/UL (ref 164–446)
PMV BLD AUTO: 9.7 FL (ref 9–12.9)
POTASSIUM SERPL-SCNC: 3.9 MMOL/L (ref 3.6–5.5)
RBC # BLD AUTO: 2.91 M/UL (ref 4.7–6.1)
SODIUM SERPL-SCNC: 135 MMOL/L (ref 135–145)
WBC # BLD AUTO: 11 K/UL (ref 4.8–10.8)

## 2021-02-14 PROCEDURE — 700111 HCHG RX REV CODE 636 W/ 250 OVERRIDE (IP): Performed by: STUDENT IN AN ORGANIZED HEALTH CARE EDUCATION/TRAINING PROGRAM

## 2021-02-14 PROCEDURE — 700101 HCHG RX REV CODE 250: Performed by: STUDENT IN AN ORGANIZED HEALTH CARE EDUCATION/TRAINING PROGRAM

## 2021-02-14 PROCEDURE — 83735 ASSAY OF MAGNESIUM: CPT

## 2021-02-14 PROCEDURE — 80048 BASIC METABOLIC PNL TOTAL CA: CPT

## 2021-02-14 PROCEDURE — 700111 HCHG RX REV CODE 636 W/ 250 OVERRIDE (IP): Performed by: HOSPITALIST

## 2021-02-14 PROCEDURE — 99233 SBSQ HOSP IP/OBS HIGH 50: CPT | Performed by: INTERNAL MEDICINE

## 2021-02-14 PROCEDURE — 700102 HCHG RX REV CODE 250 W/ 637 OVERRIDE(OP): Performed by: STUDENT IN AN ORGANIZED HEALTH CARE EDUCATION/TRAINING PROGRAM

## 2021-02-14 PROCEDURE — 770001 HCHG ROOM/CARE - MED/SURG/GYN PRIV*

## 2021-02-14 PROCEDURE — 84100 ASSAY OF PHOSPHORUS: CPT

## 2021-02-14 PROCEDURE — 99233 SBSQ HOSP IP/OBS HIGH 50: CPT | Performed by: HOSPITALIST

## 2021-02-14 PROCEDURE — A9270 NON-COVERED ITEM OR SERVICE: HCPCS | Performed by: STUDENT IN AN ORGANIZED HEALTH CARE EDUCATION/TRAINING PROGRAM

## 2021-02-14 PROCEDURE — 85025 COMPLETE CBC W/AUTO DIFF WBC: CPT

## 2021-02-14 RX ORDER — MAGNESIUM SULFATE HEPTAHYDRATE 40 MG/ML
2 INJECTION, SOLUTION INTRAVENOUS ONCE
Status: COMPLETED | OUTPATIENT
Start: 2021-02-14 | End: 2021-02-14

## 2021-02-14 RX ADMIN — TRAZODONE HYDROCHLORIDE 100 MG: 50 TABLET ORAL at 20:49

## 2021-02-14 RX ADMIN — POLYETHYLENE GLYCOL 3350 1 PACKET: 17 POWDER, FOR SOLUTION ORAL at 12:56

## 2021-02-14 RX ADMIN — HEPARIN SODIUM 5000 UNITS: 5000 INJECTION, SOLUTION INTRAVENOUS; SUBCUTANEOUS at 06:55

## 2021-02-14 RX ADMIN — ACETAMINOPHEN 650 MG: 325 TABLET ORAL at 06:55

## 2021-02-14 RX ADMIN — HEPARIN SODIUM 5000 UNITS: 5000 INJECTION, SOLUTION INTRAVENOUS; SUBCUTANEOUS at 12:57

## 2021-02-14 RX ADMIN — ACETAMINOPHEN 650 MG: 325 TABLET ORAL at 20:50

## 2021-02-14 RX ADMIN — SEVELAMER CARBONATE 800 MG: 800 TABLET, FILM COATED ORAL at 12:56

## 2021-02-14 RX ADMIN — HEPARIN SODIUM 5000 UNITS: 5000 INJECTION, SOLUTION INTRAVENOUS; SUBCUTANEOUS at 20:50

## 2021-02-14 RX ADMIN — SEVELAMER CARBONATE 800 MG: 800 TABLET, FILM COATED ORAL at 08:27

## 2021-02-14 RX ADMIN — DOCUSATE SODIUM 50 MG AND SENNOSIDES 8.6 MG 2 TABLET: 8.6; 5 TABLET, FILM COATED ORAL at 06:54

## 2021-02-14 RX ADMIN — SEVELAMER CARBONATE 800 MG: 800 TABLET, FILM COATED ORAL at 16:46

## 2021-02-14 RX ADMIN — MAGNESIUM SULFATE 2 G: 2 INJECTION INTRAVENOUS at 13:05

## 2021-02-14 ASSESSMENT — ENCOUNTER SYMPTOMS
VOMITING: 0
HEMOPTYSIS: 0
STRIDOR: 0
HEADACHES: 0
EYES NEGATIVE: 1
FLANK PAIN: 0
FEVER: 0
SHORTNESS OF BREATH: 0
SPEECH CHANGE: 0
CONSTIPATION: 0
CHILLS: 0
COUGH: 0
ABDOMINAL PAIN: 0
PALPITATIONS: 0
DIZZINESS: 0
DIARRHEA: 0
ORTHOPNEA: 0
WHEEZING: 0
NERVOUS/ANXIOUS: 0
NAUSEA: 0
SORE THROAT: 0
SINUS PAIN: 0

## 2021-02-14 ASSESSMENT — PAIN DESCRIPTION - PAIN TYPE
TYPE: ACUTE PAIN

## 2021-02-14 NOTE — PROGRESS NOTES
Nephrology Daily Progress Note    Date of Service  2/14/2021    Chief Complaint  81 y.o. male with hx/of bladder cancer admitted 2/9/2021 with NOEL, obstructive nephropathy   Acidosis,hyperkalemia, started HD    Interval Problem Update  2/11 -no acute events  Continue daily dialysis  Urology consulted -recommended nephrostomy tubes  Seen and examined during dialysis  Tolerates well  2/13 -s/p nephrostomy tube placement  Pt doing well  No complaints  Transferring to regular floor  Holding dialysis today to see if improving with NT  2/14 - doing better, improved UOP -1500 cc/24 H  Holding dialysis for now  Review of Systems  Review of Systems   Constitutional: Positive for malaise/fatigue. Negative for chills and fever.   HENT: Negative for congestion, hearing loss, sinus pain and sore throat.    Eyes: Negative.    Respiratory: Negative for cough, hemoptysis, shortness of breath and wheezing.    Cardiovascular: Negative for chest pain, palpitations, orthopnea and leg swelling.   Gastrointestinal: Negative for abdominal pain, diarrhea, nausea and vomiting.   Genitourinary: Negative for flank pain.        Bishop catheter  Nephrostomy tubes   Skin: Negative.    All other systems reviewed and are negative.       Physical Exam  Temp:  [36.1 °C (97 °F)-36.7 °C (98.1 °F)] 36.1 °C (97 °F)  Pulse:  [58-95] 77  Resp:  [3-23] 21  BP: ()/(50-64) 112/63  SpO2:  [95 %-100 %] 99 %    Physical Exam  Vitals and nursing note reviewed.   Constitutional:       General: He is not in acute distress.     Appearance: Normal appearance. He is well-developed. He is not diaphoretic.   HENT:      Head: Normocephalic and atraumatic.      Nose: Nose normal.      Mouth/Throat:      Mouth: Mucous membranes are moist.      Pharynx: Oropharynx is clear.   Eyes:      Extraocular Movements: Extraocular movements intact.      Conjunctiva/sclera: Conjunctivae normal.      Pupils: Pupils are equal, round, and reactive to light.   Cardiovascular:       Rate and Rhythm: Normal rate and regular rhythm.      Pulses: Normal pulses.      Heart sounds: Normal heart sounds. No friction rub. No gallop.    Pulmonary:      Effort: Pulmonary effort is normal. No respiratory distress.      Breath sounds: Normal breath sounds. No wheezing, rhonchi or rales.   Abdominal:      General: Bowel sounds are normal. There is no distension.      Palpations: Abdomen is soft. There is no mass.      Tenderness: There is no abdominal tenderness. There is no right CVA tenderness, left CVA tenderness or guarding.   Genitourinary:     Comments: Nephrostomy tubes in place  Musculoskeletal:      Cervical back: Normal range of motion and neck supple.   Skin:     General: Skin is warm.      Findings: No erythema or rash.   Neurological:      General: No focal deficit present.      Mental Status: He is alert.      Cranial Nerves: No cranial nerve deficit.      Coordination: Coordination normal.         Fluids    Intake/Output Summary (Last 24 hours) at 2/14/2021 1113  Last data filed at 2/14/2021 1000  Gross per 24 hour   Intake 50 ml   Output 1510 ml   Net -1460 ml       Laboratory  Recent Labs     02/12/21  0640 02/13/21  0556 02/14/21  0700   WBC 9.0 8.6 11.0*   RBC 2.80* 2.80* 2.91*   HEMOGLOBIN 8.4* 8.1* 8.7*   HEMATOCRIT 26.4* 27.4* 28.7*   MCV 94.3 97.9* 98.6*   MCH 30.0 28.9 29.9   MCHC 31.8* 29.6* 30.3*   RDW 52.5* 54.2* 52.3*   PLATELETCT 158* 164 173   MPV 10.0 9.9 9.7     Recent Labs     02/12/21  0640 02/13/21  0556 02/14/21  0700   SODIUM 136 136 135   POTASSIUM 5.0 4.2 3.9   CHLORIDE 98 97 97   CO2 28 31 31   GLUCOSE 104* 98 100*   BUN 61* 38* 46*   CREATININE 7.44* 5.12* 5.55*   CALCIUM 7.4* 7.5* 7.6*         No results for input(s): NTPROBNP in the last 72 hours.        Imaging  CT reviewed    Assessment/Plan  1.NOEL/CKD III/ obstructive nephropathy on dialysis - holding dialysis to monitor for recovery with NT , creat stable  2.HTN: low BP improved  3.Electrolytes: well  controlled  4.Anemia: Hb stable -to monitor   5.hx/of bladder cancer, urinary obstruction -s/p bilat nephrostomy tubes placement  6.Volume: well controlled    Recs: holding dialysis today            Will reassess for dialysis needs AM             Daily labs             Avoid nephrotoxins             Renal diet             Will follow

## 2021-02-14 NOTE — CARE PLAN
Problem: Communication  Goal: The ability to communicate needs accurately and effectively will improve  Outcome: PROGRESSING AS EXPECTED     Problem: Safety  Goal: Will remain free from injury  Outcome: PROGRESSING AS EXPECTED  Goal: Will remain free from falls  Outcome: PROGRESSING AS EXPECTED     Problem: Infection  Goal: Will remain free from infection  Outcome: PROGRESSING AS EXPECTED     Problem: Venous Thromboembolism (VTW)/Deep Vein Thrombosis (DVT) Prevention:  Goal: Patient will participate in Venous Thrombosis (VTE)/Deep Vein Thrombosis (DVT)Prevention Measures  Outcome: PROGRESSING AS EXPECTED     Problem: Bowel/Gastric:  Goal: Normal bowel function is maintained or improved  Outcome: PROGRESSING AS EXPECTED  Goal: Will not experience complications related to bowel motility  Outcome: PROGRESSING AS EXPECTED     Problem: Knowledge Deficit  Goal: Knowledge of disease process/condition, treatment plan, diagnostic tests, and medications will improve  Outcome: PROGRESSING AS EXPECTED  Goal: Knowledge of the prescribed therapeutic regimen will improve  Outcome: PROGRESSING AS EXPECTED     Problem: Discharge Barriers/Planning  Goal: Patient's continuum of care needs will be met  Outcome: PROGRESSING AS EXPECTED     Problem: Pain Management  Goal: Pain level will decrease to patient's comfort goal  Outcome: PROGRESSING AS EXPECTED     Problem: Respiratory:  Goal: Respiratory status will improve  Outcome: PROGRESSING AS EXPECTED     Problem: Skin Integrity  Goal: Risk for impaired skin integrity will decrease  Outcome: PROGRESSING AS EXPECTED     Problem: Mobility  Goal: Risk for activity intolerance will decrease  Outcome: PROGRESSING AS EXPECTED

## 2021-02-14 NOTE — PROGRESS NOTES
Hospital Medicine Daily Progress Note    Date of Service  2/14/2021    Chief Complaint  Sent by outside MD for hyperkalemia K:7.2    Hospital Course  81 y.o. male w/ hx of Alzheimers, bladder cancer (follows with Dr Murphy, urology), with worsening renal function.  He was admitted 2/9/2021 with Cr:12.65, K:7.2 and in need of emergent dialysis.  A CT of the abd showed bilateral hydronephrosis and hydroureter along w/ bladder thickening.    Interval Problem Update  2/12: On dialysis this am when I saw him,  Off pressors.  Alert and conversant. States he lives with his wife that has cancer in the brain.  He denies pain.  He is to get nephrostomy tube today.    2/13: Cr:5.12, Hgb:8.1. Urine & blood culture no growth to date. Weak.  Awaits transfer out of ICU.    2/14: Awake and conversant.  Discussed with his RN.  Remove toro catheter.  Left nephrostomy with greater output then right nephrostomy.  Await transfer    Consultants/Specialty  Nephrology  Urology  Intensivist (signing off)    Code Status  Full Code    Disposition  Transfer to medical floor (order placed 2/12 and on 2/14)    Review of Systems  Review of Systems   Constitutional: Positive for malaise/fatigue. Negative for chills and fever.   Respiratory: Negative for cough, shortness of breath and stridor.    Cardiovascular: Negative for chest pain, palpitations and leg swelling.   Gastrointestinal: Negative for abdominal pain, constipation and nausea.   Genitourinary: Negative for flank pain.        No pain from nephrostomy tubes   Musculoskeletal: Negative for joint pain.   Skin: Negative for rash.   Neurological: Negative for dizziness, speech change and headaches.   Psychiatric/Behavioral: The patient is not nervous/anxious.         Physical Exam  Temp:  [36.1 °C (97 °F)-36.7 °C (98.1 °F)] 36.3 °C (97.3 °F)  Pulse:  [67-95] 79  Resp:  [11-23] 19  BP: ()/(50-64) 102/58  SpO2:  [95 %-100 %] 99 %    Physical Exam  Vitals reviewed.   Constitutional:        Appearance: Normal appearance. He is not diaphoretic.   HENT:      Head: Normocephalic and atraumatic.      Nose: Nose normal.      Mouth/Throat:      Mouth: Mucous membranes are moist.      Pharynx: No oropharyngeal exudate.   Eyes:      General: No scleral icterus.        Right eye: No discharge.         Left eye: No discharge.      Extraocular Movements: Extraocular movements intact.      Conjunctiva/sclera: Conjunctivae normal.   Cardiovascular:      Rate and Rhythm: Normal rate and regular rhythm.      Pulses:           Radial pulses are 2+ on the right side and 2+ on the left side.        Dorsalis pedis pulses are 2+ on the right side and 2+ on the left side.      Heart sounds: No murmur.   Pulmonary:      Effort: Pulmonary effort is normal. No respiratory distress.      Breath sounds: Normal breath sounds. No wheezing or rales.   Abdominal:      General: Bowel sounds are normal. There is no distension.      Palpations: Abdomen is soft.      Tenderness: There is no abdominal tenderness.   Musculoskeletal:         General: No swelling or tenderness.      Cervical back: Normal range of motion. No muscular tenderness.      Right lower leg: No edema.      Left lower leg: No edema.   Skin:     Coloration: Skin is not jaundiced or pale.   Neurological:      General: No focal deficit present.      Mental Status: He is alert. Mental status is at baseline.      Cranial Nerves: No cranial nerve deficit.   Psychiatric:         Mood and Affect: Mood normal.         Behavior: Behavior normal.         Fluids    Intake/Output Summary (Last 24 hours) at 2/14/2021 1237  Last data filed at 2/14/2021 1000  Gross per 24 hour   Intake 50 ml   Output 1510 ml   Net -1460 ml       Laboratory  Recent Labs     02/12/21  0640 02/13/21  0556 02/14/21  0700   WBC 9.0 8.6 11.0*   RBC 2.80* 2.80* 2.91*   HEMOGLOBIN 8.4* 8.1* 8.7*   HEMATOCRIT 26.4* 27.4* 28.7*   MCV 94.3 97.9* 98.6*   MCH 30.0 28.9 29.9   MCHC 31.8* 29.6* 30.3*   RDW 52.5*  54.2* 52.3*   PLATELETCT 158* 164 173   MPV 10.0 9.9 9.7     Recent Labs     02/12/21  0640 02/13/21  0556 02/14/21  0700   SODIUM 136 136 135   POTASSIUM 5.0 4.2 3.9   CHLORIDE 98 97 97   CO2 28 31 31   GLUCOSE 104* 98 100*   BUN 61* 38* 46*   CREATININE 7.44* 5.12* 5.55*   CALCIUM 7.4* 7.5* 7.6*                   Imaging  IR-PERQ NEPH CATH NEW ACCESS (ALL RADIOLOGY) BOTH   Final Result      1. ULTRASOUND AND FLUOROSCOPIC GUIDED PLACEMENT OF A LEFT 8 [place]  PIGTAIL LOCKING LOOP PERCUTANEOUS NEPHROSTOMY CATHETER. MODERATE LEFT HYDRONEPHROSIS.      2. ULTRASOUND AND FLUOROSCOPIC GUIDED PLACEMENT OF A RIGHT 8 [place] PIGTAIL LOCKING LOOP PERCUTANEOUS NEPHROSTOMY CATHETER. MODERATE RIGHT HYDRONEPHROSIS.      3. NEPHROSTOGRAMS SHOWING SATISFACTORY CATHETER POSITION WITHOUT EXTRAVASATION.      DX-CHEST-FOR LINE PLACEMENT Perform procedure in: Patient's Room   Final Result      Right IJ dialysis catheter has been placed and the tip projects appropriately over the superior vena cava/right atrial junction.      EC-ECHOCARDIOGRAM COMPLETE W/O CONT   Final Result      CT-RENAL COLIC EVALUATION(A/P W/O)   Final Result         1.  Moderate pericardial effusion measuring 30 Hounsfield units, consider proteinaceous or hemorrhagic fluid. Further evaluation with echocardiogram to evaluate for component of cardiac tamponade.   2.  Moderate left pleural effusion with dependent consolidation which could represent atelectasis or infiltrate   3.  Bilateral hydronephrosis and hydroureter without visualized obstructing stone   4.  Diverticulosis   5.  Large left lower quadrant lateral abdominal wall hernia containing mesenteric fat in these portion of the sigmoid colon without visualized obstructive change   6.  3.6 cm fusiform distal abdominal aortic aneurysm, radiographic follow-up and surveillance as clinically appropriate   7.  Bladder wall thickening with slight adjacent hazy fat stranding, likely corresponding with history of  bladder malignancy.   8.  Cholelithiasis      These findings were discussed with the patient's clinician, MAIDA CHANDRA, on 2/10/2021 12:18 AM.           Assessment/Plan  * NOEL (acute kidney injury) (HCC)- (present on admission)  Assessment & Plan  2/14 BUN:61, Cr:5.55  Patient had creatinine greater than 12 and potassium greater than 7 on admission.  Nephrology consulting (Dr Stevenson) and holding dialysis 2/13  Nephrostomy tube placed 2/12 2/14 Fair urine output from left nephrostomy, right nephrostomy with bloody fluids  Bilateral hydronephrosis noted on CT without evidence of clear stenosis or stone.  Likely related to his transitional cell carcinoma.    Hyperkalemia- (present on admission)  Assessment & Plan  2/14 K:3.9  2/13 K:4.2  2/12 K:5 and had hemodialysis after lab draw.  2/10 K:6.2  Monitor bmp    Pulmonary hypertension (HCC)  Assessment & Plan  Supplemental oxygen    Atrial fibrillation (HCC)- (present on admission)  Assessment & Plan  Monitor on telemetry and vitals  Rate control  S/P nephrostomy tubes consider initiation of anticoagulation    Acute systolic CHF (congestive heart failure) (HCC)- (present on admission)  Assessment & Plan  HFrEF:30% per 2/10 echo.  No overt sign of volume overload 2/14    Pericardial effusion- (present on admission)  Assessment & Plan  Likely related to his acute renal failure.   Echo shows no acute tamponade.    Transitional cell carcinoma (HCC)- (present on admission)  Assessment & Plan  Follow-up outpatient oncology for further management.    Aortic stenosis- (present on admission)  Assessment & Plan  2/10 Echo: Moderate aortic stenosis with low grade/low flow.  Transvalvular gradients are - Peak: 33 mmHg, Mean: 21 mmHg.  The severity of the aortic stenosis may be underestimated due to the   poor LV systolic function.    Essential hypertension- (present on admission)  Assessment & Plan  Outpatient lisinopril 40mg held due to acute renal failure  Monitor  vitals.  Consider amlodipine if on going HTN.    Abdominal aortic aneurysm (AAA) 35 to 39 mm in diameter (HCC)  Assessment & Plan  Noted on CT renal scan  3.6cm  Monitor BP.  Bigger picture is his bladder cancer, chf and renal failure.    Cholelithiasis  Assessment & Plan  Noted on CT  asymptomatic    Anemia  Assessment & Plan  Likely chronic disease (transitional cell cancer and renal failure)  Monitor cbc    Pleural effusion- (present on admission)  Assessment & Plan  Hemodialysis  Wean oxygen as tolerates.       VTE prophylaxis: Heparin

## 2021-02-15 LAB
ANION GAP SERPL CALC-SCNC: 10 MMOL/L (ref 7–16)
BACTERIA BLD CULT: NORMAL
BACTERIA BLD CULT: NORMAL
BASOPHILS # BLD AUTO: 0.3 % (ref 0–1.8)
BASOPHILS # BLD: 0.05 K/UL (ref 0–0.12)
BUN SERPL-MCNC: 47 MG/DL (ref 8–22)
CALCIUM SERPL-MCNC: 7.6 MG/DL (ref 8.5–10.5)
CHLORIDE SERPL-SCNC: 95 MMOL/L (ref 96–112)
CO2 SERPL-SCNC: 29 MMOL/L (ref 20–33)
CREAT SERPL-MCNC: 5.53 MG/DL (ref 0.5–1.4)
EOSINOPHIL # BLD AUTO: 0.16 K/UL (ref 0–0.51)
EOSINOPHIL NFR BLD: 1 % (ref 0–6.9)
ERYTHROCYTE [DISTWIDTH] IN BLOOD BY AUTOMATED COUNT: 53 FL (ref 35.9–50)
GLUCOSE SERPL-MCNC: 150 MG/DL (ref 65–99)
HCT VFR BLD AUTO: 28.4 % (ref 42–52)
HGB BLD-MCNC: 8.6 G/DL (ref 14–18)
IMM GRANULOCYTES # BLD AUTO: 0.38 K/UL (ref 0–0.11)
IMM GRANULOCYTES NFR BLD AUTO: 2.5 % (ref 0–0.9)
LYMPHOCYTES # BLD AUTO: 0.77 K/UL (ref 1–4.8)
LYMPHOCYTES NFR BLD: 5 % (ref 22–41)
MAGNESIUM SERPL-MCNC: 2.1 MG/DL (ref 1.5–2.5)
MCH RBC QN AUTO: 30.1 PG (ref 27–33)
MCHC RBC AUTO-ENTMCNC: 30.3 G/DL (ref 33.7–35.3)
MCV RBC AUTO: 99.3 FL (ref 81.4–97.8)
MONOCYTES # BLD AUTO: 1.44 K/UL (ref 0–0.85)
MONOCYTES NFR BLD AUTO: 9.3 % (ref 0–13.4)
NEUTROPHILS # BLD AUTO: 12.61 K/UL (ref 1.82–7.42)
NEUTROPHILS NFR BLD: 81.9 % (ref 44–72)
NRBC # BLD AUTO: 0 K/UL
NRBC BLD-RTO: 0 /100 WBC
PHOSPHATE SERPL-MCNC: 3.8 MG/DL (ref 2.5–4.5)
PLATELET # BLD AUTO: 178 K/UL (ref 164–446)
PMV BLD AUTO: 10.1 FL (ref 9–12.9)
POTASSIUM SERPL-SCNC: 3.7 MMOL/L (ref 3.6–5.5)
RBC # BLD AUTO: 2.86 M/UL (ref 4.7–6.1)
SIGNIFICANT IND 70042: NORMAL
SIGNIFICANT IND 70042: NORMAL
SITE SITE: NORMAL
SITE SITE: NORMAL
SODIUM SERPL-SCNC: 134 MMOL/L (ref 135–145)
SOURCE SOURCE: NORMAL
SOURCE SOURCE: NORMAL
WBC # BLD AUTO: 15.4 K/UL (ref 4.8–10.8)

## 2021-02-15 PROCEDURE — 99232 SBSQ HOSP IP/OBS MODERATE 35: CPT | Performed by: INTERNAL MEDICINE

## 2021-02-15 PROCEDURE — 84100 ASSAY OF PHOSPHORUS: CPT

## 2021-02-15 PROCEDURE — 700102 HCHG RX REV CODE 250 W/ 637 OVERRIDE(OP): Performed by: STUDENT IN AN ORGANIZED HEALTH CARE EDUCATION/TRAINING PROGRAM

## 2021-02-15 PROCEDURE — A9270 NON-COVERED ITEM OR SERVICE: HCPCS | Performed by: STUDENT IN AN ORGANIZED HEALTH CARE EDUCATION/TRAINING PROGRAM

## 2021-02-15 PROCEDURE — 83735 ASSAY OF MAGNESIUM: CPT

## 2021-02-15 PROCEDURE — 85025 COMPLETE CBC W/AUTO DIFF WBC: CPT

## 2021-02-15 PROCEDURE — 700111 HCHG RX REV CODE 636 W/ 250 OVERRIDE (IP): Performed by: STUDENT IN AN ORGANIZED HEALTH CARE EDUCATION/TRAINING PROGRAM

## 2021-02-15 PROCEDURE — 770001 HCHG ROOM/CARE - MED/SURG/GYN PRIV*

## 2021-02-15 PROCEDURE — 80048 BASIC METABOLIC PNL TOTAL CA: CPT

## 2021-02-15 PROCEDURE — 99232 SBSQ HOSP IP/OBS MODERATE 35: CPT | Performed by: HOSPITALIST

## 2021-02-15 RX ADMIN — SEVELAMER CARBONATE 800 MG: 800 TABLET, FILM COATED ORAL at 18:41

## 2021-02-15 RX ADMIN — DOCUSATE SODIUM 50 MG AND SENNOSIDES 8.6 MG 2 TABLET: 8.6; 5 TABLET, FILM COATED ORAL at 05:57

## 2021-02-15 RX ADMIN — SEVELAMER CARBONATE 800 MG: 800 TABLET, FILM COATED ORAL at 08:59

## 2021-02-15 RX ADMIN — TRAZODONE HYDROCHLORIDE 100 MG: 50 TABLET ORAL at 21:17

## 2021-02-15 RX ADMIN — SEVELAMER CARBONATE 800 MG: 800 TABLET, FILM COATED ORAL at 13:58

## 2021-02-15 RX ADMIN — HEPARIN SODIUM 5000 UNITS: 5000 INJECTION, SOLUTION INTRAVENOUS; SUBCUTANEOUS at 13:59

## 2021-02-15 RX ADMIN — HEPARIN SODIUM 5000 UNITS: 5000 INJECTION, SOLUTION INTRAVENOUS; SUBCUTANEOUS at 21:16

## 2021-02-15 RX ADMIN — MAGNESIUM HYDROXIDE 30 ML: 400 SUSPENSION ORAL at 05:57

## 2021-02-15 RX ADMIN — HEPARIN SODIUM 5000 UNITS: 5000 INJECTION, SOLUTION INTRAVENOUS; SUBCUTANEOUS at 05:57

## 2021-02-15 RX ADMIN — DOCUSATE SODIUM 50 MG AND SENNOSIDES 8.6 MG 2 TABLET: 8.6; 5 TABLET, FILM COATED ORAL at 18:41

## 2021-02-15 RX ADMIN — ACETAMINOPHEN 650 MG: 325 TABLET ORAL at 05:57

## 2021-02-15 ASSESSMENT — ENCOUNTER SYMPTOMS
COUGH: 0
FEVER: 0
SHORTNESS OF BREATH: 0
NERVOUS/ANXIOUS: 0
PALPITATIONS: 0
NAUSEA: 0
CHILLS: 0
DIZZINESS: 0
SPEECH CHANGE: 0
HEADACHES: 0
ABDOMINAL PAIN: 0
STRIDOR: 0
FLANK PAIN: 0
CONSTIPATION: 0

## 2021-02-15 ASSESSMENT — PAIN DESCRIPTION - PAIN TYPE
TYPE: ACUTE PAIN

## 2021-02-15 ASSESSMENT — FIBROSIS 4 INDEX: FIB4 SCORE: 1.41

## 2021-02-15 NOTE — PROGRESS NOTES
Hospital Medicine Daily Progress Note    Date of Service  2/15/2021    Chief Complaint  Sent by outside MD for hyperkalemia K:7.2    Hospital Course  81 y.o. male w/ hx of Alzheimers, bladder cancer (follows with Dr Murphy, urology), with worsening renal function.  He was admitted 2/9/2021 with Cr:12.65, K:7.2 and in need of emergent dialysis.  A CT of the abd showed bilateral hydronephrosis and hydroureter along w/ bladder thickening.    Interval Problem Update  2/12: On dialysis this am when I saw him,  Off pressors.  Alert and conversant. States he lives with his wife that has cancer in the brain.  He denies pain.  He is to get nephrostomy tube today.    2/13: Cr:5.12, Hgb:8.1. Urine & blood culture no growth to date. Weak.  Awaits transfer out of ICU.    2/14: Awake and conversant.  Discussed with his RN.  Remove toro catheter.  Left nephrostomy with greater output then right nephrostomy.  Await transfer    2/15: Awake sitting up in chair.  Speech clear.  Wife at bedside later in the day.  Minimal urine output noted in nephrostomy bags.    Consultants/Specialty  Nephrology  Urology  Intensivist (signing off)    Code Status  Full Code    Disposition  Transfer to medical floor (order placed 2/12 and on 2/14)    Review of Systems  Review of Systems   Constitutional: Positive for malaise/fatigue. Negative for chills and fever.   Respiratory: Negative for cough, shortness of breath and stridor.    Cardiovascular: Negative for chest pain, palpitations and leg swelling.   Gastrointestinal: Negative for abdominal pain, constipation and nausea.   Genitourinary: Negative for flank pain.        No pain from nephrostomy tubes   Musculoskeletal: Negative for joint pain.   Skin: Negative for rash.   Neurological: Negative for dizziness, speech change and headaches.   Psychiatric/Behavioral: The patient is not nervous/anxious.         Physical Exam  Temp:  [36.3 °C (97.3 °F)-37 °C (98.6 °F)] 37 °C (98.6 °F)  Pulse:  [68-93]  79  Resp:  [13-36] 26  BP: ()/(47-67) 99/58  SpO2:  [89 %-99 %] 98 %    Physical Exam  Vitals reviewed.   Constitutional:       Appearance: Normal appearance. He is not diaphoretic.   HENT:      Head: Normocephalic and atraumatic.      Nose: Nose normal.      Mouth/Throat:      Mouth: Mucous membranes are moist.      Pharynx: No oropharyngeal exudate.   Eyes:      General: No scleral icterus.        Right eye: No discharge.         Left eye: No discharge.      Extraocular Movements: Extraocular movements intact.      Conjunctiva/sclera: Conjunctivae normal.   Cardiovascular:      Rate and Rhythm: Normal rate and regular rhythm.      Pulses:           Radial pulses are 2+ on the right side and 2+ on the left side.        Dorsalis pedis pulses are 2+ on the right side and 2+ on the left side.      Heart sounds: No murmur.   Pulmonary:      Effort: Pulmonary effort is normal. No respiratory distress.      Breath sounds: Normal breath sounds. No wheezing or rales.   Abdominal:      General: Bowel sounds are normal. There is no distension.      Palpations: Abdomen is soft.      Tenderness: There is no abdominal tenderness.   Musculoskeletal:         General: No swelling or tenderness.      Cervical back: Normal range of motion. No muscular tenderness.      Right lower leg: No edema.      Left lower leg: No edema.   Skin:     Coloration: Skin is not jaundiced or pale.   Neurological:      General: No focal deficit present.      Mental Status: He is alert. Mental status is at baseline.      Cranial Nerves: No cranial nerve deficit.   Psychiatric:         Mood and Affect: Mood normal.         Behavior: Behavior normal.         Fluids    Intake/Output Summary (Last 24 hours) at 2/15/2021 1536  Last data filed at 2/15/2021 1032  Gross per 24 hour   Intake 580 ml   Output 785 ml   Net -205 ml       Laboratory  Recent Labs     02/13/21  0556 02/14/21  0700 02/15/21  0613   WBC 8.6 11.0* 15.4*   RBC 2.80* 2.91* 2.86*    HEMOGLOBIN 8.1* 8.7* 8.6*   HEMATOCRIT 27.4* 28.7* 28.4*   MCV 97.9* 98.6* 99.3*   MCH 28.9 29.9 30.1   MCHC 29.6* 30.3* 30.3*   RDW 54.2* 52.3* 53.0*   PLATELETCT 164 173 178   MPV 9.9 9.7 10.1     Recent Labs     02/13/21  0556 02/14/21  0700 02/15/21  0613   SODIUM 136 135 134*   POTASSIUM 4.2 3.9 3.7   CHLORIDE 97 97 95*   CO2 31 31 29   GLUCOSE 98 100* 150*   BUN 38* 46* 47*   CREATININE 5.12* 5.55* 5.53*   CALCIUM 7.5* 7.6* 7.6*                   Imaging  IR-PERQ NEPH CATH NEW ACCESS (ALL RADIOLOGY) BOTH   Final Result      1. ULTRASOUND AND FLUOROSCOPIC GUIDED PLACEMENT OF A LEFT 8 [place]  PIGTAIL LOCKING LOOP PERCUTANEOUS NEPHROSTOMY CATHETER. MODERATE LEFT HYDRONEPHROSIS.      2. ULTRASOUND AND FLUOROSCOPIC GUIDED PLACEMENT OF A RIGHT 8 [place] PIGTAIL LOCKING LOOP PERCUTANEOUS NEPHROSTOMY CATHETER. MODERATE RIGHT HYDRONEPHROSIS.      3. NEPHROSTOGRAMS SHOWING SATISFACTORY CATHETER POSITION WITHOUT EXTRAVASATION.      DX-CHEST-FOR LINE PLACEMENT Perform procedure in: Patient's Room   Final Result      Right IJ dialysis catheter has been placed and the tip projects appropriately over the superior vena cava/right atrial junction.      EC-ECHOCARDIOGRAM COMPLETE W/O CONT   Final Result      CT-RENAL COLIC EVALUATION(A/P W/O)   Final Result         1.  Moderate pericardial effusion measuring 30 Hounsfield units, consider proteinaceous or hemorrhagic fluid. Further evaluation with echocardiogram to evaluate for component of cardiac tamponade.   2.  Moderate left pleural effusion with dependent consolidation which could represent atelectasis or infiltrate   3.  Bilateral hydronephrosis and hydroureter without visualized obstructing stone   4.  Diverticulosis   5.  Large left lower quadrant lateral abdominal wall hernia containing mesenteric fat in these portion of the sigmoid colon without visualized obstructive change   6.  3.6 cm fusiform distal abdominal aortic aneurysm, radiographic follow-up and  surveillance as clinically appropriate   7.  Bladder wall thickening with slight adjacent hazy fat stranding, likely corresponding with history of bladder malignancy.   8.  Cholelithiasis      These findings were discussed with the patient's clinician, MAIDA CHANDRA, on 2/10/2021 12:18 AM.           Assessment/Plan  * NOEL (acute kidney injury) (HCC)- (present on admission)  Assessment & Plan  2/15 BUN:47, Cr:5.53 (nephrology holding on HD for now and continue to keep HD catheter inplace).  2/14 BUN:61, Cr:5.55  Patient had creatinine greater than 12 and potassium greater than 7 on admission.  Nephrology consulting (Dr Stevenson) and holding dialysis 2/13  Nephrostomy tube placed 2/12 2/14 Fair urine output from left nephrostomy, right nephrostomy with bloody fluids  Bilateral hydronephrosis noted on CT without evidence of clear stenosis or stone.  Likely related to his transitional cell carcinoma.    Hyperkalemia- (present on admission)  Assessment & Plan  2/15 K:3.7  2/14 K:3.9  2/13 K:4.2  2/12 K:5 and had hemodialysis after lab draw.  2/10 K:6.2  Monitor bmp    Pulmonary hypertension (HCC)  Assessment & Plan  Supplemental oxygen    Atrial fibrillation (HCC)- (present on admission)  Assessment & Plan  Monitor on telemetry and vitals  Rate control  S/P nephrostomy tubes consider initiation of anticoagulation    Acute systolic CHF (congestive heart failure) (HCC)- (present on admission)  Assessment & Plan  HFrEF:30% per 2/10 echo.  No overt sign of volume overload 2/14    Pericardial effusion- (present on admission)  Assessment & Plan  Likely related to his acute renal failure.   Echo shows no acute tamponade.    Transitional cell carcinoma (HCC)- (present on admission)  Assessment & Plan  Follow-up outpatient oncology for further management.    Aortic stenosis- (present on admission)  Assessment & Plan  2/10 Echo: Moderate aortic stenosis with low grade/low flow.  Transvalvular gradients are - Peak: 33 mmHg, Mean: 21  mmHg.  The severity of the aortic stenosis may be underestimated due to the   poor LV systolic function.    Essential hypertension- (present on admission)  Assessment & Plan  Outpatient lisinopril 40mg held due to acute renal failure  Monitor vitals.  Consider amlodipine if on going HTN.    Abdominal aortic aneurysm (AAA) 35 to 39 mm in diameter (HCC)  Assessment & Plan  Noted on CT renal scan  3.6cm  Monitor BP.  Bigger picture is his bladder cancer, chf and renal failure.    Cholelithiasis  Assessment & Plan  Noted on CT  asymptomatic    Anemia  Assessment & Plan  Likely chronic disease (transitional cell cancer and renal failure)  Monitor cbc    Pleural effusion- (present on admission)  Assessment & Plan  Hemodialysis  Wean oxygen as tolerates.       VTE prophylaxis: Heparin

## 2021-02-15 NOTE — PROGRESS NOTES
Nephrology Daily Progress Note    Date of Service  2/15/2021    Chief Complaint  81 y.o. male with hx/of bladder cancer admitted 2/9/2021 with NOEL, obstructive nephropathy. Acidosis,hyperkalemia, started HD 2/10/2021     Interval Problem Update  2/11 -no acute events  Continue daily dialysis  Urology consulted -recommended nephrostomy tubes  Seen and examined during dialysis  Tolerates well  2/13 -s/p nephrostomy tube placement  Pt doing well  No complaints  Transferring to regular floor  Holding dialysis today to see if improving with NT  2/14 - doing better, improved UOP -1500 cc/24 H  Holding dialysis for now  2/15 -urine output 785 mL via bilateral percutaneous nephrostomy tubes over the last 24 hours.  Patient had dialysis for 3 sessions on Wednesday, Thursday, and Friday last week.  Patient denies chest pain, shortness of breath.    Review of Systems  Review of Systems   Constitutional: Negative for fever.   Respiratory: Negative for shortness of breath.    Cardiovascular: Negative for chest pain.   Gastrointestinal: Negative for abdominal pain.   All other systems reviewed and are negative.       Physical Exam  Temp:  [36.3 °C (97.3 °F)-37 °C (98.6 °F)] 37 °C (98.6 °F)  Pulse:  [68-93] 79  Resp:  [13-36] 26  BP: ()/(47-67) 99/58  SpO2:  [89 %-99 %] 98 %    Physical Exam   Constitutional: He is oriented to person, place, and time. He appears well-developed. No distress.   HENT:   Mouth/Throat: Oropharynx is clear and moist. No oropharyngeal exudate.   Eyes: EOM are normal. No scleral icterus.   Neck: No tracheal deviation present.   Cardiovascular: Normal rate and normal heart sounds.   No murmur heard.  Pulmonary/Chest: Effort normal and breath sounds normal. No stridor. He has no rales.   Abdominal: Soft. He exhibits no distension. There is no abdominal tenderness.   Genitourinary:    Genitourinary Comments: Bilateral percutaneous nephrostomy tubes in place draining joaquin-colored urine      Musculoskeletal:         General: No edema. Normal range of motion.   Neurological: He is alert and oriented to person, place, and time.   Skin: Skin is warm and dry. He is not diaphoretic.   Psychiatric: He has a normal mood and affect.   Access: Right IJ temp Vas-Cath in place      Fluids    Intake/Output Summary (Last 24 hours) at 2/15/2021 1518  Last data filed at 2/15/2021 1032  Gross per 24 hour   Intake 580 ml   Output 785 ml   Net -205 ml       Laboratory  Labs reviewed, pertinent labs below.  Recent Labs     02/13/21  0556 02/14/21  0700 02/15/21  0613   WBC 8.6 11.0* 15.4*   RBC 2.80* 2.91* 2.86*   HEMOGLOBIN 8.1* 8.7* 8.6*   HEMATOCRIT 27.4* 28.7* 28.4*   MCV 97.9* 98.6* 99.3*   MCH 28.9 29.9 30.1   MCHC 29.6* 30.3* 30.3*   RDW 54.2* 52.3* 53.0*   PLATELETCT 164 173 178   MPV 9.9 9.7 10.1     Recent Labs     02/13/21  0556 02/14/21  0700 02/15/21  0613   SODIUM 136 135 134*   POTASSIUM 4.2 3.9 3.7   CHLORIDE 97 97 95*   CO2 31 31 29   GLUCOSE 98 100* 150*   BUN 38* 46* 47*   CREATININE 5.12* 5.55* 5.53*   CALCIUM 7.5* 7.6* 7.6*               URINALYSIS:  Lab Results   Component Value Date/Time    COLORURINE Brown (A) 02/10/2021 0550    CLARITY Turbid (A) 02/10/2021 0550    SPECGRAVITY 1.013 02/10/2021 0550    PHURINE 5.0 02/10/2021 0550    KETONES Negative 02/10/2021 0550    PROTEINURIN 100 (A) 02/10/2021 0550    BILIRUBINUR Negative 02/10/2021 0550    UROBILU 0.2 02/10/2021 0550    NITRITE Negative 02/10/2021 0550    LEUKESTERAS Large (A) 02/10/2021 0550    OCCULTBLOOD Large (A) 02/10/2021 0550     UPC  No results found for: TOTPROTUR No results found for: CREATININEU      Imaging reviewed  IR-PERQ NEPH CATH NEW ACCESS (ALL RADIOLOGY) BOTH   Final Result      1. ULTRASOUND AND FLUOROSCOPIC GUIDED PLACEMENT OF A LEFT 8 [place]  PIGTAIL LOCKING LOOP PERCUTANEOUS NEPHROSTOMY CATHETER. MODERATE LEFT HYDRONEPHROSIS.      2. ULTRASOUND AND FLUOROSCOPIC GUIDED PLACEMENT OF A RIGHT 8 [place] PIGTAIL LOCKING  LOOP PERCUTANEOUS NEPHROSTOMY CATHETER. MODERATE RIGHT HYDRONEPHROSIS.      3. NEPHROSTOGRAMS SHOWING SATISFACTORY CATHETER POSITION WITHOUT EXTRAVASATION.      DX-CHEST-FOR LINE PLACEMENT Perform procedure in: Patient's Room   Final Result      Right IJ dialysis catheter has been placed and the tip projects appropriately over the superior vena cava/right atrial junction.      EC-ECHOCARDIOGRAM COMPLETE W/O CONT   Final Result      CT-RENAL COLIC EVALUATION(A/P W/O)   Final Result         1.  Moderate pericardial effusion measuring 30 Hounsfield units, consider proteinaceous or hemorrhagic fluid. Further evaluation with echocardiogram to evaluate for component of cardiac tamponade.   2.  Moderate left pleural effusion with dependent consolidation which could represent atelectasis or infiltrate   3.  Bilateral hydronephrosis and hydroureter without visualized obstructing stone   4.  Diverticulosis   5.  Large left lower quadrant lateral abdominal wall hernia containing mesenteric fat in these portion of the sigmoid colon without visualized obstructive change   6.  3.6 cm fusiform distal abdominal aortic aneurysm, radiographic follow-up and surveillance as clinically appropriate   7.  Bladder wall thickening with slight adjacent hazy fat stranding, likely corresponding with history of bladder malignancy.   8.  Cholelithiasis      These findings were discussed with the patient's clinician, MAIDA CHANDRA, on 2/10/2021 12:18 AM.            Current Facility-Administered Medications   Medication Dose Route Frequency Provider Last Rate Last Admin   • ondansetron (ZOFRAN) syringe/vial injection 4 mg  4 mg Intravenous Q4HRS PRN Lisy Lau M.D.   4 mg at 02/11/21 2232   • traZODone (DESYREL) tablet 100 mg  100 mg Oral QHS Tacos Pickens, D.O.   100 mg at 02/14/21 2049   • senna-docusate (PERICOLACE or SENOKOT S) 8.6-50 MG per tablet 2 tablet  2 tablet Oral BID Tacos Pickens, D.O.   2 tablet at 02/15/21 3664     And   • polyethylene glycol/lytes (MIRALAX) PACKET 1 Packet  1 Packet Oral QDAY PRN Tacos Pickens D.O.   1 Packet at 02/14/21 1256    And   • magnesium hydroxide (MILK OF MAGNESIA) suspension 30 mL  30 mL Oral QDAY PRN JERSEY Maravilla.O.   30 mL at 02/15/21 0557    And   • bisacodyl (DULCOLAX) suppository 10 mg  10 mg Rectal QDAY PRN Tacos Pickens D.O.       • heparin injection 5,000 Units  5,000 Units Subcutaneous Q8HRS Tacos Pickens D.O.   5,000 Units at 02/15/21 1359   • acetaminophen (Tylenol) tablet 650 mg  650 mg Oral Q6HRS PRN JERSEY Maravilla.O.   650 mg at 02/15/21 0557   • labetalol (NORMODYNE/TRANDATE) injection 10 mg  10 mg Intravenous Q4HRS PRN JERSEY Maravilla.O.       • sevelamer carbonate (RENVELA) tablet 800 mg  800 mg Oral TID WITH MEALS Tacos Pickens D.O.   800 mg at 02/15/21 1358   • heparin injection 2,800 Units  2,800 Units Intravenous PRN Gabi Stevenson M.D.   Stopped at 02/12/21 1538         Assessment/Plan  81 y.o. male with hx/of bladder cancer admitted 2/9/2021 with NOEL, obstructive nephropathy. Acidosis,hyperkalemia, started HD 2/10/2021    1.  NOEL on CKD stage IIIa, nonoliguric.  Creatinine stable in the mid 5 range without dialysis.  Patient required dialysis from February 10-12.  Patient currently without urgent need for dialysis.  Continue to monitor urine output and daily labs.  Avoid nephrotoxins.  Low-salt diet.    2.  Access: Right IJ temp Vas-Cath.  Maintain for now.    3.  Hyponatremia, worsening.  Limit hypotonic fluids.  Check labs daily.    4.  Obstructive uropathy/history of bladder cancer, currently with bilateral percutaneous nephrostomy tubes in place.  Defer management to urology.    5.  Hypertension, controlled.    6.  Normocytic anemia, worsening.  Unclear etiology.  Check CBC daily.    7.  Leukocytosis, worsening.  Unclear etiology.  Check CBC daily.    Following    Solitario Sigala MD  Nephrology

## 2021-02-16 ENCOUNTER — APPOINTMENT (OUTPATIENT)
Dept: MEDICAL GROUP | Facility: PHYSICIAN GROUP | Age: 81
End: 2021-02-16
Payer: MEDICARE

## 2021-02-16 LAB
ANION GAP SERPL CALC-SCNC: 9 MMOL/L (ref 7–16)
ANISOCYTOSIS BLD QL SMEAR: ABNORMAL
BASOPHILS # BLD AUTO: 0.2 % (ref 0–1.8)
BASOPHILS # BLD: 0.03 K/UL (ref 0–0.12)
BUN SERPL-MCNC: 48 MG/DL (ref 8–22)
CALCIUM SERPL-MCNC: 7.8 MG/DL (ref 8.5–10.5)
CHLORIDE SERPL-SCNC: 94 MMOL/L (ref 96–112)
CO2 SERPL-SCNC: 30 MMOL/L (ref 20–33)
COMMENT 1642: NORMAL
CREAT SERPL-MCNC: 4.93 MG/DL (ref 0.5–1.4)
EOSINOPHIL # BLD AUTO: 0.12 K/UL (ref 0–0.51)
EOSINOPHIL NFR BLD: 0.8 % (ref 0–6.9)
ERYTHROCYTE [DISTWIDTH] IN BLOOD BY AUTOMATED COUNT: 51.9 FL (ref 35.9–50)
GLUCOSE SERPL-MCNC: 115 MG/DL (ref 65–99)
HCT VFR BLD AUTO: 29.2 % (ref 42–52)
HGB BLD-MCNC: 8.6 G/DL (ref 14–18)
IMM GRANULOCYTES # BLD AUTO: 0.39 K/UL (ref 0–0.11)
IMM GRANULOCYTES NFR BLD AUTO: 2.7 % (ref 0–0.9)
LYMPHOCYTES # BLD AUTO: 0.59 K/UL (ref 1–4.8)
LYMPHOCYTES NFR BLD: 4.1 % (ref 22–41)
MAGNESIUM SERPL-MCNC: 2 MG/DL (ref 1.5–2.5)
MCH RBC QN AUTO: 29 PG (ref 27–33)
MCHC RBC AUTO-ENTMCNC: 29.5 G/DL (ref 33.7–35.3)
MCV RBC AUTO: 98.3 FL (ref 81.4–97.8)
MICROCYTES BLD QL SMEAR: ABNORMAL
MONOCYTES # BLD AUTO: 1.46 K/UL (ref 0–0.85)
MONOCYTES NFR BLD AUTO: 10.1 % (ref 0–13.4)
MORPHOLOGY BLD-IMP: NORMAL
NEUTROPHILS # BLD AUTO: 11.82 K/UL (ref 1.82–7.42)
NEUTROPHILS NFR BLD: 82.1 % (ref 44–72)
NRBC # BLD AUTO: 0 K/UL
NRBC BLD-RTO: 0 /100 WBC
PHOSPHATE SERPL-MCNC: 4 MG/DL (ref 2.5–4.5)
PLATELET # BLD AUTO: 182 K/UL (ref 164–446)
PLATELET BLD QL SMEAR: NORMAL
PMV BLD AUTO: 9.9 FL (ref 9–12.9)
POTASSIUM SERPL-SCNC: 4.3 MMOL/L (ref 3.6–5.5)
RBC # BLD AUTO: 2.97 M/UL (ref 4.7–6.1)
RBC BLD AUTO: PRESENT
SODIUM SERPL-SCNC: 133 MMOL/L (ref 135–145)
WBC # BLD AUTO: 14.4 K/UL (ref 4.8–10.8)

## 2021-02-16 PROCEDURE — 770001 HCHG ROOM/CARE - MED/SURG/GYN PRIV*

## 2021-02-16 PROCEDURE — A9270 NON-COVERED ITEM OR SERVICE: HCPCS | Performed by: STUDENT IN AN ORGANIZED HEALTH CARE EDUCATION/TRAINING PROGRAM

## 2021-02-16 PROCEDURE — 84100 ASSAY OF PHOSPHORUS: CPT

## 2021-02-16 PROCEDURE — 97166 OT EVAL MOD COMPLEX 45 MIN: CPT

## 2021-02-16 PROCEDURE — 83735 ASSAY OF MAGNESIUM: CPT

## 2021-02-16 PROCEDURE — 97161 PT EVAL LOW COMPLEX 20 MIN: CPT

## 2021-02-16 PROCEDURE — 700102 HCHG RX REV CODE 250 W/ 637 OVERRIDE(OP): Performed by: STUDENT IN AN ORGANIZED HEALTH CARE EDUCATION/TRAINING PROGRAM

## 2021-02-16 PROCEDURE — 80048 BASIC METABOLIC PNL TOTAL CA: CPT

## 2021-02-16 PROCEDURE — 99232 SBSQ HOSP IP/OBS MODERATE 35: CPT | Performed by: INTERNAL MEDICINE

## 2021-02-16 PROCEDURE — 85025 COMPLETE CBC W/AUTO DIFF WBC: CPT

## 2021-02-16 PROCEDURE — 99233 SBSQ HOSP IP/OBS HIGH 50: CPT | Performed by: HOSPITALIST

## 2021-02-16 PROCEDURE — 700111 HCHG RX REV CODE 636 W/ 250 OVERRIDE (IP): Performed by: STUDENT IN AN ORGANIZED HEALTH CARE EDUCATION/TRAINING PROGRAM

## 2021-02-16 RX ADMIN — DOCUSATE SODIUM 50 MG AND SENNOSIDES 8.6 MG 2 TABLET: 8.6; 5 TABLET, FILM COATED ORAL at 05:55

## 2021-02-16 RX ADMIN — TRAZODONE HYDROCHLORIDE 100 MG: 50 TABLET ORAL at 21:10

## 2021-02-16 RX ADMIN — HEPARIN SODIUM 5000 UNITS: 5000 INJECTION, SOLUTION INTRAVENOUS; SUBCUTANEOUS at 21:11

## 2021-02-16 RX ADMIN — SEVELAMER CARBONATE 800 MG: 800 TABLET, FILM COATED ORAL at 14:28

## 2021-02-16 RX ADMIN — DOCUSATE SODIUM 50 MG AND SENNOSIDES 8.6 MG 2 TABLET: 8.6; 5 TABLET, FILM COATED ORAL at 18:09

## 2021-02-16 RX ADMIN — SEVELAMER CARBONATE 800 MG: 800 TABLET, FILM COATED ORAL at 10:07

## 2021-02-16 RX ADMIN — SEVELAMER CARBONATE 800 MG: 800 TABLET, FILM COATED ORAL at 18:09

## 2021-02-16 RX ADMIN — HEPARIN SODIUM 5000 UNITS: 5000 INJECTION, SOLUTION INTRAVENOUS; SUBCUTANEOUS at 05:55

## 2021-02-16 RX ADMIN — HEPARIN SODIUM 5000 UNITS: 5000 INJECTION, SOLUTION INTRAVENOUS; SUBCUTANEOUS at 14:28

## 2021-02-16 ASSESSMENT — COGNITIVE AND FUNCTIONAL STATUS - GENERAL
PERSONAL GROOMING: A LITTLE
MOVING FROM LYING ON BACK TO SITTING ON SIDE OF FLAT BED: A LITTLE
TOILETING: A LITTLE
WALKING IN HOSPITAL ROOM: A LITTLE
TURNING FROM BACK TO SIDE WHILE IN FLAT BAD: A LITTLE
MOBILITY SCORE: 17
STANDING UP FROM CHAIR USING ARMS: A LITTLE
HELP NEEDED FOR BATHING: A LITTLE
CLIMB 3 TO 5 STEPS WITH RAILING: A LOT
SUGGESTED CMS G CODE MODIFIER DAILY ACTIVITY: CK
DRESSING REGULAR LOWER BODY CLOTHING: A LOT
MOVING TO AND FROM BED TO CHAIR: A LITTLE
DRESSING REGULAR UPPER BODY CLOTHING: A LITTLE
SUGGESTED CMS G CODE MODIFIER MOBILITY: CK
EATING MEALS: A LITTLE
DAILY ACTIVITIY SCORE: 17

## 2021-02-16 ASSESSMENT — ENCOUNTER SYMPTOMS
NERVOUS/ANXIOUS: 0
ABDOMINAL PAIN: 0
SPEECH CHANGE: 0
FLANK PAIN: 0
DIZZINESS: 0
SHORTNESS OF BREATH: 0
STRIDOR: 0
NAUSEA: 0
CHILLS: 0
PALPITATIONS: 0
COUGH: 0
HEADACHES: 0
FEVER: 0
CONSTIPATION: 0

## 2021-02-16 ASSESSMENT — ACTIVITIES OF DAILY LIVING (ADL): TOILETING: INDEPENDENT

## 2021-02-16 ASSESSMENT — GAIT ASSESSMENTS
DISTANCE (FEET): 40
GAIT LEVEL OF ASSIST: MINIMAL ASSIST
ASSISTIVE DEVICE: FRONT WHEEL WALKER
DEVIATION: DECREASED BASE OF SUPPORT

## 2021-02-16 NOTE — PROGRESS NOTES
Hospital Medicine Daily Progress Note    Date of Service  2/16/2021    Chief Complaint  Sent by outside MD for hyperkalemia K:7.2    Hospital Course  81 y.o. male w/ hx of Alzheimers, bladder cancer (follows with Dr Murphy, urology), with worsening renal function.  He was admitted 2/9/2021 with Cr:12.65, K:7.2 and in need of emergent dialysis.  A CT of the abd showed bilateral hydronephrosis and hydroureter along w/ bladder thickening.    Interval Problem Update  2/12: On dialysis,  Off pressors.  Alert and conversant. States he lives with his wife that has cancer in the brain.  He denies pain.  He is to get nephrostomy tube today.    2/13: Cr:5.12, Hgb:8.1. Urine & blood culture no growth to date. Weak.  Awaits transfer out of ICU.    2/14: Awake and conversant.  Discussed with his RN.  Remove toro catheter.  Left nephrostomy with greater output then right nephrostomy.  Await transfer    2/15: Awake sitting up in chair.  Speech clear.  Wife at bedside later in the day.  Minimal urine output noted in nephrostomy bags.    2/16 the patient appears content, bilateral nephrostomy tubes with little fluid, left greater than right, reported urine output 975/20 4 hours, since this morning 58 cc, the patient denies pain, white cell count 14.4, hemoglobin 8.6 unchanged, renal function fairly unchanged with a GFR around 10, creatinine 4.93, potassium 4.3, sodium 133.    Consultants/Specialty  Nephrology  Urology  Intensivist (signing off)    Code Status  Full Code    Disposition  Transfer to medical floor (order placed 2/12 and on 2/14)    Review of Systems  Review of Systems   Constitutional: Positive for malaise/fatigue. Negative for chills and fever.   Respiratory: Negative for cough, shortness of breath and stridor.    Cardiovascular: Negative for chest pain, palpitations and leg swelling.   Gastrointestinal: Negative for abdominal pain, constipation and nausea.   Genitourinary: Negative for flank pain.        No pain from  nephrostomy tubes   Musculoskeletal: Negative for joint pain.   Skin: Negative for rash.   Neurological: Negative for dizziness, speech change and headaches.   Psychiatric/Behavioral: The patient is not nervous/anxious.         Physical Exam  Temp:  [36.4 °C (97.5 °F)-37 °C (98.6 °F)] 36.4 °C (97.5 °F)  Pulse:  [77-99] 77  Resp:  [15-31] 25  BP: ()/(56-64) 93/59  SpO2:  [89 %-99 %] 91 %    Physical Exam  Vitals reviewed.   Constitutional:       Appearance: Normal appearance. He is not diaphoretic.   HENT:      Head: Normocephalic and atraumatic.      Nose: Nose normal.      Mouth/Throat:      Mouth: Mucous membranes are moist.      Pharynx: No oropharyngeal exudate.   Eyes:      General: No scleral icterus.        Right eye: No discharge.         Left eye: No discharge.      Extraocular Movements: Extraocular movements intact.      Conjunctiva/sclera: Conjunctivae normal.   Cardiovascular:      Rate and Rhythm: Normal rate and regular rhythm.      Pulses:           Radial pulses are 2+ on the right side and 2+ on the left side.        Dorsalis pedis pulses are 2+ on the right side and 2+ on the left side.      Heart sounds: No murmur.   Pulmonary:      Effort: Pulmonary effort is normal. No respiratory distress.      Breath sounds: Normal breath sounds. No wheezing or rales.   Abdominal:      General: Bowel sounds are normal. There is no distension.      Palpations: Abdomen is soft.      Tenderness: There is no abdominal tenderness.      Comments: Bilateral nephrostomy tubes   Musculoskeletal:         General: No swelling or tenderness.      Cervical back: Normal range of motion. No muscular tenderness.      Right lower leg: No edema.      Left lower leg: No edema.   Skin:     Coloration: Skin is not jaundiced or pale.   Neurological:      General: No focal deficit present.      Mental Status: He is alert. Mental status is at baseline.      Cranial Nerves: No cranial nerve deficit.   Psychiatric:         Mood  and Affect: Mood normal.         Behavior: Behavior normal.         Fluids    Intake/Output Summary (Last 24 hours) at 2/16/2021 0752  Last data filed at 2/16/2021 0600  Gross per 24 hour   Intake 360 ml   Output 975 ml   Net -615 ml       Laboratory  Recent Labs     02/14/21  0700 02/15/21  0613 02/16/21  0400   WBC 11.0* 15.4* 14.4*   RBC 2.91* 2.86* 2.97*   HEMOGLOBIN 8.7* 8.6* 8.6*   HEMATOCRIT 28.7* 28.4* 29.2*   MCV 98.6* 99.3* 98.3*   MCH 29.9 30.1 29.0   MCHC 30.3* 30.3* 29.5*   RDW 52.3* 53.0* 51.9*   PLATELETCT 173 178 182   MPV 9.7 10.1 9.9     Recent Labs     02/14/21  0700 02/15/21  0613 02/16/21  0400   SODIUM 135 134* 133*   POTASSIUM 3.9 3.7 4.3   CHLORIDE 97 95* 94*   CO2 31 29 30   GLUCOSE 100* 150* 115*   BUN 46* 47* 48*   CREATININE 5.55* 5.53* 4.93*   CALCIUM 7.6* 7.6* 7.8*                   Imaging  IR-PERQ NEPH CATH NEW ACCESS (ALL RADIOLOGY) BOTH   Final Result      1. ULTRASOUND AND FLUOROSCOPIC GUIDED PLACEMENT OF A LEFT 8 [place]  PIGTAIL LOCKING LOOP PERCUTANEOUS NEPHROSTOMY CATHETER. MODERATE LEFT HYDRONEPHROSIS.      2. ULTRASOUND AND FLUOROSCOPIC GUIDED PLACEMENT OF A RIGHT 8 [place] PIGTAIL LOCKING LOOP PERCUTANEOUS NEPHROSTOMY CATHETER. MODERATE RIGHT HYDRONEPHROSIS.      3. NEPHROSTOGRAMS SHOWING SATISFACTORY CATHETER POSITION WITHOUT EXTRAVASATION.      DX-CHEST-FOR LINE PLACEMENT Perform procedure in: Patient's Room   Final Result      Right IJ dialysis catheter has been placed and the tip projects appropriately over the superior vena cava/right atrial junction.      EC-ECHOCARDIOGRAM COMPLETE W/O CONT   Final Result      CT-RENAL COLIC EVALUATION(A/P W/O)   Final Result         1.  Moderate pericardial effusion measuring 30 Hounsfield units, consider proteinaceous or hemorrhagic fluid. Further evaluation with echocardiogram to evaluate for component of cardiac tamponade.   2.  Moderate left pleural effusion with dependent consolidation which could represent atelectasis or  infiltrate   3.  Bilateral hydronephrosis and hydroureter without visualized obstructing stone   4.  Diverticulosis   5.  Large left lower quadrant lateral abdominal wall hernia containing mesenteric fat in these portion of the sigmoid colon without visualized obstructive change   6.  3.6 cm fusiform distal abdominal aortic aneurysm, radiographic follow-up and surveillance as clinically appropriate   7.  Bladder wall thickening with slight adjacent hazy fat stranding, likely corresponding with history of bladder malignancy.   8.  Cholelithiasis      These findings were discussed with the patient's clinician, MAIDA CHANDRA, on 2/10/2021 12:18 AM.           Assessment/Plan  * NOEL (acute kidney injury) (HCC)- (present on admission)  Assessment & Plan  2/16 slightly improved renal parameters, potassium stable  2/15 BUN:47, Cr:5.53 (nephrology holding on HD for now and continue to keep HD catheter inplace).  2/14 BUN:61, Cr:5.55  Patient had creatinine greater than 12 and potassium greater than 7 on admission.  Nephrology consulting (Dr Stevenson) and holding dialysis 2/13  Nephrostomy tube placed 2/12 2/14 Fair urine output from left nephrostomy, right nephrostomy with bloody fluids  Bilateral hydronephrosis noted on CT without evidence of clear stenosis or stone.  Likely related to his transitional cell carcinoma.    Hyperkalemia- (present on admission)  Assessment & Plan  Improved, monitor    Pulmonary hypertension (HCC)- (present on admission)  Assessment & Plan  Supplemental oxygen    Atrial fibrillation (HCC)- (present on admission)  Assessment & Plan  Monitor on telemetry and vitals  Rate control  S/P nephrostomy tubes consider initiation of anticoagulation    Acute systolic CHF (congestive heart failure) (HCC)- (present on admission)  Assessment & Plan  HFrEF:30% per 2/10 echo.  No overt sign of volume overload     Pericardial effusion- (present on admission)  Assessment & Plan  Likely related to his acute renal  failure.   Echo shows no acute tamponade.    Transitional cell carcinoma (HCC)- (present on admission)  Assessment & Plan  Follow-up outpatient urology/oncology for further management.      Aortic stenosis- (present on admission)  Assessment & Plan  2/10 Echo: Moderate aortic stenosis with low grade/low flow.  Transvalvular gradients are - Peak: 33 mmHg, Mean: 21 mmHg.  The severity of the aortic stenosis may be underestimated due to the   poor LV systolic function.    Essential hypertension- (present on admission)  Assessment & Plan  Outpatient lisinopril 40mg held due to acute renal failure  Monitor vitals.  Consider amlodipine if on going HTN.    Abdominal aortic aneurysm (AAA) 35 to 39 mm in diameter (HCC)  Assessment & Plan  Noted on CT renal scan  3.6cm  Monitor BP.  Bigger picture is his bladder cancer, chf and renal failure.    Cholelithiasis- (present on admission)  Assessment & Plan  Noted on CT  asymptomatic    Anemia- (present on admission)  Assessment & Plan  Likely chronic disease (transitional cell cancer and renal failure)  Monitor cbc    Pleural effusion- (present on admission)  Assessment & Plan  Hemodialysis  Wean oxygen as tolerates.     Plan  Continue with current close monitoring  Nephrology follow-up, likely no further hemodialysis indicated at this time  Will involve palliative care in light of the reported history the patient was not interested in aggressive treatment of his bladder cancer now has effects from further progression of his disease.  Follow labs closely  See orders  Medically complex high risk patient    VTE prophylaxis: Heparin    I have performed a physical exam and reviewed and updated ROS and Plan today . In review of yesterday's note , there are no changes except as documented above.        Please note that this dictation was created using voice recognition software. I have made every reasonable attempt to correct obvious errors, but I expect that there are errors of  grammar and possibly context that I did not discover before finalizing the note.    This patient was seen under COVID 19 pandemic disaster response conditions.  During a disaster, the provisions of care is subject to the Crisis Standard of Care

## 2021-02-16 NOTE — CARE PLAN
Problem: Safety  Goal: Will remain free from injury  Outcome: PROGRESSING AS EXPECTED     Problem: Pain Management  Goal: Pain level will decrease to patient's comfort goal  Outcome: PROGRESSING AS EXPECTED     Problem: Communication  Goal: The ability to communicate needs accurately and effectively will improve  Outcome: PROGRESSING SLOWER THAN EXPECTED     Problem: Bowel/Gastric:  Goal: Normal bowel function is maintained or improved  Outcome: PROGRESSING SLOWER THAN EXPECTED  Goal: Will not experience complications related to bowel motility  Outcome: PROGRESSING SLOWER THAN EXPECTED

## 2021-02-16 NOTE — THERAPY
"Occupational Therapy   Initial Evaluation     Patient Name: Bay Forrester  Age:  81 y.o., Sex:  male  Medical Record #: [id number]  Today's Date: 2/16/2021     Precautions  Precautions: (P) Fall Risk  Comments: (P) bilateral nephrostomy tubes    Assessment  Patient is 81 y.o. male admitted for NOEL s/p bilateral nephrostomy tubes, bladder cancer, and acute CHF. Pt lives in a [facility] with spouse, normally independent with all ADLs and mobility without an AD at baseline. Pt required Branden for bed mobility as well as well as maxA for LE dressing, combined with patients poor activity tolerance and generalized weakness, pt presents below his baseline requiring further therapy while admitted as well as post-acute placement.  Plan    Recommend Occupational Therapy 3 times per week until therapy goals are met for the following treatments:  Adaptive Equipment, Self Care/Activities of Daily Living and Therapeutic Activities.    DC Equipment Recommendations: (P) Unable to determine at this time  Discharge Recommendations: (P) Recommend post-acute placement for additional occupational therapy services prior to discharge home     Subjective    \"I want to sleep\"     Objective       02/16/21 1052   Prior Living Situation   Prior Services Home-Independent   Housing / Facility [address] [facility]   Steps Into Home 0   Bathroom Set up Walk In Shower;Shower Chair   Equipment Owned None   Lives with - Patient's Self Care Capacity Spouse   Prior Level of ADL Function   Self Feeding Independent   Grooming / Hygiene Independent   Bathing Independent   Dressing Independent   Toileting Independent   Prior Level of IADL Function   Medication Management Independent   Laundry Independent   Kitchen Mobility Independent   Finances Independent   Home Management Independent   Shopping Independent   Prior Level Of Mobility Independent Without Device in Community   Driving / Transportation Driving Independent   Occupation (Pre-Hospital Vocational) Retired " Due To Age   History of Falls   History of Falls No   Precautions   Precautions Fall Risk   Comments bilateral nephrostomy tubes   Pain 0 - 10 Group   Therapist Pain Assessment Post Activity Pain Same as Prior to Activity;Nurse Notified  (chronic back pain)   Cognition    Cognition / Consciousness WDL   Level of Consciousness Alert   Comments pleasant, cooperative, re-directable   Active ROM Upper Body   Active ROM Upper Body  WDL   Dominant Hand Right   Strength Upper Body   Upper Body Strength  X   Gross Strength Generalized Weakness, Equal Bilaterally.    Sensation Upper Body   Upper Extremity Sensation  WDL   Upper Body Muscle Tone   Upper Body Muscle Tone  WDL   Neurological Concerns   Neurological Concerns No   Coordination Upper Body   Coordination WDL   Balance Assessment   Sitting Balance (Static) Fair   Sitting Balance (Dynamic) Poor   Standing Balance (Static) Poor   Standing Balance (Dynamic) Poor   Weight Shift Sitting Fair   Weight Shift Standing Poor   Comments w/ FWW   Bed Mobility    Supine to Sit Minimal Assist   Scooting Minimal Assist   Rolling Minimum Assist to Lt.   ADL Assessment   Upper Body Dressing Minimal Assist   Lower Body Dressing Maximal Assist   Toileting   (NT-refused need)   How much help from another person does the patient currently need...   Putting on and taking off regular lower body clothing? 2   Bathing (including washing, rinsing, and drying)? 3   Toileting, which includes using a toilet, bedpan, or urinal? 3   Putting on and taking off regular upper body clothing? 3   Taking care of personal grooming such as brushing teeth? 3   Eating meals? 3   6 Clicks Daily Activity Score 17   Functional Mobility   Sit to Stand Minimal Assist   Bed, Chair, Wheelchair Transfer Minimal Assist   Toilet Transfers Refused   Transfer Method Stand Step   Mobility bed mobility, ambulate in room, seated rest breaks, up to chair   Comments w/ FWW   ICU Target Mobility Level   ICU Mobility -  Targeted Level Level 3A   Visual Perception   Visual Perception  WDL   Activity Tolerance   Sitting in Chair 10   Sitting Edge of Bed 5   Standing 5   Patient / Family Goals   Patient / Family Goal #1 To go home   Short Term Goals   Short Term Goal # 1 Pt will complete ADL transfers with supervision   Short Term Goal # 2 Pt will complete toileting with supervision   Short Term Goal # 3 Pt will complete standing G/H with supervision   Education Group   Education Provided Role of Occupational Therapist   Role of Occupational Therapist Patient Response Patient;Acceptance;Explanation;Reinforcement Needed   Interdisciplinary Plan of Care Collaboration   IDT Collaboration with  Nursing   Patient Position at End of Therapy Seated;Chair Alarm On;Tray Table within Reach;Call Light within Reach;Phone within Reach   Collaboration Comments RN updated

## 2021-02-16 NOTE — THERAPY
Physical Therapy   Initial Evaluation     Patient Name: aBy Forrester  Age:  81 y.o., Sex:  male  Medical Record #: [id number]  Today's Date: 2/16/2021     Precautions: Fall Risk    Assessment  Patient is 81 y.o. male s/p B nephrostomy tube placement.  Additional factors influencing patient status / progress : today, pt is limited by decreased endurance with attempt at ambulation, c/o chronic back pain when up, flexed posture and needing to turn around and sit after 40 feet of ambulation using FWW with min A. Pt lives with wife who can assist as needed. Pt may need post acute placement vs home with assist from his wife, depending on progress while here. PT to follow to address problems noted..      Plan    Recommend Physical Therapy 3 times per week until therapy goals are met for the following treatments:  Bed Mobility, Gait Training, Neuro Re-Education / Balance and Therapeutic Activities    DC Equipment Recommendations: Unable to determine at this time  Discharge Recommendations: Recommend post-acute placement for additional physical therapy services prior to discharge home(vs home with family assist depending on progress)        Objective       02/16/21 1050   Prior Living Situation   Prior Services Home-Independent   Housing / Facility 1 Story House   Steps Into Home 0   Steps In Home 0   Equipment Owned Front-Wheel Walker   Lives with - Patient's Self Care Capacity Spouse  (home as needed, can assist as needed)   Prior Level of Functional Mobility   Bed Mobility Independent   Transfer Status Independent   Ambulation Independent   Distance Ambulation (Feet)   (community)   Assistive Devices Used None   Gait Analysis   Gait Level Of Assist Minimal Assist   Assistive Device Front Wheel Walker   Distance (Feet) 40   Bed Mobility    Supine to Sit Minimal Assist   Sit to Supine   (up chair)   Scooting Minimal Assist   Rolling Minimum Assist to Lt.   Functional Mobility   Sit to Stand Minimal Assist   Bed,  Chair, Wheelchair Transfer Minimal Assist   Short Term Goals    Short Term Goal # 1 Pt will perform bed mobility with mod indep in 6 visits.    Short Term Goal # 2 Pt will transfer with superivision in 6 visits to improve functional indep.    Short Term Goal # 3 Pt will ambulate x 125 feet using FWW with supervision in 6 visits to improve functional indep.    Anticipated Discharge Equipment and Recommendations   DC Equipment Recommendations Unable to determine at this time   Discharge Recommendations Recommend post-acute placement for additional physical therapy services prior to discharge home  (vs home with family assist depending on progress)

## 2021-02-17 LAB
ANION GAP SERPL CALC-SCNC: 13 MMOL/L (ref 7–16)
BUN SERPL-MCNC: 47 MG/DL (ref 8–22)
CALCIUM SERPL-MCNC: 8.2 MG/DL (ref 8.5–10.5)
CHLORIDE SERPL-SCNC: 94 MMOL/L (ref 96–112)
CO2 SERPL-SCNC: 27 MMOL/L (ref 20–33)
CREAT SERPL-MCNC: 4.89 MG/DL (ref 0.5–1.4)
ERYTHROCYTE [DISTWIDTH] IN BLOOD BY AUTOMATED COUNT: 49.9 FL (ref 35.9–50)
FERRITIN SERPL-MCNC: 1308 NG/ML (ref 22–322)
GLUCOSE SERPL-MCNC: 110 MG/DL (ref 65–99)
HCT VFR BLD AUTO: 29 % (ref 42–52)
HGB BLD-MCNC: 8.7 G/DL (ref 14–18)
IRON SATN MFR SERPL: 17 % (ref 15–55)
IRON SERPL-MCNC: 21 UG/DL (ref 50–180)
MAGNESIUM SERPL-MCNC: 2 MG/DL (ref 1.5–2.5)
MCH RBC QN AUTO: 29.1 PG (ref 27–33)
MCHC RBC AUTO-ENTMCNC: 30 G/DL (ref 33.7–35.3)
MCV RBC AUTO: 97 FL (ref 81.4–97.8)
PHOSPHATE SERPL-MCNC: 4.2 MG/DL (ref 2.5–4.5)
PLATELET # BLD AUTO: 188 K/UL (ref 164–446)
PMV BLD AUTO: 10 FL (ref 9–12.9)
POTASSIUM SERPL-SCNC: 4.4 MMOL/L (ref 3.6–5.5)
RBC # BLD AUTO: 2.99 M/UL (ref 4.7–6.1)
SODIUM SERPL-SCNC: 134 MMOL/L (ref 135–145)
TIBC SERPL-MCNC: 126 UG/DL (ref 250–450)
UIBC SERPL-MCNC: 105 UG/DL (ref 110–370)
WBC # BLD AUTO: 12.6 K/UL (ref 4.8–10.8)

## 2021-02-17 PROCEDURE — 80048 BASIC METABOLIC PNL TOTAL CA: CPT

## 2021-02-17 PROCEDURE — 84100 ASSAY OF PHOSPHORUS: CPT

## 2021-02-17 PROCEDURE — 99233 SBSQ HOSP IP/OBS HIGH 50: CPT | Performed by: HOSPITALIST

## 2021-02-17 PROCEDURE — 83550 IRON BINDING TEST: CPT

## 2021-02-17 PROCEDURE — 700102 HCHG RX REV CODE 250 W/ 637 OVERRIDE(OP): Performed by: STUDENT IN AN ORGANIZED HEALTH CARE EDUCATION/TRAINING PROGRAM

## 2021-02-17 PROCEDURE — 83735 ASSAY OF MAGNESIUM: CPT

## 2021-02-17 PROCEDURE — 700111 HCHG RX REV CODE 636 W/ 250 OVERRIDE (IP): Performed by: STUDENT IN AN ORGANIZED HEALTH CARE EDUCATION/TRAINING PROGRAM

## 2021-02-17 PROCEDURE — A9270 NON-COVERED ITEM OR SERVICE: HCPCS | Performed by: STUDENT IN AN ORGANIZED HEALTH CARE EDUCATION/TRAINING PROGRAM

## 2021-02-17 PROCEDURE — 770001 HCHG ROOM/CARE - MED/SURG/GYN PRIV*

## 2021-02-17 PROCEDURE — 83540 ASSAY OF IRON: CPT

## 2021-02-17 PROCEDURE — 85027 COMPLETE CBC AUTOMATED: CPT

## 2021-02-17 PROCEDURE — A9270 NON-COVERED ITEM OR SERVICE: HCPCS | Performed by: HOSPITALIST

## 2021-02-17 PROCEDURE — 82728 ASSAY OF FERRITIN: CPT

## 2021-02-17 PROCEDURE — 700102 HCHG RX REV CODE 250 W/ 637 OVERRIDE(OP): Performed by: HOSPITALIST

## 2021-02-17 PROCEDURE — 99233 SBSQ HOSP IP/OBS HIGH 50: CPT | Performed by: INTERNAL MEDICINE

## 2021-02-17 RX ORDER — FERROUS SULFATE 325(65) MG
325 TABLET ORAL 2 TIMES DAILY WITH MEALS
Status: DISCONTINUED | OUTPATIENT
Start: 2021-02-17 | End: 2021-02-19

## 2021-02-17 RX ADMIN — DOCUSATE SODIUM 50 MG AND SENNOSIDES 8.6 MG 2 TABLET: 8.6; 5 TABLET, FILM COATED ORAL at 06:09

## 2021-02-17 RX ADMIN — FERROUS SULFATE TAB 325 MG (65 MG ELEMENTAL FE) 325 MG: 325 (65 FE) TAB at 17:10

## 2021-02-17 RX ADMIN — SEVELAMER CARBONATE 800 MG: 800 TABLET, FILM COATED ORAL at 11:07

## 2021-02-17 RX ADMIN — HEPARIN SODIUM 5000 UNITS: 5000 INJECTION, SOLUTION INTRAVENOUS; SUBCUTANEOUS at 06:09

## 2021-02-17 RX ADMIN — TRAZODONE HYDROCHLORIDE 100 MG: 50 TABLET ORAL at 21:26

## 2021-02-17 RX ADMIN — SEVELAMER CARBONATE 800 MG: 800 TABLET, FILM COATED ORAL at 06:09

## 2021-02-17 RX ADMIN — HEPARIN SODIUM 5000 UNITS: 5000 INJECTION, SOLUTION INTRAVENOUS; SUBCUTANEOUS at 16:11

## 2021-02-17 RX ADMIN — HEPARIN SODIUM 5000 UNITS: 5000 INJECTION, SOLUTION INTRAVENOUS; SUBCUTANEOUS at 21:26

## 2021-02-17 RX ADMIN — SEVELAMER CARBONATE 800 MG: 800 TABLET, FILM COATED ORAL at 17:10

## 2021-02-17 RX ADMIN — DOCUSATE SODIUM 50 MG AND SENNOSIDES 8.6 MG 2 TABLET: 8.6; 5 TABLET, FILM COATED ORAL at 17:08

## 2021-02-17 ASSESSMENT — ENCOUNTER SYMPTOMS
SHORTNESS OF BREATH: 0
DIZZINESS: 0
NERVOUS/ANXIOUS: 0
CHILLS: 0
HEADACHES: 0
CONSTIPATION: 0
COUGH: 0
ABDOMINAL PAIN: 0
FLANK PAIN: 0
STRIDOR: 0
NAUSEA: 0
FEVER: 0
SPEECH CHANGE: 0
PALPITATIONS: 0

## 2021-02-17 ASSESSMENT — FIBROSIS 4 INDEX: FIB4 SCORE: 1.3

## 2021-02-17 NOTE — PROGRESS NOTES
Monitor Summary  Rhythm: A.flutter  HR: 70s-100s  Ectopy: PVCs  Measurements: /.12/    12 hour chart check

## 2021-02-17 NOTE — PROGRESS NOTES
Patient's son Deepak expressed concerned and irritation with lack of communication to the son. After a long conversation, MD Rousseau made aware of patients concerns of discharge planning and follow-up biopsy plans. MD Rousseau updated patients son via phone and we will continue to update son on patients plan of care.

## 2021-02-17 NOTE — PROGRESS NOTES
Hospital Medicine Daily Progress Note    Date of Service  2/17/2021    Chief Complaint  Sent by outside MD for hyperkalemia K:7.2    Hospital Course  81 y.o. male w/ hx of Alzheimers, bladder cancer (follows with Dr Murphy, urology), with worsening renal function.  He was admitted 2/9/2021 with Cr:12.65, K:7.2 and in need of emergent dialysis.  A CT of the abd showed bilateral hydronephrosis and hydroureter along w/ bladder thickening.    Interval Problem Update  2/12: On dialysis,  Off pressors.  Alert and conversant. States he lives with his wife that has cancer in the brain.  He denies pain.  He is to get nephrostomy tube today.    2/13: Cr:5.12, Hgb:8.1. Urine & blood culture no growth to date. Weak.  Awaits transfer out of ICU.    2/14: Awake and conversant.  Discussed with his RN.  Remove toro catheter.  Left nephrostomy with greater output then right nephrostomy.  Await transfer    2/15: Awake sitting up in chair.  Speech clear.  Wife at bedside later in the day.  Minimal urine output noted in nephrostomy bags.    2/16 the patient appears content, bilateral nephrostomy tubes with little fluid, left greater than right, reported urine output 975/20 4 hours, since this morning 58 cc, the patient denies pain, white cell count 14.4, hemoglobin 8.6 unchanged, renal function fairly unchanged with a GFR around 10, creatinine 4.93, potassium 4.3, sodium 133.  2/17 the patient denies acute changes, he has been eating, he has still urine output from primarily the left nephrostomy, I did reach out to urology to update the patient's son and the patient on plan of care from the urology side.  The patient removed his dialysis catheter by accident, discussed with nephrology, currently no urgent need for hemodialysis.  Consultants/Specialty  Nephrology  Urology  Intensivist (signing off)    Code Status  Full Code    Disposition  Transfer to medical floor (order placed 2/12 and on 2/14)    Review of Systems  Review of Systems    Constitutional: Positive for malaise/fatigue. Negative for chills and fever.   Respiratory: Negative for cough, shortness of breath and stridor.    Cardiovascular: Negative for chest pain, palpitations and leg swelling.   Gastrointestinal: Negative for abdominal pain, constipation and nausea.   Genitourinary: Negative for flank pain.        No pain from nephrostomy tubes   Musculoskeletal: Negative for joint pain.   Skin: Negative for rash.   Neurological: Negative for dizziness, speech change and headaches.   Psychiatric/Behavioral: The patient is not nervous/anxious.         Physical Exam  Temp:  [36.2 °C (97.1 °F)-36.7 °C (98 °F)] 36.3 °C (97.3 °F)  Pulse:  [] 80  Resp:  [15-19] 19  BP: ()/(58-72) 104/72  SpO2:  [93 %] 93 %    Physical Exam  Vitals reviewed.   Constitutional:       Appearance: Normal appearance. He is not diaphoretic.   HENT:      Head: Normocephalic and atraumatic.      Nose: Nose normal.      Mouth/Throat:      Mouth: Mucous membranes are moist.      Pharynx: No oropharyngeal exudate.   Eyes:      General: No scleral icterus.        Right eye: No discharge.         Left eye: No discharge.      Extraocular Movements: Extraocular movements intact.      Conjunctiva/sclera: Conjunctivae normal.   Cardiovascular:      Rate and Rhythm: Normal rate and regular rhythm.      Pulses:           Radial pulses are 2+ on the right side and 2+ on the left side.        Dorsalis pedis pulses are 2+ on the right side and 2+ on the left side.      Heart sounds: No murmur.   Pulmonary:      Effort: Pulmonary effort is normal. No respiratory distress.      Breath sounds: Normal breath sounds. No wheezing or rales.   Abdominal:      General: Bowel sounds are normal. There is no distension.      Palpations: Abdomen is soft.      Tenderness: There is no abdominal tenderness.      Comments: Bilateral nephrostomy tubes   Musculoskeletal:         General: No swelling or tenderness.      Cervical back:  Normal range of motion. No muscular tenderness.      Right lower leg: No edema.      Left lower leg: No edema.   Skin:     Coloration: Skin is not jaundiced or pale.   Neurological:      General: No focal deficit present.      Mental Status: He is alert. Mental status is at baseline.      Cranial Nerves: No cranial nerve deficit.   Psychiatric:         Mood and Affect: Mood normal.         Behavior: Behavior normal.         Fluids    Intake/Output Summary (Last 24 hours) at 2/17/2021 0816  Last data filed at 2/17/2021 0600  Gross per 24 hour   Intake 580 ml   Output 715 ml   Net -135 ml       Laboratory  Recent Labs     02/15/21  0613 02/16/21  0400 02/17/21  0258   WBC 15.4* 14.4* 12.6*   RBC 2.86* 2.97* 2.99*   HEMOGLOBIN 8.6* 8.6* 8.7*   HEMATOCRIT 28.4* 29.2* 29.0*   MCV 99.3* 98.3* 97.0   MCH 30.1 29.0 29.1   MCHC 30.3* 29.5* 30.0*   RDW 53.0* 51.9* 49.9   PLATELETCT 178 182 188   MPV 10.1 9.9 10.0     Recent Labs     02/15/21  0613 02/16/21  0400 02/17/21  0258   SODIUM 134* 133* 134*   POTASSIUM 3.7 4.3 4.4   CHLORIDE 95* 94* 94*   CO2 29 30 27   GLUCOSE 150* 115* 110*   BUN 47* 48* 47*   CREATININE 5.53* 4.93* 4.89*   CALCIUM 7.6* 7.8* 8.2*                   Imaging  IR-PERQ NEPH CATH NEW ACCESS (ALL RADIOLOGY) BOTH   Final Result      1. ULTRASOUND AND FLUOROSCOPIC GUIDED PLACEMENT OF A LEFT 8 [place]  PIGTAIL LOCKING LOOP PERCUTANEOUS NEPHROSTOMY CATHETER. MODERATE LEFT HYDRONEPHROSIS.      2. ULTRASOUND AND FLUOROSCOPIC GUIDED PLACEMENT OF A RIGHT 8 [place] PIGTAIL LOCKING LOOP PERCUTANEOUS NEPHROSTOMY CATHETER. MODERATE RIGHT HYDRONEPHROSIS.      3. NEPHROSTOGRAMS SHOWING SATISFACTORY CATHETER POSITION WITHOUT EXTRAVASATION.      DX-CHEST-FOR LINE PLACEMENT Perform procedure in: Patient's Room   Final Result      Right IJ dialysis catheter has been placed and the tip projects appropriately over the superior vena cava/right atrial junction.      EC-ECHOCARDIOGRAM COMPLETE W/O CONT   Final Result       CT-RENAL COLIC EVALUATION(A/P W/O)   Final Result         1.  Moderate pericardial effusion measuring 30 Hounsfield units, consider proteinaceous or hemorrhagic fluid. Further evaluation with echocardiogram to evaluate for component of cardiac tamponade.   2.  Moderate left pleural effusion with dependent consolidation which could represent atelectasis or infiltrate   3.  Bilateral hydronephrosis and hydroureter without visualized obstructing stone   4.  Diverticulosis   5.  Large left lower quadrant lateral abdominal wall hernia containing mesenteric fat in these portion of the sigmoid colon without visualized obstructive change   6.  3.6 cm fusiform distal abdominal aortic aneurysm, radiographic follow-up and surveillance as clinically appropriate   7.  Bladder wall thickening with slight adjacent hazy fat stranding, likely corresponding with history of bladder malignancy.   8.  Cholelithiasis      These findings were discussed with the patient's clinician, MAIDA CHANDRA, on 2/10/2021 12:18 AM.           Assessment/Plan  * NOEL (acute kidney injury) (HCC)- (present on admission)  Assessment & Plan  2/17 continued renal insufficiency, moderate urine output, potassium within normal limits  2/16 slightly improved renal parameters, potassium stable  2/15 BUN:47, Cr:5.53 (nephrology holding on HD for now and continue to keep HD catheter inplace).  2/14 BUN:61, Cr:5.55  Patient had creatinine greater than 12 and potassium greater than 7 on admission.  Nephrology consulting (Dr Stevenson) and holding dialysis 2/13  Nephrostomy tube placed 2/12 2/14 Fair urine output from left nephrostomy, right nephrostomy with bloody fluids  Bilateral hydronephrosis noted on CT without evidence of clear stenosis or stone.  Likely related to his transitional cell carcinoma.    Hyperkalemia- (present on admission)  Assessment & Plan  Improved, monitor    Pulmonary hypertension (HCC)- (present on admission)  Assessment & Plan  Supplemental  oxygen    Atrial fibrillation (HCC)- (present on admission)  Assessment & Plan  Monitor on telemetry and vitals  Rate control  S/P nephrostomy tubes consider initiation of anticoagulation    Acute systolic CHF (congestive heart failure) (HCC)- (present on admission)  Assessment & Plan  HFrEF:30% per 2/10 echo.  No overt sign of volume overload     Pericardial effusion- (present on admission)  Assessment & Plan  Likely related to his acute renal failure.   Echo shows no acute tamponade.    Transitional cell carcinoma (HCC)- (present on admission)  Assessment & Plan  History of original diagnosis in 2019, history of transurethral resection  Likely now a significant recurrence with hydronephrosis resulting  Related renal failure  Unknown current plan of care from urology given the patient's stability, age, renal function and prior refusal to undergo aggressive care, discussed with patient as well as the patient's son Deepak      Aortic stenosis- (present on admission)  Assessment & Plan  2/10 Echo: Moderate aortic stenosis with low grade/low flow.  Transvalvular gradients are - Peak: 33 mmHg, Mean: 21 mmHg.  The severity of the aortic stenosis may be underestimated due to the   poor LV systolic function.    Essential hypertension- (present on admission)  Assessment & Plan  Outpatient lisinopril 40mg held due to acute renal failure  Monitor vitals.  Consider amlodipine if on going HTN.    Abdominal aortic aneurysm (AAA) 35 to 39 mm in diameter (HCC)- (present on admission)  Assessment & Plan  Noted on CT renal scan  3.6cm  Monitor BP.  Bigger picture is his bladder cancer, chf and renal failure.    Cholelithiasis- (present on admission)  Assessment & Plan  Noted on CT  asymptomatic    Anemia- (present on admission)  Assessment & Plan  Likely chronic disease (transitional cell cancer and renal failure)  Monitor cbc    Pleural effusion- (present on admission)  Assessment & Plan  Hemodialysis  Wean oxygen as tolerates.      Plan  Continue with current close monitoring  Nephrology follow-up, likely no further hemodialysis indicated at this time  Will involve palliative care in light of the reported history the patient was not interested in aggressive treatment of his bladder cancer now has effects from further progression of his disease.  Follow labs closely  See orders  Medically complex high risk patient  Updated urology to involve patient and patient's son and goal of care discussion    VTE prophylaxis: Heparin    I have performed a physical exam and reviewed and updated ROS and Plan today . In review of yesterday's note , there are no changes except as documented above.        Please note that this dictation was created using voice recognition software. I have made every reasonable attempt to correct obvious errors, but I expect that there are errors of grammar and possibly context that I did not discover before finalizing the note.    This patient was seen under COVID 19 pandemic disaster response conditions.  During a disaster, the provisions of care is subject to the Crisis Standard of Care

## 2021-02-17 NOTE — PROGRESS NOTES
Nephrology Daily Progress Note    Date of Service  2/17/2021    Chief Complaint  81 y.o. male with hx/of bladder cancer admitted 2/9/2021 with NOEL, obstructive nephropathy. Acidosis,hyperkalemia, started HD 2/10/2021     Interval Problem Update  2/11 -no acute events  Continue daily dialysis  Urology consulted -recommended nephrostomy tubes  Seen and examined during dialysis  Tolerates well  2/13 -s/p nephrostomy tube placement  Pt doing well  No complaints  Transferring to regular floor  Holding dialysis today to see if improving with NT  2/14 - doing better, improved UOP -1500 cc/24 H  Holding dialysis for now  2/15 -urine output 785 mL via bilateral percutaneous nephrostomy tubes over the last 24 hours.  Patient had dialysis for 3 sessions on Wednesday, Thursday, and Friday last week.  Patient denies chest pain, shortness of breath.  2/16 -urine output 975 mL in the last 24 hours.  Patient denies headache, nausea, vomiting, chest pain, shortness of breath.  Most urine output is coming out of left nephrostomy tube.  2/17-urine output 773 mL in last 24 hours, mostly from left nephrostomy tube.  Patient denies headache, nausea, vomiting, chest pain, shortness of breath.  Patient accidentally removed his own right IJ temp Vas-Cath overnight last night.  I discussed goals of care with the patient, and he remains hopeful that something good will happen, despite me telling him that his bladder cancer will eventually kill him in 1 way or another.    Review of Systems  Review of Systems   Constitutional: Negative for fever.   Respiratory: Negative for shortness of breath.    Cardiovascular: Negative for chest pain.   Gastrointestinal: Negative for abdominal pain.   All other systems reviewed and are negative.       Physical Exam  Temp:  [36.1 °C (97 °F)-36.4 °C (97.6 °F)] 36.4 °C (97.5 °F)  Pulse:  [75-78] 75  BP: (102-119)/(42-72) 105/42  SpO2:  [88 %-94 %] 93 %    Physical Exam   Constitutional: He appears  well-developed. No distress.   HENT:   Mouth/Throat: Oropharynx is clear and moist. No oropharyngeal exudate.   Eyes: EOM are normal. No scleral icterus.   Neck: No tracheal deviation present.   Cardiovascular: Normal rate and normal heart sounds.   No murmur heard.  Pulmonary/Chest: Effort normal and breath sounds normal. No stridor. He has no rales.   Abdominal: Soft. He exhibits no distension. There is no abdominal tenderness.   Musculoskeletal:         General: No edema. Normal range of motion.   Neurological: He is alert.   Only oriented to self and city, not to month or year   Skin: Skin is warm and dry. He is not diaphoretic.   Psychiatric: He has a normal mood and affect.   Access: No dialysis access      Fluids    Intake/Output Summary (Last 24 hours) at 2/17/2021 1402  Last data filed at 2/17/2021 0800  Gross per 24 hour   Intake 490 ml   Output 715 ml   Net -225 ml       Laboratory  Labs reviewed, pertinent labs below.  Recent Labs     02/15/21  0613 02/16/21  0400 02/17/21  0258   WBC 15.4* 14.4* 12.6*   RBC 2.86* 2.97* 2.99*   HEMOGLOBIN 8.6* 8.6* 8.7*   HEMATOCRIT 28.4* 29.2* 29.0*   MCV 99.3* 98.3* 97.0   MCH 30.1 29.0 29.1   MCHC 30.3* 29.5* 30.0*   RDW 53.0* 51.9* 49.9   PLATELETCT 178 182 188   MPV 10.1 9.9 10.0     Recent Labs     02/15/21  0613 02/16/21  0400 02/17/21  0258   SODIUM 134* 133* 134*   POTASSIUM 3.7 4.3 4.4   CHLORIDE 95* 94* 94*   CO2 29 30 27   GLUCOSE 150* 115* 110*   BUN 47* 48* 47*   CREATININE 5.53* 4.93* 4.89*   CALCIUM 7.6* 7.8* 8.2*               URINALYSIS:  Lab Results   Component Value Date/Time    COLORURINE Brown (A) 02/10/2021 0550    CLARITY Turbid (A) 02/10/2021 0550    SPECGRAVITY 1.013 02/10/2021 0550    PHURINE 5.0 02/10/2021 0550    KETONES Negative 02/10/2021 0550    PROTEINURIN 100 (A) 02/10/2021 0550    BILIRUBINUR Negative 02/10/2021 0550    UROBILU 0.2 02/10/2021 0550    NITRITE Negative 02/10/2021 0550    LEUKESTERAS Large (A) 02/10/2021 0550     OCCULTBLOOD Large (A) 02/10/2021 0550     [facility] [place] [facility] [place] results found for: TOTPROTUR No results found for: CREATININEU      Imaging reviewed  IR-PERQ NEPH CATH NEW ACCESS (ALL RADIOLOGY) BOTH   Final Result      1. ULTRASOUND AND FLUOROSCOPIC GUIDED PLACEMENT OF A LEFT 8 [place]  PIGTAIL LOCKING LOOP PERCUTANEOUS NEPHROSTOMY CATHETER. MODERATE LEFT HYDRONEPHROSIS.      2. ULTRASOUND AND FLUOROSCOPIC GUIDED PLACEMENT OF A RIGHT 8 [place] PIGTAIL LOCKING LOOP PERCUTANEOUS NEPHROSTOMY CATHETER. MODERATE RIGHT HYDRONEPHROSIS.      3. NEPHROSTOGRAMS SHOWING SATISFACTORY CATHETER POSITION WITHOUT EXTRAVASATION.      DX-CHEST-FOR LINE PLACEMENT Perform procedure in: Patient's Room   Final Result      Right IJ dialysis catheter has been placed and the tip projects appropriately over the superior vena cava/right atrial junction.      EC-ECHOCARDIOGRAM COMPLETE W/O CONT   Final Result      CT-RENAL COLIC EVALUATION(A/P W/O)   Final Result         1.  Moderate pericardial effusion measuring 30 Hounsfield units, consider proteinaceous or hemorrhagic fluid. Further evaluation with echocardiogram to evaluate for component of cardiac tamponade.   2.  Moderate left pleural effusion with dependent consolidation which could represent atelectasis or infiltrate   3.  Bilateral hydronephrosis and hydroureter without visualized obstructing stone   4.  Diverticulosis   5.  Large left lower quadrant lateral abdominal wall hernia containing mesenteric fat in these portion of the sigmoid colon without visualized obstructive change   6.  3.6 cm fusiform distal abdominal aortic aneurysm, radiographic follow-up and surveillance as clinically appropriate   7.  Bladder wall thickening with slight adjacent hazy fat stranding, likely corresponding with history of bladder malignancy.   8.  Cholelithiasis      These findings were discussed with the patient's clinician, MAIDA CHANDRA, on 2/10/2021 12:18 AM.            Current Facility-Administered  Medications   Medication Dose Route Frequency Provider Last Rate Last Admin   • ondansetron (ZOFRAN) syringe/vial injection 4 mg  4 mg Intravenous Q4HRS PRN Lisy Lau M.D.   4 mg at 02/11/21 2232   • traZODone (DESYREL) tablet 100 mg  100 mg Oral QHS Tacos Pickens D.O.   100 mg at 02/16/21 2110   • senna-docusate (PERICOLACE or SENOKOT S) 8.6-50 MG per tablet 2 tablet  2 tablet Oral BID Tacos Pickens D.O.   2 tablet at 02/17/21 0609    And   • polyethylene glycol/lytes (MIRALAX) PACKET 1 Packet  1 Packet Oral QDAY PRN Tacos Pickens D.O.   1 Packet at 02/14/21 1256    And   • magnesium hydroxide (MILK OF MAGNESIA) suspension 30 mL  30 mL Oral QDAY PRN Tacos Pickens D.O.   30 mL at 02/15/21 0557    And   • bisacodyl (DULCOLAX) suppository 10 mg  10 mg Rectal QDAY PRN Tacos Pickens D.O.       • heparin injection 5,000 Units  5,000 Units Subcutaneous Q8HRS Tacos Pickens D.O.   5,000 Units at 02/17/21 0609   • acetaminophen (Tylenol) tablet 650 mg  650 mg Oral Q6HRS PRN Tacos Pickens D.O.   650 mg at 02/15/21 0557   • labetalol (NORMODYNE/TRANDATE) injection 10 mg  10 mg Intravenous Q4HRS PRN Tacos Pickens D.O.       • sevelamer carbonate (RENVELA) tablet 800 mg  800 mg Oral TID WITH MEALS Tacos Pickens D.O.   800 mg at 02/17/21 1107   • heparin injection 2,800 Units  2,800 Units Intravenous PRN Gabi Stevenson M.D.   Stopped at 02/12/21 1538         Assessment/Plan  81 y.o. male with hx/of bladder cancer admitted 2/9/2021 with NOEL, obstructive nephropathy. Acidosis,hyperkalemia, required HD 2/10 - 2/12/2021 then recovered.    1.  NOEL on CKD stage IIIa, nonoliguric, stent.  Unclear if patient will recover kidney function any further, or if he will be stuck with stage V CKD.  Patient removed his own dialysis access, and fortunately, there is no urgent need for dialysis.  I attempted to discuss goals of care with the patient, but I do not think he understands the gravity of  his illness.  I discussed the possibility of not doing dialysis and dying from kidney failure if or when his urine output or nephrostomy tubes become obstructed the next time, but patient could not understand, and kept saying that he hopes for the best.  Avoid nephrotoxins.  Check labs daily.  Monitor urine output.    2.  Access: No dialysis access currently, patient self removed on 2/16/2021.    3.  Hyponatremia, persistent.  Limit hypotonic fluids.  Check labs daily.    4.  Obstructive uropathy/history of bladder cancer, currently with bilateral percutaneous nephrostomy tubes in place.  Currently stable.  Patient previously declined therapy of bladder cancer.  Will defer further management to urology.    5.  Hypertension, controlled.    6.  Normocytic anemia, persistent.  Patient is iron replete with ferritin over thousand.  Check CBC daily.    7.  Leukocytosis, persistent.  Unclear etiology.  Check CBC daily.    Following    Solitario Sigala MD  Nephrology

## 2021-02-18 ENCOUNTER — HOME HEALTH ADMISSION (OUTPATIENT)
Dept: HOME HEALTH SERVICES | Facility: HOME HEALTHCARE | Age: 81
End: 2021-02-18
Payer: MEDICARE

## 2021-02-18 ENCOUNTER — APPOINTMENT (OUTPATIENT)
Dept: RADIOLOGY | Facility: MEDICAL CENTER | Age: 81
DRG: 682 | End: 2021-02-18
Attending: HOSPITALIST
Payer: MEDICARE

## 2021-02-18 LAB
ANION GAP SERPL CALC-SCNC: 10 MMOL/L (ref 7–16)
BUN SERPL-MCNC: 57 MG/DL (ref 8–22)
CALCIUM SERPL-MCNC: 8.2 MG/DL (ref 8.5–10.5)
CHLORIDE SERPL-SCNC: 90 MMOL/L (ref 96–112)
CO2 SERPL-SCNC: 27 MMOL/L (ref 20–33)
CREAT SERPL-MCNC: 4.48 MG/DL (ref 0.5–1.4)
ERYTHROCYTE [DISTWIDTH] IN BLOOD BY AUTOMATED COUNT: 50 FL (ref 35.9–50)
GLUCOSE SERPL-MCNC: 124 MG/DL (ref 65–99)
HCT VFR BLD AUTO: 29.1 % (ref 42–52)
HGB BLD-MCNC: 8.8 G/DL (ref 14–18)
MAGNESIUM SERPL-MCNC: 2.1 MG/DL (ref 1.5–2.5)
MCH RBC QN AUTO: 29.2 PG (ref 27–33)
MCHC RBC AUTO-ENTMCNC: 30.2 G/DL (ref 33.7–35.3)
MCV RBC AUTO: 96.7 FL (ref 81.4–97.8)
PHOSPHATE SERPL-MCNC: 4 MG/DL (ref 2.5–4.5)
PLATELET # BLD AUTO: 180 K/UL (ref 164–446)
PMV BLD AUTO: 9.9 FL (ref 9–12.9)
POTASSIUM SERPL-SCNC: 4.1 MMOL/L (ref 3.6–5.5)
RBC # BLD AUTO: 3.01 M/UL (ref 4.7–6.1)
SODIUM SERPL-SCNC: 127 MMOL/L (ref 135–145)
WBC # BLD AUTO: 12 K/UL (ref 4.8–10.8)

## 2021-02-18 PROCEDURE — 80048 BASIC METABOLIC PNL TOTAL CA: CPT

## 2021-02-18 PROCEDURE — 99233 SBSQ HOSP IP/OBS HIGH 50: CPT | Performed by: INTERNAL MEDICINE

## 2021-02-18 PROCEDURE — 700117 HCHG RX CONTRAST REV CODE 255: Performed by: HOSPITALIST

## 2021-02-18 PROCEDURE — 97530 THERAPEUTIC ACTIVITIES: CPT

## 2021-02-18 PROCEDURE — 700102 HCHG RX REV CODE 250 W/ 637 OVERRIDE(OP): Performed by: HOSPITALIST

## 2021-02-18 PROCEDURE — 99153 MOD SED SAME PHYS/QHP EA: CPT

## 2021-02-18 PROCEDURE — A9270 NON-COVERED ITEM OR SERVICE: HCPCS | Performed by: STUDENT IN AN ORGANIZED HEALTH CARE EDUCATION/TRAINING PROGRAM

## 2021-02-18 PROCEDURE — 700111 HCHG RX REV CODE 636 W/ 250 OVERRIDE (IP): Performed by: RADIOLOGY

## 2021-02-18 PROCEDURE — BT141ZZ FLUOROSCOPY OF KIDNEYS, URETERS AND BLADDER USING LOW OSMOLAR CONTRAST: ICD-10-PCS | Performed by: RADIOLOGY

## 2021-02-18 PROCEDURE — 0T9430Z DRAINAGE OF LEFT KIDNEY PELVIS WITH DRAINAGE DEVICE, PERCUTANEOUS APPROACH: ICD-10-PCS | Performed by: RADIOLOGY

## 2021-02-18 PROCEDURE — 700102 HCHG RX REV CODE 250 W/ 637 OVERRIDE(OP): Performed by: STUDENT IN AN ORGANIZED HEALTH CARE EDUCATION/TRAINING PROGRAM

## 2021-02-18 PROCEDURE — 700111 HCHG RX REV CODE 636 W/ 250 OVERRIDE (IP): Performed by: STUDENT IN AN ORGANIZED HEALTH CARE EDUCATION/TRAINING PROGRAM

## 2021-02-18 PROCEDURE — 700111 HCHG RX REV CODE 636 W/ 250 OVERRIDE (IP)

## 2021-02-18 PROCEDURE — A9270 NON-COVERED ITEM OR SERVICE: HCPCS | Performed by: HOSPITALIST

## 2021-02-18 PROCEDURE — 99233 SBSQ HOSP IP/OBS HIGH 50: CPT | Performed by: HOSPITALIST

## 2021-02-18 PROCEDURE — 83735 ASSAY OF MAGNESIUM: CPT

## 2021-02-18 PROCEDURE — 84100 ASSAY OF PHOSPHORUS: CPT

## 2021-02-18 PROCEDURE — 770001 HCHG ROOM/CARE - MED/SURG/GYN PRIV*

## 2021-02-18 PROCEDURE — 97535 SELF CARE MNGMENT TRAINING: CPT

## 2021-02-18 PROCEDURE — 85027 COMPLETE CBC AUTOMATED: CPT

## 2021-02-18 PROCEDURE — 97116 GAIT TRAINING THERAPY: CPT

## 2021-02-18 RX ORDER — SODIUM CHLORIDE 9 MG/ML
500 INJECTION, SOLUTION INTRAVENOUS
Status: ACTIVE | OUTPATIENT
Start: 2021-02-18 | End: 2021-02-18

## 2021-02-18 RX ORDER — ONDANSETRON 2 MG/ML
4 INJECTION INTRAMUSCULAR; INTRAVENOUS PRN
Status: ACTIVE | OUTPATIENT
Start: 2021-02-18 | End: 2021-02-18

## 2021-02-18 RX ORDER — MIDAZOLAM HYDROCHLORIDE 1 MG/ML
INJECTION INTRAMUSCULAR; INTRAVENOUS
Status: COMPLETED
Start: 2021-02-18 | End: 2021-02-18

## 2021-02-18 RX ORDER — MIDAZOLAM HYDROCHLORIDE 1 MG/ML
.5-2 INJECTION INTRAMUSCULAR; INTRAVENOUS PRN
Status: ACTIVE | OUTPATIENT
Start: 2021-02-18 | End: 2021-02-18

## 2021-02-18 RX ADMIN — TRAZODONE HYDROCHLORIDE 100 MG: 50 TABLET ORAL at 21:49

## 2021-02-18 RX ADMIN — FENTANYL CITRATE 50 MCG: 50 INJECTION, SOLUTION INTRAMUSCULAR; INTRAVENOUS at 16:34

## 2021-02-18 RX ADMIN — FERROUS SULFATE TAB 325 MG (65 MG ELEMENTAL FE) 325 MG: 325 (65 FE) TAB at 07:30

## 2021-02-18 RX ADMIN — IOHEXOL 10 ML: 300 INJECTION, SOLUTION INTRAVENOUS at 17:04

## 2021-02-18 RX ADMIN — MIDAZOLAM HYDROCHLORIDE 2 MG: 1 INJECTION, SOLUTION INTRAMUSCULAR; INTRAVENOUS at 16:25

## 2021-02-18 RX ADMIN — FENTANYL CITRATE 50 MCG: 50 INJECTION, SOLUTION INTRAMUSCULAR; INTRAVENOUS at 16:25

## 2021-02-18 RX ADMIN — SEVELAMER CARBONATE 800 MG: 800 TABLET, FILM COATED ORAL at 07:30

## 2021-02-18 RX ADMIN — HEPARIN SODIUM 5000 UNITS: 5000 INJECTION, SOLUTION INTRAVENOUS; SUBCUTANEOUS at 21:49

## 2021-02-18 RX ADMIN — MIDAZOLAM HYDROCHLORIDE 2 MG: 1 INJECTION INTRAMUSCULAR; INTRAVENOUS at 16:25

## 2021-02-18 RX ADMIN — HEPARIN SODIUM 5000 UNITS: 5000 INJECTION, SOLUTION INTRAVENOUS; SUBCUTANEOUS at 05:25

## 2021-02-18 RX ADMIN — FERROUS SULFATE TAB 325 MG (65 MG ELEMENTAL FE) 325 MG: 325 (65 FE) TAB at 17:27

## 2021-02-18 RX ADMIN — DOCUSATE SODIUM 50 MG AND SENNOSIDES 8.6 MG 2 TABLET: 8.6; 5 TABLET, FILM COATED ORAL at 05:24

## 2021-02-18 RX ADMIN — DOCUSATE SODIUM 50 MG AND SENNOSIDES 8.6 MG 2 TABLET: 8.6; 5 TABLET, FILM COATED ORAL at 17:27

## 2021-02-18 ASSESSMENT — COGNITIVE AND FUNCTIONAL STATUS - GENERAL
DRESSING REGULAR LOWER BODY CLOTHING: A LOT
DAILY ACTIVITIY SCORE: 16
WALKING IN HOSPITAL ROOM: A LITTLE
DRESSING REGULAR UPPER BODY CLOTHING: A LITTLE
STANDING UP FROM CHAIR USING ARMS: A LITTLE
PERSONAL GROOMING: A LITTLE
CLIMB 3 TO 5 STEPS WITH RAILING: A LOT
MOVING TO AND FROM BED TO CHAIR: UNABLE
TOILETING: A LOT
MOVING FROM LYING ON BACK TO SITTING ON SIDE OF FLAT BED: UNABLE
TURNING FROM BACK TO SIDE WHILE IN FLAT BAD: A LOT
SUGGESTED CMS G CODE MODIFIER MOBILITY: CL
MOBILITY SCORE: 12
SUGGESTED CMS G CODE MODIFIER DAILY ACTIVITY: CK
HELP NEEDED FOR BATHING: A LOT

## 2021-02-18 ASSESSMENT — ENCOUNTER SYMPTOMS
DIZZINESS: 0
SPEECH CHANGE: 0
SHORTNESS OF BREATH: 0
PALPITATIONS: 0
CHILLS: 0
FEVER: 0
MEMORY LOSS: 1
CONSTIPATION: 0
HEADACHES: 0
NERVOUS/ANXIOUS: 0
NAUSEA: 0
ABDOMINAL PAIN: 0
COUGH: 0
FLANK PAIN: 0
STRIDOR: 0

## 2021-02-18 ASSESSMENT — GAIT ASSESSMENTS
GAIT LEVEL OF ASSIST: MINIMAL ASSIST
ASSISTIVE DEVICE: FRONT WHEEL WALKER
DEVIATION: BRADYKINETIC;SHUFFLED GAIT
DISTANCE (FEET): 35

## 2021-02-18 ASSESSMENT — PAIN DESCRIPTION - PAIN TYPE
TYPE: ACUTE PAIN

## 2021-02-18 ASSESSMENT — FIBROSIS 4 INDEX: FIB4 SCORE: 1.36

## 2021-02-18 NOTE — CARE PLAN
Problem: Safety  Goal: Will remain free from injury  Outcome: PROGRESSING AS EXPECTED  Bed locked and in low position, Lower bed rails raised, bed alarm in use, call light within reach, treaded slipper socks on patient and patient room near nursing station.       Problem: Bowel/Gastric:  Goal: Normal bowel function is maintained or improved  Outcome: PROGRESSING AS EXPECTED  Patient had formed bowel movement today, senna continued.

## 2021-02-18 NOTE — DISCHARGE PLANNING
Anticipated Discharge Disposition: home w/ HH    Action:   0905 spoke w/ son Deepak [phone]. Son states he and his mom (pt's wife) would like to have pt home rather than going to SNF and had discussion w/ MD yesterday. Explained to son that support from HH is limited a few visits a week and each visit 30-45 minutes. Son states his mom has cancer and wants this writer to call his mom to obtain choice for HH and wants this writer to call him back after discussion w/ his mom. Son states he's not sure which HH agency to choose. Offered to read list of HH agencies with star ratings to son. Son asks this writer to call his mom. Spoke w/ wife Saadia [phone]. Read list of HH agencies to wife. Wife is not sure which HH agency to choose. Pt has [facility]. Asks wife if she would like [facility] and wife states she's ok with [facility]. Called son back and updated son. Son has questions about how HH operates and wants detail info. Provided son w/ [facility] contact number. Received call from daughter in law Nahomi who also has questions how HH operates. Also provided daughter in law with [facility] contact number. PT/OT notes 2/16 recommended post acute placement  1015: received call from daughter in law Nahomi. Nahomi states family is not ready for hospice and would like HH for now and wants to know how to coordinate hospice if it's needed later post DC. Informed Nahomi that hospice referral can be placed through HH team or PCP office. Nahomi states if pt's wife is unable to care for pt post DC, pt can stay w/ her and her  (pt's son Deepak).    Barriers to Discharge: TBD    Plan: TBD

## 2021-02-18 NOTE — DISCHARGE PLANNING
Received Choice form at 0905  Agency/Facility Name: [facility]  Referral sent per Choice form at 0910

## 2021-02-18 NOTE — PALLIATIVE CARE
Palliative Care   Discussed case with MD and bedside RN, appreciate updates. Plan for Urology to come discuss clinical picture with pt.    Called wife Saadia, scheduled a phone conference with her at pt's bedside today at 1400.    Active listening, education, validation, normalization, therapeutic touch, and emotional support provided.     Updated:   Bedside RN    Plan:   Discuss GOC with pt and wife via phone today at 1400.    Thank you for allowing Palliative Care to participate in this patient's care. Please feel free to call [phone] with any questions or concerns.

## 2021-02-18 NOTE — PROGRESS NOTES
Monitor Summary-     Rhythm: a flutter, BBB  Rate: 70's-90's  0.22/0.12/0.36           12 hour chart check

## 2021-02-18 NOTE — CONSULTS
Reason for PC Consult: Advance Care Planning    Consulted by:   Dr. Rousseau    Assessment:  General:   81 year old male admitted for acute renal failure on 2/9/21. Pt has a history of bladder cancer, hepatitis C, HTN, and aortic stenosis. Pt presented after labs showed increased creatinine. Pt presented with a creatinine of 13.87. Pt had bilateral nephrostomy tubes placed 2/15, pt pulled one tube out over night, pending replacement on 2/18.     Social:   Pt lives at home with his wife, whom has cancer. Pt has one son and daughter in law who live locally, the other children live out of state.     Dyspnea: No, 92% on RA  Last BM: 02/17/21    Pain: No   Depression: No    Dementia: Yes,  5    Spiritual:  Is Gnosticism or spirituality important for coping with this illness? No    Has a  or spiritual provider visit been requested? No    Palliative Performance Scale: 50%    Advance Directive: None on File    DPOA: None on File, OSWALD Forrester  POLST: None on File    To locate the AD/POLST, please hover the cursor over the patient's code status to find all linked ACP documents.    Code Status: Full      Outcome:  PC RN visited with ptSaadia and PRATIBHA Comer at bedside. Placed LENCHO Jerez with [facility] on speaker phone. He provided updates on clinical picture, prognosis and recommendations, answered family's questions. Explored pt and family's thoughts and feelings about updates. Discussed nephrostomy tubes and risk of them getting pulled out easily.     PC RN discussed quality vs quantity of life, benefits vs burdens of treatment, and explored pt's beliefs and values about end of life. Long discussion about pt's wishes, he expressed wanting to fight, however he does wish to be at home with his family when he dies. Discussed concern for continued aggressive treatment and what that may look like and the possibility of not getting pt home to die with his family.     Discussed hospice in detail, philosophy,  goals and support provided. Discussed hospice at home vs continued treatment. Discussed going forward with the bladders scrapping and then following up with Oncology regarding possible treatment. Nahomi expressed concern about pt's ability to tolerate the scrapping again, the first time was incredibly painful, and it required pt to do follow up treatments, which was also hard on pt's body. Family reflected on their experience watching pt go through the procedure and concern for doing it again.     PC RN left family to discuss privately.    Returned and spoke with pt and Saadia privately, they both expressed wanting to have pt return home with hospice. Family would like to process information over night and speak with hospice tomorrow.     Obtained hospice choice for [facility], faxed to [facility].    Active listening, education, validation, normalization, therapeutic touch, and emotional support provided throughout encounter.    Updated:   Dr. Rousseau, Dr. Sigala, bedside RN    Plan:   Family to process information tonight, willing to discuss hospice with [facility] tomorrow.     Thank you for allowing Palliative Care to participate in this patient's care. Please feel free to call [phone] with any questions or concerns.

## 2021-02-18 NOTE — PROGRESS NOTES
"[facility] [place] Progress Note    Service: [facility] [place]  Patient's Name: Bay Forrester  MRN: [id number]  Admit Date:2/9/2021  Today's Date: 2/18/2021   Room #: T615/00        Subjective/ROS:   Review of clinic notes show he saw Dr. Murphy 2/9/2021 for a pre-op appointment for a TURBT on 2/22 but pre-op labs showed hyperkalemia and increased Cr. It looks like there were considerations of oncology consult at that time but were going to proceed with aggressive TURBT at that time. He had declined cystectomy/radiation therapy. His L. NT was removed by patient this morning and to be replaced by IR.     He is not reporting any pain at this time. He reports is tolerating his oral diet. His R. NT is draining bloody urine.     Physical Exam:  Current Vitals:   /74   Pulse 76   Temp 36.7 °C (98.1 °F) (Temporal)   Resp 18   Ht 1.803 m (5' 11\")   Wt 102 kg (225 lb 15.5 oz)   SpO2 92%   BMI 31.52 kg/m²     02/16 1900 - 02/18 0659  In: 690 [P.O.:690]  Out: 1080 [Urine:1080]    GEN : NAD, A&O X4   RES:  no acute respiratory distress  ABD: Soft ND, no TTP   :   R. NT with bloody urine.     Labs:   Recent Labs     02/16/21  0400 02/17/21  0258 02/18/21  0305   SODIUM 133* 134* 127*   POTASSIUM 4.3 4.4 4.1   CHLORIDE 94* 94* 90*   CO2 30 27 27   GLUCOSE 115* 110* 124*   BUN 48* 47* 57*   CREATININE 4.93* 4.89* 4.48*   CALCIUM 7.8* 8.2* 8.2*     Recent Labs     02/16/21  0400 02/17/21  0258 02/18/21  0305   WBC 14.4* 12.6* 12.0*   RBC 2.97* 2.99* 3.01*   HEMOGLOBIN 8.6* 8.7* 8.8*   HEMATOCRIT 29.2* 29.0* 29.1*   MCV 98.3* 97.0 96.7   MCH 29.0 29.1 29.2   MCHC 29.5* 30.0* 30.2*   RDW 51.9* 49.9 50.0   PLATELETCT 182 188 180   MPV 9.9 10.0 9.9     Lab Results   Component Value Date/Time    GLUCOSE 124 (H) 02/18/2021 03:05 AM    GLUCOSE 110 (H) 02/17/2021 02:58 AM    GLUCOSE 115 (H) 02/16/2021 04:00 AM    GLUCOSE 150 (H) 02/15/2021 06:13 AM       Assessment/Plan  81 y.o. M with history of bladder cancer. Original " diagnosis was 09/2019, he did not want to pursue treatment at that time due to age including chemo, radiation, or surgery like a cystesctomy. He agreed to aggressive TUBRT for treatment, currently still scheduled for TURBT next week. Now with renal insufficiency, hyperkalemia.    -Will discuss case with Dr. Murphy. Dr. Cook spoke with Dr. Rousseau and recommended palliative care.    -Patient to have NT replaced though likely to be removed again by patient. Internalization of stents is not felt to be a good option for the patient.    -Palliative care conference this afternoon and felt that Urology would better be able to express progression of disease. Will coordinate with Dr. Murphy or Dr. Cook.            Solitario Soriano, P.A.-C.   [address]  [place] [id number]   [phone]

## 2021-02-18 NOTE — PROGRESS NOTES
Nephrology Daily Progress Note    Date of Service  2/18/2021    Chief Complaint  81 y.o. male with hx/of bladder cancer admitted 2/9/2021 with NOEL, obstructive nephropathy. Acidosis,hyperkalemia, started HD 2/10/2021     Interval Problem Update  2/11 -no acute events  Continue daily dialysis  Urology consulted -recommended nephrostomy tubes  Seen and examined during dialysis  Tolerates well  2/13 -s/p nephrostomy tube placement  Pt doing well  No complaints  Transferring to regular floor  Holding dialysis today to see if improving with NT  2/14 - doing better, improved UOP -1500 cc/24 H  Holding dialysis for now  2/15 -urine output 785 mL via bilateral percutaneous nephrostomy tubes over the last 24 hours.  Patient had dialysis for 3 sessions on Wednesday, Thursday, and Friday last week.  Patient denies chest pain, shortness of breath.  2/16 -urine output 975 mL in the last 24 hours.  Patient denies headache, nausea, vomiting, chest pain, shortness of breath.  Most urine output is coming out of left nephrostomy tube.  2/17-urine output 773 mL in last 24 hours, mostly from left nephrostomy tube.  Patient denies headache, nausea, vomiting, chest pain, shortness of breath.  Patient accidentally removed his own right IJ temp Vas-Cath overnight last night.  I discussed goals of care with the patient, and he remains hopeful that something good will happen, despite me telling him that his bladder cancer will eventually kill him in 1 way or another.  2/18 -patient accidentally pulled out his left nephrostomy tube last night.  Urine output 630 mL in the last 24 hours prior to nephrostomy tube being pulled out.  Remains with scant urine output from right nephrostomy tube.  Patient denies chest pain, shortness of breath, headache, nausea, vomiting.    Review of Systems  Review of Systems   Constitutional: Negative for fever.   Respiratory: Negative for shortness of breath.    Cardiovascular: Negative for chest pain.    Gastrointestinal: Negative for abdominal pain.   All other systems reviewed and are negative.       Physical Exam  Temp:  [36.3 °C (97.3 °F)-37.5 °C (99.5 °F)] 36.7 °C (98.1 °F)  Pulse:  [47-84] 78  Resp:  [18-32] 20  BP: ()/(56-92) 113/58  SpO2:  [89 %-95 %] 95 %    Physical Exam   Constitutional: He is oriented to person, place, and time. He appears cachectic. No distress.   HENT:   Mouth/Throat: Oropharynx is clear and moist. No oropharyngeal exudate.   Eyes: EOM are normal. No scleral icterus.   Neck: No tracheal deviation present.   Cardiovascular: Normal rate and normal heart sounds.   No murmur heard.  Pulmonary/Chest: Effort normal and breath sounds normal. No stridor. He has no rales.   Abdominal: Soft. He exhibits no distension. There is no abdominal tenderness.   Musculoskeletal:         General: No edema. Normal range of motion.   Neurological: He is alert and oriented to person, place, and time.   Skin: Skin is warm and dry. He is not diaphoretic.   Psychiatric: He has a normal mood and affect.   Access: No dialysis access      Fluids    Intake/Output Summary (Last 24 hours) at 2/18/2021 1518  Last data filed at 2/18/2021 0600  Gross per 24 hour   Intake 300 ml   Output 630 ml   Net -330 ml       Laboratory  Labs reviewed, pertinent labs below.  Recent Labs     02/16/21  0400 02/17/21  0258 02/18/21  0305   WBC 14.4* 12.6* 12.0*   RBC 2.97* 2.99* 3.01*   HEMOGLOBIN 8.6* 8.7* 8.8*   HEMATOCRIT 29.2* 29.0* 29.1*   MCV 98.3* 97.0 96.7   MCH 29.0 29.1 29.2   MCHC 29.5* 30.0* 30.2*   RDW 51.9* 49.9 50.0   PLATELETCT 182 188 180   MPV 9.9 10.0 9.9     Recent Labs     02/16/21  0400 02/17/21  0258 02/18/21  0305   SODIUM 133* 134* 127*   POTASSIUM 4.3 4.4 4.1   CHLORIDE 94* 94* 90*   CO2 30 27 27   GLUCOSE 115* 110* 124*   BUN 48* 47* 57*   CREATININE 4.93* 4.89* 4.48*   CALCIUM 7.8* 8.2* 8.2*               URINALYSIS:  Lab Results   Component Value Date/Time    COLORURINE Brown (A) 02/10/2021 0550     CLARITY Turbid (A) 02/10/2021 0550    SPECGRAVITY 1.013 02/10/2021 0550    PHURINE 5.0 02/10/2021 0550    KETONES Negative 02/10/2021 0550    PROTEINURIN 100 (A) 02/10/2021 0550    BILIRUBINUR Negative 02/10/2021 0550    UROBILU 0.2 02/10/2021 0550    NITRITE Negative 02/10/2021 0550    LEUKESTERAS Large (A) 02/10/2021 0550    OCCULTBLOOD Large (A) 02/10/2021 0550     UPC  No results found for: TOTPROTUR No results found for: CREATININEU      Imaging reviewed  IR-PERQ NEPH CATH NEW ACCESS (ALL RADIOLOGY) BOTH   Final Result      1. ULTRASOUND AND FLUOROSCOPIC GUIDED PLACEMENT OF A LEFT 8 [place]  PIGTAIL LOCKING LOOP PERCUTANEOUS NEPHROSTOMY CATHETER. MODERATE LEFT HYDRONEPHROSIS.      2. ULTRASOUND AND FLUOROSCOPIC GUIDED PLACEMENT OF A RIGHT 8 [place] PIGTAIL LOCKING LOOP PERCUTANEOUS NEPHROSTOMY CATHETER. MODERATE RIGHT HYDRONEPHROSIS.      3. NEPHROSTOGRAMS SHOWING SATISFACTORY CATHETER POSITION WITHOUT EXTRAVASATION.      DX-CHEST-FOR LINE PLACEMENT Perform procedure in: Patient's Room   Final Result      Right IJ dialysis catheter has been placed and the tip projects appropriately over the superior vena cava/right atrial junction.      EC-ECHOCARDIOGRAM COMPLETE W/O CONT   Final Result      CT-RENAL COLIC EVALUATION(A/P W/O)   Final Result         1.  Moderate pericardial effusion measuring 30 Hounsfield units, consider proteinaceous or hemorrhagic fluid. Further evaluation with echocardiogram to evaluate for component of cardiac tamponade.   2.  Moderate left pleural effusion with dependent consolidation which could represent atelectasis or infiltrate   3.  Bilateral hydronephrosis and hydroureter without visualized obstructing stone   4.  Diverticulosis   5.  Large left lower quadrant lateral abdominal wall hernia containing mesenteric fat in these portion of the sigmoid colon without visualized obstructive change   6.  3.6 cm fusiform distal abdominal aortic aneurysm, radiographic follow-up and surveillance  as clinically appropriate   7.  Bladder wall thickening with slight adjacent hazy fat stranding, likely corresponding with history of bladder malignancy.   8.  Cholelithiasis      These findings were discussed with the patient's clinician, MAIDA CHANDRA, on 2/10/2021 12:18 AM.      IR-NEPHROSTOGRAM W/ NEW TUBE PLACEMENT (ALL RADIOLOGY) LEFT    (Results Pending)         Current Facility-Administered Medications   Medication Dose Route Frequency Provider Last Rate Last Admin   • ferrous sulfate tablet 325 mg  325 mg Oral BID WITH MEALS Bubba Rousseau M.D.   325 mg at 02/18/21 0730   • ondansetron (ZOFRAN) syringe/vial injection 4 mg  4 mg Intravenous Q4HRS PRN Lisy Lau M.D.   4 mg at 02/11/21 2232   • traZODone (DESYREL) tablet 100 mg  100 mg Oral QHS Tacos Pickens D.O.   100 mg at 02/17/21 2126   • senna-docusate (PERICOLACE or SENOKOT S) 8.6-50 MG per tablet 2 tablet  2 tablet Oral BID Tacos Pickens D.O.   2 tablet at 02/18/21 0524    And   • polyethylene glycol/lytes (MIRALAX) PACKET 1 Packet  1 Packet Oral QDAY PRN Tacos Pickens D.O.   1 Packet at 02/14/21 1256    And   • magnesium hydroxide (MILK OF MAGNESIA) suspension 30 mL  30 mL Oral QDAY PRN Tacos Pickens D.O.   30 mL at 02/15/21 0557    And   • bisacodyl (DULCOLAX) suppository 10 mg  10 mg Rectal QDAY PRN Tacos Pickens D.O.       • heparin injection 5,000 Units  5,000 Units Subcutaneous Q8HRS Tacos Pickens D.O.   5,000 Units at 02/18/21 0525   • acetaminophen (Tylenol) tablet 650 mg  650 mg Oral Q6HRS PRN Tacos Pickens D.O.   650 mg at 02/15/21 0557   • labetalol (NORMODYNE/TRANDATE) injection 10 mg  10 mg Intravenous Q4HRS PRN Tacos Pickens D.O.       • sevelamer carbonate (RENVELA) tablet 800 mg  800 mg Oral TID WITH MEALS Tacos Pickens D.O.   Stopped at 02/18/21 1130   • heparin injection 2,800 Units  2,800 Units Intravenous PRN Gabi Stevenson M.D.   Stopped at 02/12/21 1538          Assessment/Plan  81 y.o. male with hx/of bladder cancer admitted 2/9/2021 with NOEL, obstructive nephropathy. Acidosis,hyperkalemia, required HD 2/10 - 2/12/2021 then recovered.    1.  NOEL on CKD stage IIIa, nonoliguric, slightly improving.  NOEL from obstructive uropathy from bladder cancer.  Majority of urine output has come from left nephrostomy tube, which patient pulled out early this morning.  If left nephrostomy tube is not replaced, and NOEL will worsen again, so I recommend replacement of nephrostomy tube if it is within the patient and family's goals of care.  Given his ongoing delirium and dementia, I worry that if his kidney function worsened and a dialysis catheter replaced, that patient would once again try to pull his dialysis catheter.  As such, I recommend against dialysis in the future, and recommend transition to comfort focused measures.  For now, avoid nephrotoxins, check labs daily.    2.  Access: No dialysis access currently.    3.  Hyponatremia, worsening.  Limit hypotonic fluids.  Check labs daily.      4.  Obstructive uropathy/history of bladder cancer, currently just with right-sided nephrostomy tube placed.  Recommend replacement of left nephrostomy tube if within goals of care as mentioned above.    5.  Hypertension, controlled.    6.  Normocytic anemia, persistent.  Patient is iron replete.  Check CBC daily.    7.  Leukocytosis, persistent.  Unclear etiology.  Check CBC daily.      Following    Solitario Sigala MD  Nephrology

## 2021-02-18 NOTE — DISCHARGE PLANNING
ATTN: Case Management  RE: Referral for Home Health    Please remind patient that in order for the patient to remain on Home Health that they must complete their appointment to re-establish with their PCP Dr. Jessenia Ruelas on 03/16/2021.    As of 02/18/2021, we have accepted the Home Health referral for the patient listed above.    A [facility] clinician will be out to see the patient within 48 hours. If you have any questions or concerns regarding the patient's transition to Home Health, please do not hesitate to contact us at [phone].      We look forward to collaborating with you,  [facility] Team

## 2021-02-18 NOTE — DISCHARGE PLANNING
We are currently verifying MD acceptance of your patient. This will be resolved ASAP. Thank you for your patience. Additionally, this referral has been escalated to a Clinical Supervisor for review in order to determine Home Health appropriateness.  This issue will be resolved as quickly as possible, but for any questions feel free to call us at [phone].  Thank you!       Respectfully,   [facility]

## 2021-02-18 NOTE — FACE TO FACE
Face to Face Supporting Documentation - Home Health    The encounter with this patient was in whole or in part the primary reason for home health admission.    Date of encounter:   Patient:                    MRN:                       YOB: 2022  Bay Forrester  [id number]  1940     Home health to see patient for:  Skilled Nursing care for assessment, interventions & education    Skilled need for:  Recent Deterioration of Health Status Renal failure, bilateral nephrostomy tubes with need for assistance, management    Skilled nursing interventions to include:  Line/Drain/Airway education and care    Homebound status evidenced by:  Need the aid of supportive devices such as crutches, canes, wheelchairs or walkers. Leaving home requires a considerable and taxing effort. There is a normal inability to leave the home.    Community Physician to provide follow up care: Jessenia Ruelas M.D.     Optional Interventions? No      I certify the face to face encounter for this home health care referral meets the CMS requirements and the encounter/clinical assessment with the patient was, in whole, or in part, for the medical condition(s) listed above, which is the primary reason for home health care. Based on my clinical findings: the service(s) are medically necessary, support the need for home health care, and the homebound criteria are met.  I certify that this patient has had a face to face encounter by myself.  Bubba Rousseau M.D. - NPI: [id number]

## 2021-02-18 NOTE — PROGRESS NOTES
Hospital Medicine Daily Progress Note    Date of Service  2/18/2021    Chief Complaint  Sent by outside MD for hyperkalemia K:7.2    Hospital Course  81 y.o. male w/ hx of Alzheimers, bladder cancer (follows with Dr Murphy, urology), with worsening renal function.  He was admitted 2/9/2021 with Cr:12.65, K:7.2 and in need of emergent dialysis.  A CT of the abd showed bilateral hydronephrosis and hydroureter along w/ bladder thickening.    Interval Problem Update  2/12: On dialysis,  Off pressors.  Alert and conversant. States he lives with his wife that has cancer in the brain.  He denies pain.  He is to get nephrostomy tube today.    2/13: Cr:5.12, Hgb:8.1. Urine & blood culture no growth to date. Weak.  Awaits transfer out of ICU.    2/14: Awake and conversant.  Discussed with his RN.  Remove toro catheter.  Left nephrostomy with greater output then right nephrostomy.  Await transfer    2/15: Awake sitting up in chair.  Speech clear.  Wife at bedside later in the day.  Minimal urine output noted in nephrostomy bags.    2/16 the patient appears content, bilateral nephrostomy tubes with little fluid, left greater than right, reported urine output 975/20 4 hours, since this morning 58 cc, the patient denies pain, white cell count 14.4, hemoglobin 8.6 unchanged, renal function fairly unchanged with a GFR around 10, creatinine 4.93, potassium 4.3, sodium 133.  2/17 the patient denies acute changes, he has been eating, he has still urine output from primarily the left nephrostomy, I did reach out to urology to update the patient's son and the patient on plan of care from the urology side.  The patient removed his dialysis catheter by accident, discussed with nephrology, currently no urgent need for hemodialysis.  2/18 the patient remains lethargic, intermittently confused, patient pulled left nephrostomy tube out overnight, in restraints this morning, extended discussion with palliative care, urology, nephrology to  further define goals of care, the patient overall with poor understanding of his condition, prognosis, updated from multiple providers.  Renal function remains poor, hemoglobin 8.8, ordered reinsertion of left nephrostomy by IR.  Consultants/Specialty  Nephrology  Urology  Intensivist (signing off)  Palliative care  Possible hospice  Code Status  Full Code    Disposition  Transfer to medical floor (order placed 2/12 and on 2/14), disposition likely either to home with hospice or home health    Review of Systems  Review of Systems   Constitutional: Positive for malaise/fatigue. Negative for chills and fever.   Respiratory: Negative for cough, shortness of breath and stridor.    Cardiovascular: Negative for chest pain, palpitations and leg swelling.   Gastrointestinal: Negative for abdominal pain, constipation and nausea.   Genitourinary: Negative for flank pain.        No pain from nephrostomy tubes   Musculoskeletal: Negative for joint pain.   Skin: Negative for rash.   Neurological: Negative for dizziness, speech change and headaches.   Psychiatric/Behavioral: Positive for memory loss. The patient is not nervous/anxious.         Physical Exam  Temp:  [36.3 °C (97.3 °F)-37.5 °C (99.5 °F)] 36.7 °C (98.1 °F)  Pulse:  [47-84] 78  Resp:  [18-32] 20  BP: ()/(56-92) 113/58  SpO2:  [89 %-95 %] 95 %    Physical Exam  Vitals reviewed.   Constitutional:       Appearance: Normal appearance. He is not diaphoretic.   HENT:      Head: Normocephalic and atraumatic.      Nose: Nose normal.      Mouth/Throat:      Mouth: Mucous membranes are moist.      Pharynx: No oropharyngeal exudate.   Eyes:      General: No scleral icterus.        Right eye: No discharge.         Left eye: No discharge.      Extraocular Movements: Extraocular movements intact.      Conjunctiva/sclera: Conjunctivae normal.   Cardiovascular:      Rate and Rhythm: Normal rate and regular rhythm.      Pulses:           Radial pulses are 2+ on the right side  and 2+ on the left side.        Dorsalis pedis pulses are 2+ on the right side and 2+ on the left side.      Heart sounds: No murmur.   Pulmonary:      Effort: Pulmonary effort is normal. No respiratory distress.      Breath sounds: Normal breath sounds. No wheezing or rales.   Abdominal:      General: Bowel sounds are normal. There is no distension.      Palpations: Abdomen is soft.      Tenderness: There is no abdominal tenderness.      Comments: Bilateral nephrostomy tubes   Musculoskeletal:         General: No swelling or tenderness.      Cervical back: Normal range of motion. No muscular tenderness.      Right lower leg: No edema.      Left lower leg: No edema.   Skin:     Coloration: Skin is not jaundiced or pale.   Neurological:      General: No focal deficit present.      Mental Status: He is alert. Mental status is at baseline.      Cranial Nerves: No cranial nerve deficit.      Comments: Memory deficits   Psychiatric:         Mood and Affect: Mood normal.         Fluids    Intake/Output Summary (Last 24 hours) at 2/18/2021 1555  Last data filed at 2/18/2021 0600  Gross per 24 hour   Intake 300 ml   Output 630 ml   Net -330 ml       Laboratory  Recent Labs     02/16/21  0400 02/17/21  0258 02/18/21  0305   WBC 14.4* 12.6* 12.0*   RBC 2.97* 2.99* 3.01*   HEMOGLOBIN 8.6* 8.7* 8.8*   HEMATOCRIT 29.2* 29.0* 29.1*   MCV 98.3* 97.0 96.7   MCH 29.0 29.1 29.2   MCHC 29.5* 30.0* 30.2*   RDW 51.9* 49.9 50.0   PLATELETCT 182 188 180   MPV 9.9 10.0 9.9     Recent Labs     02/16/21  0400 02/17/21  0258 02/18/21  0305   SODIUM 133* 134* 127*   POTASSIUM 4.3 4.4 4.1   CHLORIDE 94* 94* 90*   CO2 30 27 27   GLUCOSE 115* 110* 124*   BUN 48* 47* 57*   CREATININE 4.93* 4.89* 4.48*   CALCIUM 7.8* 8.2* 8.2*                   Imaging  IR-PERQ NEPH CATH NEW ACCESS (ALL RADIOLOGY) BOTH   Final Result      1. ULTRASOUND AND FLUOROSCOPIC GUIDED PLACEMENT OF A LEFT 8 Vietnamese  PIGTAIL LOCKING LOOP PERCUTANEOUS NEPHROSTOMY CATHETER.  MODERATE LEFT HYDRONEPHROSIS.      2. ULTRASOUND AND FLUOROSCOPIC GUIDED PLACEMENT OF A RIGHT 8 Burmese PIGTAIL LOCKING LOOP PERCUTANEOUS NEPHROSTOMY CATHETER. MODERATE RIGHT HYDRONEPHROSIS.      3. NEPHROSTOGRAMS SHOWING SATISFACTORY CATHETER POSITION WITHOUT EXTRAVASATION.      DX-CHEST-FOR LINE PLACEMENT Perform procedure in: Patient's Room   Final Result      Right IJ dialysis catheter has been placed and the tip projects appropriately over the superior vena cava/right atrial junction.      EC-ECHOCARDIOGRAM COMPLETE W/O CONT   Final Result      CT-RENAL COLIC EVALUATION(A/P W/O)   Final Result         1.  Moderate pericardial effusion measuring 30 Hounsfield units, consider proteinaceous or hemorrhagic fluid. Further evaluation with echocardiogram to evaluate for component of cardiac tamponade.   2.  Moderate left pleural effusion with dependent consolidation which could represent atelectasis or infiltrate   3.  Bilateral hydronephrosis and hydroureter without visualized obstructing stone   4.  Diverticulosis   5.  Large left lower quadrant lateral abdominal wall hernia containing mesenteric fat in these portion of the sigmoid colon without visualized obstructive change   6.  3.6 cm fusiform distal abdominal aortic aneurysm, radiographic follow-up and surveillance as clinically appropriate   7.  Bladder wall thickening with slight adjacent hazy fat stranding, likely corresponding with history of bladder malignancy.   8.  Cholelithiasis      These findings were discussed with the patient's clinician, MAIDA CHANDRA, on 2/10/2021 12:18 AM.      IR-NEPHROSTOGRAM W/ NEW TUBE PLACEMENT (ALL RADIOLOGY) LEFT    (Results Pending)        Assessment/Plan  * NOEL (acute kidney injury) (HCC)- (present on admission)  Assessment & Plan  2/17 continued renal insufficiency, moderate urine output, potassium within normal limits  2/16 slightly improved renal parameters, potassium stable  2/15 BUN:47, Cr:5.53 (nephrology holding  on HD for now and continue to keep HD catheter inplace).  2/14 BUN:61, Cr:5.55  Patient had creatinine greater than 12 and potassium greater than 7 on admission.  Nephrology consulting (Dr Stevenson) and holding dialysis 2/13  Nephrostomy tube placed 2/12 2/14 Fair urine output from left nephrostomy, right nephrostomy with bloody fluids  Bilateral hydronephrosis noted on CT without evidence of clear stenosis or stone.  Likely related to his transitional cell carcinoma.    Hyperkalemia- (present on admission)  Assessment & Plan  Improved, monitor    Pulmonary hypertension (HCC)- (present on admission)  Assessment & Plan  Supplemental oxygen    Atrial fibrillation (HCC)- (present on admission)  Assessment & Plan  Monitor on telemetry and vitals  Rate control  S/P nephrostomy tubes consider initiation of anticoagulation    Acute systolic CHF (congestive heart failure) (HCC)- (present on admission)  Assessment & Plan  HFrEF:30% per 2/10 echo.  No overt sign of volume overload     Pericardial effusion- (present on admission)  Assessment & Plan  Likely related to his acute renal failure.   Echo shows no acute tamponade.    Transitional cell carcinoma (HCC)- (present on admission)  Assessment & Plan  History of original diagnosis in 2019, history of transurethral resection  Likely now a significant recurrence with hydronephrosis resulting  Related renal failure  Unknown current plan of care from urology given the patient's stability, age, renal function and prior refusal to undergo aggressive care, discussed with patient as well as the patient's son Deepak      Aortic stenosis- (present on admission)  Assessment & Plan  2/10 Echo: Moderate aortic stenosis with low grade/low flow.  Transvalvular gradients are - Peak: 33 mmHg, Mean: 21 mmHg.  The severity of the aortic stenosis may be underestimated due to the   poor LV systolic function.    Essential hypertension- (present on admission)  Assessment & Plan  Outpatient lisinopril  40mg held due to acute renal failure  Monitor vitals.  Consider amlodipine if on going HTN.    Abdominal aortic aneurysm (AAA) 35 to 39 mm in diameter (HCC)- (present on admission)  Assessment & Plan  Noted on CT renal scan  3.6cm  Monitor BP.  Bigger picture is his bladder cancer, chf and renal failure.    Cholelithiasis- (present on admission)  Assessment & Plan  Noted on CT  asymptomatic    Anemia- (present on admission)  Assessment & Plan  Likely chronic disease (transitional cell cancer and renal failure)  Monitor cbc    Pleural effusion- (present on admission)  Assessment & Plan  Hemodialysis  Wean oxygen as tolerates.     Plan  Palliative care assistance appreciated,  Ongoing discussion with family members, patient and involved subspecialty providers in terms of goals of care  Hospice might be the patient's best option at this time  Follow labs closely  See orders  Medically complex high risk patient  Updated urology to involve patient and patient's son and goal of care discussion  With not further escalate care at this time  VTE prophylaxis: Heparin    I have performed a physical exam and reviewed and updated ROS and Plan today . In review of yesterday's note , there are no changes except as documented above.        Please note that this dictation was created using voice recognition software. I have made every reasonable attempt to correct obvious errors, but I expect that there are errors of grammar and possibly context that I did not discover before finalizing the note.    This patient was seen under COVID 19 pandemic disaster response conditions.  During a disaster, the provisions of care is subject to the Crisis Standard of Care

## 2021-02-18 NOTE — THERAPY
"Occupational Therapy  Daily Treatment     Patient Name: Bay Forresetr  Age:  81 y.o., Sex:  male  Medical Record #: [id number]  Today's Date: 2/18/2021     Precautions: Fall Risk  Comments: 1- nephrostomy tubes    Assessment  Pt was seen for OT tx, demonstrating on going weakness and limited activity tolerance and participation in ADL's. Pt was irritable and minimally participatory in OOB activity and had poor safety awareness and use of fww during activity.     Plan  Continue current treatment plan.    DC Equipment Recommendations: Unable to determine at this time  Discharge Recommendations: Recommend post-acute placement for additional occupational therapy services prior to discharge home- Notes indicate family would like pt to d/c home, pt is a high fall risk recommend family be able to provide intermittent physical assist to pt as it also states pt's wife has cancer and may not be able to assist pt.     Subjective  \"I just really need a nap\"      Objective   02/18/21 1032   Total Time Spent   Total Time Spent (Mins) 26   Treatment Charges   Charges Yes   OT Self Care / ADL 2   Precautions   Precautions Fall Risk   Comments 1- nephrostomy tubes   Pain 0 - 10 Group   Therapist Pain Assessment During Activity;Nurse Notified  (none specified )   Cognition    Cognition / Consciousness X   Speech/ Communication Delayed Responses   Level of Consciousness Alert   Ability To Follow Commands 1 Step   New Learning Impaired   Attention Impaired   Comments flat affect withdrawn perseverating on needing a \"nap\" and requring increased cues to initiate simple cues    Passive ROM Upper Body   Passive ROM Upper Body WDL   Active ROM Upper Body   Active ROM Upper Body  WDL   Strength Upper Body   Upper Body Strength  X   Gross Strength Generalized Weakness, Equal Bilaterally.    Other Treatments   Other Treatments Provided Focused on standing ADL's and use of toilet    Balance   Sitting Balance (Static) Fair   Sitting " Balance (Dynamic) Fair   Standing Balance (Static) Fair   Standing Balance (Dynamic) Fair -   Weight Shift Sitting Fair   Weight Shift Standing Poor   Skilled Intervention Tactile Cuing;Verbal Cuing   Comments w/fww    Bed Mobility    Supine to Sit Minimal Assist   Sit to Supine Minimal Assist   Skilled Intervention Verbal Cuing;Tactile Cuing   Activities of Daily Living   Grooming Minimal Assist;Standing   Upper Body Dressing Minimal Assist   Lower Body Dressing Maximal Assist   Toileting Maximal Assist   Skilled Intervention Verbal Cuing;Tactile Cuing   How much help from another person does the patient currently need...   Putting on and taking off regular lower body clothing? 2   Bathing (including washing, rinsing, and drying)? 2   Toileting, which includes using a toilet, bedpan, or urinal? 2   Putting on and taking off regular upper body clothing? 3   Taking care of personal grooming such as brushing teeth? 3   Eating meals? 4   6 Clicks Daily Activity Score 16   Functional Mobility   Sit to Stand Minimal Assist   Bed, Chair, Wheelchair Transfer Minimal Assist   Toilet Transfers Minimal Assist   Mobility walking in room w/fww poor safety awarness    ICU Target Mobility Level   ICU Mobility - Targeted Level Level 4   Visual Perception   Visual Perception  Not Tested   Activity Tolerance   Comments co fatigue    Patient / Family Goals   Patient / Family Goal #1 To go home   Goal #1 Outcome Goal not met   Short Term Goals   Short Term Goal # 1 Pt will complete ADL transfers with supervision   Goal Outcome # 1 Progressing slower than expected   Short Term Goal # 2 Pt will complete toileting with supervision   Goal Outcome # 2 Progressing slower than expected   Short Term Goal # 3 Pt will complete standing G/H with supervision   Goal Outcome # 3 Progressing as expected   Education Group   Role of Occupational Therapist Patient Response Patient;Acceptance;Explanation;Reinforcement Needed   Anticipated Discharge  Equipment and Recommendations   DC Equipment Recommendations Unable to determine at this time   Discharge Recommendations Recommend post-acute placement for additional occupational therapy services prior to discharge home   Interdisciplinary Plan of Care Collaboration   IDT Collaboration with  Nursing   Patient Position at End of Therapy In Bed;Call Light within Reach;Tray Table within Reach;Phone within Reach   Collaboration Comments RN aware of OT session    Session Information   Date / Session Number  2/18 #2 (2/3, 2/22)   Priority 2

## 2021-02-18 NOTE — PROGRESS NOTES
"patient removal of left nephroscopy tube    Patient awoke from sleep with likely acute altered mental status and began to remove medical equipment   - during the acute confused state, the patient removed the previously secured and sutured in Left Nephroscopy tube along with the dressing covering his right nephroscopy tube.    Patient stated afterwards that he did not remember removing the tube, however he did recall the pain caused by the removal of the tape and sutures as being \"very painful\"    RN cleansed both sites with CHG, placed occlusive dressing over Left flank site and  redressed the Right flank nephroscopy tube insertion site.    On Call hospitalist MD notified: Scott Cruz MD requested the patient be retrained to avoid removal of the patients remaining drain    On Call Nephrology MD paged: Solitario Sigala MD   - awaiting return call at this time     RN will continue to monitor           "

## 2021-02-19 PROBLEM — E87.5 HYPERKALEMIA: Status: RESOLVED | Noted: 2021-02-10 | Resolved: 2021-02-19

## 2021-02-19 PROBLEM — I50.21 ACUTE SYSTOLIC CHF (CONGESTIVE HEART FAILURE) (HCC): Status: RESOLVED | Noted: 2021-02-10 | Resolved: 2021-02-19

## 2021-02-19 PROBLEM — R82.81 PYURIA: Status: RESOLVED | Noted: 2021-02-10 | Resolved: 2021-02-19

## 2021-02-19 LAB
ANION GAP SERPL CALC-SCNC: 11 MMOL/L (ref 7–16)
BUN SERPL-MCNC: 62 MG/DL (ref 8–22)
CALCIUM SERPL-MCNC: 8.5 MG/DL (ref 8.5–10.5)
CHLORIDE SERPL-SCNC: 94 MMOL/L (ref 96–112)
CO2 SERPL-SCNC: 29 MMOL/L (ref 20–33)
CREAT SERPL-MCNC: 4.92 MG/DL (ref 0.5–1.4)
ERYTHROCYTE [DISTWIDTH] IN BLOOD BY AUTOMATED COUNT: 50.6 FL (ref 35.9–50)
GLUCOSE SERPL-MCNC: 110 MG/DL (ref 65–99)
HCT VFR BLD AUTO: 29.3 % (ref 42–52)
HGB BLD-MCNC: 8.8 G/DL (ref 14–18)
MCH RBC QN AUTO: 29.2 PG (ref 27–33)
MCHC RBC AUTO-ENTMCNC: 30 G/DL (ref 33.7–35.3)
MCV RBC AUTO: 97.3 FL (ref 81.4–97.8)
PLATELET # BLD AUTO: 217 K/UL (ref 164–446)
PMV BLD AUTO: 9.9 FL (ref 9–12.9)
POTASSIUM SERPL-SCNC: 3.9 MMOL/L (ref 3.6–5.5)
RBC # BLD AUTO: 3.01 M/UL (ref 4.7–6.1)
SODIUM SERPL-SCNC: 134 MMOL/L (ref 135–145)
WBC # BLD AUTO: 9.3 K/UL (ref 4.8–10.8)

## 2021-02-19 PROCEDURE — 99233 SBSQ HOSP IP/OBS HIGH 50: CPT | Performed by: HOSPITALIST

## 2021-02-19 PROCEDURE — A9270 NON-COVERED ITEM OR SERVICE: HCPCS | Performed by: HOSPITALIST

## 2021-02-19 PROCEDURE — 700111 HCHG RX REV CODE 636 W/ 250 OVERRIDE (IP): Performed by: STUDENT IN AN ORGANIZED HEALTH CARE EDUCATION/TRAINING PROGRAM

## 2021-02-19 PROCEDURE — 700102 HCHG RX REV CODE 250 W/ 637 OVERRIDE(OP): Performed by: HOSPITALIST

## 2021-02-19 PROCEDURE — 85027 COMPLETE CBC AUTOMATED: CPT

## 2021-02-19 PROCEDURE — A9270 NON-COVERED ITEM OR SERVICE: HCPCS | Performed by: STUDENT IN AN ORGANIZED HEALTH CARE EDUCATION/TRAINING PROGRAM

## 2021-02-19 PROCEDURE — 700102 HCHG RX REV CODE 250 W/ 637 OVERRIDE(OP): Performed by: STUDENT IN AN ORGANIZED HEALTH CARE EDUCATION/TRAINING PROGRAM

## 2021-02-19 PROCEDURE — 770001 HCHG ROOM/CARE - MED/SURG/GYN PRIV*

## 2021-02-19 PROCEDURE — 99233 SBSQ HOSP IP/OBS HIGH 50: CPT | Performed by: INTERNAL MEDICINE

## 2021-02-19 PROCEDURE — 80048 BASIC METABOLIC PNL TOTAL CA: CPT

## 2021-02-19 RX ADMIN — DOCUSATE SODIUM 50 MG AND SENNOSIDES 8.6 MG 2 TABLET: 8.6; 5 TABLET, FILM COATED ORAL at 06:33

## 2021-02-19 RX ADMIN — FERROUS SULFATE TAB 325 MG (65 MG ELEMENTAL FE) 325 MG: 325 (65 FE) TAB at 08:38

## 2021-02-19 RX ADMIN — HEPARIN SODIUM 5000 UNITS: 5000 INJECTION, SOLUTION INTRAVENOUS; SUBCUTANEOUS at 06:27

## 2021-02-19 RX ADMIN — DOCUSATE SODIUM 50 MG AND SENNOSIDES 8.6 MG 2 TABLET: 8.6; 5 TABLET, FILM COATED ORAL at 17:35

## 2021-02-19 RX ADMIN — TRAZODONE HYDROCHLORIDE 100 MG: 50 TABLET ORAL at 20:14

## 2021-02-19 RX ADMIN — SEVELAMER CARBONATE 800 MG: 800 TABLET, FILM COATED ORAL at 12:14

## 2021-02-19 RX ADMIN — SEVELAMER CARBONATE 800 MG: 800 TABLET, FILM COATED ORAL at 08:38

## 2021-02-19 RX ADMIN — SEVELAMER CARBONATE 800 MG: 800 TABLET, FILM COATED ORAL at 17:35

## 2021-02-19 ASSESSMENT — ENCOUNTER SYMPTOMS
STRIDOR: 0
MEMORY LOSS: 1
COUGH: 0
SPEECH CHANGE: 0
SHORTNESS OF BREATH: 0
CONSTIPATION: 0
FLANK PAIN: 0
PALPITATIONS: 0
DIZZINESS: 0
CHILLS: 0
NAUSEA: 0
FEVER: 0
HEADACHES: 0
NERVOUS/ANXIOUS: 0
ABDOMINAL PAIN: 0

## 2021-02-19 NOTE — OR SURGEON
Immediate Post- Operative Note        PostOp Diagnosis: LEFT ureteral obstruction      Procedure(s): LEFT neph tube      Estimated Blood Loss: Less than 5 ml        Complications: None            2/18/2021     4:41 PM     Jewel Hines M.D.

## 2021-02-19 NOTE — DISCHARGE PLANNING
ATTN: Case Management  RE: Referral for Home Health    Reason for referral denial: Patient is going home with Hospice              Unfortunately, we are not able to accept this referral for the reason listed above. If further clarity is needed, our Transitional Care Specialists are available to discuss any barriers to service at [phone].      We look forward to collaborating with you in the future,  [facility] Team

## 2021-02-19 NOTE — PROGRESS NOTES
Nephrology Daily Progress Note    Date of Service  2/19/2021    Chief Complaint  81 y.o. male with hx/of bladder cancer admitted 2/9/2021 with NOEL, obstructive nephropathy. Acidosis,hyperkalemia, started HD 2/10/2021     Interval Problem Update  2/11 -no acute events  Continue daily dialysis  Urology consulted -recommended nephrostomy tubes  Seen and examined during dialysis  Tolerates well  2/13 -s/p nephrostomy tube placement  Pt doing well  No complaints  Transferring to regular floor  Holding dialysis today to see if improving with NT  2/14 - doing better, improved UOP -1500 cc/24 H  Holding dialysis for now  2/15 -urine output 785 mL via bilateral percutaneous nephrostomy tubes over the last 24 hours.  Patient had dialysis for 3 sessions on Wednesday, Thursday, and Friday last week.  Patient denies chest pain, shortness of breath.  2/16 -urine output 975 mL in the last 24 hours.  Patient denies headache, nausea, vomiting, chest pain, shortness of breath.  Most urine output is coming out of left nephrostomy tube.  2/17-urine output 773 mL in last 24 hours, mostly from left nephrostomy tube.  Patient denies headache, nausea, vomiting, chest pain, shortness of breath.  Patient accidentally removed his own right IJ temp Vas-Cath overnight last night.  I discussed goals of care with the patient, and he remains hopeful that something good will happen, despite me telling him that his bladder cancer will eventually kill him in 1 way or another.  2/18 -patient accidentally pulled out his left nephrostomy tube last night.  Urine output 630 mL in the last 24 hours prior to nephrostomy tube being pulled out.  Remains with scant urine output from right nephrostomy tube.  Patient denies chest pain, shortness of breath, headache, nausea, vomiting.  2/19 -left nephrostomy tube replaced yesterday afternoon.  Patient denies chest pain, shortness of breath.  Family meeting held yesterday, family leaning toward hospice.    Review  of Systems  Review of Systems   Constitutional: Negative for fever.   Respiratory: Negative for shortness of breath.    Cardiovascular: Negative for chest pain.   Gastrointestinal: Negative for abdominal pain.   All other systems reviewed and are negative.       Physical Exam  Temp:  [35.8 °C (96.4 °F)-36.8 °C (98.2 °F)] 35.8 °C (96.4 °F)  Pulse:  [74-82] 78  Resp:  [12-22] 22  BP: (103-135)/(60-77) 116/62  SpO2:  [88 %-99 %] 93 %    Physical Exam   Constitutional: He is oriented to person, place, and time. He appears well-developed. No distress.   HENT:   Mouth/Throat: Oropharynx is clear and moist. No oropharyngeal exudate.   Eyes: EOM are normal. No scleral icterus.   Neck: No tracheal deviation present.   Cardiovascular: Normal rate and normal heart sounds.   No murmur heard.  Pulmonary/Chest: Effort normal and breath sounds normal. No stridor. He has no rales.   Abdominal: Soft. He exhibits no distension. There is no abdominal tenderness.   Genitourinary:    Genitourinary Comments: Bilateral nephrostomy tubes in place, left tube draining joaquin-colored urine, right tube with scant joaquin-colored urine     Musculoskeletal:         General: No edema. Normal range of motion.   Neurological: He is alert and oriented to person, place, and time.   Skin: Skin is warm and dry. He is not diaphoretic.   Psychiatric: He has a normal mood and affect.   Access: No dialysis access      Fluids    Intake/Output Summary (Last 24 hours) at 2/19/2021 1325  Last data filed at 2/19/2021 1000  Gross per 24 hour   Intake 200 ml   Output 600 ml   Net -400 ml       Laboratory  Labs reviewed, pertinent labs below.  Recent Labs     02/17/21  0258 02/18/21  0305 02/19/21  0335   WBC 12.6* 12.0* 9.3   RBC 2.99* 3.01* 3.01*   HEMOGLOBIN 8.7* 8.8* 8.8*   HEMATOCRIT 29.0* 29.1* 29.3*   MCV 97.0 96.7 97.3   MCH 29.1 29.2 29.2   MCHC 30.0* 30.2* 30.0*   RDW 49.9 50.0 50.6*   PLATELETCT 188 180 217   MPV 10.0 9.9 9.9     Recent Labs      02/17/21  0258 02/18/21  0305 02/19/21  0335   SODIUM 134* 127* 134*   POTASSIUM 4.4 4.1 3.9   CHLORIDE 94* 90* 94*   CO2 27 27 29   GLUCOSE 110* 124* 110*   BUN 47* 57* 62*   CREATININE 4.89* 4.48* 4.92*   CALCIUM 8.2* 8.2* 8.5               URINALYSIS:  Lab Results   Component Value Date/Time    COLORURINE Brown (A) 02/10/2021 0550    CLARITY Turbid (A) 02/10/2021 0550    SPECGRAVITY 1.013 02/10/2021 0550    PHURINE 5.0 02/10/2021 0550    KETONES Negative 02/10/2021 0550    PROTEINURIN 100 (A) 02/10/2021 0550    BILIRUBINUR Negative 02/10/2021 0550    UROBILU 0.2 02/10/2021 0550    NITRITE Negative 02/10/2021 0550    LEUKESTERAS Large (A) 02/10/2021 0550    OCCULTBLOOD Large (A) 02/10/2021 0550     UPC  No results found for: TOTPROTUR No results found for: CREATININEU      Imaging reviewed  IR-NEPHROSTOGRAM W/ NEW TUBE PLACEMENT (ALL RADIOLOGY) LEFT   Final Result      Successful image guided LEFT nephrostomy tube placement.      Plan:  [place] drainage. Monitor outputs. Please contact interventional radiology if there is any concern for tube dysfunction.            IR-PERQ NEPH CATH NEW ACCESS (ALL RADIOLOGY) BOTH   Final Result      1. ULTRASOUND AND FLUOROSCOPIC GUIDED PLACEMENT OF A LEFT 8 [place]  PIGTAIL LOCKING LOOP PERCUTANEOUS NEPHROSTOMY CATHETER. MODERATE LEFT HYDRONEPHROSIS.      2. ULTRASOUND AND FLUOROSCOPIC GUIDED PLACEMENT OF A RIGHT 8 [place] PIGTAIL LOCKING LOOP PERCUTANEOUS NEPHROSTOMY CATHETER. MODERATE RIGHT HYDRONEPHROSIS.      3. NEPHROSTOGRAMS SHOWING SATISFACTORY CATHETER POSITION WITHOUT EXTRAVASATION.      DX-CHEST-FOR LINE PLACEMENT Perform procedure in: Patient's Room   Final Result      Right IJ dialysis catheter has been placed and the tip projects appropriately over the superior vena cava/right atrial junction.      EC-ECHOCARDIOGRAM COMPLETE W/O CONT   Final Result      CT-RENAL COLIC EVALUATION(A/P W/O)   Final Result         1.  Moderate pericardial effusion measuring 30 Hounsfield  units, consider proteinaceous or hemorrhagic fluid. Further evaluation with echocardiogram to evaluate for component of cardiac tamponade.   2.  Moderate left pleural effusion with dependent consolidation which could represent atelectasis or infiltrate   3.  Bilateral hydronephrosis and hydroureter without visualized obstructing stone   4.  Diverticulosis   5.  Large left lower quadrant lateral abdominal wall hernia containing mesenteric fat in these portion of the sigmoid colon without visualized obstructive change   6.  3.6 cm fusiform distal abdominal aortic aneurysm, radiographic follow-up and surveillance as clinically appropriate   7.  Bladder wall thickening with slight adjacent hazy fat stranding, likely corresponding with history of bladder malignancy.   8.  Cholelithiasis      These findings were discussed with the patient's clinician, MAIDA CHANDRA, on 2/10/2021 12:18 AM.            Current Facility-Administered Medications   Medication Dose Route Frequency Provider Last Rate Last Admin   • ondansetron (ZOFRAN) syringe/vial injection 4 mg  4 mg Intravenous Q4HRS PRN Lisy Lau M.D.   4 mg at 02/11/21 2232   • traZODone (DESYREL) tablet 100 mg  100 mg Oral QHS Tacos Pickens D.O.   100 mg at 02/18/21 2149   • senna-docusate (PERICOLACE or SENOKOT S) 8.6-50 MG per tablet 2 tablet  2 tablet Oral BID Tacos Pickens D.O.   2 tablet at 02/19/21 0633    And   • polyethylene glycol/lytes (MIRALAX) PACKET 1 Packet  1 Packet Oral QDAY PRN Tacos Pickens D.O.   1 Packet at 02/14/21 1256    And   • magnesium hydroxide (MILK OF MAGNESIA) suspension 30 mL  30 mL Oral QDAY PRN Tacos Pickens D.O.   30 mL at 02/15/21 0557    And   • bisacodyl (DULCOLAX) suppository 10 mg  10 mg Rectal QDAY PRN Tacos Pickens D.O.       • acetaminophen (Tylenol) tablet 650 mg  650 mg Oral Q6HRS PRN Tacos Pickens D.O.   650 mg at 02/15/21 0557   • labetalol (NORMODYNE/TRANDATE) injection 10 mg  10 mg  Intravenous Q4HRS PRN Tacos Pickens D.O.       • sevelamer carbonate (RENVELA) tablet 800 mg  800 mg Oral TID WITH MEALS Tacos Pickens D.O.   800 mg at 02/19/21 1214         Assessment/Plan  81 y.o. male with hx/of bladder cancer admitted 2/9/2021 with NOEL, obstructive nephropathy. Acidosis,hyperkalemia, required HD 2/10 - 2/12/2021 then recovered.    1.  NOEL on CKD stage IIIa, nonoliguric, worsening from yesterday, likely due to left nephrostomy tube being out for part of the day.  NOEL from obstructive uropathy from bladder cancer.  As the patient does have cognitive impairment, and has pulled out lines previously, if his kidney function worsens, I would not recommend placement of a dialysis catheter, but would recommend transition to comfort focused care.  There is no acute need for dialysis.  Check labs daily while within goals of care.  Avoid nephrotoxins.    2.  Access: No dialysis access currently.    3.  Hyponatremia, persistent, but improving.  Limit hypotonic fluids.  Check labs daily.    4.  Obstructive uropathy/history of bladder cancer, now with bilateral percutaneous nephrostomy tubes, left tube replaced 2/18/2021.  Patient previously refused definitive management of bladder cancer.  Consider transition to comfort focused measures.    5.  Hypertension, controlled.      6.  Normocytic anemia, persistent.  Patient is iron replete.  Check CBC daily.        Following    Solitario Sigala MD  Nephrology

## 2021-02-19 NOTE — PROGRESS NOTES
Patient underwent a left nephrostomy tube replacement by Dr. Hines, assisted by Adalid RT and Dawn RT. Patient, this RN and MD signed consent prior to sedation medication.     Procedure site was marked by MD and verified using imaging guidance. Patient was placed in a prone position. Vitals were taken every 5 minutes and remained stable during procedure (see doc flow sheet for results). CO2 waveform capnography was monitored and remained 30-36 throughout procedure. Patient appeared to tolerate procedure well, all sedation medication given at discretion of MD Hines. Nephrostomy tube sutured to patient, then a gauze and medipore tape dressing was placed over left flank surgical site. Report called to Riri ZUNIGA. Pt transported by bed with this ACLS RN to [place] [id number]. During bedside handoff with CIC Charge RN, patient is awake and talking, his son is at bedside, left nephrostomy tube draining dark brown urine. VSS.      Flexima Regular Nephrostomy Catheter System 8 Fr placed in left kidney   REF # [id number] LOT # [id number] Exp. 9/25/23

## 2021-02-19 NOTE — DISCHARGE PLANNING
Referral sent per choice to [facility].    1150- Agency/Facility Name: [facility]  Spoke To: Admissions  Outcome: DPA confirmed referral received.    1230- Hospice order sent to [facility].

## 2021-02-19 NOTE — CARE PLAN
Problem: Safety  Goal: Will remain free from injury  Outcome: PROGRESSING AS EXPECTED  Bed locked and in low position, Lower bed rails raised, bed alarm in use, call light within reach, treaded slipper socks on patient and patient room near nursing station.       Problem: Knowledge Deficit  Goal: Knowledge of disease process/condition, treatment plan, diagnostic tests, and medications will improve  Outcome: PROGRESSING AS EXPECTED  Family meeting with palliative care and urology team, family decided to pursue hospice care.

## 2021-02-19 NOTE — DISCHARGE PLANNING
Anticipated Discharge Disposition: home w/ [facility]    Action:   10:00 per chart review, family had meeting yesterday w/ palliative RN and agreed to home w/ hospice and choice for [facility]. Requested DPA to send referral to [facility]. Left MV for Britta at [facility] [phone]  11:53 requested DPA to follow up w/ [facility]. Notified Dr. Rousseau for hospice order  1300: received VM from Britta at [facility] stating pt accepted by [facility] and DME being arranged to be delivered and wife would like to have pt home Sunday 2/21. Britta requests [facility] to be arranged for Sunday 2/21 12:30pm  1350: faxed [facility] form to DPA. Updated Dr. Rousseau    Barriers to Discharge: none    Plan: home w/ [facility] via [facility] 2/21 12:30pm

## 2021-02-19 NOTE — DISCHARGE PLANNING
Received Transport Form at 1420  Spoke to Angelina at [facility]  Transport is scheduled for 2/21/21 Sunday at 1230 going home with [facility].    SCP auth# [id number]

## 2021-02-19 NOTE — PROGRESS NOTES
Hospital Medicine Daily Progress Note    Date of Service  2/19/2021    Chief Complaint  Sent by outside MD for hyperkalemia K:7.2    Hospital Course  81 y.o. male w/ hx of Alzheimers, bladder cancer (follows with Dr Murphy, urology), with worsening renal function.  He was admitted 2/9/2021 with Cr:12.65, K:7.2 and in need of emergent dialysis.  A CT of the abd showed bilateral hydronephrosis and hydroureter along w/ bladder thickening.    Interval Problem Update  2/12: On dialysis,  Off pressors.  Alert and conversant. States he lives with his wife that has cancer in the brain.  He denies pain.  He is to get nephrostomy tube today.    2/13: Cr:5.12, Hgb:8.1. Urine & blood culture no growth to date. Weak.  Awaits transfer out of ICU.    2/14: Awake and conversant.  Discussed with his RN.  Remove toro catheter.  Left nephrostomy with greater output then right nephrostomy.  Await transfer    2/15: Awake sitting up in chair.  Speech clear.  Wife at bedside later in the day.  Minimal urine output noted in nephrostomy bags.    2/16 the patient appears content, bilateral nephrostomy tubes with little fluid, left greater than right, reported urine output 975/20 4 hours, since this morning 58 cc, the patient denies pain, white cell count 14.4, hemoglobin 8.6 unchanged, renal function fairly unchanged with a GFR around 10, creatinine 4.93, potassium 4.3, sodium 133.  2/17 the patient denies acute changes, he has been eating, he has still urine output from primarily the left nephrostomy, I did reach out to urology to update the patient's son and the patient on plan of care from the urology side.  The patient removed his dialysis catheter by accident, discussed with nephrology, currently no urgent need for hemodialysis.  2/18 the patient remains lethargic, intermittently confused, patient pulled left nephrostomy tube out overnight, in restraints this morning, extended discussion with palliative care, urology, nephrology to  further define goals of care, the patient overall with poor understanding of his condition, prognosis, updated from multiple providers.  Renal function remains poor, hemoglobin 8.8, ordered reinsertion of left nephrostomy by IR.  2/19 the patient feels okay per his report, he remains confused as to his current diagnosis and outlook, I reiterated the need to be careful with the nephrostomy tubes, the patient is referred to hospice for home hospice discharge  Consultants/Specialty  Nephrology  Urology  Intensivist (signing off)  Palliative care   hospice  Code Status  Full Code    Disposition  Transfer to medical floor (order placed 2/12 and on 2/14), disposition likely either to home with hospice or home health    Review of Systems  Review of Systems   Constitutional: Positive for malaise/fatigue. Negative for chills and fever.   Respiratory: Negative for cough, shortness of breath and stridor.    Cardiovascular: Negative for chest pain, palpitations and leg swelling.   Gastrointestinal: Negative for abdominal pain, constipation and nausea.   Genitourinary: Negative for flank pain.        No pain from nephrostomy tubes   Musculoskeletal: Negative for joint pain.   Skin: Negative for rash.   Neurological: Negative for dizziness, speech change and headaches.   Psychiatric/Behavioral: Positive for memory loss. The patient is not nervous/anxious ().         Physical Exam  Temp:  [36 °C (96.8 °F)-36.8 °C (98.2 °F)] 36.1 °C (96.9 °F)  Pulse:  [74-82] 76  Resp:  [12-21] 19  BP: (103-135)/(58-77) 135/77  SpO2:  [88 %-99 %] 93 %    Physical Exam  Vitals reviewed.   Constitutional:       Appearance: Normal appearance. He is not diaphoretic.   HENT:      Head: Normocephalic and atraumatic.      Nose: Nose normal.      Mouth/Throat:      Mouth: Mucous membranes are moist.      Pharynx: No oropharyngeal exudate.   Eyes:      General: No scleral icterus.        Right eye: No discharge.         Left eye: No discharge.       Extraocular Movements: Extraocular movements intact.      Conjunctiva/sclera: Conjunctivae normal.   Cardiovascular:      Rate and Rhythm: Normal rate and regular rhythm.      Pulses:           Radial pulses are 2+ on the right side and 2+ on the left side.        Dorsalis pedis pulses are 2+ on the right side and 2+ on the left side.      Heart sounds: No murmur.   Pulmonary:      Effort: Pulmonary effort is normal. No respiratory distress.      Breath sounds: Normal breath sounds. No wheezing or rales.   Abdominal:      General: Bowel sounds are normal. There is no distension.      Palpations: Abdomen is soft.      Tenderness: There is no abdominal tenderness.      Comments: Bilateral nephrostomy tubes   Musculoskeletal:         General: No swelling or tenderness.      Cervical back: Normal range of motion. No muscular tenderness.      Right lower leg: No edema.      Left lower leg: No edema.   Skin:     Coloration: Skin is not jaundiced or pale.   Neurological:      General: No focal deficit present.      Mental Status: He is alert. Mental status is at baseline.      Cranial Nerves: No cranial nerve deficit.      Comments: Memory deficits   Psychiatric:         Mood and Affect: Mood normal.         Fluids    Intake/Output Summary (Last 24 hours) at 2/19/2021 1156  Last data filed at 2/19/2021 1000  Gross per 24 hour   Intake 200 ml   Output 600 ml   Net -400 ml       Laboratory  Recent Labs     02/17/21  0258 02/18/21  0305 02/19/21  0335   WBC 12.6* 12.0* 9.3   RBC 2.99* 3.01* 3.01*   HEMOGLOBIN 8.7* 8.8* 8.8*   HEMATOCRIT 29.0* 29.1* 29.3*   MCV 97.0 96.7 97.3   MCH 29.1 29.2 29.2   MCHC 30.0* 30.2* 30.0*   RDW 49.9 50.0 50.6*   PLATELETCT 188 180 217   MPV 10.0 9.9 9.9     Recent Labs     02/17/21  0258 02/18/21  0305 02/19/21  0335   SODIUM 134* 127* 134*   POTASSIUM 4.4 4.1 3.9   CHLORIDE 94* 90* 94*   CO2 27 27 29   GLUCOSE 110* 124* 110*   BUN 47* 57* 62*   CREATININE 4.89* 4.48* 4.92*   CALCIUM 8.2* 8.2*  8.5                   Imaging  IR-NEPHROSTOGRAM W/ NEW TUBE PLACEMENT (ALL RADIOLOGY) LEFT   Final Result      Successful image guided LEFT nephrostomy tube placement.      Plan:  [place] drainage. Monitor outputs. Please contact interventional radiology if there is any concern for tube dysfunction.            IR-PERQ NEPH CATH NEW ACCESS (ALL RADIOLOGY) BOTH   Final Result      1. ULTRASOUND AND FLUOROSCOPIC GUIDED PLACEMENT OF A LEFT 8 [place]  PIGTAIL LOCKING LOOP PERCUTANEOUS NEPHROSTOMY CATHETER. MODERATE LEFT HYDRONEPHROSIS.      2. ULTRASOUND AND FLUOROSCOPIC GUIDED PLACEMENT OF A RIGHT 8 [place] PIGTAIL LOCKING LOOP PERCUTANEOUS NEPHROSTOMY CATHETER. MODERATE RIGHT HYDRONEPHROSIS.      3. NEPHROSTOGRAMS SHOWING SATISFACTORY CATHETER POSITION WITHOUT EXTRAVASATION.      DX-CHEST-FOR LINE PLACEMENT Perform procedure in: Patient's Room   Final Result      Right IJ dialysis catheter has been placed and the tip projects appropriately over the superior vena cava/right atrial junction.      EC-ECHOCARDIOGRAM COMPLETE W/O CONT   Final Result      CT-RENAL COLIC EVALUATION(A/P W/O)   Final Result         1.  Moderate pericardial effusion measuring 30 Hounsfield units, consider proteinaceous or hemorrhagic fluid. Further evaluation with echocardiogram to evaluate for component of cardiac tamponade.   2.  Moderate left pleural effusion with dependent consolidation which could represent atelectasis or infiltrate   3.  Bilateral hydronephrosis and hydroureter without visualized obstructing stone   4.  Diverticulosis   5.  Large left lower quadrant lateral abdominal wall hernia containing mesenteric fat in these portion of the sigmoid colon without visualized obstructive change   6.  3.6 cm fusiform distal abdominal aortic aneurysm, radiographic follow-up and surveillance as clinically appropriate   7.  Bladder wall thickening with slight adjacent hazy fat stranding, likely corresponding with history of bladder malignancy.    8.  Cholelithiasis      These findings were discussed with the patient's clinician, MAIDA CHANDRA, on 2/10/2021 12:18 AM.           Assessment/Plan  * NOEL (acute kidney injury) (HCC)- (present on admission)  Assessment & Plan  2/17 continued renal insufficiency, moderate urine output, potassium within normal limits  2/16 slightly improved renal parameters, potassium stable  2/15 BUN:47, Cr:5.53 (nephrology holding on HD for now and continue to keep HD catheter inplace).  2/14 BUN:61, Cr:5.55  Patient had creatinine greater than 12 and potassium greater than 7 on admission.  Nephrology consulting (Dr Stevenson) and holding dialysis 2/13  Nephrostomy tube placed 2/12 2/14 Fair urine output from left nephrostomy, right nephrostomy with bloody fluids  Bilateral hydronephrosis noted on CT without evidence of clear stenosis or stone.  Likely related to his transitional cell carcinoma.    Hyperkalemia- (present on admission)  Assessment & Plan  Improved, monitor    Pulmonary hypertension (HCC)- (present on admission)  Assessment & Plan  Supplemental oxygen    Atrial fibrillation (HCC)- (present on admission)  Assessment & Plan  Monitor on telemetry and vitals  Rate control  S/P nephrostomy tubes consider initiation of anticoagulation    Acute systolic CHF (congestive heart failure) (HCC)- (present on admission)  Assessment & Plan  HFrEF:30% per 2/10 echo.  No overt sign of volume overload     Pericardial effusion- (present on admission)  Assessment & Plan  Likely related to his acute renal failure.   Echo shows no acute tamponade.    Transitional cell carcinoma (HCC)- (present on admission)  Assessment & Plan  History of original diagnosis in 2019, history of transurethral resection  Likely now a significant recurrence with hydronephrosis resulting  Related renal failure  Unknown current plan of care from urology given the patient's stability, age, renal function and prior refusal to undergo aggressive care, discussed with  patient as well as the patient's son Deepak      Aortic stenosis- (present on admission)  Assessment & Plan  2/10 Echo: Moderate aortic stenosis with low grade/low flow.  Transvalvular gradients are - Peak: 33 mmHg, Mean: 21 mmHg.  The severity of the aortic stenosis may be underestimated due to the   poor LV systolic function.    Essential hypertension- (present on admission)  Assessment & Plan  Outpatient lisinopril 40mg held due to acute renal failure  Monitor vitals.  Consider amlodipine if on going HTN.    Abdominal aortic aneurysm (AAA) 35 to 39 mm in diameter (HCC)- (present on admission)  Assessment & Plan  Noted on CT renal scan  3.6cm  Monitor BP.  Bigger picture is his bladder cancer, chf and renal failure.    Cholelithiasis- (present on admission)  Assessment & Plan  Noted on CT  asymptomatic    Anemia- (present on admission)  Assessment & Plan  Likely chronic disease (transitional cell cancer and renal failure)  Monitor cbc    Pleural effusion- (present on admission)  Assessment & Plan  Hemodialysis  Wean oxygen as tolerates.     Plan  Palliative care assistance appreciated, hospice referral  See orders  Medically complex high risk patient  Updated urology to involve patient and patient's son and goal of care discussion    VTE prophylaxis: Not indicated    I have performed a physical exam and reviewed and updated ROS and Plan today . In review of yesterday's note , there are no changes except as documented above.        Please note that this dictation was created using voice recognition software. I have made every reasonable attempt to correct obvious errors, but I expect that there are errors of grammar and possibly context that I did not discover before finalizing the note.    This patient was seen under COVID 19 pandemic disaster response conditions.  During a disaster, the provisions of care is subject to the Crisis Standard of Care

## 2021-02-19 NOTE — CARE PLAN
Problem: Safety  Goal: Will remain free from injury  Outcome: PROGRESSING AS EXPECTED  Goal: Will remain free from falls  Outcome: PROGRESSING AS EXPECTED     Problem: Infection  Goal: Will remain free from infection  Outcome: PROGRESSING AS EXPECTED   Pt sleeping; no signs of distress. No change from previous assessment. Fall precautions in place. Will continue to monitor.

## 2021-02-20 PROCEDURE — 700102 HCHG RX REV CODE 250 W/ 637 OVERRIDE(OP): Performed by: HOSPITALIST

## 2021-02-20 PROCEDURE — 770001 HCHG ROOM/CARE - MED/SURG/GYN PRIV*

## 2021-02-20 PROCEDURE — 99232 SBSQ HOSP IP/OBS MODERATE 35: CPT | Performed by: HOSPITALIST

## 2021-02-20 PROCEDURE — 700102 HCHG RX REV CODE 250 W/ 637 OVERRIDE(OP): Performed by: STUDENT IN AN ORGANIZED HEALTH CARE EDUCATION/TRAINING PROGRAM

## 2021-02-20 PROCEDURE — A9270 NON-COVERED ITEM OR SERVICE: HCPCS | Performed by: STUDENT IN AN ORGANIZED HEALTH CARE EDUCATION/TRAINING PROGRAM

## 2021-02-20 PROCEDURE — A9270 NON-COVERED ITEM OR SERVICE: HCPCS | Performed by: HOSPITALIST

## 2021-02-20 RX ORDER — TRAZODONE HYDROCHLORIDE 50 MG/1
50 TABLET ORAL
Status: DISCONTINUED | OUTPATIENT
Start: 2021-02-20 | End: 2021-02-21 | Stop reason: HOSPADM

## 2021-02-20 RX ADMIN — DOCUSATE SODIUM 50 MG AND SENNOSIDES 8.6 MG 2 TABLET: 8.6; 5 TABLET, FILM COATED ORAL at 05:29

## 2021-02-20 RX ADMIN — SEVELAMER CARBONATE 800 MG: 800 TABLET, FILM COATED ORAL at 05:30

## 2021-02-20 RX ADMIN — SEVELAMER CARBONATE 800 MG: 800 TABLET, FILM COATED ORAL at 12:43

## 2021-02-20 RX ADMIN — DOCUSATE SODIUM 50 MG AND SENNOSIDES 8.6 MG 2 TABLET: 8.6; 5 TABLET, FILM COATED ORAL at 17:43

## 2021-02-20 RX ADMIN — TRAZODONE HYDROCHLORIDE 50 MG: 50 TABLET ORAL at 20:19

## 2021-02-20 ASSESSMENT — ENCOUNTER SYMPTOMS
SPEECH CHANGE: 0
DIZZINESS: 0
STRIDOR: 0
COUGH: 0
CONSTIPATION: 0
NERVOUS/ANXIOUS: 0
NAUSEA: 0
PALPITATIONS: 0
FEVER: 0
CHILLS: 0
FLANK PAIN: 0
MEMORY LOSS: 1
SHORTNESS OF BREATH: 0
HEADACHES: 0
ABDOMINAL PAIN: 0

## 2021-02-20 NOTE — PROGRESS NOTES
Assumed care of Pt, He is alert and oriented x3. Discussed POC with day shift RN at bedside. Bed is locked in lowest position and call light/ belongings are within reach.

## 2021-02-20 NOTE — PROGRESS NOTES
Nephrology brief note.  Plans noted for discharge home with hospice.   Nephrology will sign off. Please call back with further questions or concerns.    Solitario Sigala MD  Nephrology

## 2021-02-20 NOTE — CARE PLAN
Problem: Knowledge Deficit  Goal: Knowledge of disease process/condition, treatment plan, diagnostic tests, and medications will improve  Outcome: NOT MET  Goal: Knowledge of the prescribed therapeutic regimen will improve  Outcome: NOT MET     Problem: Communication  Goal: The ability to communicate needs accurately and effectively will improve  Outcome: PROGRESSING AS EXPECTED     Problem: Safety  Goal: Will remain free from injury  Outcome: PROGRESSING AS EXPECTED  Goal: Will remain free from falls  Outcome: PROGRESSING AS EXPECTED     Problem: Pain Management  Goal: Pain level will decrease to patient's comfort goal  Outcome: PROGRESSING AS EXPECTED     Problem: Respiratory:  Goal: Respiratory status will improve  Outcome: PROGRESSING AS EXPECTED     Problem: Safety - Medical Restraint  Goal: Remains free of injury from restraints (Restraint for Interference with Medical Device)  Description: INTERVENTIONS:  1. Determine that other, less restrictive measures have been tried or would not be effective before applying the restraint  2. Evaluate the patient's condition at the time of restraint application  3. Inform patient/family regarding the reason for restraint  4. Q2H: Monitor safety, psychosocial status, comfort, nutrition and hydration  Outcome: MET  Goal: Free from restraint(s) (Restraint for Interference with Medical Device)  Description: INTERVENTIONS:  1. ONCE/SHIFT or MINIMUM Q12H: Assess and document the continuing need for restraints  2. Q24H: Continued use of restraint requires LIP to perform face to face examination and written order  3. Identify and implement measures to help patient regain control  Outcome: MET

## 2021-02-20 NOTE — PALLIATIVE CARE
Palliative Care follow-up  PC f/u for POLST completion. Pt not well enough to complete POLST form per MD. Call placed to wife who states that Deepak will be coming to the hospital today. Explained form to complete and she wishes for this RN to call Deepak. PC placed a call to Deepak and explained this RN's role as well as the POLST. He plans to come to the hospital but uncertain of when. He was open to discussing/completing by phone and signing when he was present. POLST completed for DNR, comfort focused treatment, no TF. Education provided on code status and TF at EOL, including the natural process of the cessation of eating/drinking. He was appreciative of the explanations and discussion.    POLST left on hard chart and Deepak knows to ask the BS RN for it when he arrives. PC will finish up with MD following Deepak's signature.    Updated: RN/MD    Plan: POLST completion today; home with hospice tomorrow    Thank you for allowing Palliative Care to support this patient and family. Contact [phone] for additional assistance, change in patient status, or with any questions/concerns.

## 2021-02-20 NOTE — PROGRESS NOTES
Hospital Medicine Daily Progress Note    Date of Service  2/20/2021    Chief Complaint  Sent by outside MD for hyperkalemia K:7.2    Hospital Course  81 y.o. male w/ hx of Alzheimers, bladder cancer (follows with Dr Murphy, urology), with worsening renal function.  He was admitted 2/9/2021 with Cr:12.65, K:7.2 and in need of emergent dialysis.  A CT of the abd showed bilateral hydronephrosis and hydroureter along w/ bladder thickening.    Interval Problem Update  2/12: On dialysis,  Off pressors.  Alert and conversant. States he lives with his wife that has cancer in the brain.  He denies pain.  He is to get nephrostomy tube today.    2/13: Cr:5.12, Hgb:8.1. Urine & blood culture no growth to date. Weak.  Awaits transfer out of ICU.    2/14: Awake and conversant.  Discussed with his RN.  Remove toro catheter.  Left nephrostomy with greater output then right nephrostomy.  Await transfer    2/15: Awake sitting up in chair.  Speech clear.  Wife at bedside later in the day.  Minimal urine output noted in nephrostomy bags.    2/16 the patient appears content, bilateral nephrostomy tubes with little fluid, left greater than right, reported urine output 975/20 4 hours, since this morning 58 cc, the patient denies pain, white cell count 14.4, hemoglobin 8.6 unchanged, renal function fairly unchanged with a GFR around 10, creatinine 4.93, potassium 4.3, sodium 133.  2/17 the patient denies acute changes, he has been eating, he has still urine output from primarily the left nephrostomy, I did reach out to urology to update the patient's son and the patient on plan of care from the urology side.  The patient removed his dialysis catheter by accident, discussed with nephrology, currently no urgent need for hemodialysis.  2/18 the patient remains lethargic, intermittently confused, patient pulled left nephrostomy tube out overnight, in restraints this morning, extended discussion with palliative care, urology, nephrology to  further define goals of care, the patient overall with poor understanding of his condition, prognosis, updated from multiple providers.  Renal function remains poor, hemoglobin 8.8, ordered reinsertion of left nephrostomy by IR.  2/19 the patient feels okay per his report, he remains confused as to his current diagnosis and outlook, I reiterated the need to be careful with the nephrostomy tubes, the patient is referred to hospice for home hospice discharge  2/20 the patient without new complaints, both nephrostomy tubes are draining, left greater than right, the patient remains confused, lethargic, not oriented to his overall picture and situation.  Plan for discharge to home hospice on 2/21  Consultants/Specialty  Nephrology  Urology  Intensivist (signing off)  Palliative care   hospice  Code Status  Full Code    Disposition  Transfer to medical floor (order placed 2/12 and on 2/14), disposition likely either to home with hospice or home health    Review of Systems  Review of Systems   Constitutional: Positive for malaise/fatigue. Negative for chills and fever.   Respiratory: Negative for cough, shortness of breath and stridor.    Cardiovascular: Negative for chest pain, palpitations and leg swelling.   Gastrointestinal: Negative for abdominal pain, constipation and nausea.   Genitourinary: Negative for flank pain.        No pain from nephrostomy tubes   Musculoskeletal: Negative for joint pain.   Skin: Negative for rash.   Neurological: Negative for dizziness, speech change and headaches.   Psychiatric/Behavioral: Positive for memory loss. The patient is not nervous/anxious ().         Physical Exam  Temp:  [35.8 °C (96.4 °F)-36.2 °C (97.1 °F)] 36.1 °C (96.9 °F)  Pulse:  [76-88] 76  Resp:  [19-29] 23  BP: (108-125)/(62-72) 124/72  SpO2:  [81 %-97 %] 94 %    Physical Exam  Vitals reviewed.   Constitutional:       Appearance: Normal appearance. He is not diaphoretic.   HENT:      Head: Normocephalic and atraumatic.       Nose: Nose normal.      Mouth/Throat:      Mouth: Mucous membranes are moist.      Pharynx: No oropharyngeal exudate.   Eyes:      General: No scleral icterus.        Right eye: No discharge.         Left eye: No discharge.      Extraocular Movements: Extraocular movements intact.      Conjunctiva/sclera: Conjunctivae normal.   Cardiovascular:      Rate and Rhythm: Normal rate and regular rhythm.      Pulses:           Radial pulses are 2+ on the right side and 2+ on the left side.        Dorsalis pedis pulses are 2+ on the right side and 2+ on the left side.      Heart sounds: No murmur.   Pulmonary:      Effort: Pulmonary effort is normal. No respiratory distress.      Breath sounds: Normal breath sounds. No wheezing or rales.   Abdominal:      General: Bowel sounds are normal. There is no distension.      Palpations: Abdomen is soft.      Tenderness: There is no abdominal tenderness.      Comments: Bilateral nephrostomy tubes   Musculoskeletal:         General: No swelling or tenderness.      Cervical back: Normal range of motion. No muscular tenderness.      Right lower leg: No edema.      Left lower leg: No edema.   Skin:     Coloration: Skin is not jaundiced or pale.   Neurological:      General: No focal deficit present.      Mental Status: He is alert. Mental status is at baseline.      Cranial Nerves: No cranial nerve deficit.      Comments: Memory deficits   Psychiatric:         Mood and Affect: Mood normal.         Fluids    Intake/Output Summary (Last 24 hours) at 2/20/2021 0828  Last data filed at 2/20/2021 0437  Gross per 24 hour   Intake 200 ml   Output 1150 ml   Net -950 ml       Laboratory  Recent Labs     02/18/21  0305 02/19/21  0335   WBC 12.0* 9.3   RBC 3.01* 3.01*   HEMOGLOBIN 8.8* 8.8*   HEMATOCRIT 29.1* 29.3*   MCV 96.7 97.3   MCH 29.2 29.2   MCHC 30.2* 30.0*   RDW 50.0 50.6*   PLATELETCT 180 217   MPV 9.9 9.9     Recent Labs     02/18/21  0305 02/19/21  0335   SODIUM 127* 134*    POTASSIUM 4.1 3.9   CHLORIDE 90* 94*   CO2 27 29   GLUCOSE 124* 110*   BUN 57* 62*   CREATININE 4.48* 4.92*   CALCIUM 8.2* 8.5                   Imaging  IR-NEPHROSTOGRAM W/ NEW TUBE PLACEMENT (ALL RADIOLOGY) LEFT   Final Result      Successful image guided LEFT nephrostomy tube placement.      Plan:  [place] drainage. Monitor outputs. Please contact interventional radiology if there is any concern for tube dysfunction.            IR-PERQ NEPH CATH NEW ACCESS (ALL RADIOLOGY) BOTH   Final Result      1. ULTRASOUND AND FLUOROSCOPIC GUIDED PLACEMENT OF A LEFT 8 [place]  PIGTAIL LOCKING LOOP PERCUTANEOUS NEPHROSTOMY CATHETER. MODERATE LEFT HYDRONEPHROSIS.      2. ULTRASOUND AND FLUOROSCOPIC GUIDED PLACEMENT OF A RIGHT 8 [place] PIGTAIL LOCKING LOOP PERCUTANEOUS NEPHROSTOMY CATHETER. MODERATE RIGHT HYDRONEPHROSIS.      3. NEPHROSTOGRAMS SHOWING SATISFACTORY CATHETER POSITION WITHOUT EXTRAVASATION.      DX-CHEST-FOR LINE PLACEMENT Perform procedure in: Patient's Room   Final Result      Right IJ dialysis catheter has been placed and the tip projects appropriately over the superior vena cava/right atrial junction.      EC-ECHOCARDIOGRAM COMPLETE W/O CONT   Final Result      CT-RENAL COLIC EVALUATION(A/P W/O)   Final Result         1.  Moderate pericardial effusion measuring 30 Hounsfield units, consider proteinaceous or hemorrhagic fluid. Further evaluation with echocardiogram to evaluate for component of cardiac tamponade.   2.  Moderate left pleural effusion with dependent consolidation which could represent atelectasis or infiltrate   3.  Bilateral hydronephrosis and hydroureter without visualized obstructing stone   4.  Diverticulosis   5.  Large left lower quadrant lateral abdominal wall hernia containing mesenteric fat in these portion of the sigmoid colon without visualized obstructive change   6.  3.6 cm fusiform distal abdominal aortic aneurysm, radiographic follow-up and surveillance as clinically appropriate    7.  Bladder wall thickening with slight adjacent hazy fat stranding, likely corresponding with history of bladder malignancy.   8.  Cholelithiasis      These findings were discussed with the patient's clinician, MAIDA CHANDRA, on 2/10/2021 12:18 AM.           Assessment/Plan  * NOEL (acute kidney injury) (HCC)- (present on admission)  Assessment & Plan  2/17 continued renal insufficiency, moderate urine output, potassium within normal limits  2/16 slightly improved renal parameters, potassium stable  2/15 BUN:47, Cr:5.53 (nephrology holding on HD for now and continue to keep HD catheter inplace).  2/14 BUN:61, Cr:5.55  Patient had creatinine greater than 12 and potassium greater than 7 on admission.  Nephrology consulting (Dr Stevenson) and holding dialysis 2/13  Nephrostomy tube placed 2/12 2/14 Fair urine output from left nephrostomy, right nephrostomy with bloody fluids  Bilateral hydronephrosis noted on CT without evidence of clear stenosis or stone.  Likely related to his transitional cell carcinoma.    Pulmonary hypertension (HCC)- (present on admission)  Assessment & Plan  Supplemental oxygen    Atrial fibrillation (HCC)- (present on admission)  Assessment & Plan  Monitor on telemetry and vitals  Rate control  S/P nephrostomy tubes consider initiation of anticoagulation    Pericardial effusion- (present on admission)  Assessment & Plan  Likely related to his acute renal failure.   Echo shows no acute tamponade.    Transitional cell carcinoma (HCC)- (present on admission)  Assessment & Plan  History of original diagnosis in 2019, history of transurethral resection  Likely now a significant recurrence with hydronephrosis resulting  Related renal failure  Unknown current plan of care from urology given the patient's stability, age, renal function and prior refusal to undergo aggressive care, discussed with patient as well as the patient's son Deepak      Aortic stenosis- (present on admission)  Assessment &  Plan  2/10 Echo: Moderate aortic stenosis with low grade/low flow.  Transvalvular gradients are - Peak: 33 mmHg, Mean: 21 mmHg.  The severity of the aortic stenosis may be underestimated due to the   poor LV systolic function.    Essential hypertension- (present on admission)  Assessment & Plan  Outpatient lisinopril 40mg held due to acute renal failure  Monitor vitals.  Consider amlodipine if on going HTN.    Abdominal aortic aneurysm (AAA) 35 to 39 mm in diameter (HCC)- (present on admission)  Assessment & Plan  Noted on CT renal scan  3.6cm  Monitor BP.  Bigger picture is his bladder cancer, chf and renal failure.    Cholelithiasis- (present on admission)  Assessment & Plan  Noted on CT  asymptomatic    Anemia- (present on admission)  Assessment & Plan  Likely chronic disease (transitional cell cancer and renal failure)  Monitor cbc    Pleural effusion- (present on admission)  Assessment & Plan  Hemodialysis  Wean oxygen as tolerates.     Plan  DNR, complete POLST  Palliative care assistance appreciated, hospice referral  See orders  Medically complex high risk patient  Discharge plans for 2/21 with home hospice    VTE prophylaxis: Not indicated    I have performed a physical exam and reviewed and updated ROS and Plan today . In review of yesterday's note , there are no changes except as documented above.        Please note that this dictation was created using voice recognition software. I have made every reasonable attempt to correct obvious errors, but I expect that there are errors of grammar and possibly context that I did not discover before finalizing the note.    This patient was seen under COVID 19 pandemic disaster response conditions.  During a disaster, the provisions of care is subject to the Crisis Standard of Care

## 2021-02-21 VITALS
RESPIRATION RATE: 10 BRPM | WEIGHT: 225.97 LBS | SYSTOLIC BLOOD PRESSURE: 118 MMHG | HEIGHT: 71 IN | DIASTOLIC BLOOD PRESSURE: 66 MMHG | HEART RATE: 77 BPM | TEMPERATURE: 97.9 F | OXYGEN SATURATION: 92 % | BODY MASS INDEX: 31.64 KG/M2

## 2021-02-21 PROCEDURE — 700102 HCHG RX REV CODE 250 W/ 637 OVERRIDE(OP): Performed by: STUDENT IN AN ORGANIZED HEALTH CARE EDUCATION/TRAINING PROGRAM

## 2021-02-21 PROCEDURE — A9270 NON-COVERED ITEM OR SERVICE: HCPCS | Performed by: STUDENT IN AN ORGANIZED HEALTH CARE EDUCATION/TRAINING PROGRAM

## 2021-02-21 PROCEDURE — 99239 HOSP IP/OBS DSCHRG MGMT >30: CPT | Performed by: HOSPITALIST

## 2021-02-21 RX ADMIN — DOCUSATE SODIUM 50 MG AND SENNOSIDES 8.6 MG 2 TABLET: 8.6; 5 TABLET, FILM COATED ORAL at 06:28

## 2021-02-21 NOTE — DISCHARGE SUMMARY
Discharge Summary    CHIEF COMPLAINT ON ADMISSION  Chief Complaint   Patient presents with   • Sent by MD     pt had labs drawn for upcoming biopsy. pts son was called due to his potassium being 7.2 and creatinine 12.65       Reason for Admission  Sent by MD     Admission Date  2/9/2021    CODE STATUS  DNAR/DNI    HPI & HOSPITAL COURSE  This is a 81 y.o. male here with acute on chronic renal failure, history of incompletely treated bladder cancer since 2019, admitted with a creatinine of 12.6 and a potassium of 7.2, required emergent dialysis.  In the course the patient was found to have bilateral hydronephrosis and likely obstruction from bladder cancer that is advancing, required bilateral nephrostomy tube placement.  The patient had significant amount of memory loss and dementia, at this point after discussion with urology no meaningful treatment options available, advanced disease and the patient not a candidate for radical cystectomy and neobladder.  Patient with con commitment atrial fibrillation, aortic stenosis, pulmonary hypertension, referred to palliative care and in conjunction with family decision was made to bring the patient home on home hospice for end-of-life care.  The patient pulled out his left nephrostomy had one occasion and this needed to be replaced, he primarily depends on his left nephrostomy.  The patient at this time will be transferred and discharged now into the hands of hospice, if his nephrostomy tubes can be maintained for some time, this will give the patient additional hopefully quality time with his family.  For full additional details please refer to computer system and paper chart, notes from subspecialists and palliative care.    Therefore, he is discharged in guarded and stable condition to hospice.    The patient met 2-midnight criteria for an inpatient stay at the time of discharge.    Discharge Date  2/21/2021    FOLLOW UP ITEMS POST DISCHARGE  With hospice    DISCHARGE  DIAGNOSES  Principal Problem:    NOEL (acute kidney injury) (HCC) POA: Yes  Active Problems:    Essential hypertension POA: Yes    Aortic stenosis POA: Yes    Transitional cell carcinoma (HCC) POA: Yes      Overview:       DATE OF SERVICE:  09/09/2019      PREOPERATIVE DIAGNOSIS:  History of high-grade transitional cell carcinoma       of the bladder.      OPERATION AND PROCEDURE PERFORMED:      1.  Rigid cystourethroscopy.      2.  Transurethral resection of bladder scar site.      SURGEON:  Benji Murphy MD            DATE OF SERVICE:  05/13/2019      PREOPERATIVE DIAGNOSES:  History of high-grade transitional cell carcinoma       of the bladder involving the trigone and bladder neck.      PROCEDURES PERFORMED:      1.  Rigid cystourethroscopy.      2.  Transurethral resection of bladder tumor scar site 6x5 cm in area and       fulguration of base.      3.  Transurethral resection of left proximal and distal trigonal area 2x3       cm.      SURGEON:  Benji Murphy MD          Pericardial effusion POA: Yes    Atrial fibrillation (HCC) POA: Yes    Pulmonary hypertension (HCC) POA: Yes    Anemia POA: Yes    Cholelithiasis POA: Yes    Abdominal aortic aneurysm (AAA) 35 to 39 mm in diameter (HCC) POA: Yes    Pleural effusion POA: Yes  Resolved Problems:    Hyperkalemia POA: Yes    Acute systolic CHF (congestive heart failure) (HCC) POA: Yes    Pyuria POA: Yes      FOLLOW UP  Future Appointments   Date Time Provider Department Center   2/22/2021  2:00 PM MICKEY Cerrato [facility] None   3/16/2021 11:30 AM Jessenia Ruelas M.D. [facility] [place]     No follow-up provider specified.    MEDICATIONS ON DISCHARGE     Medication List      CONTINUE taking these medications      Instructions   HYDROcodone-acetaminophen 5-325 MG Tabs per tablet  Commonly known as: NORCO   hydrocodone 5 mg-acetaminophen 325 mg tablet     ondansetron 4 MG Tbdp  Commonly known as: ZOFRAN ODT      traZODone 100 MG Tabs  Commonly known as: DESYREL    TAKE 1 TABLET BY MOUTH EVERY DAY AT BEDTIME AS NEEDED FOR SLEEP     VITAMIN B COMPLEX PO   Take 1 Tab by mouth every 48 hours.  Dose: 1 tablet     vitamin D 1000 Unit (25 mcg) Tabs  Commonly known as: cholecalciferol   Take 2,000 Units by mouth every 48 hours.  Dose: 2,000 Units     XANAX PO   alprazolam        STOP taking these medications    doxazosin 8 MG tablet  Commonly known as: CARDURA     lisinopril 40 MG tablet  Commonly known as: PRINIVIL            Allergies  No Known Allergies    DIET  Orders Placed This Encounter   Procedures   • Diet Order Diet: Renal     Standing Status:   Standing     Number of Occurrences:   1     Order Specific Question:   Diet:     Answer:   Renal [8]       ACTIVITY  As tolerated.  Weight bearing as tolerated    CONSULTATIONS  Critical care  Nephrology  Palliative care      PROCEDURES  Hemodialysis with hemodialysis catheter placement and removal by patient  Bilateral nephrostomy tubes    LABORATORY  Lab Results   Component Value Date    SODIUM 134 (L) 02/19/2021    POTASSIUM 3.9 02/19/2021    CHLORIDE 94 (L) 02/19/2021    CO2 29 02/19/2021    GLUCOSE 110 (H) 02/19/2021    BUN 62 (H) 02/19/2021    CREATININE 4.92 (H) 02/19/2021    CREATININE 1.00 12/23/2010    GLOMRATE >59 12/23/2010        Lab Results   Component Value Date    WBC 9.3 02/19/2021    WBC 9.2 12/23/2010    HEMOGLOBIN 8.8 (L) 02/19/2021    HEMATOCRIT 29.3 (L) 02/19/2021    PLATELETCT 217 02/19/2021      The patient overall improved with nephrostomy tube placement, certainly this is not a long-term solution  The patient is appropriate for home hospice    Total time of the discharge process exceeds 55 minutes.

## 2021-02-21 NOTE — DISCHARGE INSTRUCTIONS
Physician Discharge Instructions:  Discharge Diagnosis: Progressive bladder cancer, acute kidney failure, end-of-life, discharged to hospice at home  Proceed to discharge/transfer  to home with hospice   take Rx/Prescriptions as prescribed and reconciled per AVS  For OTC Medication consult with your Pharmacist first.  Diet: As tolerated  Activity: As tolerated  F/u with hospice physicians and staff  Set up to hopefully preserve bilateral nephrostomy tubes as long as possible, if they would get dislodged proceed with comfort care.    Bubba Rousseau MD      Discharge Instructions    Discharged to Home by ambulance with [facility]. Discharged via [facility] escort: Yes.  Special equipment needed: None    Be sure to schedule a follow-up appointment with your primary care doctor or any specialists as instructed.     Discharge Plan:   Influenza Vaccine Indication: Not indicated: Previously immunized this influenza season and > 8 years of age    I understand that a diet low in cholesterol, fat, and sodium is recommended for good health. Unless I have been given specific instructions below for another diet, I accept this instruction as my diet prescription.     Special Instructions: None    · Is patient discharged on Warfarin / Coumadin?   No    Depression / Suicide Risk    As you are discharged from [facility], it is important to learn how to keep safe from harming yourself.    Recognize the warning signs:  · Abrupt changes in personality, positive or negative- including increase in energy   · Giving away possessions  · Change in eating patterns- significant weight changes-  positive or negative  · Change in sleeping patterns- unable to sleep or sleeping all the time   · Unwillingness or inability to communicate  · Depression  · Unusual sadness, discouragement and loneliness  · Talk of wanting to die  · Neglect of personal appearance   · Rebelliousness- reckless behavior  · Withdrawal from  people/activities they love  · Confusion- inability to concentrate     If you or a loved one observes any of these behaviors or has concerns about self-harm, here's what you can do:  · Talk about it- your feelings and reasons for harming yourself  · Remove any means that you might use to hurt yourself (examples: pills, rope, extension cords, firearm)  · Get professional help from the community (Mental Health, Substance Abuse, psychological counseling)  · Do not be alone:Call your Safe Contact- someone whom you trust who will be there for you.  · Call your local CRISIS HOTLINE 496-9532 or 037-219-6984  · Call your local Children's Mobile Crisis Response Team [place] (743) 342-7153 or www.[employer]  · Call the toll free National Suicide Prevention Hotlines   · National Suicide Prevention Lifeline 609-207-UPWQ (9323)  · National Hope Line Network 800-SUICIDE (998-2463)

## 2021-02-21 NOTE — DISCHARGE PLANNING
Patient discharged home via [facility]. POLST, facesheet, and AVS accompanied patient. All IV's removed. Belongings packed and accompanied patient (Glasses). Vital signs stable. Patient family notified via phone at 1320.

## 2021-02-21 NOTE — DISCHARGE PLANNING
Hospital Care Management Discharge Planning       Anticipated Discharge Disposition:   · Home with [facility]      Action:   · This RN CM received update from bedside RN that Pt had plan to d/c home with hospice today at 1230  · RN CM confirmed plan through chart review   · RN CM request JENNIFER Blood, to verify transport is scheduled with [facility]  · Per Jeremi, transport is set for 1230  · RN CM called [facility], at [phone]  · No answer, voicemail left requesting a call back   · RN CM called main [facility] phone number [phone] and was forwarded to Ebony, answering service  · Ebony states she will have someone call this RN back to confirm  · RN CM called Pt's son, Deepak, at [phone] regarding DME delivery   · Per Deepak, DME has been delivered to the house and family are expecting Pt around 1300  · RN CM updated Deepak that transport is at 1230  · Deepak states he was told a hospice RN will be meeting them at home when the Pt arrives   · RN THAI updated Dr. Rousseau on planned d/c today at 1230  · RN CM received call from [facility] CM, regarding planned acceptance today  · Per Donna, admitting RN (Mary Jo) will be meeting the Pt and family around 1300  · All questions answered      Barriers to Discharge:   · D/C orders     Plan:   · Hospital Care Management Team to continue to provide support services and assistance with discharge planning as needed.

## 2021-02-21 NOTE — PROGRESS NOTES
Received bedside shift change report and assumed care of pt from day shift nurse EFRAIN Barreto. Pt awake, alert and verbally responsive, watching TV. Denies pain, offers no complaints. Bed at low level, call bell within reach. Standard and safety precautions maintained. Will continue to monitor.

## 2021-02-24 ENCOUNTER — PATIENT OUTREACH (OUTPATIENT)
Dept: HEALTH INFORMATION MANAGEMENT | Facility: OTHER | Age: 81
End: 2021-02-24

## 2021-03-01 DIAGNOSIS — C68.9 TRANSITIONAL CELL CARCINOMA (HCC): ICD-10-CM

## 2021-03-01 DIAGNOSIS — N40.0 BENIGN PROSTATIC HYPERPLASIA WITHOUT LOWER URINARY TRACT SYMPTOMS: ICD-10-CM

## 2021-03-01 PROBLEM — C67.9 MALIGNANT TUMOR OF URINARY BLADDER (HCC): Status: ACTIVE | Noted: 2019-04-23

## 2021-03-09 ENCOUNTER — TELEPHONE (OUTPATIENT)
Dept: MEDICAL GROUP | Facility: PHYSICIAN GROUP | Age: 81
End: 2021-03-09

## 2021-03-09 NOTE — TELEPHONE ENCOUNTER
ESTABLISHED PATIENT PRE-VISIT PLANNING     Patient was NOT contacted to complete PVP.     Note: Patient will not be contacted if there is no indication to call.     1.  Reviewed notes from the last few office visits within the medical group: Yes    2.  If any orders were placed at last visit or intended to be done for this visit (i.e. 6 mos follow-up), do we have Results/Consult Notes?         •  Labs -  ED LABS ON FILE  Note: If patient appointment is for lab review and patient did not complete labs, check with provider if OK to reschedule patient until labs completed.       •  Imaging - ED IMAGING ON FILE       •  Referrals - Referral ordered, patient has NOT been seen.    3. Is this appointment scheduled as a Hospital Follow-Up? No    4.  Immunizations were updated in Epic using Reconcile Outside Information activity? Yes    5.  Patient is due for the following Health Maintenance Topics:   Health Maintenance Due   Topic Date Due   • COVID-19 Vaccine (1 of 2) Never done   • IMM HEP B VACCINE (1 of 3 - Risk 3-dose series) Never done   • IMM ZOSTER VACCINES (1 of 2) Never done   • IMM PNEUMOCOCCAL VACCINE: 65+ Years (2 of 2 - PPSV23) 01/06/2017   • Annual Wellness Visit  01/17/2019   • IMM INFLUENZA (1) Never done       6.  AHA (Pulse8) form printed for Provider? Yes

## 2021-03-16 ENCOUNTER — APPOINTMENT (OUTPATIENT)
Dept: MEDICAL GROUP | Facility: PHYSICIAN GROUP | Age: 81
End: 2021-03-16
Payer: MEDICARE

## 2021-03-18 ENCOUNTER — HOSPITAL ENCOUNTER (OUTPATIENT)
Facility: MEDICAL CENTER | Age: 81
End: 2021-03-18
Attending: PHYSICIAN ASSISTANT
Payer: MEDICARE

## 2021-03-18 PROCEDURE — 87186 SC STD MICRODIL/AGAR DIL: CPT

## 2021-03-18 PROCEDURE — 87205 SMEAR GRAM STAIN: CPT | Mod: 91

## 2021-03-18 PROCEDURE — 87077 CULTURE AEROBIC IDENTIFY: CPT | Mod: 91

## 2021-03-18 PROCEDURE — 87086 URINE CULTURE/COLONY COUNT: CPT

## 2021-03-19 LAB
GRAM STN SPEC: NORMAL
GRAM STN SPEC: NORMAL
SIGNIFICANT IND 70042: NORMAL
SIGNIFICANT IND 70042: NORMAL
SITE SITE: NORMAL
SITE SITE: NORMAL
SOURCE SOURCE: NORMAL
SOURCE SOURCE: NORMAL

## 2021-03-23 LAB
BACTERIA UR CULT: ABNORMAL
GRAM STN SPEC: ABNORMAL
GRAM STN SPEC: ABNORMAL
SIGNIFICANT IND 70042: ABNORMAL
SIGNIFICANT IND 70042: ABNORMAL
SITE SITE: ABNORMAL
SITE SITE: ABNORMAL
SOURCE SOURCE: ABNORMAL
SOURCE SOURCE: ABNORMAL

## 2021-04-02 ENCOUNTER — PATIENT MESSAGE (OUTPATIENT)
Dept: HEALTH INFORMATION MANAGEMENT | Facility: OTHER | Age: 81
End: 2021-04-02

## 2021-04-02 NOTE — THERAPY
Physical Therapy   Daily Treatment     Patient Name: Bay Forrester  Age:  81 y.o., Sex:  male  Medical Record #: [id number]  Today's Date: 2/18/2021     Precautions: Fall Risk    Assessment    Pt with flat affect and poor desire to mobilize. Continues to require assist for all tasks. Was unaware of BM in bed, was agreeable to ambulate to bathroom to clean. Very ataxic with multiple lateral losses of balance, absent righting reactions. Poor use of FWW, max cues for safety. Pt with very limited activity tolerance, reporting complete exhaustion after ambulating to bathroom and back. Recommend post acute placement due to these deficits. If pt were to discharge home would require 24/7 physical assist which per notes his wife cannot provide. Acute PT to continue to follow.    Plan    Continue current treatment plan.    DC Equipment Recommendations: Unable to determine at this time  Discharge Recommendations: Recommend post-acute placement for additional physical therapy services prior to discharge home           Objective       02/18/21 1015   Cognition    Speech/ Communication Delayed Responses   Ability To Follow Commands 1 Step   New Learning Impaired   Attention Impaired   Comments low motivation   Balance   Sitting Balance (Static) Fair   Sitting Balance (Dynamic) Fair -   Standing Balance (Static) Poor +   Standing Balance (Dynamic) Poor -   Weight Shift Sitting Fair   Weight Shift Standing Poor   Comments FWW   Gait Analysis   Gait Level Of Assist Minimal Assist   Assistive Device Front Wheel Walker   Distance (Feet) 35   # of Times Distance was Traveled 2   Deviation Bradykinetic;Shuffled Gait   Weight Bearing Status no restrictions   Comments very ataxic, poor FWW use   Bed Mobility    Supine to Sit Minimal Assist   Sit to Supine Minimal Assist   Scooting Minimal Assist   Functional Mobility   Sit to Stand Minimal Assist   Bed, Chair, Wheelchair Transfer Minimal Assist   Short Term Goals    Short Term Goal  # 1 Pt will perform bed mobility with mod indep in 6 visits.    Goal Outcome # 1 goal not met   Short Term Goal # 2 Pt will transfer with superivision in 6 visits to improve functional indep.    Goal Outcome # 2 Goal not met   Short Term Goal # 3 Pt will ambulate x 125 feet using FWW with supervision in 6 visits to improve functional indep.    Goal Outcome # 3 Goal not met          <-------- Click here to INCLUDE CoVID-19 Discharge Instructions

## 2021-04-12 ENCOUNTER — PATIENT OUTREACH (OUTPATIENT)
Dept: HEALTH INFORMATION MANAGEMENT | Facility: OTHER | Age: 81
End: 2021-04-12

## 2021-04-12 NOTE — PROGRESS NOTES
Outcome: Left Message    Please transfer to Patient Outreach Team at [phone] when patient returns call.    WebIZ Checked & Epic Updated:  no    HealthConnect Verified: no    Attempt # 1

## 2021-09-11 NOTE — ASSESSMENT & PLAN NOTE
Continue to hold lisinopril   Incentive spirometer provided and teaching, pt verbalized understanding.      Ifeanyi Yan RN  09/11/21 2692

## 2022-05-03 NOTE — PROCEDURES
Date of service:  2/10/2021    Title:  Hemodialysis central venous catheter placement - internal jugular vein    Indication: Acute kidney injury    Narrative:    A time out was performed identifying the correct patient, correct procedure and correct location prior to this procedure.    The right neck was prepped with chlorhexidine and draped in the usual sterile fashion.  1% Xylocaine solution was used for topical anesthesia.  A hemodialysis central venous catheter was placed into the right internal jugular vein under ultrasound guidance using the technique described by Chau without difficulty or apparent complication.  The line was sutured into place and a sterile dressing was placed over the line.  All ports flush and return venous blood easily.  The patient tolerated the procedure quite nicely.  No complications are apparent.  A STAT CXR is ordered to confirm placement.      Benji Hernández MD  Pulmonary and Critical Care Medicine     [Normal] : affect was normal and insight and judgment were intact

## 2023-05-26 ENCOUNTER — DOCUMENTATION (OUTPATIENT)
Dept: HEALTH INFORMATION MANAGEMENT | Facility: OTHER | Age: 83
End: 2023-05-26
Payer: MEDICAID

## 2023-05-26 ENCOUNTER — PATIENT MESSAGE (OUTPATIENT)
Dept: HEALTH INFORMATION MANAGEMENT | Facility: OTHER | Age: 83
End: 2023-05-26

## 2023-06-24 NOTE — TELEPHONE ENCOUNTER
Pt notified. He is already sched with vascular and an US repeat in July. No further action required.   [Reviewed Clinical Lab Test(s)] : Results of clinical tests were reviewed. [Reviewed Radiology Report(s)] : Radiology reports were reviewed. [Discuss Alternatives/Risks/Benefits w/Patient] : All alternatives, risks, and benefits were discussed with the patient/family and all questions were answered.  Patient expressed good understanding and appreciates the importance of follow up as recommended.

## 2024-07-27 NOTE — ASSESSMENT & PLAN NOTE
A/P:  Vandana Noble, a 67 year old female with past medical history of type 2 diabetes mellitus, hyperglycemia, hypertension, hyperlipidemia, and hypothyroidism, referred by Geovanna Mueller DO presents in the office for a follow up of type 2 diabetes mellitus.    Type 2 diabetes mellitus without complication, without long-term current use of insulin/Hyperglycemia/ Obesity:  Lab Results   Component Value Date    HGBA1C 6.1 (H) 07/17/2024    HGBA1C 5.9 (H) 03/05/2024    CREATININE 0.94 07/17/2024    CREATININE 0.73 03/13/2024    GFRESTIMATE 66 07/17/2024    GFRESTIMATE 90 03/13/2024    HGB 13.9 05/28/2020    HCT 41.9 05/28/2020    MALBCR 20.3 03/05/2024    MALBCR 19.6 07/13/2023    BUN 37 (H) 07/17/2024    BUN 29 (H) 03/13/2024     Wt Readings from Last 3 Encounters:   07/26/24 77.7 kg (171 lb 4.8 oz)   03/13/24 77.7 kg (171 lb 6.5 oz)   11/21/23 77.7 kg (171 lb 3 oz)   Reviewed and discussed the relevant reports.  HbA1c level is 6.1 % checked on 07/17/2024.  Her current weight is maintained at 171 lbs.  Urine test done on 03/05/2024 reported normal proteinuria.   BUN level is high and recommended drinking more water.  Kidney function is normal at 66 on 07/17/2024.  Ordered Glycohemoglobin and Creatinine, Urine, 24 Hour to be done one week prior to the next visit.  Continue Ozempic 1 mg once weekly. Will increase the dose to 2 mg in three months after her refills is completed.  Continue Metformin 500 mg two tablets twice daily.  Follow up in four months with labs done a week prior to the visit.    Hypothyroidism:  Lab Results   Component Value Date    TSH 3.243 03/05/2024    TSH 1.415 11/17/2023    FT3 2.7 04/13/2015    T4FREE 1.1 04/13/2015   Reviewed and discussed the relevant reports.  TSH level was normal checked on 03/05/2024.  Ordered Thyroid Stimulating Hormone Reflex to be done one week prior to the next visit.  Continue Levothyroxine 50 mcg one tablet daily. Take Levothyroxine in the morning on an empty stomach  This is a chronic health problem that is uncontrolled with current medications and lifestyle measures.Pt following  Urology. No plans of chemotherapy until 08/01/2019 when it will be discussed.   and wait 1 hour before eating. Wait 4 hours before taking any calcium, multivitamins, acid suppressant pills, iron pills, sucralfate, or raloxifene.  Advised to double the dose another day if skipped one day as the half life of levothyroxine is seven days.  Will continue to monitor.    Hypercalcemia   Lab Results   Component Value Date    CALCIUM 11.0 (H) 07/17/2024    CALCIUM 11.4 (H) 03/13/2024    INTAC 25 07/17/2024    INTAC 21 03/13/2024   Reviewed and discussed the relevant reports.  Calcium level was high at 11.4 checked on 03/13/2024.  PTH is normal as checked on 07/17/2024.  Ordered NM PARATHYROID IMAGING. Explained its significance to rule out parathyroid adenoma.  Ordered comprehensive metabolic panel, vitamin D-25 hydroxy, parathyroid hormone intact without calcium and calcium, Urine, 24 to be done a week prior to the next visit.   Follow up in four months with labs done a week prior to the visit.    Hyperlipidemia associated with type 2 diabetes mellitus:  Lab Results   Component Value Date    CHOLESTEROL 175 07/17/2024    CHOLESTEROL 165 03/05/2024    TRIGLYCERIDE 254 (H) 07/17/2024    TRIGLYCERIDE 162 (H) 03/05/2024    CALCLDL 75 07/17/2024    CALCLDL 81 03/05/2024    HDL 49 (L) 07/17/2024    HDL 52 03/05/2024   Reviewed and discussed the relevant reports.  LDL level is normal checked on 07/17/2024.  Triglyceride level has increased from 162 to 254 checked on 07/17/2024.  Continue Fenofibrate 48 mg, 3 tablets daily.  Will continue to monitor.    Hypertension associated with type 2 diabetes mellitus:  Lab Results   Component Value Date    MALBCR 20.3 03/05/2024    MALBCR 19.6 07/13/2023    SODIUM 135 07/17/2024    SODIUM 135 03/13/2024    POTASSIUM 4.7 07/17/2024    POTASSIUM 4.9 03/13/2024   Blood pressure measured today at the office is 143/76 mmHg.  Reviewed and discussed the relevant reports.  Continue Hydrochlorothiazide 25 mg once daily.  Will continue to monitor.    Additional details:  Referral  provided for PCP Dr. Roro Robertson.    Chief Complaint   Patient presents with   • Follow-up     Diabetes follow up      HPI:  Vandana Noble, a 67 year old female with past medical history of type 2 diabetes mellitus, hyperglycemia, hypertension, hyperlipidemia, and hypothyroidism, referred by Geovanna Mueller DO presents in the office for a follow up of type 2 diabetes mellitus.    Patient has a history of type 2 diabetes mellitus. She is currently on:  Ozempic 2 mg once weekly. Has mild diarrhea.  Metformin 500 mg two tablets twice daily.   She is up-to-date with eye exam and has undergone cataract surgery in the right eye three weeks ago. She is scheduled for left eye cataract surgery.     Patient has a history of hypothyroidism, and is on Levothyroxine 50 mcg one tablet daily. Has complaints of hair thinning.    Patient has a history of hypertension, and is on Hydrochlorothiazide 25 mg once daily.    Patient has a history of hyperlipidemia, and is on Fenofibrate 48 mg, 3 tablets daily.    Patient needs reference for a new PCP.     Patient has not reported symptoms of COVID-19.    Current Outpatient Medications   Medication Sig Dispense Refill   • Semaglutide, 2 MG/DOSE, (Ozempic, 2 MG/DOSE,) 8 MG/3ML Solution Pen-injector Inject 2 mg into the skin every 7 days. Indications: Type 2 Diabetes 9 mL 3   • metFORMIN (GLUCOPHAGE) 500 MG tablet Take 2 tablets by mouth in the morning and 2 tablets in the evening. Take with meals. 360 tablet 3   • Lancets Micro Thin 33G Misc USE AS DIRECTED - each 1   • blood glucose (Glucose Meter Test) test strip Test blood sugar QID times daily as directed. Diagnosis:  strip 1   • levothyroxine 50 MCG tablet Take 1 tablet by mouth daily. 90 tablet 1   • fluoxetine (PROzac) 40 MG capsule Take 1 capsule by mouth daily. 90 capsule 1   • hydroCHLOROthiazide (HYDRODIURIL) 25 MG tablet Take 1 tablet by mouth daily. 90 tablet 1   • fenofibrate (TRICOR) 48 MG tablet Take 3 tablets by  mouth daily. 270 tablet 1   • Accu-Chek Evelin Plus test strip TEST THREE TIMES DAILY AS DIRECTED 100 strip 5   • SOFTCLIX LANCETS Misc TEST TWICE DAILY 100 each 1   • fexofenadine (ALLEGRA) 180 MG tablet Take 1 tablet by mouth daily. 90 tablet 0   • aspirin 81 MG EC tablet Take 81 mg by mouth daily.     • CALCIUM-VITAMIN D PO Take 600 mg by mouth daily.     • Multiple Vitamins-Minerals (CENTRUM SILVER 50+WOMEN PO) Take 1 tablet by mouth daily.     • Ascorbic Acid (VITAMIN C) 500 MG tablet Take 500 mg by mouth daily.     • zolpidem (AMBIEN) 5 MG tablet Take 5 mg by mouth as needed.     • fluticasone (FLONASE) 50 MCG/ACT nasal spray Spray 1 spray in each nostril daily as needed.       No current facility-administered medications for this visit.     Orders Placed This Encounter   • NM PARATHYROID IMAGING WITH SPECT CT   • Calcium, Urine, 24 Hour   • Creatinine, Urine, 24 Hour   • Vitamin D -25 Hydroxy   • Parathyroid Hormone Intact Without Calcium   • Comprehensive Metabolic Panel   • Thyroid Stimulating Hormone Reflex   • Glycohemoglobin       OBJECTIVE:    PROBLEM LIST:    Patient Active Problem List   Diagnosis   • Anxiety   • Back pain   • Benign essential HTN   • Essential hypertriglyceridemia   • Hyperglycemia   • Hypothyroidism   • Insomnia   • Pancreatitis, necrotizing (CMD)   • Postmenopausal hormone replacement therapy   • Vitamin D deficiency   • Hypothyroidism   • Pancreatic abscess (CMD)   • Type 2 diabetes mellitus without complication, without long-term current use of insulin  (CMD)   • Closed fracture of left ankle   • ROSINA (obstructive sleep apnea)       HISTORIES:    Past Medical History:    Thyroid disease                                               Pancreatitis (CMD)                                          Past Surgical History:   Procedure Laterality Date   • Ankle surgery Left     6 major surgery on Lt ankle within 1 yr   • Cholecystectomy     • Colonoscopy     • Lasik surgery Right      Family  History   Problem Relation Age of Onset   • Hypertension Mother    • Cancer Father    • Cancer, Prostate Father    • Myocardial Infarction Brother    • Heart disease Brother    • Cancer, Prostate Brother        Social History:  Social History     Socioeconomic History   • Marital status:      Spouse name: Not on file   • Number of children: Not on file   • Years of education: Not on file   • Highest education level: Not on file   Occupational History   • Not on file   Tobacco Use   • Smoking status: Former     Passive exposure: Past   • Smokeless tobacco: Never   Vaping Use   • Vaping status: never used   Substance and Sexual Activity   • Alcohol use: Yes     Comment: occassionally    • Drug use: Not on file   • Sexual activity: Not on file   Other Topics Concern   • Not on file   Social History Narrative   • Not on file     Social Determinants of Health     Financial Resource Strain: Not on file   Food Insecurity: Not on file   Transportation Needs: Not on file   Physical Activity: Inactive (12/17/2018)    Exercise Vital Sign    • Days of Exercise per Week: 0 days    • Minutes of Exercise per Session: 0 min   Stress: Not on file   Social Connections: Not on file   Interpersonal Safety: Not on file     ALLERGIES:   Allergen Reactions   • Morphine HEADACHES     Horrible headache     • Vancomycin RASH     Red man syndrome; sx resolved w/premedication and slower infusion     Review of Systems   Constitutional:  Negative for fatigue.   Respiratory:  Negative for cough and shortness of breath.    Cardiovascular:  Negative for chest pain.   All other systems reviewed and are negative.    BP (!) 143/76   Pulse 75   Resp 18   Ht 5' (1.524 m)   Wt 77.7 kg (171 lb 4.8 oz)   BMI 33.45 kg/m²   BSA 1.75 m²     P/E:  HEENT: EOMI, No exophthalmos, no palpable Lymph node. Slightly enlarged submandibular lymph node.  Neck: No visible thyroid enlargement or palpable Thyroid Nodule.  Chest: No labor respiration, Clear to  Auscultation.  CVS: No tachycardia noted or murmur noted.  Abdomen: Not distended abdomen.   Neurologic: Pt is awake, alert, oriented x 3.  MSK: Moves all extremities.   Skin: No visible rash.  Psych: Pleasant.  Speech: No dysarthria.    LABORATORY DATA:    Hemoglobin A1C (%)   Date Value   07/17/2024 6.1 (H)     No components found for: \"CHOLHDL\"  Triglycerides (mg/dL)   Date Value   07/17/2024 254 (H)     HDL (mg/dL)   Date Value   07/17/2024 49 (L)     LDL (mg/dL)   Date Value   07/17/2024 75     No components found for: \"NONHDLCALC\"  Sodium (mmol/L)   Date Value   07/17/2024 135     Potassium (mmol/L)   Date Value   07/17/2024 4.7     Chloride (mmol/L)   Date Value   07/17/2024 105     Glucose (mg/dL)   Date Value   07/17/2024 108 (H)     Calcium (mg/dL)   Date Value   07/17/2024 11.0 (H)     Carbon Dioxide (mmol/L)   Date Value   07/17/2024 23     BUN (mg/dL)   Date Value   07/17/2024 37 (H)     Creatinine (mg/dL)   Date Value   07/17/2024 0.94     TSH (mcUnits/mL)   Date Value   03/05/2024 3.243     WBC (K/mcL)   Date Value   05/28/2020 5.7     RBC (mil/mcL)   Date Value   05/28/2020 4.40     HCT (%)   Date Value   05/28/2020 41.9     HGB (g/dL)   Date Value   05/28/2020 13.9     PLT (K/mcL)   Date Value   05/28/2020 308     GOT/AST (Units/L)   Date Value   07/17/2024 21     GPT/ALT (Units/L)   Date Value   07/17/2024 29     No results found for: \"GGTP\"  Alkaline Phosphatase (Units/L)   Date Value   07/17/2024 65     Bilirubin, Total (mg/dL)   Date Value   07/17/2024 0.4     All questions were answered, and the patient said that she did not have any questions. And they are leaving the room satisfactorily.    Thank you Geovanna F Will, DO for allowing me to participate in the care of the patient and for Consultation. If any of the questions are unanswered please don't hesitate to give me a call.     IAlice, have created a visit summary document based on the audio recording between Dr. Janki Snow MD  and this patient for the physician to review, edit as needed, and authenticate.  Creation Date: 7/27/2024        Provider Attestation  I have reviewed and edited the visit summary above and attest that it is accurate. The documentation recorded by the scribble accurately reflects the service I personally performed and the decisions made by me,       Electronically signed by     Janki Snow MD; AMADOR; OMID  Associate Professor of Medicine, [facility]  Staff Endocrinologist, [facility]/ [facility]

## 2024-09-29 NOTE — PROGRESS NOTES
Critical Care Progress Note    Date of admission  2/9/2021    Chief Complaint  81 y.o. male admitted 2/9/2021 with life-threatening hyperkalemia, acute kidney injury, pericardial effusion and acute systolic heart failure.  He has a history of transitional cell carcinoma, HTN and hepatitis C.     Hospital Course      2/10 -    stop bicarbonate drip and begin emergent HD.  Start Rocephin for UTI.  2/11 -    stop Cardura.  Titrating norepinephrine for hypotension.  HD today.  Continue Rocephin.      Interval Problem Update  Reviewed last 24 hour events:      AF  Patel 110  97.7  +1319 mL in the last 24  +1943 mL since admit  Lower MAP target to greater than 55 mmHg  Stop Cardura      Review of Systems  Review of Systems   Constitutional: Negative for chills and fever.   HENT: Negative for ear discharge and nosebleeds.    Eyes: Negative for pain, discharge and redness.   Respiratory: Negative for cough, hemoptysis, shortness of breath and stridor.    Cardiovascular: Negative for chest pain and palpitations.   Gastrointestinal: Negative for blood in stool, nausea and vomiting.   Genitourinary: Negative for dysuria and urgency.   Musculoskeletal: Negative for myalgias and neck pain.   Skin: Negative for rash.   Neurological: Negative for focal weakness, seizures and headaches.   Endo/Heme/Allergies: Does not bruise/bleed easily.   Psychiatric/Behavioral: Negative for hallucinations and suicidal ideas.        Vital Signs for last 24 hours   Temp:  [35.9 °C (96.6 °F)-36.5 °C (97.7 °F)] 36.2 °C (97.1 °F)  Pulse:  [] 79  Resp:  [13-34] 18  BP: ()/(38-84) 106/54  SpO2:  [90 %-98 %] 92 %    Hemodynamic parameters for last 24 hours       Respiratory Information for the last 24 hours       Physical Exam   Physical Exam  Constitutional:       Appearance: He is not diaphoretic.   HENT:      Head: Normocephalic and atraumatic.      Right Ear: External ear normal.      Left Ear: External ear normal.      Nose: Nose normal.       Mouth/Throat:      Mouth: Mucous membranes are moist.      Pharynx: Oropharynx is clear.   Eyes:      General:         Right eye: No discharge.         Left eye: No discharge.      Pupils: Pupils are equal, round, and reactive to light.   Cardiovascular:      Pulses: Normal pulses.      Heart sounds: Murmur present. No friction rub.      Comments: Atrial fibrillation  Pulmonary:      Breath sounds: No wheezing or rales.   Abdominal:      General: Bowel sounds are normal. There is no distension.      Palpations: Abdomen is soft.      Tenderness: There is no abdominal tenderness. There is no rebound.   Musculoskeletal:         General: Normal range of motion.      Cervical back: Normal range of motion and neck supple.      Comments: No clubbing or cyanosis   Skin:     General: Skin is warm and dry.      Capillary Refill: Capillary refill takes less than 2 seconds.   Neurological:      Comments: More awake and alert.  No focal weakness.         Medications  Current Facility-Administered Medications   Medication Dose Route Frequency Provider Last Rate Last Admin   • ondansetron (ZOFRAN) syringe/vial injection 4 mg  4 mg Intravenous Q4HRS PRN Lisy Lau M.D.   4 mg at 02/11/21 0158   • phenylephrine (ROBINSON-SYNEPHRINE) 40 mg in  mL Infusion  0-300 mcg/min Intravenous Continuous Benji Hernández M.D.       • traZODone (DESYREL) tablet 100 mg  100 mg Oral QHS Tacos Pickens D.O.   100 mg at 02/10/21 2109   • senna-docusate (PERICOLACE or SENOKOT S) 8.6-50 MG per tablet 2 tablet  2 tablet Oral BID Tacos Pickens D.O.   2 tablet at 02/11/21 0537    And   • polyethylene glycol/lytes (MIRALAX) PACKET 1 Packet  1 Packet Oral QDAY PRN Tacos Pickens D.O.        And   • magnesium hydroxide (MILK OF MAGNESIA) suspension 30 mL  30 mL Oral QDAY PRN Tacos Pickens D.O.        And   • bisacodyl (DULCOLAX) suppository 10 mg  10 mg Rectal QDAY PRN Tacos Pickens D.O.       • heparin injection  5,000 Units  5,000 Units Subcutaneous Q8HRS Tacos Pickens D.O.   5,000 Units at 02/11/21 0538   • acetaminophen (Tylenol) tablet 650 mg  650 mg Oral Q6HRS PRN Tacos Pickens D.O.   650 mg at 02/10/21 0806   • labetalol (NORMODYNE/TRANDATE) injection 10 mg  10 mg Intravenous Q4HRS PRN Tacos Pickens D.O.       • sevelamer carbonate (RENVELA) tablet 800 mg  800 mg Oral TID WITH MEALS Tacos Pickens D.O.   800 mg at 02/10/21 1753   • cefTRIAXone (ROCEPHIN) 1 g in  mL IVPB  1 g Intravenous Q24HRS Benji Hernández M.D.   Stopped at 02/11/21 0607   • heparin injection 2,800 Units  2,800 Units Intravenous PRN Gabi Stevenson M.D.   2,800 Units at 02/10/21 1711       Fluids    Intake/Output Summary (Last 24 hours) at 2/11/2021 0831  Last data filed at 2/11/2021 0800  Gross per 24 hour   Intake 2931.36 ml   Output 1532 ml   Net 1399.36 ml       Laboratory          Recent Labs     02/09/21  2337 02/10/21  0417 02/10/21  0810 02/11/21  0238   SODIUM 136 136 134* 132*   POTASSIUM 7.0* 6.4* 6.2* 4.6   CHLORIDE 106 106 103 98   CO2 10* 9* 10* 21   * 173* 174* 97*   CREATININE 13.87* 12.86* 12.77* 9.12*   MAGNESIUM 2.1  --   --  1.7   PHOSPHORUS 7.2*  --   --  5.0*   CALCIUM 8.7 9.0 8.2* 7.7*     Recent Labs     02/09/21  2337 02/10/21  0417 02/10/21  0810 02/11/21  0238   ALTSGPT 22 22 22  --    ASTSGOT 10* 12 14  --    ALKPHOSPHAT 94 89 77  --    TBILIRUBIN 0.6 0.6 0.6  --    GLUCOSE 108* 85 182* 126*     Recent Labs     02/09/21  1350 02/09/21  2337 02/10/21  0417 02/10/21  0810 02/11/21  0238   WBC 11.5* 11.5*  --   --  9.5   NEUTSPOLYS 83.00* 90.40*  --   --  86.80*   LYMPHOCYTES 4.50* 4.30*  --   --  3.90*   MONOCYTES 9.50 3.50  --   --  7.90   EOSINOPHILS 0.10 0.90  --   --  0.20   BASOPHILS 0.40 0.00  --   --  0.20   ASTSGOT  --  10* 12 14  --    ALTSGPT  --  22 22 22  --    ALKPHOSPHAT  --  94 89 77  --    TBILIRUBIN  --  0.6 0.6 0.6  --      Recent Labs     02/09/21  1350 02/09/21 2337  02/11/21  0238   RBC 3.55* 3.54* 2.90*   HEMOGLOBIN 10.4* 10.4* 8.7*   HEMATOCRIT 34.3* 33.7* 26.2*   PLATELETCT 284 264 179   PROTHROMBTM 17.6* 17.9*  --    APTT  --  36.8*  --    INR 1.40* 1.43*  --        Imaging  None    Assessment/Plan  * NOEL (acute kidney injury) (HCC)- (present on admission)  Assessment & Plan  Hydronephrosis and hydroureter on imaging  Keep Bishop catheter in place  Continue HD with UF  Avoid nephrotoxins and renal dose medications    Hyperkalemia- (present on admission)  Assessment & Plan  Resolved after HD    Pyuria  Assessment & Plan  Continue empiric Rocephin    Pulmonary hypertension (HCC)  Assessment & Plan  RVSP 60 mm Hg    Atrial fibrillation (HCC)- (present on admission)  Assessment & Plan  Rate control  Echo with LVEF of 30%, moderate pericardial effusion and moderate aortic stenosis  Optimize potassium and magnesium    Acute systolic CHF (congestive heart failure) (HCC)- (present on admission)  Assessment & Plan  EF 30%    Pericardial effusion- (present on admission)  Assessment & Plan  Echo reveals no evidence of hemodynamic compromise or tamponade    Transitional cell carcinoma (HCC)- (present on admission)  Assessment & Plan  Followed by Dr. Murphy    Aortic stenosis- (present on admission)  Assessment & Plan  Moderate AS with mean gradient of 21 mmHg  Avoid intravascular volume depletion    Essential hypertension- (present on admission)  Assessment & Plan  Continue to hold lisinopril       VTE:  Heparin  Ulcer: Not Indicated  Lines: Central Line  Ongoing indication addressed and Bishop Catheter  Ongoing indication addressed    I have performed a physical exam and reviewed and updated ROS and Plan today (2/11/2021). In review of yesterday's note (2/10/2021), there are no changes except as documented above.     I have assessed and reassessed his respiratory status, hemodynamics, blood pressure, cardiovascular status with titration of phenylephrine and his neurologic status.  He is  at increased risk for worsening cardiovascular and respiratory system dysfunction.    Discussed patient condition and risk of morbidity and/or mortality with RN, RT, Pharmacy, Charge nurse / hot rounds, QA team and nephrology     The patient remains critically ill.  Critical care time = 35 minutes in directly providing and coordinating critical care and extensive data review.  No time overlap and excludes procedures.    Benji Hernández MD  Pulmonary and Critical Care Medicine     Adult female hx as above sp prior l/s surg pw worsening back pain, not responding to opt meds. Plan analgesic, labs and cs spine who elected to admit pt for further management.  Olivier Horvath MD, Facep

## (undated) DEVICE — SENSOR SPO2 NEO LNCS ADHESIVE (20/BX) SEE USER NOTES

## (undated) DEVICE — CONNECTOR HOSE NEPTUNE FOR CYSTO ROOM

## (undated) DEVICE — GOWN WARMING STANDARD FLEX - (30/CA)

## (undated) DEVICE — SPONGE GAUZESTER 4 X 4 4PLY - (128PK/CA)

## (undated) DEVICE — BAG DRAINAGE URINARY CLOSED 2000ML (20EA/CA)

## (undated) DEVICE — HEAD HOLDER JUNIOR/ADULT

## (undated) DEVICE — TUBING CLEARLINK DUO-VENT - C-FLO (48EA/CA)

## (undated) DEVICE — LACTATED RINGERS INJ 1000 ML - (14EA/CA 60CA/PF)

## (undated) DEVICE — SUCTION INSTRUMENT YANKAUER BULBOUS TIP W/O VENT (50EA/CA)

## (undated) DEVICE — COVER FOOT UNIVERSAL DISP. - (25EA/CA)

## (undated) DEVICE — WATER IRRIG. STER 3000 ML - (4/CA)

## (undated) DEVICE — ELECTRODE ROLLERBALL 3MM 24FR (6EA/PK)

## (undated) DEVICE — ELECTRODE 850 FOAM ADHESIVE - HYDROGEL RADIOTRNSPRNT (50/PK)

## (undated) DEVICE — KIT ANESTHESIA W/CIRCUIT & 3/LT BAG W/FILTER (20EA/CA)

## (undated) DEVICE — GLOVE BIOGEL SZ 7.5 SURGICAL PF LTX - (50PR/BX 4BX/CA)

## (undated) DEVICE — GOWN SURGICAL X-LARGE ULTRA - FILM-REINFORCED (20/CA)

## (undated) DEVICE — ELECTRODE DUAL RETURN W/ CORD - (50/PK)

## (undated) DEVICE — NEPTUNE 4 PORT MANIFOLD - (20/PK)

## (undated) DEVICE — ELECTRODE MONOPOLAR ANGLED CUTTING LOOP DIAM 0.35 YELLOW 24FR (6EA/PK)

## (undated) DEVICE — EVACUATOR BLADDER ELLIK - (10/BX)

## (undated) DEVICE — CONTAINER SPECIMEN BAG OR - STERILE 4 OZ W/LID (100EA/CA)

## (undated) DEVICE — SET IRRIGATION CYSTOSCOPY Y-TYPE L81 IN (20EA/CA)

## (undated) DEVICE — CATH, FOLEY 20 FR 30ML 2-WAY

## (undated) DEVICE — PROTECTOR ULNA NERVE - (36PR/CA)

## (undated) DEVICE — TUBE CONNECT SUCTION CLEAR 120 X 1/4" (50EA/CA)"

## (undated) DEVICE — KIT ROOM DECONTAMINATION

## (undated) DEVICE — SYRINGE DISP. 12 CC LL - (100/BX)

## (undated) DEVICE — SET EXTENSION WITH 2 PORTS (48EA/CA) ***PART #2C8610 IS A SUBSTITUTE*****

## (undated) DEVICE — SET LEADWIRE 5 LEAD BEDSIDE DISPOSABLE ECG (1SET OF 5/EA)

## (undated) DEVICE — BAG URODRAIN WITH TUBING - (20/CA)

## (undated) DEVICE — ELECTRODE ROLLERBALL 5MM 24FR (6EA/PK)

## (undated) DEVICE — GOWN SURGEONS X-LARGE - DISP. (30/CA)

## (undated) DEVICE — SLEEVE, VASO, THIGH, MED

## (undated) DEVICE — JELLY SURGILUBE STERILE TUBE 4.25 OZ (1/EA)

## (undated) DEVICE — ZIPWIRE .038 STRAIGHT BENTSON TIP (5EA/BX)

## (undated) DEVICE — CATHETER URETHRAL OPEN END AXXCESS (10EA/BX)

## (undated) DEVICE — MASK ANESTHESIA ADULT  - (100/CA)

## (undated) DEVICE — PACK SINGLE BASIN - (6/CA)

## (undated) DEVICE — CATHETER URETHRAL FOLEY SILICONE OD20 FR 10 ML (10EA/CA)

## (undated) DEVICE — PACK CYSTOSCOPY III - (8/CA)

## (undated) DEVICE — WATER IRRIGATION STERILE 1000ML (12EA/CA)

## (undated) DEVICE — CORD ELECTROSURG. TUR DISP (50EA/CA)

## (undated) DEVICE — TUBE E-T HI-LO CUFF 7.5MM (10EA/PK)

## (undated) DEVICE — JELLY, KY 2 0Z STERILE

## (undated) DEVICE — WIRE GUIDE SENSOR DUAL FLEX - 5/BX

## (undated) DEVICE — GLOVE BIOGEL PI INDICATOR SZ 6.5 SURGICAL PF LF - (50/BX 4BX/CA)

## (undated) DEVICE — WATER IRRIG. STER. 1500 ML - (9/CA)